# Patient Record
Sex: MALE | Race: WHITE | Employment: OTHER | URBAN - METROPOLITAN AREA
[De-identification: names, ages, dates, MRNs, and addresses within clinical notes are randomized per-mention and may not be internally consistent; named-entity substitution may affect disease eponyms.]

---

## 2017-05-06 ENCOUNTER — ED HISTORICAL/CONVERTED ENCOUNTER (OUTPATIENT)
Dept: OTHER | Age: 82
End: 2017-05-06

## 2018-03-13 ENCOUNTER — OP HISTORICAL/CONVERTED ENCOUNTER (OUTPATIENT)
Dept: OTHER | Age: 83
End: 2018-03-13

## 2018-03-22 ENCOUNTER — OP HISTORICAL/CONVERTED ENCOUNTER (OUTPATIENT)
Dept: OTHER | Age: 83
End: 2018-03-22

## 2018-11-22 ENCOUNTER — IP HISTORICAL/CONVERTED ENCOUNTER (OUTPATIENT)
Dept: OTHER | Age: 83
End: 2018-11-22

## 2019-01-15 ENCOUNTER — OP HISTORICAL/CONVERTED ENCOUNTER (OUTPATIENT)
Dept: OTHER | Age: 84
End: 2019-01-15

## 2019-06-06 ENCOUNTER — ED HISTORICAL/CONVERTED ENCOUNTER (OUTPATIENT)
Dept: OTHER | Age: 84
End: 2019-06-06

## 2019-10-20 ENCOUNTER — ED HISTORICAL/CONVERTED ENCOUNTER (OUTPATIENT)
Dept: OTHER | Age: 84
End: 2019-10-20

## 2020-03-28 ENCOUNTER — OP HISTORICAL/CONVERTED ENCOUNTER (OUTPATIENT)
Dept: OTHER | Age: 85
End: 2020-03-28

## 2020-05-07 ENCOUNTER — OP HISTORICAL/CONVERTED ENCOUNTER (OUTPATIENT)
Dept: OTHER | Age: 85
End: 2020-05-07

## 2020-07-07 ENCOUNTER — OP HISTORICAL/CONVERTED ENCOUNTER (OUTPATIENT)
Dept: OTHER | Age: 85
End: 2020-07-07

## 2021-01-01 ENCOUNTER — APPOINTMENT (OUTPATIENT)
Dept: CT IMAGING | Age: 86
End: 2021-01-01
Attending: EMERGENCY MEDICINE
Payer: MEDICARE

## 2021-01-01 ENCOUNTER — ANESTHESIA (OUTPATIENT)
Dept: SURGERY | Age: 86
DRG: 036 | End: 2021-01-01
Payer: MEDICARE

## 2021-01-01 ENCOUNTER — APPOINTMENT (OUTPATIENT)
Dept: GENERAL RADIOLOGY | Age: 86
End: 2021-01-01
Attending: FAMILY MEDICINE
Payer: MEDICARE

## 2021-01-01 ENCOUNTER — HOSPITAL ENCOUNTER (INPATIENT)
Age: 86
LOS: 1 days | Discharge: HOME | DRG: 036 | End: 2021-08-27
Payer: MEDICARE

## 2021-01-01 ENCOUNTER — ANESTHESIA EVENT (OUTPATIENT)
Dept: SURGERY | Age: 86
DRG: 036 | End: 2021-01-01
Payer: MEDICARE

## 2021-01-01 ENCOUNTER — APPOINTMENT (OUTPATIENT)
Dept: GENERAL RADIOLOGY | Age: 86
DRG: 036 | End: 2021-01-01
Payer: MEDICARE

## 2021-01-01 ENCOUNTER — HOSPITAL ENCOUNTER (EMERGENCY)
Age: 86
Discharge: HOME | End: 2021-08-11
Attending: FAMILY MEDICINE
Payer: MEDICARE

## 2021-01-01 ENCOUNTER — HOSPITAL ENCOUNTER (EMERGENCY)
Age: 86
Discharge: HOME | End: 2021-09-01
Attending: EMERGENCY MEDICINE
Payer: MEDICARE

## 2021-01-01 ENCOUNTER — HOSPITAL ENCOUNTER (OUTPATIENT)
Dept: PREADMISSION TESTING | Age: 86
Discharge: HOME | End: 2021-08-19
Payer: MEDICARE

## 2021-01-01 ENCOUNTER — HOSPITAL ENCOUNTER (EMERGENCY)
Age: 86
Discharge: HOME | End: 2021-08-28
Attending: EMERGENCY MEDICINE
Payer: MEDICARE

## 2021-01-01 ENCOUNTER — APPOINTMENT (OUTPATIENT)
Dept: CT IMAGING | Age: 86
End: 2021-01-01
Attending: FAMILY MEDICINE
Payer: MEDICARE

## 2021-01-01 VITALS
HEART RATE: 93 BPM | DIASTOLIC BLOOD PRESSURE: 103 MMHG | TEMPERATURE: 98.2 F | BODY MASS INDEX: 21.26 KG/M2 | WEIGHT: 120 LBS | HEIGHT: 63 IN | RESPIRATION RATE: 16 BRPM | OXYGEN SATURATION: 93 % | SYSTOLIC BLOOD PRESSURE: 176 MMHG

## 2021-01-01 VITALS
HEIGHT: 65 IN | BODY MASS INDEX: 19.78 KG/M2 | DIASTOLIC BLOOD PRESSURE: 74 MMHG | TEMPERATURE: 97.3 F | RESPIRATION RATE: 20 BRPM | HEART RATE: 77 BPM | WEIGHT: 118.7 LBS | SYSTOLIC BLOOD PRESSURE: 137 MMHG | OXYGEN SATURATION: 98 %

## 2021-01-01 VITALS
RESPIRATION RATE: 20 BRPM | WEIGHT: 118 LBS | TEMPERATURE: 98.5 F | HEIGHT: 65 IN | OXYGEN SATURATION: 90 % | SYSTOLIC BLOOD PRESSURE: 157 MMHG | BODY MASS INDEX: 19.66 KG/M2 | HEART RATE: 78 BPM | DIASTOLIC BLOOD PRESSURE: 82 MMHG

## 2021-01-01 VITALS
OXYGEN SATURATION: 96 % | RESPIRATION RATE: 18 BRPM | DIASTOLIC BLOOD PRESSURE: 79 MMHG | BODY MASS INDEX: 24.84 KG/M2 | WEIGHT: 135 LBS | HEART RATE: 70 BPM | SYSTOLIC BLOOD PRESSURE: 138 MMHG | TEMPERATURE: 98.2 F | HEIGHT: 62 IN

## 2021-01-01 VITALS
TEMPERATURE: 98 F | SYSTOLIC BLOOD PRESSURE: 119 MMHG | DIASTOLIC BLOOD PRESSURE: 60 MMHG | BODY MASS INDEX: 19.66 KG/M2 | HEIGHT: 65 IN | OXYGEN SATURATION: 90 % | HEART RATE: 75 BPM | RESPIRATION RATE: 19 BRPM | WEIGHT: 118 LBS

## 2021-01-01 DIAGNOSIS — G45.9 TIA (TRANSIENT ISCHEMIC ATTACK): Primary | ICD-10-CM

## 2021-01-01 DIAGNOSIS — I65.22 STENOSIS OF LEFT CAROTID ARTERY: Primary | ICD-10-CM

## 2021-01-01 DIAGNOSIS — Z09 POSTOP CHECK: Primary | ICD-10-CM

## 2021-01-01 DIAGNOSIS — Z48.812 POSTOP CAROTID ENDARTERECTOMY SURVEILLANCE, ENCOUNTER FOR: ICD-10-CM

## 2021-01-01 DIAGNOSIS — S01.01XA LACERATION OF SCALP, INITIAL ENCOUNTER: ICD-10-CM
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
## 2021-01-01 DIAGNOSIS — I65.22 MORE THAN 50 PERCENT STENOSIS OF LEFT INTERNAL CAROTID ARTERY: ICD-10-CM

## 2021-01-01 DIAGNOSIS — S00.03XA CONTUSION OF SCALP, INITIAL ENCOUNTER: ICD-10-CM

## 2021-01-01 LAB
ABO + RH BLD: NORMAL
ACT BLD: 323 SECS (ref 79–138)
ACT BLD: 390 SECS (ref 79–138)
ALBUMIN SERPL-MCNC: 3.4 G/DL (ref 3.5–5)
ALBUMIN SERPL-MCNC: 3.5 G/DL (ref 3.5–5)
ALBUMIN/GLOB SERPL: 0.9 {RATIO} (ref 1.1–2.2)
ALBUMIN/GLOB SERPL: 1.1 {RATIO} (ref 1.1–2.2)
ALP SERPL-CCNC: 125 U/L (ref 45–117)
ALP SERPL-CCNC: 92 U/L (ref 45–117)
ALT SERPL-CCNC: 17 U/L (ref 12–78)
ALT SERPL-CCNC: 23 U/L (ref 12–78)
ANION GAP SERPL CALC-SCNC: 7 MMOL/L (ref 5–15)
APTT PPP: 29.4 SEC (ref 22.1–31)
AST SERPL W P-5'-P-CCNC: 28 U/L (ref 15–37)
AST SERPL-CCNC: 25 U/L (ref 15–37)
ATRIAL RATE: 68 BPM
ATRIAL RATE: 76 BPM
BASOPHILS # BLD: 0 K/UL (ref 0–0.1)
BASOPHILS NFR BLD: 0 % (ref 0–1)
BASOPHILS NFR BLD: 0 % (ref 0–1)
BASOPHILS NFR BLD: 1 % (ref 0–1)
BILIRUB SERPL-MCNC: 0.4 MG/DL (ref 0.2–1)
BILIRUB SERPL-MCNC: 0.8 MG/DL (ref 0.2–1)
BLOOD GROUP ANTIBODIES SERPL: NORMAL
BUN SERPL-MCNC: 15 MG/DL (ref 6–20)
BUN SERPL-MCNC: 15 MG/DL (ref 6–20)
BUN SERPL-MCNC: 17 MG/DL (ref 6–20)
BUN/CREAT SERPL: 16 (ref 12–20)
BUN/CREAT SERPL: 17 (ref 12–20)
BUN/CREAT SERPL: 19 (ref 12–20)
CA-I BLD-MCNC: 9 MG/DL (ref 8.5–10.1)
CALCIUM SERPL-MCNC: 7.7 MG/DL (ref 8.5–10.1)
CALCIUM SERPL-MCNC: 8.9 MG/DL (ref 8.5–10.1)
CALCULATED P AXIS, ECG09: 69 DEGREES
CALCULATED P AXIS, ECG09: 71 DEGREES
CALCULATED R AXIS, ECG10: -23 DEGREES
CALCULATED R AXIS, ECG10: -28 DEGREES
CALCULATED T AXIS, ECG11: 101 DEGREES
CALCULATED T AXIS, ECG11: 99 DEGREES
CHLORIDE SERPL-SCNC: 100 MMOL/L (ref 97–108)
CHLORIDE SERPL-SCNC: 103 MMOL/L (ref 97–108)
CHLORIDE SERPL-SCNC: 99 MMOL/L (ref 97–108)
CHOLEST SERPL-MCNC: 132 MG/DL
CO2 SERPL-SCNC: 24 MMOL/L (ref 21–32)
CO2 SERPL-SCNC: 29 MMOL/L (ref 21–32)
CO2 SERPL-SCNC: 29 MMOL/L (ref 21–32)
CREAT SERPL-MCNC: 0.8 MG/DL (ref 0.7–1.3)
CREAT SERPL-MCNC: 0.93 MG/DL (ref 0.7–1.3)
CREAT SERPL-MCNC: 0.99 MG/DL (ref 0.7–1.3)
DIAGNOSIS, 93000: NORMAL
DIAGNOSIS, 93000: NORMAL
DIFFERENTIAL METHOD BLD: ABNORMAL
EOSINOPHIL # BLD: 0.1 K/UL (ref 0–0.4)
EOSINOPHIL # BLD: 0.2 K/UL (ref 0–0.4)
EOSINOPHIL # BLD: 0.2 K/UL (ref 0–0.4)
EOSINOPHIL NFR BLD: 1 % (ref 0–7)
EOSINOPHIL NFR BLD: 2 % (ref 0–7)
EOSINOPHIL NFR BLD: 3 % (ref 0–7)
ERYTHROCYTE [DISTWIDTH] IN BLOOD BY AUTOMATED COUNT: 13 % (ref 11.5–14.5)
ERYTHROCYTE [DISTWIDTH] IN BLOOD BY AUTOMATED COUNT: 13 % (ref 11.5–14.5)
ERYTHROCYTE [DISTWIDTH] IN BLOOD BY AUTOMATED COUNT: 13.1 % (ref 11.5–14.5)
ERYTHROCYTE [DISTWIDTH] IN BLOOD BY AUTOMATED COUNT: 13.3 % (ref 11.5–14.5)
GLOBULIN SER CALC-MCNC: 3.2 G/DL (ref 2–4)
GLOBULIN SER CALC-MCNC: 3.9 G/DL (ref 2–4)
GLUCOSE SERPL-MCNC: 118 MG/DL (ref 65–100)
GLUCOSE SERPL-MCNC: 122 MG/DL (ref 65–100)
GLUCOSE SERPL-MCNC: 90 MG/DL (ref 65–100)
HCT VFR BLD AUTO: 23.8 % (ref 36.6–50.3)
HCT VFR BLD AUTO: 26.3 % (ref 36.6–50.3)
HCT VFR BLD AUTO: 39.6 % (ref 36.6–50.3)
HCT VFR BLD AUTO: 41.9 % (ref 36.6–50.3)
HDLC SERPL-MCNC: 51 MG/DL
HDLC SERPL: 2.6 {RATIO} (ref 0–5)
HGB BLD-MCNC: 13 G/DL (ref 12.1–17)
HGB BLD-MCNC: 13.8 G/DL (ref 12.1–17)
HGB BLD-MCNC: 7.9 G/DL (ref 12.1–17)
HGB BLD-MCNC: 8.8 G/DL (ref 12.1–17)
IMM GRANULOCYTES # BLD AUTO: 0 K/UL (ref 0–0.04)
IMM GRANULOCYTES NFR BLD AUTO: 0 % (ref 0–0.5)
IMM GRANULOCYTES NFR BLD AUTO: 0 % (ref 0–0.5)
IMM GRANULOCYTES NFR BLD AUTO: 1 % (ref 0–0.5)
INR PPP: 1 (ref 0.9–1.1)
INR PPP: 1.1 (ref 0.9–1.1)
LDLC SERPL CALC-MCNC: 66.6 MG/DL (ref 0–100)
LYMPHOCYTES # BLD: 1.2 K/UL (ref 0.8–3.5)
LYMPHOCYTES # BLD: 1.7 K/UL (ref 0.8–3.5)
LYMPHOCYTES # BLD: 1.7 K/UL (ref 0.8–3.5)
LYMPHOCYTES NFR BLD: 17 % (ref 12–49)
LYMPHOCYTES NFR BLD: 24 % (ref 12–49)
LYMPHOCYTES NFR BLD: 34 % (ref 12–49)
MCH RBC QN AUTO: 32.8 PG (ref 26–34)
MCH RBC QN AUTO: 33.3 PG (ref 26–34)
MCH RBC QN AUTO: 33.5 PG (ref 26–34)
MCH RBC QN AUTO: 34.1 PG (ref 26–34)
MCHC RBC AUTO-ENTMCNC: 32.8 G/DL (ref 30–36.5)
MCHC RBC AUTO-ENTMCNC: 32.9 G/DL (ref 30–36.5)
MCHC RBC AUTO-ENTMCNC: 33.2 G/DL (ref 30–36.5)
MCHC RBC AUTO-ENTMCNC: 33.5 G/DL (ref 30–36.5)
MCV RBC AUTO: 100 FL (ref 80–99)
MCV RBC AUTO: 100.4 FL (ref 80–99)
MCV RBC AUTO: 101.7 FL (ref 80–99)
MCV RBC AUTO: 101.9 FL (ref 80–99)
MONOCYTES # BLD: 0.7 K/UL (ref 0–1)
MONOCYTES # BLD: 0.7 K/UL (ref 0–1)
MONOCYTES # BLD: 0.8 K/UL (ref 0–1)
MONOCYTES NFR BLD: 10 % (ref 5–13)
MONOCYTES NFR BLD: 12 % (ref 5–13)
MONOCYTES NFR BLD: 15 % (ref 5–13)
NEUTS SEG # BLD: 2.3 K/UL (ref 1.8–8)
NEUTS SEG # BLD: 4.2 K/UL (ref 1.8–8)
NEUTS SEG # BLD: 5 K/UL (ref 1.8–8)
NEUTS SEG NFR BLD: 46 % (ref 32–75)
NEUTS SEG NFR BLD: 62 % (ref 32–75)
NEUTS SEG NFR BLD: 72 % (ref 32–75)
NRBC # BLD: 0 K/UL (ref 0–0.01)
NRBC # BLD: 0 K/UL (ref 0–0.01)
NRBC BLD-RTO: 0 PER 100 WBC
NRBC BLD-RTO: 0 PER 100 WBC
P-R INTERVAL, ECG05: 208 MS
P-R INTERVAL, ECG05: 224 MS
PLATELET # BLD AUTO: 138 K/UL (ref 150–400)
PLATELET # BLD AUTO: 206 K/UL (ref 150–400)
PLATELET # BLD AUTO: 237 K/UL (ref 150–400)
PLATELET # BLD AUTO: 262 K/UL (ref 150–400)
PMV BLD AUTO: 10 FL (ref 8.9–12.9)
PMV BLD AUTO: 8.9 FL (ref 8.9–12.9)
PMV BLD AUTO: 9.3 FL (ref 8.9–12.9)
PMV BLD AUTO: 9.3 FL (ref 8.9–12.9)
POTASSIUM SERPL-SCNC: 3.9 MMOL/L (ref 3.5–5.1)
POTASSIUM SERPL-SCNC: 4.1 MMOL/L (ref 3.5–5.1)
POTASSIUM SERPL-SCNC: 4.2 MMOL/L (ref 3.5–5.1)
PROT SERPL-MCNC: 6.6 G/DL (ref 6.4–8.2)
PROT SERPL-MCNC: 7.4 G/DL (ref 6.4–8.2)
PROTHROMBIN TIME: 10.7 SEC (ref 9–11.1)
PROTHROMBIN TIME: 11.1 SEC (ref 9–11.1)
Q-T INTERVAL, ECG07: 394 MS
Q-T INTERVAL, ECG07: 412 MS
QRS DURATION, ECG06: 106 MS
QRS DURATION, ECG06: 98 MS
QTC CALCULATION (BEZET), ECG08: 438 MS
QTC CALCULATION (BEZET), ECG08: 443 MS
RBC # BLD AUTO: 2.37 M/UL (ref 4.1–5.7)
RBC # BLD AUTO: 2.58 M/UL (ref 4.1–5.7)
RBC # BLD AUTO: 3.96 M/UL (ref 4.1–5.7)
RBC # BLD AUTO: 4.12 M/UL (ref 4.1–5.7)
SODIUM SERPL-SCNC: 134 MMOL/L (ref 136–145)
SODIUM SERPL-SCNC: 135 MMOL/L (ref 136–145)
SODIUM SERPL-SCNC: 136 MMOL/L (ref 136–145)
SPECIMEN EXP DATE BLD: NORMAL
THERAPEUTIC RANGE,PTTT: NORMAL SECS (ref 58–77)
TRIGL SERPL-MCNC: 72 MG/DL (ref ?–150)
VENTRICULAR RATE, ECG03: 68 BPM
VENTRICULAR RATE, ECG03: 76 BPM
VLDLC SERPL CALC-MCNC: 14.4 MG/DL
WBC # BLD AUTO: 13.9 K/UL (ref 4.1–11.1)
WBC # BLD AUTO: 5 K/UL (ref 4.1–11.1)
WBC # BLD AUTO: 6.9 K/UL (ref 4.1–11.1)
WBC # BLD AUTO: 7.1 K/UL (ref 4.1–11.1)

## 2021-01-01 PROCEDURE — 65270000029 HC RM PRIVATE

## 2021-01-01 PROCEDURE — 74011250636 HC RX REV CODE- 250/636

## 2021-01-01 PROCEDURE — 70450 CT HEAD/BRAIN W/O DYE: CPT

## 2021-01-01 PROCEDURE — C1769 GUIDE WIRE: HCPCS

## 2021-01-01 PROCEDURE — 36415 COLL VENOUS BLD VENIPUNCTURE: CPT

## 2021-01-01 PROCEDURE — C1894 INTRO/SHEATH, NON-LASER: HCPCS

## 2021-01-01 PROCEDURE — 85025 COMPLETE CBC W/AUTO DIFF WBC: CPT

## 2021-01-01 PROCEDURE — 2709999900 HC NON-CHARGEABLE SUPPLY

## 2021-01-01 PROCEDURE — 77030002933 HC SUT MCRYL J&J -A

## 2021-01-01 PROCEDURE — 77030010507 HC ADH SKN DERMBND J&J -B

## 2021-01-01 PROCEDURE — 72125 CT NECK SPINE W/O DYE: CPT

## 2021-01-01 PROCEDURE — 77030028271 HC SRGFL HEMSTAT MTRX KT J&J -C

## 2021-01-01 PROCEDURE — 76010000153 HC OR TIME 1.5 TO 2 HR

## 2021-01-01 PROCEDURE — 77030019940 HC BLNKT HYPOTHRM STRY -B

## 2021-01-01 PROCEDURE — 74011000250 HC RX REV CODE- 250

## 2021-01-01 PROCEDURE — 74011250636 HC RX REV CODE- 250/636: Performed by: ANESTHESIOLOGY

## 2021-01-01 PROCEDURE — 85730 THROMBOPLASTIN TIME PARTIAL: CPT

## 2021-01-01 PROCEDURE — 99282 EMERGENCY DEPT VISIT SF MDM: CPT

## 2021-01-01 PROCEDURE — 74011000250 HC RX REV CODE- 250: Performed by: NURSE ANESTHETIST, CERTIFIED REGISTERED

## 2021-01-01 PROCEDURE — 99284 EMERGENCY DEPT VISIT MOD MDM: CPT

## 2021-01-01 PROCEDURE — 86901 BLOOD TYPING SEROLOGIC RH(D): CPT

## 2021-01-01 PROCEDURE — C1876 STENT, NON-COA/NON-COV W/DEL: HCPCS

## 2021-01-01 PROCEDURE — 80053 COMPREHEN METABOLIC PANEL: CPT

## 2021-01-01 PROCEDURE — 76210000006 HC OR PH I REC 0.5 TO 1 HR

## 2021-01-01 PROCEDURE — 03HY32Z INSERTION OF MONITORING DEVICE INTO UPPER ARTERY, PERCUTANEOUS APPROACH: ICD-10-PCS | Performed by: ANESTHESIOLOGY

## 2021-01-01 PROCEDURE — C1725 CATH, TRANSLUMIN NON-LASER: HCPCS

## 2021-01-01 PROCEDURE — 74011250637 HC RX REV CODE- 250/637

## 2021-01-01 PROCEDURE — 85610 PROTHROMBIN TIME: CPT

## 2021-01-01 PROCEDURE — 93005 ELECTROCARDIOGRAM TRACING: CPT

## 2021-01-01 PROCEDURE — 74011000636 HC RX REV CODE- 636

## 2021-01-01 PROCEDURE — 77030013058 HC DEV INFL ANGIO BSC -B

## 2021-01-01 PROCEDURE — 70498 CT ANGIOGRAPHY NECK: CPT

## 2021-01-01 PROCEDURE — 85347 COAGULATION TIME ACTIVATED: CPT

## 2021-01-01 PROCEDURE — 77030002996 HC SUT SLK J&J -A

## 2021-01-01 PROCEDURE — 77030040361 HC SLV COMPR DVT MDII -B

## 2021-01-01 PROCEDURE — 76060000034 HC ANESTHESIA 1.5 TO 2 HR

## 2021-01-01 PROCEDURE — 77030002986 HC SUT PROL J&J -A

## 2021-01-01 PROCEDURE — 77030005513 HC CATH URETH FOL11 MDII -B

## 2021-01-01 PROCEDURE — C1892 INTRO/SHEATH,FIXED,PEEL-AWAY: HCPCS

## 2021-01-01 PROCEDURE — 74011250636 HC RX REV CODE- 250/636: Performed by: NURSE ANESTHETIST, CERTIFIED REGISTERED

## 2021-01-01 PROCEDURE — 77030004561 HC CATH ANGI DX COBRA ANGI -B

## 2021-01-01 PROCEDURE — 80061 LIPID PANEL: CPT

## 2021-01-01 PROCEDURE — 037J3DZ DILATION OF LEFT COMMON CAROTID ARTERY WITH INTRALUMINAL DEVICE, PERCUTANEOUS APPROACH: ICD-10-PCS

## 2021-01-01 PROCEDURE — 85027 COMPLETE CBC AUTOMATED: CPT

## 2021-01-01 PROCEDURE — 77030040922 HC BLNKT HYPOTHRM STRY -A

## 2021-01-01 PROCEDURE — 80048 BASIC METABOLIC PNL TOTAL CA: CPT

## 2021-01-01 PROCEDURE — 74011000636 HC RX REV CODE- 636: Performed by: FAMILY MEDICINE

## 2021-01-01 PROCEDURE — 77030031139 HC SUT VCRL2 J&J -A

## 2021-01-01 PROCEDURE — 71045 X-RAY EXAM CHEST 1 VIEW: CPT

## 2021-01-01 PROCEDURE — 74011000258 HC RX REV CODE- 258: Performed by: NURSE ANESTHETIST, CERTIFIED REGISTERED

## 2021-01-01 DEVICE — 9 MM X 40 MM
Type: IMPLANTABLE DEVICE | Site: CAROTID | Status: FUNCTIONAL
Brand: ENROUTE TRANSCAROTID STENT

## 2021-01-01 RX ORDER — ASPIRIN 81 MG/1
81 TABLET ORAL DAILY
COMMUNITY

## 2021-01-01 RX ORDER — SODIUM CHLORIDE, SODIUM LACTATE, POTASSIUM CHLORIDE, CALCIUM CHLORIDE 600; 310; 30; 20 MG/100ML; MG/100ML; MG/100ML; MG/100ML
125 INJECTION, SOLUTION INTRAVENOUS CONTINUOUS
Status: DISCONTINUED | OUTPATIENT
Start: 2021-01-01 | End: 2021-01-01

## 2021-01-01 RX ORDER — MIDODRINE HYDROCHLORIDE 5 MG/1
10 TABLET ORAL
Status: DISCONTINUED | OUTPATIENT
Start: 2021-01-01 | End: 2021-01-01 | Stop reason: HOSPADM

## 2021-01-01 RX ORDER — HYDROMORPHONE HYDROCHLORIDE 1 MG/ML
.25-1 INJECTION, SOLUTION INTRAMUSCULAR; INTRAVENOUS; SUBCUTANEOUS
Status: DISCONTINUED | OUTPATIENT
Start: 2021-01-01 | End: 2021-01-01

## 2021-01-01 RX ORDER — SODIUM CHLORIDE, SODIUM LACTATE, POTASSIUM CHLORIDE, CALCIUM CHLORIDE 600; 310; 30; 20 MG/100ML; MG/100ML; MG/100ML; MG/100ML
75 INJECTION, SOLUTION INTRAVENOUS CONTINUOUS
Status: DISCONTINUED | OUTPATIENT
Start: 2021-01-01 | End: 2021-01-01 | Stop reason: HOSPADM

## 2021-01-01 RX ORDER — LIDOCAINE HYDROCHLORIDE 10 MG/ML
0.1 INJECTION, SOLUTION EPIDURAL; INFILTRATION; INTRACAUDAL; PERINEURAL AS NEEDED
Status: DISCONTINUED | OUTPATIENT
Start: 2021-01-01 | End: 2021-01-01 | Stop reason: HOSPADM

## 2021-01-01 RX ORDER — ASPIRIN 81 MG/1
81 TABLET ORAL DAILY
Status: DISCONTINUED | OUTPATIENT
Start: 2021-01-01 | End: 2021-01-01 | Stop reason: HOSPADM

## 2021-01-01 RX ORDER — PHENYLEPHRINE HCL IN 0.9% NACL 0.4MG/10ML
SYRINGE (ML) INTRAVENOUS AS NEEDED
Status: DISCONTINUED | OUTPATIENT
Start: 2021-01-01 | End: 2021-01-01 | Stop reason: HOSPADM

## 2021-01-01 RX ORDER — DULOXETIN HYDROCHLORIDE 30 MG/1
30 CAPSULE, DELAYED RELEASE ORAL DAILY
Status: DISCONTINUED | OUTPATIENT
Start: 2021-01-01 | End: 2021-01-01 | Stop reason: HOSPADM

## 2021-01-01 RX ORDER — MIDODRINE HYDROCHLORIDE 10 MG/1
10 TABLET ORAL
Qty: 30 TABLET | Refills: 0 | Status: SHIPPED | OUTPATIENT
Start: 2021-01-01 | End: 2021-01-01

## 2021-01-01 RX ORDER — HYDROCODONE BITARTRATE AND ACETAMINOPHEN 5; 325 MG/1; MG/1
1 TABLET ORAL
Status: DISCONTINUED | OUTPATIENT
Start: 2021-01-01 | End: 2021-01-01 | Stop reason: HOSPADM

## 2021-01-01 RX ORDER — SODIUM CHLORIDE 0.9 % (FLUSH) 0.9 %
5-40 SYRINGE (ML) INJECTION AS NEEDED
Status: DISCONTINUED | OUTPATIENT
Start: 2021-01-01 | End: 2021-01-01 | Stop reason: HOSPADM

## 2021-01-01 RX ORDER — SODIUM CHLORIDE 0.9 % (FLUSH) 0.9 %
5-40 SYRINGE (ML) INJECTION EVERY 8 HOURS
Status: DISCONTINUED | OUTPATIENT
Start: 2021-01-01 | End: 2021-01-01 | Stop reason: HOSPADM

## 2021-01-01 RX ORDER — ONDANSETRON 2 MG/ML
4 INJECTION INTRAMUSCULAR; INTRAVENOUS AS NEEDED
Status: DISCONTINUED | OUTPATIENT
Start: 2021-01-01 | End: 2021-01-01

## 2021-01-01 RX ORDER — GABAPENTIN 300 MG/1
300 CAPSULE ORAL
Status: DISCONTINUED | OUTPATIENT
Start: 2021-01-01 | End: 2021-01-01 | Stop reason: HOSPADM

## 2021-01-01 RX ORDER — GLYCOPYRROLATE 0.2 MG/ML
INJECTION INTRAMUSCULAR; INTRAVENOUS AS NEEDED
Status: DISCONTINUED | OUTPATIENT
Start: 2021-01-01 | End: 2021-01-01 | Stop reason: HOSPADM

## 2021-01-01 RX ORDER — DIPHENHYDRAMINE HYDROCHLORIDE 50 MG/ML
12.5 INJECTION, SOLUTION INTRAMUSCULAR; INTRAVENOUS AS NEEDED
Status: DISCONTINUED | OUTPATIENT
Start: 2021-01-01 | End: 2021-01-01

## 2021-01-01 RX ORDER — RANOLAZINE 500 MG/1
500 TABLET, EXTENDED RELEASE ORAL 2 TIMES DAILY
Status: DISCONTINUED | OUTPATIENT
Start: 2021-01-01 | End: 2021-01-01 | Stop reason: HOSPADM

## 2021-01-01 RX ORDER — CHOLECALCIFEROL (VITAMIN D3) 125 MCG
2000 CAPSULE ORAL
COMMUNITY

## 2021-01-01 RX ORDER — SODIUM CHLORIDE, SODIUM LACTATE, POTASSIUM CHLORIDE, CALCIUM CHLORIDE 600; 310; 30; 20 MG/100ML; MG/100ML; MG/100ML; MG/100ML
100 INJECTION, SOLUTION INTRAVENOUS CONTINUOUS
Status: DISCONTINUED | OUTPATIENT
Start: 2021-01-01 | End: 2021-01-01 | Stop reason: HOSPADM

## 2021-01-01 RX ORDER — SODIUM CHLORIDE, SODIUM LACTATE, POTASSIUM CHLORIDE, CALCIUM CHLORIDE 600; 310; 30; 20 MG/100ML; MG/100ML; MG/100ML; MG/100ML
100 INJECTION, SOLUTION INTRAVENOUS CONTINUOUS
Status: DISCONTINUED | OUTPATIENT
Start: 2021-01-01 | End: 2021-01-01

## 2021-01-01 RX ORDER — MIRTAZAPINE 15 MG/1
15 TABLET, FILM COATED ORAL
Status: DISCONTINUED | OUTPATIENT
Start: 2021-01-01 | End: 2021-01-01 | Stop reason: HOSPADM

## 2021-01-01 RX ORDER — MIRTAZAPINE 15 MG/1
15 TABLET, FILM COATED ORAL
COMMUNITY

## 2021-01-01 RX ORDER — DEXAMETHASONE SODIUM PHOSPHATE 4 MG/ML
INJECTION, SOLUTION INTRA-ARTICULAR; INTRALESIONAL; INTRAMUSCULAR; INTRAVENOUS; SOFT TISSUE AS NEEDED
Status: DISCONTINUED | OUTPATIENT
Start: 2021-01-01 | End: 2021-01-01 | Stop reason: HOSPADM

## 2021-01-01 RX ORDER — HYDROCODONE BITARTRATE AND ACETAMINOPHEN 5; 325 MG/1; MG/1
1 TABLET ORAL
Qty: 20 TABLET | Refills: 0 | Status: SHIPPED | OUTPATIENT
Start: 2021-01-01 | End: 2021-01-01

## 2021-01-01 RX ORDER — HEPARIN SODIUM 1000 [USP'U]/ML
INJECTION, SOLUTION INTRAVENOUS; SUBCUTANEOUS AS NEEDED
Status: DISCONTINUED | OUTPATIENT
Start: 2021-01-01 | End: 2021-01-01 | Stop reason: HOSPADM

## 2021-01-01 RX ORDER — ONDANSETRON 2 MG/ML
INJECTION INTRAMUSCULAR; INTRAVENOUS AS NEEDED
Status: DISCONTINUED | OUTPATIENT
Start: 2021-01-01 | End: 2021-01-01 | Stop reason: HOSPADM

## 2021-01-01 RX ORDER — CLOPIDOGREL BISULFATE 75 MG/1
75 TABLET ORAL DAILY
Qty: 20 TABLET | Refills: 0 | Status: SHIPPED | OUTPATIENT
Start: 2021-01-01 | End: 2021-01-01

## 2021-01-01 RX ORDER — PROPOFOL 10 MG/ML
INJECTION, EMULSION INTRAVENOUS AS NEEDED
Status: DISCONTINUED | OUTPATIENT
Start: 2021-01-01 | End: 2021-01-01 | Stop reason: HOSPADM

## 2021-01-01 RX ORDER — PROPOFOL 10 MG/ML
INJECTION, EMULSION INTRAVENOUS
Status: DISCONTINUED | OUTPATIENT
Start: 2021-01-01 | End: 2021-01-01 | Stop reason: HOSPADM

## 2021-01-01 RX ORDER — ESMOLOL HYDROCHLORIDE 10 MG/ML
INJECTION INTRAVENOUS AS NEEDED
Status: DISCONTINUED | OUTPATIENT
Start: 2021-01-01 | End: 2021-01-01 | Stop reason: HOSPADM

## 2021-01-01 RX ORDER — CLOPIDOGREL BISULFATE 75 MG/1
75 TABLET ORAL DAILY
Status: DISCONTINUED | OUTPATIENT
Start: 2021-01-01 | End: 2021-01-01 | Stop reason: HOSPADM

## 2021-01-01 RX ORDER — FENTANYL CITRATE 50 UG/ML
INJECTION, SOLUTION INTRAMUSCULAR; INTRAVENOUS AS NEEDED
Status: DISCONTINUED | OUTPATIENT
Start: 2021-01-01 | End: 2021-01-01 | Stop reason: HOSPADM

## 2021-01-01 RX ORDER — ATORVASTATIN CALCIUM 20 MG/1
20 TABLET, FILM COATED ORAL DAILY
Status: DISCONTINUED | OUTPATIENT
Start: 2021-01-01 | End: 2021-01-01 | Stop reason: HOSPADM

## 2021-01-01 RX ORDER — LIDOCAINE HYDROCHLORIDE 10 MG/ML
INJECTION INFILTRATION; PERINEURAL AS NEEDED
Status: DISCONTINUED | OUTPATIENT
Start: 2021-01-01 | End: 2021-01-01 | Stop reason: HOSPADM

## 2021-01-01 RX ORDER — ATORVASTATIN CALCIUM 20 MG/1
20 TABLET, FILM COATED ORAL DAILY
COMMUNITY

## 2021-01-01 RX ORDER — CLOPIDOGREL BISULFATE 75 MG/1
75 TABLET ORAL DAILY
Qty: 30 TABLET | Refills: 3 | Status: SHIPPED | OUTPATIENT
Start: 2021-01-01 | End: 2022-01-01

## 2021-01-01 RX ADMIN — Medication 40 MCG: at 14:03

## 2021-01-01 RX ADMIN — ESMOLOL HYDROCHLORIDE 2.5 MG: 100 INJECTION, SOLUTION INTRAVENOUS at 13:34

## 2021-01-01 RX ADMIN — SODIUM CHLORIDE, POTASSIUM CHLORIDE, SODIUM LACTATE AND CALCIUM CHLORIDE 100 ML/HR: 600; 310; 30; 20 INJECTION, SOLUTION INTRAVENOUS at 00:00

## 2021-01-01 RX ADMIN — Medication 40 MCG: at 14:06

## 2021-01-01 RX ADMIN — PHENYLEPHRINE HYDROCHLORIDE 30 MCG/MIN: 10 INJECTION INTRAVENOUS at 09:00

## 2021-01-01 RX ADMIN — ONDANSETRON HYDROCHLORIDE 4 MG: 2 SOLUTION INTRAMUSCULAR; INTRAVENOUS at 12:55

## 2021-01-01 RX ADMIN — Medication 40 MCG: at 14:01

## 2021-01-01 RX ADMIN — SODIUM CHLORIDE, POTASSIUM CHLORIDE, SODIUM LACTATE AND CALCIUM CHLORIDE 75 ML/HR: 600; 310; 30; 20 INJECTION, SOLUTION INTRAVENOUS at 11:24

## 2021-01-01 RX ADMIN — HYDROCODONE BITARTRATE AND ACETAMINOPHEN 1 TABLET: 5; 325 TABLET ORAL at 02:55

## 2021-01-01 RX ADMIN — PHENYLEPHRINE HYDROCHLORIDE 10 MCG/MIN: 10 INJECTION INTRAVENOUS at 13:59

## 2021-01-01 RX ADMIN — Medication 10 ML: at 18:00

## 2021-01-01 RX ADMIN — FENTANYL CITRATE 25 MCG: 50 INJECTION, SOLUTION INTRAMUSCULAR; INTRAVENOUS at 12:25

## 2021-01-01 RX ADMIN — Medication 80 MCG: at 14:08

## 2021-01-01 RX ADMIN — SODIUM CHLORIDE 19.3 MG/HR: 9 INJECTION, SOLUTION INTRAVENOUS at 12:49

## 2021-01-01 RX ADMIN — Medication 80 MCG: at 14:07

## 2021-01-01 RX ADMIN — Medication 80 MCG: at 14:09

## 2021-01-01 RX ADMIN — HYDROMORPHONE HYDROCHLORIDE 0.25 MG: 1 INJECTION, SOLUTION INTRAMUSCULAR; INTRAVENOUS; SUBCUTANEOUS at 16:46

## 2021-01-01 RX ADMIN — IOPAMIDOL 100 ML: 755 INJECTION, SOLUTION INTRAVENOUS at 11:46

## 2021-01-01 RX ADMIN — MIDODRINE HYDROCHLORIDE 10 MG: 5 TABLET ORAL at 11:31

## 2021-01-01 RX ADMIN — ATORVASTATIN CALCIUM 20 MG: 20 TABLET, FILM COATED ORAL at 09:24

## 2021-01-01 RX ADMIN — CEFAZOLIN SODIUM 2 G: 1 POWDER, FOR SOLUTION INTRAMUSCULAR; INTRAVENOUS at 12:53

## 2021-01-01 RX ADMIN — MIDODRINE HYDROCHLORIDE 10 MG: 5 TABLET ORAL at 09:24

## 2021-01-01 RX ADMIN — GABAPENTIN 300 MG: 300 CAPSULE ORAL at 20:02

## 2021-01-01 RX ADMIN — Medication 10 ML: at 06:52

## 2021-01-01 RX ADMIN — ASPIRIN 81 MG: 81 TABLET, COATED ORAL at 09:24

## 2021-01-01 RX ADMIN — PROPOFOL 20 MG: 10 INJECTION, EMULSION INTRAVENOUS at 13:42

## 2021-01-01 RX ADMIN — PROPOFOL 60 MCG/KG/MIN: 10 INJECTION, EMULSION INTRAVENOUS at 12:49

## 2021-01-01 RX ADMIN — PROPOFOL 10 MG: 10 INJECTION, EMULSION INTRAVENOUS at 13:27

## 2021-01-01 RX ADMIN — HYDROCODONE BITARTRATE AND ACETAMINOPHEN 1 TABLET: 5; 325 TABLET ORAL at 21:07

## 2021-01-01 RX ADMIN — RANOLAZINE 500 MG: 500 TABLET, FILM COATED, EXTENDED RELEASE ORAL at 19:49

## 2021-01-01 RX ADMIN — DULOXETINE HYDROCHLORIDE 30 MG: 30 CAPSULE, DELAYED RELEASE ORAL at 19:49

## 2021-01-01 RX ADMIN — CLOPIDOGREL BISULFATE 75 MG: 75 TABLET ORAL at 09:24

## 2021-01-01 RX ADMIN — Medication 40 MCG: at 14:05

## 2021-01-01 RX ADMIN — Medication 20 MCG: at 14:02

## 2021-01-01 RX ADMIN — GLYCOPYRROLATE 0.2 MG: 0.2 INJECTION INTRAMUSCULAR; INTRAVENOUS at 13:25

## 2021-01-01 RX ADMIN — RANOLAZINE 500 MG: 500 TABLET, FILM COATED, EXTENDED RELEASE ORAL at 09:24

## 2021-01-01 RX ADMIN — PHENYLEPHRINE HYDROCHLORIDE 30 MCG/MIN: 10 INJECTION INTRAVENOUS at 18:46

## 2021-01-01 RX ADMIN — Medication 40 MCG: at 13:58

## 2021-01-01 RX ADMIN — Medication 40 MCG: at 13:40

## 2021-01-01 RX ADMIN — HEPARIN SODIUM 6500 UNITS: 1000 INJECTION, SOLUTION INTRAVENOUS; SUBCUTANEOUS at 13:16

## 2021-01-01 RX ADMIN — PROPOFOL 10 MG: 10 INJECTION, EMULSION INTRAVENOUS at 13:31

## 2021-01-01 RX ADMIN — PROPOFOL 10 MG: 10 INJECTION, EMULSION INTRAVENOUS at 13:28

## 2021-01-01 RX ADMIN — Medication 80 MCG: at 14:18

## 2021-01-01 RX ADMIN — PHENYLEPHRINE HYDROCHLORIDE 10 MCG/MIN: 10 INJECTION INTRAVENOUS at 10:13

## 2021-01-01 RX ADMIN — FENTANYL CITRATE 25 MCG: 50 INJECTION, SOLUTION INTRAMUSCULAR; INTRAVENOUS at 12:17

## 2021-01-01 RX ADMIN — FENTANYL CITRATE 25 MCG: 50 INJECTION, SOLUTION INTRAMUSCULAR; INTRAVENOUS at 13:04

## 2021-01-01 RX ADMIN — DEXAMETHASONE SODIUM PHOSPHATE 4 MG: 4 INJECTION, SOLUTION INTRAMUSCULAR; INTRAVENOUS at 12:54

## 2021-01-01 RX ADMIN — SODIUM CHLORIDE, POTASSIUM CHLORIDE, SODIUM LACTATE AND CALCIUM CHLORIDE: 600; 310; 30; 20 INJECTION, SOLUTION INTRAVENOUS at 12:36

## 2021-01-01 RX ADMIN — PROPOFOL 10 MG: 10 INJECTION, EMULSION INTRAVENOUS at 13:35

## 2021-04-07 ENCOUNTER — TRANSCRIBE ORDER (OUTPATIENT)
Dept: SCHEDULING | Age: 86
End: 2021-04-07

## 2021-04-07 DIAGNOSIS — C61 MALIGNANT NEOPLASM OF PROSTATE (HCC): Primary | ICD-10-CM

## 2021-05-24 ENCOUNTER — APPOINTMENT (OUTPATIENT)
Dept: CT IMAGING | Age: 86
End: 2021-05-24
Attending: EMERGENCY MEDICINE
Payer: MEDICARE

## 2021-05-24 ENCOUNTER — HOSPITAL ENCOUNTER (EMERGENCY)
Age: 86
Discharge: HOME | End: 2021-05-24
Attending: EMERGENCY MEDICINE
Payer: MEDICARE

## 2021-05-24 VITALS
WEIGHT: 118 LBS | RESPIRATION RATE: 14 BRPM | TEMPERATURE: 98.6 F | HEART RATE: 84 BPM | DIASTOLIC BLOOD PRESSURE: 88 MMHG | BODY MASS INDEX: 20.91 KG/M2 | HEIGHT: 63 IN | SYSTOLIC BLOOD PRESSURE: 161 MMHG
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
## 2021-05-24 DIAGNOSIS — S01.01XA LACERATION OF OCCIPITAL SCALP, INITIAL ENCOUNTER: ICD-10-CM

## 2021-05-24 PROCEDURE — 75810000293 HC SIMP/SUPERF WND  RPR

## 2021-05-24 PROCEDURE — 99282 EMERGENCY DEPT VISIT SF MDM: CPT

## 2021-05-24 PROCEDURE — 70450 CT HEAD/BRAIN W/O DYE: CPT

## 2021-05-24 RX ORDER — RANOLAZINE 500 MG/1
500 TABLET, EXTENDED RELEASE ORAL 2 TIMES DAILY
COMMUNITY

## 2021-05-24 RX ORDER — DULOXETIN HYDROCHLORIDE 30 MG/1
30 CAPSULE, DELAYED RELEASE ORAL DAILY
COMMUNITY

## 2021-05-24 RX ORDER — GABAPENTIN 300 MG/1
CAPSULE ORAL
COMMUNITY
End: 2022-01-01

## 2021-05-24 RX ORDER — DOXAZOSIN 4 MG/1
4 TABLET ORAL DAILY
COMMUNITY

## 2021-05-24 NOTE — ED PROVIDER NOTES
EMERGENCY DEPARTMENT HISTORY AND PHYSICAL EXAM      Date: 5/24/2021  Patient Name: Bailee Iraheta    History of Presenting Illness     Chief Complaint   Patient presents with   [facility] Laceration       History Provided By: Patient    HPI: Bailee Iraheta, 80 y.o. male   presents to the ED with cc of level fall. Patient had a ground-level fall after losing balance and fell in his head just prior to arrival.  Patient denies LOC before or after the fall. No headache. No neck pain. No extremity pain. No other injury. Patient sustained a laceration on scalp. Patient anticoagulation. PCP: Faith Schulte MD    No current facility-administered medications on file prior to encounter. Current Outpatient Medications on File Prior to Encounter   Medication Sig Dispense Refill    ranolazine ER (RANEXA) 500 mg SR tablet Take 500 mg by mouth two (2) times a day.  gabapentin (NEURONTIN) 300 mg capsule gabapentin 300 mg capsule      DULoxetine (CYMBALTA) 30 mg capsule duloxetine 30 mg capsule,delayed release      doxazosin (CARDURA) 4 mg tablet doxazosin 4 mg tablet      hydrochlorothiazide (MICROZIDE PO) hydrochlorothiazide         Past History     Past Medical History:  No past medical history on file. Past Surgical History:  No past surgical history on file. Family History:  No family history on file. Social History:  Social History     Tobacco Use    Smoking status: Not on file   Substance Use Topics    Alcohol use: Not on file    Drug use: Not on file       Allergies:  No Known Allergies      Review of Systems   Review of Systems   Constitutional: Negative for chills and fever. HENT: Negative for sore throat. Eyes: Negative for discharge. Respiratory: Negative for cough. Cardiovascular: Negative for chest pain. Gastrointestinal: Negative for abdominal pain. Genitourinary: Negative for difficulty urinating. Musculoskeletal: Negative for arthralgias. Skin: Negative for rash. Neurological: Negative for headaches. Psychiatric/Behavioral: Negative for confusion. Physical Exam   Physical Exam  Vitals and nursing note reviewed. Constitutional:       Appearance: Normal appearance. HENT:      Head: Normocephalic and atraumatic. Comments: Except for 2 cm linear laceration occipital scalp     Nose: No congestion. Mouth/Throat:      Mouth: Mucous membranes are moist.   Eyes:      General: No scleral icterus. Conjunctiva/sclera: Conjunctivae normal.   Cardiovascular:      Rate and Rhythm: Normal rate and regular rhythm. Heart sounds: Normal heart sounds. Pulmonary:      Effort: Pulmonary effort is normal.      Breath sounds: Normal breath sounds. Abdominal:      General: Abdomen is flat. Bowel sounds are normal.      Palpations: Abdomen is soft. Musculoskeletal:         General: No swelling or deformity. Cervical back: Neck supple. Right lower leg: No edema. Left lower leg: No edema. Skin:     General: Skin is warm and dry. Neurological:      General: No focal deficit present. Mental Status: He is alert. Psychiatric:         Mood and Affect: Mood normal.         Diagnostic Study Results     Labs -   No results found for this or any previous visit (from the past 12 hour(s)). Radiologic Studies -   CT HEAD WO CONT   Final Result   Negative exam.               Dose reduction: All CT scans at this facility are performed using radiation dose   reduction optimization techniques as appropriate to a performed exam, including   the following: Automated exposure control (8 cc), adjustment of the MAA and/or   KUB according to the patient's size, or the patient's use of iterative   reconstruction techniques (ASIR).               CT Results  (Last 48 hours)               05/24/21 1906  CT HEAD WO CONT Final result    Impression:  Negative exam.                   Dose reduction: All CT scans at this facility are performed using radiation dose reduction optimization techniques as appropriate to a performed exam, including   the following: Automated exposure control (8 cc), adjustment of the MAA and/or   KUB according to the patient's size, or the patient's use of iterative   reconstruction techniques (ASIR). Narrative:  CT SCAN OF THE HEAD WITHOUT IV CONTRAST:       INDICATION: Head injury       COMPARISON:None       The exam is negative for hemorrhage, acute infarct, mass lesion, midline shift   or abnormal extra-axial fluid collection. There is central and cortical atrophy   with chronic small vessel ischemic change in the deep periventricular white   matter. The ventricles and cisterns are normal.  The orbits, paranasal sinuses   and temporal bones are normal. No fracture is identified. CXR Results  (Last 48 hours)    None            Medical Decision Making   I am the first provider for this patient. I reviewed the vital signs, available nursing notes, past medical history, past surgical history, family history and social history. Vital Signs-Reviewed the patient's vital signs. Patient Vitals for the past 12 hrs:   Temp Pulse Resp BP   05/24/21 1833 98.6 °F (37 °C) 84 14 (!) 161/88       Records Reviewed:     Provider Notes (Medical Decision Making):       ED Course:   Initial assessment performed. The patients presenting problems have been discussed, and they are in agreement with the care plan formulated and outlined with them. I have encouraged them to ask questions as they arise throughout their visit. PROCEDURES  Laceration repair  2 cm linear laceration of the occipital scalp  Wound was cleaned with a Betadine  Wound was explored that showed no foreign body or active bleeding  The wound was approximated with staples x2  Total procedure time 5 minutes  Patient tolerated procedure well without complication    Disposition: Condition stable   DC- Adult Discharges:  All of the diagnostic tests were reviewed and questions answered. Diagnosis, care plan and treatment options were discussed. understand instructions and will follow up as directed. The patients results have been reviewed with them. They have been counseled regarding their diagnosis. The patient verbally convey understanding and agreement of the signs, symptoms, diagnosis, treatment and prognosis and additionally agrees to follow up as recommended. They also agree with the care-plan and convey that all of their questions have been answered. I have also put together some discharge instructions for them that include: 1) educational information regarding their diagnosis, 2) how to care for their diagnosis at home, as well a 3) list of reasons why they would want to return to the ED prior to their follow-up appointment, should their condition change. PLAN:  1. Current Discharge Medication List        2. Follow-up Information     Follow up With Specialties Details Why Contact Info    Follow up with your primary care physician  Schedule an appointment as soon as possible for a visit in 3 days As needed         Return to ED if worse     Diagnosis     Clinical Impression:   1. Fall from ground level    2. Laceration of occipital scalp, initial encounter        Please note that this dictation was completed with [email or web address], the computer voice recognition software. Quite often unanticipated grammatical, syntax, homophones, and other interpretive errors are inadvertently transcribed by the computer software. Please disregard these errors. Please excuse any errors that have escaped final proofreading. Thank you.

## 2021-07-02 ENCOUNTER — TRANSCRIBE ORDER (OUTPATIENT)
Dept: SCHEDULING | Age: 86
End: 2021-07-02

## 2021-07-02 DIAGNOSIS — C61 PROSTATE CANCER (HCC): Primary | ICD-10-CM

## 2021-07-26 ENCOUNTER — HOSPITAL ENCOUNTER (OUTPATIENT)
Dept: NUCLEAR MEDICINE | Age: 86
Discharge: HOME | End: 2021-07-26
Payer: MEDICARE

## 2021-07-26 DIAGNOSIS — C61 MALIGNANT NEOPLASM OF PROSTATE (HCC): ICD-10-CM

## 2021-07-26 PROCEDURE — 78306 BONE IMAGING WHOLE BODY: CPT

## 2021-08-11 NOTE — ED PROVIDER NOTES
EMERGENCY DEPARTMENT HISTORY AND PHYSICAL EXAM      Date: 8/11/2021  Patient Name: Eleno Fields      History of Presenting Illness     Chief Complaint   Patient presents with    Dysarthria       History Provided By: Patient and Patient's Wife    HPI: Eleno Fields, 80 y.o. male presents to the ED with cc of trouble speaking. Around 930 this morning patient was having difficulty speaking getting words out. It lasted for about an hour then resolved on its own. During the same time it was noticed that the patient had trouble keeping his balance moving the right lower extremity. Since resolved and has not returned. He has a history of right-sided weakness from past TIAs. No headache, dizziness, chest pain, shortness of breath, nausea, vomiting, numbness tingling in the extremities, visual disturbances, and all other symptoms are negative. There are no other complaints, changes, or physical findings at this time. PCP: Rikki Posadas MD    Current Outpatient Medications   Medication Sig Dispense Refill    mirtazapine (REMERON) 15 mg tablet Take 15 mg by mouth nightly.  atorvastatin (LIPITOR) 20 mg tablet Take 20 mg by mouth daily.  apixaban (ELIQUIS) 2.5 mg tablet Take 2.5 mg by mouth two (2) times a day.  clopidogreL (Plavix) 75 mg tab Take 1 Tablet by mouth daily for 20 days. 20 Tablet 0    ranolazine ER (RANEXA) 500 mg SR tablet Take 500 mg by mouth two (2) times a day.  gabapentin (NEURONTIN) 300 mg capsule gabapentin 300 mg capsule      DULoxetine (CYMBALTA) 30 mg capsule duloxetine 30 mg capsule,delayed release      doxazosin (CARDURA) 4 mg tablet doxazosin 4 mg tablet      hydrochlorothiazide (MICROZIDE PO) hydrochlorothiazide         Past History     Past Medical History:  Past Medical History:   Diagnosis Date    Atrial fibrillation ([facility] [address]     Hypercholesteremia     Hypertension     Neuropathy        Past Surgical History:  No past surgical history on file.     Family History:  No family history on file. Social History:  Social History     Tobacco Use    Smoking status: Former Smoker    Smokeless tobacco: Never Used   Substance Use Topics    Alcohol use: Not on file    Drug use: Never       Allergies:  No Known Allergies      Review of Systems     Review of Systems   Constitutional: Negative for chills and fever. HENT: Negative for congestion and sore throat. Eyes: Negative for photophobia and visual disturbance. Respiratory: Negative for cough and shortness of breath. Cardiovascular: Negative for chest pain and palpitations. Gastrointestinal: Negative for nausea and vomiting. Genitourinary: Negative for dysuria and flank pain. Musculoskeletal: Negative for arthralgias, back pain and myalgias. Skin: Negative for rash and wound. Allergic/Immunologic: Negative for environmental allergies and food allergies. Neurological: Positive for speech difficulty and weakness. Negative for light-headedness and headaches. All other systems reviewed and are negative. Physical Exam     Physical Exam  Vitals and nursing note reviewed. Constitutional:       Appearance: Normal appearance. He is normal weight. HENT:      Head: Normocephalic and atraumatic. Eyes:      Extraocular Movements: Extraocular movements intact. Conjunctiva/sclera: Conjunctivae normal.   Cardiovascular:      Rate and Rhythm: Normal rate and regular rhythm. Pulses: Normal pulses. Heart sounds: Normal heart sounds. Pulmonary:      Effort: Pulmonary effort is normal.      Breath sounds: Normal breath sounds. Abdominal:      General: Abdomen is flat. Bowel sounds are normal. There is no distension. Palpations: Abdomen is soft. Tenderness: There is no abdominal tenderness. There is no right CVA tenderness, left CVA tenderness or guarding. Musculoskeletal:         General: No swelling. Normal range of motion. Skin:     General: Skin is warm.       Capillary Refill: Capillary refill takes less than 2 seconds. Neurological:      General: No focal deficit present. Mental Status: He is alert and oriented to person, place, and time. Cranial Nerves: Cranial nerves are intact. Sensory: Sensation is intact. Motor: Motor function is intact. Coordination: Coordination is intact. Gait: Gait is intact. Comments: 5 out of 5 muscular strength   Psychiatric:         Mood and Affect: Mood normal.         Behavior: Behavior normal.         Thought Content: Thought content normal.         Judgment: Judgment normal.         Lab and Diagnostic Study Results     Labs -     Recent Results (from the past 12 hour(s))   EKG, 12 LEAD, INITIAL    Collection Time: 08/11/21 11:19 AM   Result Value Ref Range    Ventricular Rate 68 BPM    Atrial Rate 68 BPM    P-R Interval 224 ms    QRS Duration 98 ms    Q-T Interval 412 ms    QTC Calculation (Bezet) 438 ms    Calculated P Axis 69 degrees    Calculated R Axis -28 degrees    Calculated T Axis 99 degrees    Diagnosis       Sinus rhythm with 1st degree A-V block  Anterior infarct , age undetermined  Abnormal ECG  No previous ECGs available  Confirmed by ANUPAM Samuel MD ([id number]) on 8/11/2021 3:81:39 PM     METABOLIC PANEL, COMPREHENSIVE    Collection Time: 08/11/21 11:20 AM   Result Value Ref Range    Sodium 135 (L) 136 - 145 mmol/L    Potassium 4.1 3.5 - 5.1 mmol/L    Chloride 99 97 - 108 mmol/L    CO2 29 21 - 32 mmol/L    Anion gap 7 5 - 15 mmol/L    Glucose 90 65 - 100 mg/dL    BUN 17 6 - 20 mg/dL    Creatinine 0.99 0.70 - 1.30 mg/dL    BUN/Creatinine ratio 17 12 - 20      GFR est AA >60 >60 ml/min/1.73m2    GFR est non-AA >60 >60 ml/min/1.73m2    Calcium 9.0 8.5 - 10.1 mg/dL    Bilirubin, total 0.8 0.2 - 1.0 mg/dL    AST (SGOT) 28 15 - 37 U/L    ALT (SGPT) 17 12 - 78 U/L    Alk.  phosphatase 92 45 - 117 U/L    Protein, total 7.4 6.4 - 8.2 g/dL    Albumin 3.5 3.5 - 5.0 g/dL    Globulin 3.9 2.0 - 4.0 g/dL    A-G Ratio 0.9 (L) 1.1 - 2.2     CBC WITH AUTOMATED DIFF    Collection Time: 08/11/21 11:20 AM   Result Value Ref Range    WBC 6.9 4.1 - 11.1 K/uL    RBC 4.12 4.10 - 5.70 M/uL    HGB 13.8 12.1 - 17.0 g/dL    HCT 41.9 36.6 - 50.3 %    .7 (H) 80.0 - 99.0 FL    MCH 33.5 26.0 - 34.0 PG    MCHC 32.9 30.0 - 36.5 g/dL    RDW 13.0 11.5 - 14.5 %    PLATELET 497 428 - 888 K/uL    MPV 9.3 8.9 - 12.9 FL    NEUTROPHILS 62 32 - 75 %    LYMPHOCYTES 24 12 - 49 %    MONOCYTES 12 5 - 13 %    EOSINOPHILS 2 0 - 7 %    BASOPHILS 0 0 - 1 %    IMMATURE GRANULOCYTES 0 0.0 - 0.5 %    ABS. NEUTROPHILS 4.2 1.8 - 8.0 K/UL    ABS. LYMPHOCYTES 1.7 0.8 - 3.5 K/UL    ABS. MONOCYTES 0.8 0.0 - 1.0 K/UL    ABS. EOSINOPHILS 0.2 0.0 - 0.4 K/UL    ABS. BASOPHILS 0.0 0.0 - 0.1 K/UL    ABS. IMM. GRANS. 0.0 0.00 - 0.04 K/UL    DF AUTOMATED     PROTHROMBIN TIME + INR    Collection Time: 08/11/21 11:20 AM   Result Value Ref Range    Prothrombin time 11.1 9.0 - 11.1 sec    INR 1.1 0.9 - 1.1         Radiologic Studies -   [unfilled]  CT Results  (Last 48 hours)               08/11/21 1159  CTA CODE NEURO HEAD AND NECK W CONT Final result    Impression:  Calcified plaque causes surgically significant degree of narrowing   short segment proximal LICA estimated at 56%. (NASCET criteria). Complete [place]. Branch vessels from the [place] are patent. Advanced cervical spondylosis as above. Report called to Dr. Tyesha Conde. Narrative:  CTA neck       CTA head       Axial images are reviewed along with reformatted sagittal/coronal/3-D MIP   images. 100 mL Isovue 370 administered. Dose reduction: All CT scans at this facility are performed using dose reduction   optimization techniques as appropriate to a performed exam including the   following-   automated exposure control, adjustments of mA and/or Kv according to patient   size, or use of iterative reconstructive technique.        Nonocclusive atherosclerotic change thoracic aorta. Great vessel origins are   patent. Common carotid arteries through the neck are patent noting hairpin turn   mid right common carotid artery. Vertebral arteries through the neck are patent. For the right carotid bifurcation/proximal right internal carotid artery, there   is no measurable degree of narrowing. (NASCET criteria) Otherwise, the more   distal right internal carotid artery to the skull base is patent. No dissection. For the left carotid bifurcation/proximal left internal carotid artery, there is   calcified plaque causing tight short segment narrowing/estimated 90% origin left   internal carotid artery. (NASCET criteria)       Continuing into the head, distal internal carotid arteries are patent. Nonocclusive arteriosclerosis noted cavernous and supraclinoid portions distal   internal carotid arteries and vertebral arteries. Complete [place]. Proximal branch vessels from the [place]   normally opacify. Symmetric opacification peripheral branch vessels. No CT   evidence for cerebral aneurysm. Dural venous sinuses are patent. Advanced cervical spondylosis. 4 mm anterolisthesis C4-C5 level appears on a   degenerative basis. 08/11/21 1153  CT CODE NEURO HEAD WO CONTRAST Final result    Impression:  No change compared to CT head 5/24/2021. Study findings called to Dr. Deedee Childress 12:05 PM 8/11/2021. Narrative:  CT head. Comparison CT head 5/24/2021. Axial images are reviewed along with reformatted sagittal/coronal images. Dose   reduction: All CT scans at this facility are performed using dose reduction   optimization techniques as appropriate to a performed exam including the   following-   automated exposure control, adjustments of mA and/or Kv according to patient   size, or use of iterative reconstructive technique.        Bone windows demonstrate normal aeration imaged sinuses and mastoid air cells       Review of intracranial content reveals periventricular/subcortical white matter   gliosis, evidence for nonacute end vessel occlusive change. Central cerebral   arteriosclerosis. No intracranial hemorrhage. CXR Results  (Last 48 hours)               08/11/21 1155  XR CHEST PORT Final result    Impression:  Electronic device at the left lower hemithorax. Hydrostatic edema. Blunting of the costophrenic angles as above. Narrative:  Chest, frontal view, 8/11/2021       History: AICD, pacemaker. Comparison: Including chest 3/20/2020. Findings: Small electronic device is again seen at the left lower hemithorax. Retrocardiac lucency suggests hiatal hernia. The cardiac silhouette is within   normal limits. Lung volumes are low. Apical pleural thickening is seen. There   is pulmonary vascular congestion and hydrostatic edema. Blunting of the   costophrenic angles may be due to scarring, atelectasis or small pleural   effusions. No pneumothorax is identified. Degenerative changes are present in   the thoracic spine. Chronic deformity of the right scapula and right-sided ribs   are again noted. Medical Decision Making and ED Course   - I am the first and primary provider for this patient AND AM THE PRIMARY PROVIDER OF RECORD. - I reviewed the vital signs, available nursing notes, past medical history, past surgical history, family history and social history. - Initial assessment performed. The patients presenting problems have been discussed, and the staff are in agreement with the care plan formulated and outlined with them. I have encouraged them to ask questions as they arise throughout their visit. Vital Signs-Reviewed the patient's vital signs.     Patient Vitals for the past 12 hrs:   Temp Pulse Resp BP SpO2   08/11/21 1443  70 18 138/79 96 %   08/11/21 1217  71 18 (!) 144/90 97 %   08/11/21 1108 98.2 °F (36.8 °C) 70 18 (!) 150/85 98 %     Records Reviewed: Nursing Notes and Old Medical Records    ED Course:     ED Course as of Aug 11 1937   Wed Aug 11, 2021   1120 EKG Results: Rhythm: normal sinus rhythm; Rate (approx.): 68; Axis: left axis deviation; PA interval: prolonged; QRS interval: normal ; ST/T wave: non-specific changes; Other findings: abnormal ekg      [MS]      ED Course User Index  [MS] Kvng Quezada DO       Provider Notes (Medical Decision Making):     MDM  Number of Diagnoses or Management Options  Risk of Complications, Morbidity, and/or Mortality  General comments: 2:22 PM  Patient is stable with no marked toxicity or distress. The weakness in the right lower extremity is likely chronic. However stroke alert was ordered. Patient not a TPA candidate because he took Eliquis at 8 AM this morning. CTA head and neck showed 90% stenosis in the left internal carotid artery. Discussed this with the vascular surgeon recommended for him to follow-up with the specialist outpatient in the morning and start on Plavix. I reviewed all radiographic and laboratory results with the patient. All questions were answered and there are no apparent barriers to comprehension or communication. The patient verbalized agreement with the diagnosis, treatment plan, and understanding of the follow-up instructions. The patient is appropriate for discharge; leaves the Emergency Department walking with a stable gait. Patient understands to return to the ED in 12-24 hours for any new or worsening symptoms or no  expected timely resolution of symptoms on mediations prescribed. Consultations:       Consultations: - Tele-Neurology Consultant: Dr. Bear Factor: We have asked for emergent assistance with regard to this patient. We have discussed the acute and any chronic neurologic presentations of this patient with our Neurologist partner as well as the findings on the imaging and labs studies available thus far.   They are recommending Follow-up with neurologist outpatient    Vascular surgeon was also consulted, Dr Priscilla Lemos, we discussed the acute and chronic neurological presentation of the patient with the vascular surgeon as well as the imaging and labs available thus far. He commended for him to follow-up outpatient at his office tomorrow morning. Disposition     Disposition: Condition stable  DC- Adult Discharges: All of the diagnostic tests were reviewed and questions answered. Diagnosis, care plan and treatment options were discussed. The patient understands the instructions and will follow up as directed. The patients results have been reviewed with them. They have been counseled regarding their diagnosis. The family member patient and wife verbally convey understanding and agreement of the signs, symptoms, diagnosis, treatment and prognosis and additionally agrees to follow up as recommended with their PCP in 24 - 48 hours. They also agree with the care-plan and convey that all of their questions have been answered. I have also put together some discharge instructions for them that include: 1) educational information regarding their diagnosis, 2) how to care for their diagnosis at home, as well a 3) list of reasons why they would want to return to the ED prior to their follow-up appointment, should their condition change. Discharged      DISCHARGE PLAN:  1. Current Discharge Medication List      CONTINUE these medications which have NOT CHANGED    Details   mirtazapine (REMERON) 15 mg tablet Take 15 mg by mouth nightly. atorvastatin (LIPITOR) 20 mg tablet Take 20 mg by mouth daily. apixaban (ELIQUIS) 2.5 mg tablet Take 2.5 mg by mouth two (2) times a day.       ranolazine ER (RANEXA) 500 mg SR tablet Take 500 mg by mouth two (2) times a day.      gabapentin (NEURONTIN) 300 mg capsule gabapentin 300 mg capsule      DULoxetine (CYMBALTA) 30 mg capsule duloxetine 30 mg capsule,delayed release      doxazosin (CARDURA) 4 mg tablet doxazosin 4 mg tablet      hydrochlorothiazide (MICROZIDE PO) hydrochlorothiazide           2. Follow-up Information     Follow up With Specialties Details Why Contact Info    Robby Meckel, MD General and Vascular Surgery Schedule an appointment as soon as possible for a visit in 1 day ER follow-up; number is [phone] [address] [place]  [facility] [address]  [phone]          3. Return to ED if worse   4. Discharge Medication List as of 8/11/2021  2:37 PM      START taking these medications    Details   clopidogreL (Plavix) 75 mg tab Take 1 Tablet by mouth daily for 20 days. , Normal, Disp-20 Tablet, R-0         CONTINUE these medications which have NOT CHANGED    Details   mirtazapine (REMERON) 15 mg tablet Take 15 mg by mouth nightly., Historical Med      atorvastatin (LIPITOR) 20 mg tablet Take 20 mg by mouth daily. , Historical Med      apixaban (ELIQUIS) 2.5 mg tablet Take 2.5 mg by mouth two (2) times a day., Historical Med      ranolazine ER (RANEXA) 500 mg SR tablet Take 500 mg by mouth two (2) times a day., Historical Med      gabapentin (NEURONTIN) 300 mg capsule gabapentin 300 mg capsule, Historical Med      DULoxetine (CYMBALTA) 30 mg capsule duloxetine 30 mg capsule,delayed release, Historical Med      doxazosin (CARDURA) 4 mg tablet doxazosin 4 mg tablet, Historical Med      hydrochlorothiazide (MICROZIDE PO) hydrochlorothiazide, Historical Med             Diagnosis     Clinical Impression:   1. TIA (transient ischemic attack)    2. 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 Quite often unanticipated grammatical, syntax, homophones, and other interpretive errors are inadvertently transcribed by the computer software. Please disregard these errors. Please excuse any errors that have escaped final proofreading.   Thank you.

## 2021-08-11 NOTE — ED TRIAGE NOTES
Had episode of slurred speech around 0930 this morning that lasted approx one hour that resolved, also increasing weakness over last several days to right leg \"neuropathy\", hx of TIA

## 2021-08-19 NOTE — PERIOP NOTES
[address] [address] [facility] ([place]   MAIN OR                                  [phone]   MAIN PRE OP                          [phone]                                                                                AMBULATORY PRE OP          [phone]  PRE-ADMISSION TESTING    [phone]   Surgery Date:  8/26/2021       Is surgery arrival time given by surgeon? NO  If NO, [address] [facility] staff will call you between 3 and 7pm the day before your surgery with your arrival time. (If your surgery is on a Monday, we will call you the Friday before.)    Call [phone] after 7pm Monday-Friday if you did not receive this call. INSTRUCTIONS BEFORE YOUR SURGERY   When You  Arrive Arrive at the [address] [facility] on the day of your surgery  Have your insurance card, photo ID, and any copayment (if needed)   Food   and   Drink NO food or drink after midnight the night before surgery    This means NO water, gum, mints, coffee, juice, etc.  No alcohol (beer, wine, liquor) 24 hours before and after surgery   Medications to   TAKE   Morning of Surgery MEDICATIONS TO TAKE THE MORNING OF SURGERY WITH A SIP OF WATER:    TAKE ATORVASTATIN ,PLAVIX,AND ASPIRIN   RANEXA   Medications  To  STOP      7 days before surgery  Non-Steroidal anti-inflammatory Drugs (NSAID's): for example, Ibuprofen (Advil, Motrin), Naproxen (Aleve)   Aspirin, if taking for pain    Herbal supplements, vitamins, and fish oil   Other:  (Pain medications not listed above, including Tylenol may be taken)   Blood  Thinners  If you take  Aspirin, Plavix, Coumadin, or any blood-thinning or anti-blood clot medicine, talk to the doctor who prescribed the medications for pre-operative instructions.    Bathing Clothing  Jewelry  Valuables      If you shower the morning of surgery, please do not apply anything to your skin (lotions, powders, deodorant, or makeup, especially lazaro)   Follow Chlorhexidine [employer] body wash instructions provided to you during PAT appointment. Begin 3 days prior to surgery.  Do not shave or trim anywhere 24 hours before surgery   Wear your hair loose or down; no pony-tails, buns, or metal hair clips   Wear loose, comfortable, clean clothes   Wear glasses instead of contacts  [employer] money, valuables, and jewelry, including body piercings, at home   If you were given an [employer], bring it on day of surgery. Going Home - or Spending the Night  SAME-DAY SURGERY: You must have a responsible adult drive you home and stay with you 24 hours after surgery   ADMITS: If your doctor is keeping you in the hospital after surgery, leave personal belongings/luggage in your car until you have a hospital room number. Hospital discharge time is 12 noon  Drivers must be here before 12 noon unless you are told differently   Special Instructions BRING WALKER       Follow all instructions so your surgery wont be cancelled. Please, be on time. If a situation occurs and you are delayed the day of surgery, call [phone]     If your physical condition changes (like a fever, cold, flu, etc.) call your surgeon. Home medication(s) reviewed and verified via    LIST     during PAT appointment. The patient was contacted by     IN-PERSON  The patient verbalizes understanding of all instructions and     DOES NOT   need reinforcement.

## 2021-08-20 NOTE — PERIOP NOTES
EKG abnormal.  Results from 8/11/2021 and 8/19/2021 reviewed with LIZZY Shaffer. Last cardiac note and EKG requested from Dr. Shree Duenas. Per Mariama Shaffer, no further work up required.

## 2021-08-26 PROBLEM — I65.29 CAROTID STENOSIS: Status: ACTIVE | Noted: 2021-01-01

## 2021-08-26 NOTE — ANESTHESIA PROCEDURE NOTES
Arterial Line Placement    Start time: 8/26/2021 12:25 PM  End time: 8/26/2021 12:30 PM  Performed by: Derek Carlson CRNA  Authorized by: Raphael Correa MD     Pre-Procedure  Indications:  Arterial pressure monitoring  Preanesthetic Checklist: patient identified, risks and benefits discussed, anesthesia consent, site marked, patient being monitored, timeout performed and patient being monitored    Timeout Time: 12:20 EDT        Procedure:   Prep:  Chlorhexidine  Seldinger Technique?: Yes    Orientation:  Left  Location:  Radial artery  Catheter size:  20 G  Number of attempts:  2    Assessment:   Post-procedure:  Line secured  Patient Tolerance:  Patient tolerated the procedure well with no immediate complications  Comment:   First attempt by [address] [place] [address] Tara- unable to thread; 2nd attempt by Charito Grossman MD successful. Pt tolerated v well.

## 2021-08-26 NOTE — ANESTHESIA POSTPROCEDURE EVALUATION
Procedure(s):  LEFT TRANSCAROTID ARTERY REVASCULARIZATION. MAC    Anesthesia Post Evaluation      Multimodal analgesia: multimodal analgesia not used between 6 hours prior to anesthesia start to PACU discharge  Patient location during evaluation: PACU  Patient participation: complete - patient participated  Level of consciousness: awake  Pain management: adequate  Airway patency: patent  Anesthetic complications: no  Cardiovascular status: acceptable, blood pressure returned to baseline and hemodynamically stable  Respiratory status: acceptable  Hydration status: acceptable  Post anesthesia nausea and vomiting:  controlled      INITIAL Post-op Vital signs:   Vitals Value Taken Time   /80 08/26/21 1520   Temp 36.8 °C (98.2 °F) 08/26/21 1447   Pulse 82 08/26/21 1522   Resp 19 08/26/21 1522   SpO2 95 % 08/26/21 1522   Vitals shown include unvalidated device data.

## 2021-08-26 NOTE — PROGRESS NOTES
15: 30,Pts rt neck appears more swollen and bruised than when he initially arrived. Dr Avila Serna. Will Hold Eliquis until  sees pt tomorrow. 16:30,Dr Madiha Bee updated of pts neck swelling,I was told to place pressure drsg,Pt c/o left neck and rt shoulder discomfort,medicated with Dilaudid . 25mg IVP,repositioned in bed,HOB elevated per pt request.Left radial Arterial A line continues with good waveform. 17:00,Bucky started via RT PIV @ 10 mcg to maintain MAP greater than 60.  19:00,Bucky titrated up to 30mcg via Lt PIV. Wfe visited with pt ,now has gone home. Repositioned for comfort. Pt tried to void in urinal,was unsuccessful. 20:00,pt trying to void,-140 sustained,Cuff BP is 107/65,A line is 132/72. Vascular MD on call has been paged. Dr Braulio Irving returned call,orders received. Tried to straight cath pt,unable to pass catheter. Coudet catheter placed,draind 550 clear yellow urine,left catheter in. HR is now in 90's. 21:10,pt voices c/o discomfort in neck and back,medicated with Norco.Bucky is infusing @ 25 mcg,LR @ 100cc/hr.

## 2021-08-26 NOTE — BRIEF OP NOTE
Brief Postoperative Note    Patient: Clay Salazar  YOB: 1929  MRN: [id number]    Date of Procedure: 8/26/2021     Pre-Op Diagnosis: CAROTID STENOSIS    Post-Op Diagnosis: Same as preoperative diagnosis. Procedure(s):  LEFT TRANSCAROTID ARTERY REVASCULARIZATION    Surgeon(s): Casper Tee MD    Surgical Assistant: Surg Asst-1: Rodriguez Reeves I    Anesthesia: MAC     Estimated Blood Loss (mL): Minimal    Complications: None    Specimens: * No specimens in log *     Implants:   Implant Name Type Inv.  Item Serial No.  Lot No. LRB No. Used Action   STENT SYS [id number] -- ENROUTE TRANSCAROTID - SN/A  STENT SYS [id number] -- ENROUTE TRANSCAROTID N/A [employer] [facility] [id number] Left 1 Implanted       Drains: * No LDAs found *    Findings: 0    Electronically Signed by Saurabh Chawla MD on 8/26/2021 at 2:28 PM

## 2021-08-26 NOTE — ANESTHESIA PREPROCEDURE EVALUATION
Relevant Problems   No relevant active problems       Anesthetic History   No history of anesthetic complications            Review of Systems / Medical History  Patient summary reviewed, nursing notes reviewed and pertinent labs reviewed    Pulmonary  Within defined limits                 Neuro/Psych         TIA     Cardiovascular    Hypertension        Dysrhythmias : atrial fibrillation  PAD and hyperlipidemia      Comments: Has LINQ recorder for a fib monitoring    EF 48%   GI/Hepatic/Renal  Within defined limits              Endo/Other  Within defined limits           Other Findings            Physical Exam    Airway  Mallampati: II  TM Distance: 4 - 6 cm  Neck ROM: normal range of motion   Mouth opening: Normal     Cardiovascular    Rhythm: regular  Rate: normal         Dental    Dentition: Lower dentition intact and Upper dentition intact     Pulmonary  Breath sounds clear to auscultation               Abdominal         Other Findings            Anesthetic Plan    ASA: 3  Anesthesia type: MAC    Monitoring Plan: Arterial line      Induction: Intravenous  Anesthetic plan and risks discussed with: Patient

## 2021-08-27 NOTE — OP NOTES
[facility]  OPERATIVE REPORT    Name:  Kike España  MR#:  [id number]  :  1929  ACCOUNT #:  [de-identified]  DATE OF SERVICE:  2021    PREOPERATIVE DIAGNOSIS:  Left carotid stenosis. POSTOPERATIVE DIAGNOSIS:  Left carotid stenosis. PROCEDURES PERFORMED:  1. Left transcarotid arterial revascularization. 2.  Ultrasound-guided cannulation of right common femoral vein. SURGEON:  Ulysses Stager, MD    ASSISTANT:  None. ANESTHESIA:  IV sedation and local anesthesia. COMPLICATIONS:  None. SPECIMENS REMOVED:  None. IMPLANTS:  Stent. ESTIMATED BLOOD LOSS:  Minimal.    INDICATION FOR PROCEDURE:  The patient is a 27-year-old male who has had a TIA and a high-grade stenosis of his left carotid artery. Decision was made to take him to the operating room for TCAR. PROCEDURE:  The patient was taken to the operating room. Once a suitable level of anesthesia was introduced, he was prepped and draped in typical sterile fashion. Ultrasound examination of the right common femoral vein confirmed the vein to be patent. The vein was then accessed under direct ultrasound guidance and a permanent image stored. A sheath was placed. A transverse incision was made at the base of the left neck and dissection carried out down to the common carotid artery. Because of its tortuosity, this was posterior to the vein. The vagus nerve was anterior on the vessel and required dissection and movement and was not divided or injured. The patient was systemically heparinized. ACT was confirmed to be adequate. The vessel was accessed and an angiogram performed. The lesion was identified. It was crossed after reversing the flow with the device. It was predilated and then stent placed in the usual fashion. Completion angiography showed a small step-off distally with good resolution of the stenosis without evidence of complication.   The sheath was removed and Prolene used for hemostasis. Wounds were irrigated thoroughly and closed in multiple layers. Handheld pressure was used for the groin. He tolerated the procedure well. Sponge and instruments counts were correct x2. He was awakened, confirmed to be neurologically intact, and transferred to recovery area in good condition.       Christy Faria MD      [id number]  D:  08/27/2021 10:16  T:  08/27/2021 13:17  JOB #:  [id number]

## 2021-08-27 NOTE — DISCHARGE INSTRUCTIONS
Patient Discharge Instructions    Chong Esqueda / [id number] : 1929    Admitted 2021 Discharged: 2021     Take Home Medications            · It is important that you take the medication exactly as they are prescribed. · Keep your medication in the bottles provided by the pharmacist and keep a list of the medication names, dosages, and times to be taken in your wallet. · Do not take other medications without consulting your doctor. What to do at Home    Recommended diet: Regular Diet,     Recommended activity: Activity as tolerated,      Follow-up with Dr Inga Opitz in 1 week        Information obtained by :  I understand that if any problems occur once I am at home I am to contact my physician. I understand and acknowledge receipt of the instructions indicated above. Physician's or R.N.'s Signature                                                                  Date/Time                                                                                                                                              Patient or Representative Signature                                                          Date/Time      DISCHARGE SUMMARY from your Nurse      PATIENT INSTRUCTIONS    After general anesthesia or intravenous sedation, for 24 hours or while taking prescription Narcotics:  · Limit your activities  · Do not drive and operate hazardous machinery  · Do not make important personal or business decisions  · Do  not drink alcoholic beverages  · If you have not urinated within 8 hours after discharge, please contact your surgeon on call.     Report the following to your surgeon:  · Excessive pain, swelling, redness or odor of or around the surgical area  · Temperature over 100.5  · Nausea and vomiting lasting longer than 4 hours or if unable to take medications  · Any signs of decreased circulation or nerve impairment to extremity: change in color, persistent  numbness, tingling, coldness or increase pain  · Any questions      GOOD HELP TO FIGHT AN INFECTION  Here are a few tip to help reduce the chance of getting an infection after surgery:   Wash Your Hands   Good handwashing is the most important thing you and your caregiver can do.  Wash before and after caring for any wounds. Dry your hand with a clean towel.  Wash with soap and water for at least 20 seconds. A TIP: sing the \"Happy Birthday\" song through one time while washing to help with the timing.  Use a hand  in between washings.  Shower   When your surgeon says it is OK to take a shower, use a new bar of antibacterial soap (if that is what you use, and keep that bar of soap ONLY for your use), or antibacterial body wash.  Use a clean wash cloth or sponge when you bathe.  Dry off with a clean towel  after every bath - be careful around any wounds, skin staples, sutures or surgical glue over/on wounds.  Do not enter swimming pools, hot tubs, lakes, rivers and/or ocean until wounds are healed and your doctor/surgeon says it is OK.  Use Clean Sheets   Sleep on freshly laundered sheets after your surgery.  Keep the surgery site covered with a clean, dry bandage (if instructed to do so). If the bandage becomes soiled, reapply a new, dry, clean bandage.  Do not allow pets to sleep with you while your wound is healing.  Lifestyle Modification and Controlling Your Blood Sugar   Smoking slows wound healing. Stop smoking and limit exposure to second-hand smoke.  High blood sugar slows wound healing. Eat a well-balanced diet to provide proper nutrition while healing   Monitor your blood sugar (if you are a diabetic) and take your medications as you are suppose to so you can control you blood sugar after surgery.       COUGH AND DEEP BREATHE    Breathing deeply and coughing are very important exercises to do after surgery. Deep breathing and coughing open the little air tubes and air sacks in your lungs. You take deep breaths every day. You may not even notice - it is just something you do when you sigh or yawn. It is a natural exercise you do to keep these air passages open. After surgery, take deep breaths and cough, on purpose. DIRECTIONS:  · Take 10 to 15 slow deep breaths every hour while awake. · Breathe in deeply, and hold it for 2 seconds. · Exhale slowly through puckered lips, like blowing up a balloon. · After every 4th or 5th deep breath, hug your pillow to your chest or belly and give a hard, deep cough. Yes, it will probably hurt. But doing this exercise is a very important part of healing after surgery. Take your pain medicine to help you do this exercise without too much pain. Coughing and deep breathing help prevent bronchitis and pneumonia after surgery. If you had chest or belly surgery, use a pillow as a \"hug rod\" and hold it tightly to your chest or belly when you cough. ANKLE PUMPS    Ankle pumps increase the circulation of oxygenated blood to your lower extremities and decrease your risk for circulation problems such as blood clots. They also stretch the muscles, tendons and ligaments in your foot and ankle, and prevent joint contracture in the ankle and foot, especially after surgeries on the legs. It is important to do ankle pump exercises regularly after surgery because immobility increases your risk for developing a blood clot. Your doctor may also have you take an Aspirin for the next few days as well. If your doctor did not ask you to take an Aspirin, consult with him before starting Aspirin therapy on your own. The exercise is quite simple. · Slowly point your foot forward, feeling the muscles on the top of your lower leg stretch, and hold this position for 5 seconds.                   · Next, pull your foot back toward you as far as possible, stretching the calf muscles, and hold that position for 5 seconds. · Repeat with the other foot. · Perform 10 repetitions every hour while awake for both ankles if possible (down and then up with the foot once is one repetition). You should feel gentle stretching of the muscles in your lower leg when doing this exercise. If you feel pain, or your range of motion is limited, don't push too hard. Only go the limit your joint and muscles will let you go. If you have increasing pain, progressively worsening leg warmth or swelling, STOP the exercise and call your doctor. MEDICATION AND   SIDE EFFECT GUIDE    The [address] [place] [address] MEDICATION AND SIDE EFFECT GUIDE was provided to the PATIENT AND CARE PROVIDER. Information provided includes instruction about drug purpose and common side effects for the following medications:   · Midodrine  · Norco        These are general instructions for a healthy lifestyle:    *   Please give a list of your current medications to your Primary Care Provider. *   Please update this list whenever your medications are discontinued, doses are changed, or new medications (including over-the-counter products) are added. *   Please carry medication information at all times in case of emergency situations. About Smoking  No smoking / No tobacco products  Avoid exposure to second hand smoke     Surgeon General's Warning:  Quitting smoking now greatly reduces serious risk to your health. Obesity, smoking, and sedentary lifestyle greatly increases your risk for illness and disease. A healthy diet, regular physical exercise & weight monitoring are important for maintaining a healthy lifestyle.       Congestive Heart Failure  You may be retaining fluid if you have a history of heart failure or if you experience any of the following symptoms:  Weight gain of 3 pounds or more overnight or 5 pounds in a week, increased swelling in your hands or feet or shortness of breath while lying flat in bed. Please call your doctor as soon as you notice any of these symptoms; do not wait until your next office visit. Recognize signs and symptoms of STROKE:  F -  Face looks uneven  A -  Arms unable to move or move evenly  S -  Speech slurred or non-existent  T -  Time-call 911 as soon as signs and symptoms begin-DO NOT go          back to bed or wait to see if you get better-TIME IS BRAIN. Warning Signs of HEART ATTACK   Call 911 if you have these symptoms:     Chest discomfort. Most heart attacks involve discomfort in the center of the chest that lasts more than a few minutes, or that goes away and comes back. It can feel like uncomfortable pressure, squeezing, fullness, or pain.  Discomfort in other areas of the upper body. Symptoms can include pain or discomfort in one or both arms, the back, neck, jaw, or stomach.  Shortness of breath with or without chest discomfort.  Other signs may include breaking out in a cold sweat, nausea, or lightheadedness. Don't wait more than five minutes to call 911 - MINUTES MATTER! Fast action can save your life. Calling 911 is almost always the fastest way to get lifesaving treatment. Emergency Medical Services staff can begin treatment when they arrive -- up to an hour sooner than if someone gets to the hospital by car. Learning About Coronavirus [phone])  Coronavirus [phone]): Overview  What is coronavirus (COVID-19)? The coronavirus disease (COVID-19) is caused by a virus. It is an illness that was first found in [place], [place], in December 2019. It has since spread worldwide. The virus can cause fever, cough, and trouble breathing. In severe cases, it can cause pneumonia and make it hard to breathe without help. It can cause death. Coronaviruses are a large group of viruses. They cause the common cold.  They also cause more serious illnesses like [place] respiratory syndrome (MERS) and severe acute respiratory syndrome (SARS). COVID-19 is caused by a novel coronavirus. That means it's a new type that has not been seen in people before. This virus spreads person-to-person through droplets from coughing and sneezing. It can also spread when you are close to someone who is infected. And it can spread when you touch something that has the virus on it, such as a doorknob or a tabletop. What can you do to protect yourself from coronavirus (COVID-19)? The best way to protect yourself from getting sick is to:  · Avoid areas where there is an outbreak. · Avoid contact with people who may be infected. · Wash your hands often with soap or alcohol-based hand sanitizers. · Avoid crowds and try to stay at least 6 feet away from other people. · Wash your hands often, especially after you cough or sneeze. Use soap and water, and scrub for at least 20 seconds. If soap and water aren't available, use an alcohol-based hand . · Avoid touching your mouth, nose, and eyes. What can you do to avoid spreading the virus to others? To help avoid spreading the virus to others:  · Cover your mouth with a tissue when you cough or sneeze. Then throw the tissue in the trash. · Use a disinfectant to clean things that you touch often. · Stay home if you are sick or have been exposed to the virus. Don't go to school, work, or public areas. And don't use public transportation. · If you are sick:  ? Leave your home only if you need to get medical care. But call the doctor's office first so they know you're coming. And wear a face mask, if you have one.  ? If you have a face mask, wear it whenever you're around other people. It can help stop the spread of the virus when you cough or sneeze. ? Clean and disinfect your home every day. Use household  and disinfectant wipes or sprays. Take special care to clean things that you grab with your hands.  These include doorknobs, remote controls, phones, and handles on your refrigerator and microwave. And don't forget countertops, tabletops, bathrooms, and computer keyboards. When to call for help  Call 911 anytime you think you may need emergency care. For example, call if:  · You have severe trouble breathing. (You can't talk at all.)  · You have constant chest pain or pressure. · You are severely dizzy or lightheaded. · You are confused or can't think clearly. · Your face and lips have a blue color. · You pass out (lose consciousness) or are very hard to wake up. Call your doctor now if you develop symptoms such as:  · Shortness of breath. · Fever. · Cough. If you need to get care, call ahead to the doctor's office for instructions before you go. Make sure you wear a face mask, if you have one, to prevent exposing other people to the virus. Where can you get the latest information? The following health organizations are tracking and studying this virus. Their websites contain the most up-to-date information. Paulina Mora also learn what to do if you think you may have been exposed to the virus. · U.S. Centers for Disease Control and Prevention (CDC): The CDC provides updated news about the disease and travel advice. The website also tells you how to prevent the spread of infection. www.cdc.gov  · World Health Organization [facility]): WHO offers information about the virus outbreaks. WHO also has travel advice. www.who.int  Current as of: April 1, 2020               Content Version: 12.4  © 2006-2020 [employer]. Care instructions adapted under license by your healthcare professional. If you have questions about a medical condition or this instruction, always ask your healthcare professional. [place] [address] any warranty or liability for your use of this information. The discharge information has been reviewed with the {PATIENT PARENT GUARDIAN:54800}.     Any questions and concerns from the {PATIENT PARENT GUARDIAN:09227} have been addressed. The {PATIENT PARENT GUARDIAN:82428} verbalized understanding. CONTENTS FOUND IN YOUR DISCHARGE ENVELOPE:  [x]     Surgeon and Hospital Discharge Instructions  [x]     [facility] Care Provider Card  []     Medication & Side Effect Guide            (your newly prescribed medications have been marked/highlighted showing the most common side effects from   the classes of drugs on your prescriptions)  []     Medication Prescription(s) x *** ( [] These have been sent electronically to your pharmacy by your surgeon,   - OR -       your surgeon has already provided these to you during a previous/pre-op office visit)  []     [address]  []     Physical Therapy Prescription  []     Follow-up Appointment Cards  []     Surgery-related Pictures/Media  []     Pain block and/or block with On-Q Catheter from Anesthesia Service (information included in your instructions above)  []     Medical device information sheets/pamphlets from their    []     School/work excuse note. []     /parent work excuse note. The following personal items collected during your admission are returned to you:   Dental Appliance: Dental Appliances: None  Vision: Visual Aid: None  Hearing Aid:    Jewelry: Jewelry: None  Clothing: Clothing: Other (comment) (clothes and shoes to [employer])  Other Valuables: Other Valuables:  Other (comment) (hearing aids to [employer])  Valuables sent to safe:

## 2021-08-27 NOTE — PROGRESS NOTES
Doing well  Neuro intact  Some expected hoarseness/neck swelling  On a bit of carlos  He would like to go home if possible  Will start some Midodrine

## 2021-08-27 NOTE — PROGRESS NOTES
0350 - Map dropped and sustaining at high 50's and low 60's Bucky titrated up to 30mcg. Pt sleeping, will continue to monitor.

## 2021-08-27 NOTE — PERIOP NOTES
2200 - Report received from [facility], pt care assumed at this time. Pt resting in bed, awake, alert, denies pain, A&O x 4, pleasant, remains on full monitor, assessment completed, pt denies needs, will monitor. 2250 - Pt called out, complains of not being able to sleep, repositioned for comfort, lights out, TV off, warm blankets given, was medicated as per [facility] prior to my arrival.  Door pulled to but not shut, monitor alarms quieted slightly, pt denies further needs, appreciative, will monitor. 2315 - Pt called out again, remains upset at lights \"all that noise\" such as the monitors, etc. Offered pt eye mask and ear plugs, pt refused. Lights in no dimmed, warm blankets provided, call light in reach. 0015 - Pt called out with same complaints, repositioned in bed, continues to refuse offers for eye mask and ear plugs, states \"I just need help falling asleep\", encouraged pt to close eyes and relax, VSS remain stable, swelling and bruising to left neck remains, dressing C/D/I.    0100 - Pt now resting, RR easy and unlabored, remains on complete monitor, no distress noted. 0230 - Report to Arielle Coley, pt care transferred at this time.

## 2021-08-27 NOTE — PROGRESS NOTES
Continues to do well  Expected bruising  Discussed with family  Theyd like to go home which I think is reasonable  Ill see him in the office next week  Hold eliquis until that time.

## 2021-08-28 NOTE — DISCHARGE INSTRUCTIONS
Follow-up with your vascular surgeon as instructed and monitor wound for any changes or worsening. Return to emergency room for any new or worsening symptoms. Thank you! Thank you for allowing me to care for you in the emergency department. I sincerely hope that you are satisfied with your visit today. It is my goal to provide you with excellent care. Below you will find a list of your labs and imaging from your visit today. Should you have any questions regarding these results please do not hesitate to call the emergency department. Labs -     Recent Results (from the past 12 hour(s))   CBC WITH AUTOMATED DIFF    Collection Time: 08/28/21  2:38 PM   Result Value Ref Range    WBC 7.1 4.1 - 11.1 K/uL    RBC 2.58 (L) 4.10 - 5.70 M/uL    HGB 8.8 (L) 12.1 - 17.0 g/dL    HCT 26.3 (L) 36.6 - 50.3 %    .9 (H) 80.0 - 99.0 FL    MCH 34.1 (H) 26.0 - 34.0 PG    MCHC 33.5 30.0 - 36.5 g/dL    RDW 13.1 11.5 - 14.5 %    PLATELET 308 (L) 792 - 400 K/uL    MPV 8.9 8.9 - 12.9 FL    NEUTROPHILS 72 32 - 75 %    LYMPHOCYTES 17 12 - 49 %    MONOCYTES 10 5 - 13 %    EOSINOPHILS 1 0 - 7 %    BASOPHILS 0 0 - 1 %    IMMATURE GRANULOCYTES 0 0.0 - 0.5 %    ABS. NEUTROPHILS 5.0 1.8 - 8.0 K/UL    ABS. LYMPHOCYTES 1.2 0.8 - 3.5 K/UL    ABS. MONOCYTES 0.7 0.0 - 1.0 K/UL    ABS. EOSINOPHILS 0.1 0.0 - 0.4 K/UL    ABS. BASOPHILS 0.0 0.0 - 0.1 K/UL    ABS. IMM. GRANS. 0.0 0.00 - 0.04 K/UL    DF AUTOMATED         Radiologic Studies -   No orders to display     CT Results  (Last 48 hours)      None          CXR Results  (Last 48 hours)      None               If you feel that you have not received excellent quality care or timely care, please ask to speak to the nurse manager. Please choose us in the future for your continued health care needs.    ------------------------------------------------------------------------------------------------------------  The exam and treatment you received in the Emergency Department were for an urgent problem and are not intended as complete care. It is important that you follow-up with a doctor, nurse practitioner, or physician assistant to:  (1) confirm your diagnosis,  (2) re-evaluation of changes in your illness and treatment, and  (3) for ongoing care. If your symptoms become worse or you do not improve as expected and you are unable to reach your usual health care provider, you should return to the Emergency Department. We are available 24 hours a day. Please take your discharge instructions with you when you go to your follow-up appointment. If you have any problem arranging a follow-up appointment, contact the Emergency Department immediately. If a prescription has been provided, please have it filled as soon as possible to prevent a delay in treatment. Read the entire medication instruction sheet provided to you by the pharmacy. If you have any questions or reservations about taking the medication due to side effects or interactions with other medications, please call your primary care physician or contact the ER to speak with the charge nurse. Make an appointment with your family doctor or the physician you were referred to for follow-up of this visit as instructed on your discharge paperwork, as this is a mandatory follow-up. Return to the ER if you are unable to be seen or if you are unable to be seen in a timely manner. If you have any problem arranging the follow-up visit, contact the Emergency Department immediately.

## 2021-08-28 NOTE — ED TRIAGE NOTES
PT. TO ED TO HAVE BLOOD WORK DRAWN, S/P CAROTID SURGERY 8/26/21. WIFE STATES, PHYSICIAN CALLED LAST NIGHT TO HAVE HGB CHECKED, TODAY 9 DR. GLADIS LAWTON , [facility].

## 2021-08-28 NOTE — ED PROVIDER NOTES
EMERGENCY DEPARTMENT HISTORY AND PHYSICAL EXAM      Date: 8/28/2021  Patient Name: Марина Rockwell    History of Presenting Illness     Chief Complaint   Patient presents with    Fatigue       History Provided By: Patient    HPI: Марина Rockwell, 80 y.o. male with a past medical history significant hypertension, hyperlipidemia, stroke and COVID-19 series completed presents to the ED with cc of fatigue of insidious onset. Patient is 2 days s/p left carotid endarterectomy. He is also on Plavix and has some subcutaneous ecchymotic areas in the left shoulder and chest, s/p surgery. His vascular surgeon Dr. Efrem Vargas saw him yesterday and wanted him to get a repeat H&H today. He has no other complaints at this time no bleeding or postop fever. No other constitutional symptoms. No syncopal episodes. There are no other complaints, changes, or physical findings at this time. PCP: Lillie Torres MD    No current facility-administered medications on file prior to encounter. Current Outpatient Medications on File Prior to Encounter   Medication Sig Dispense Refill    midodrine (PROAMATINE) 10 mg tablet Take 1 Tablet by mouth three (3) times daily (with meals) for 30 days. 30 Tablet 0    HYDROcodone-acetaminophen (NORCO) 5-325 mg per tablet Take 1 Tablet by mouth every four (4) hours as needed for Pain for up to 3 days. Max Daily Amount: 6 Tablets. 20 Tablet 0    clopidogreL (Plavix) 75 mg tab Take 1 Tablet by mouth daily. 30 Tablet 3    aspirin delayed-release 81 mg tablet Take  by mouth daily.  cholecalciferol, vitamin D3, (Vitamin D3) 50 mcg (2,000 unit) tab Take  by mouth nightly.  mirtazapine (REMERON) 15 mg tablet Take 15 mg by mouth nightly.  atorvastatin (LIPITOR) 20 mg tablet Take 20 mg by mouth daily.  clopidogreL (Plavix) 75 mg tab Take 1 Tablet by mouth daily for 20 days. 20 Tablet 0    ranolazine ER (RANEXA) 500 mg SR tablet Take 500 mg by mouth two (2) times a day.       gabapentin (NEURONTIN) 300 mg capsule nightly.  DULoxetine (CYMBALTA) 30 mg capsule duloxetine 30 mg capsule,delayed release      doxazosin (CARDURA) 4 mg tablet nightly.  hydrochlorothiazide (MICROZIDE PO) 12.5 mg daily. Past History     Past Medical History:  Past Medical History:   Diagnosis Date    Atrial fibrillation ([facility] [address]     COVID-19 vaccine series completed 03/2021    Hypercholesteremia     Hypertension     Neuropathy     Stroke [facility]) 08/2021    TIA       Past Surgical History:  Past Surgical History:   Procedure Laterality Date    HX CATARACT REMOVAL Left 2018    IOL    NV ABDOMEN SURGERY PROC UNLISTED Right     [facility]       Family History:  History reviewed. No pertinent family history. Social History:  Social History     Tobacco Use    Smoking status: Former Smoker    Smokeless tobacco: Never Used    Tobacco comment: quit 30 years ago , smoked  pipe and cigar   Substance Use Topics    Alcohol use: Not on file    Drug use: Never       Allergies:  No Known Allergies      Review of Systems     Review of Systems   Constitutional: Negative. Negative for chills, fatigue and fever. HENT: Negative. Negative for congestion, ear discharge, sinus pressure and sore throat. Eyes: Negative. Negative for photophobia. Respiratory: Negative. Negative for cough and shortness of breath. Cardiovascular: Negative. Negative for chest pain and palpitations. Gastrointestinal: Negative. Negative for diarrhea, nausea and vomiting. Endocrine: Negative. Genitourinary: Negative. Negative for dysuria and flank pain. Musculoskeletal: Negative. Skin: Positive for color change. Bruising left chest wall and neck s/p vascular surgery   Allergic/Immunologic: Negative. Neurological: Negative. Negative for seizures, syncope and headaches. Hematological: Negative. Psychiatric/Behavioral: Negative. All other systems reviewed and are negative.       Physical Exam Physical Exam  Vitals and nursing note reviewed. Constitutional:       General: He is not in acute distress. Appearance: He is well-developed. He is not toxic-appearing or diaphoretic. HENT:      Head: Normocephalic and atraumatic. Nose: Nose normal.      Mouth/Throat:      Mouth: Mucous membranes are moist.      Pharynx: Oropharynx is clear. Eyes:      Extraocular Movements: Extraocular movements intact. Pupils: Pupils are equal, round, and reactive to light. Cardiovascular:      Rate and Rhythm: Normal rate and regular rhythm. Heart sounds: Normal heart sounds. Pulmonary:      Effort: Pulmonary effort is normal. No respiratory distress. Breath sounds: Normal breath sounds. Chest:      Chest wall: No mass or tenderness. Abdominal:      General: Bowel sounds are normal. There is no abdominal bruit. Palpations: Abdomen is soft. There is no hepatomegaly. Tenderness: There is no abdominal tenderness. There is no rebound. Musculoskeletal:         General: Normal range of motion. Cervical back: Normal range of motion and neck supple. Right lower leg: No tenderness. No edema. Left lower leg: No tenderness. No edema. Skin:     General: Skin is warm and dry. Capillary Refill: Capillary refill takes less than 2 seconds. Comments: Area of ecchymosis over left chest wall and neck. Surgical wound intact. No drainage. Neurological:      General: No focal deficit present. Mental Status: He is alert and oriented to person, place, and time. Psychiatric:         Mood and Affect: Mood normal.         Behavior: Behavior normal.         Lab and Diagnostic Study Results     Labs -   Results for Freddie Womack (MRN [id number]) as of 8/30/2021 06:50   Ref.  Range 8/28/2021 14:38   WBC Latest Ref Range: 4.1 - 11.1 K/uL 7.1   RBC Latest Ref Range: 4.10 - 5.70 M/uL 2.58 (L)   HGB Latest Ref Range: 12.1 - 17.0 g/dL 8.8 (L)   HCT Latest Ref Range: 36.6 - 50.3 % 26.3 (L)   MCV Latest Ref Range: 80.0 - 99.0 .9 (H)   MCH Latest Ref Range: 26.0 - 34.0 PG 34.1 (H)   MCHC Latest Ref Range: 30.0 - 36.5 g/dL 33.5   RDW Latest Ref Range: 11.5 - 14.5 % 13.1   PLATELET Latest Ref Range: 150 - 400 K/uL 138 (L)   MPV Latest Ref Range: 8.9 - 12.9 FL 8.9   NEUTROPHILS Latest Ref Range: 32 - 75 % 72   LYMPHOCYTES Latest Ref Range: 12 - 49 % 17   MONOCYTES Latest Ref Range: 5 - 13 % 10   EOSINOPHILS Latest Ref Range: 0 - 7 % 1   BASOPHILS Latest Ref Range: 0 - 1 % 0   IMMATURE GRANULOCYTES Latest Ref Range: 0.0 - 0.5 % 0   DF Latest Units:   AUTOMATED   ABS. NEUTROPHILS Latest Ref Range: 1.8 - 8.0 K/UL 5.0   ABS. IMM. GRANS. Latest Ref Range: 0.00 - 0.04 K/UL 0.0   ABS. LYMPHOCYTES Latest Ref Range: 0.8 - 3.5 K/UL 1.2   ABS. MONOCYTES Latest Ref Range: 0.0 - 1.0 K/UL 0.7   ABS. EOSINOPHILS Latest Ref Range: 0.0 - 0.4 K/UL 0.1   ABS. BASOPHILS Latest Ref Range: 0.0 - 0.1 K/UL 0.0       Radiologic Studies -     CT Results  (Last 48 hours)    None        CXR Results  (Last 48 hours)    None            Medical Decision Making   - I am the first provider for this patient. - I reviewed the vital signs, available nursing notes, past medical history, past surgical history, family history and social history. - Initial assessment performed. The patients presenting problems have been discussed, and they are in agreement with the care plan formulated and outlined with them. I have encouraged them to ask questions as they arise throughout their visit. Vital Signs-Reviewed the patient's vital signs. No data found. Records Reviewed: Nursing Notes    ED Course/Provider Notes (Medical Decision Making): Uneventful ED course, clinical improvement with therapy, patient will be discharged to followup with PCP as directed.   Patient discussed with vascular surgeon on call Dr. Julio Morales, covering for Dr. Krzysztof Guerrero, patient's vascular surgeon, who confirmed no significant reduction/change in patient's H&H from the prior day and recommended ongoing outpatient follow-up and surveillance. Disposition     Disposition: Condition stable and improved  DC- Adult Discharges: All of the diagnostic tests were reviewed and questions answered. Diagnosis, care plan and treatment options were discussed. The patient understands the instructions and will follow up as directed. The patients results have been reviewed with them. They have been counseled regarding their diagnosis. The patient verbally convey understanding and agreement of the signs, symptoms, diagnosis, treatment and prognosis and additionally agrees to follow up as recommended with their PCP in 24 - 48 hours. They also agree with the care-plan and convey that all of their questions have been answered. I have also put together some discharge instructions for them that include: 1) educational information regarding their diagnosis, 2) how to care for their diagnosis at home, as well a 3) list of reasons why they would want to return to the ED prior to their follow-up appointment, should their condition change. Discharged    DISCHARGE PLAN:  1. Current Discharge Medication List      CONTINUE these medications which have NOT CHANGED    Details   midodrine (PROAMATINE) 10 mg tablet Take 1 Tablet by mouth three (3) times daily (with meals) for 30 days. Qty: 30 Tablet, Refills: 0      HYDROcodone-acetaminophen (NORCO) 5-325 mg per tablet Take 1 Tablet by mouth every four (4) hours as needed for Pain for up to 3 days. Max Daily Amount: 6 Tablets. 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 clopidogreL (Plavix) 75 mg tab Take 1 Tablet by mouth daily. Qty: 30 Tablet, Refills: 3      aspirin delayed-release 81 mg tablet Take  by mouth daily. cholecalciferol, vitamin D3, (Vitamin D3) 50 mcg (2,000 unit) tab Take  by mouth nightly.       mirtazapine (REMERON) 15 mg tablet Take 15 mg by mouth nightly. atorvastatin (LIPITOR) 20 mg tablet Take 20 mg by mouth daily. !! clopidogreL (Plavix) 75 mg tab Take 1 Tablet by mouth daily for 20 days. Qty: 20 Tablet, Refills: 0      ranolazine ER (RANEXA) 500 mg SR tablet Take 500 mg by mouth two (2) times a day.      gabapentin (NEURONTIN) 300 mg capsule nightly. DULoxetine (CYMBALTA) 30 mg capsule duloxetine 30 mg capsule,delayed release      doxazosin (CARDURA) 4 mg tablet nightly. hydrochlorothiazide (MICROZIDE PO) 12.5 mg daily. !! - Potential duplicate medications found. Please discuss with provider. 2.   Follow-up Information     Follow up With Specialties Details Why Contact Info    Timmy Leal MD Family Medicine In 2 days  [address]  [id number]  [phone]          3. Return to ED if worse   4. Discharge Medication List as of 8/28/2021  3:28 PM            Diagnosis     Clinical Impression:   1. Postop check    2. Postop carotid endarterectomy surveillance, encounter for        Attestations:    Joshua Pedraza MD    Please note that this dictation was completed with [employer], the computer voice recognition software. Quite often unanticipated grammatical, syntax, homophones, and other interpretive errors are inadvertently transcribed by the computer software. Please disregard these errors. Please excuse any errors that have escaped final proofreading. Thank you.

## 2021-09-01 NOTE — ED PROVIDER NOTES
EMERGENCY DEPARTMENT HISTORY AND PHYSICAL EXAM      Date: 2021  Patient Name: Eleno Fields    History of Presenting Illness     Chief Complaint   Patient presents with    Head Injury       History Provided By: Patient and Patient's Wife    HPI: Eleno Fields, 80 y.o. male presents to the ED with cc of   Chief Complaint   Patient presents with    Head Injury   \"lost balance\" and fell, hitting head on windowsill, scalp laceration, takes plavix, denies loc  And fell prior to arrival denies any loss of consciousness takes Plavix bleeding is controlled, hematoma laceration on the right parieto-occipital area see picture   Moderate severity, no known exacerbating or relieving factors, no other associated signs and symptoms. There are no other complaints, changes, or physical findings at this time. PCP: Rikki Posadas MD    No current facility-administered medications on file prior to encounter. Current Outpatient Medications on File Prior to Encounter   Medication Sig Dispense Refill    midodrine (PROAMATINE) 10 mg tablet Take 1 Tablet by mouth three (3) times daily (with meals) for 30 days. 30 Tablet 0    clopidogreL (Plavix) 75 mg tab Take 1 Tablet by mouth daily. 30 Tablet 3    aspirin delayed-release 81 mg tablet Take  by mouth daily.  cholecalciferol, vitamin D3, (Vitamin D3) 50 mcg (2,000 unit) tab Take  by mouth nightly.  mirtazapine (REMERON) 15 mg tablet Take 15 mg by mouth nightly.  atorvastatin (LIPITOR) 20 mg tablet Take 20 mg by mouth daily.  [] clopidogreL (Plavix) 75 mg tab Take 1 Tablet by mouth daily for 20 days. 20 Tablet 0    ranolazine ER (RANEXA) 500 mg SR tablet Take 500 mg by mouth two (2) times a day.  gabapentin (NEURONTIN) 300 mg capsule nightly.  DULoxetine (CYMBALTA) 30 mg capsule duloxetine 30 mg capsule,delayed release      doxazosin (CARDURA) 4 mg tablet nightly.  hydrochlorothiazide (MICROZIDE PO) 12.5 mg daily. Past History     Past Medical History:  Past Medical History:   Diagnosis Date    Atrial fibrillation ([address]     COVID-19 vaccine series completed 03/2021    Hypercholesteremia     Hypertension     Neuropathy     Stroke [facility]) 08/2021    TIA       Past Surgical History:  Past Surgical History:   Procedure Laterality Date    HX CATARACT REMOVAL Left 2018    IOL    RI ABDOMEN SURGERY PROC UNLISTED Right     [facility]       Family History:  No family history on file. Social History:  Social History     Tobacco Use    Smoking status: Former Smoker    Smokeless tobacco: Never Used    Tobacco comment: quit 30 years ago , smoked  pipe and cigar   Substance Use Topics    Alcohol use: Not on file    Drug use: Never       Allergies:  No Known Allergies      Review of Systems   Review of Systems   Constitutional: Negative. HENT: Negative for congestion, nosebleeds, sinus pressure and sinus pain. Eyes: Negative. Negative for photophobia. Respiratory: Negative for apnea, cough, choking, chest tightness, shortness of breath, wheezing and stridor. Cardiovascular: Negative for chest pain. Gastrointestinal: Negative. Genitourinary: Negative. Musculoskeletal: Negative. Negative for back pain, gait problem and neck pain. Skin: Positive for wound. Allergic/Immunologic: Negative. Neurological: Negative. Negative for syncope, weakness, light-headedness and numbness. Hematological: Negative. Psychiatric/Behavioral: The patient is nervous/anxious. Physical Exam   Physical Exam  Vitals and nursing note reviewed. Constitutional:       Appearance: Normal appearance. He is normal weight. HENT:      Head: Normocephalic and atraumatic. Nose: No congestion or rhinorrhea. Eyes:      Extraocular Movements: Extraocular movements intact. Pupils: Pupils are equal, round, and reactive to light. Cardiovascular:      Rate and Rhythm: Normal rate and regular rhythm.    Pulmonary: Effort: Pulmonary effort is normal.      Breath sounds: Normal breath sounds. Abdominal:      General: Abdomen is flat. Bowel sounds are normal. There is no distension. Tenderness: There is no abdominal tenderness. There is no guarding. Musculoskeletal:         General: Normal range of motion. Cervical back: Normal range of motion and neck supple. Skin:     General: Skin is warm and dry. Comments: laceration scalp about 3 to 4 cm gaping wound bleeding controlled   Neurological:      General: No focal deficit present. Mental Status: He is alert and oriented to person, place, and time. Psychiatric:         Mood and Affect: Mood normal.             Diagnostic Study Results     Labs -   No results found for this or any previous visit (from the past 12 hour(s)). Labs reviewed by me    Radiologic Studies -   CT SPINE CERV WO CONT   Final Result   1. No acute osseous abnormality definitively identified. Anterolisthesis of C4   on C5, stable. 2.  Hematoma in the left neck and supraclavicular region. Pneumomediastinum. This could be related to interval placement of left carotid stent. Traumatic   injury should be excluded. Other findings as above. Findings conveyed to Dr. Talisha Schulte on 9/1/2021 at 5:28 PM.      CT HEAD WO CONT   Final Result   No acute intracranial process. Other findings as above. CT Results  (Last 48 hours)               09/01/21 1647  CT SPINE CERV WO CONT Final result    Impression:  1. No acute osseous abnormality definitively identified. Anterolisthesis of C4   on C5, stable. 2.  Hematoma in the left neck and supraclavicular region. Pneumomediastinum. This could be related to interval placement of left carotid stent. Traumatic   injury should be excluded. Other findings as above. Findings conveyed to Dr. Talisha Schulte on 9/1/2021 at 5:28 PM.       Narrative:  CT cervical spine, 9/1/2021. History: Fall. Injury. Comparison: Including CT angiography head and neck 11/20/2021. Technique: No intravenous contrast was administered. Multiple contiguous thin   section axial images of the cervical spine were obtained from the skull base to   the thoracic inlet. Coronal and sagittal reconstruction of images was made. All CT scans at this facility are performed using dose reduction optimization   techniques as appropriate to perform the exam including the following: Automated   exposure control, adjustments of the mA and/or kV according to patient size, or   use iterative reconstruction technique. Findings: The cervical spine demonstrates 5 mm anterolisthesis of C4 and C5,   stable as compared to CT angiography of the head and neck 8/11/2021. C3 and C4   are partially fused. Severe disc space narrowing is seen at C4-C5, C5-C6 and   C6-C7. Endplate osteophytes are most pronounced at C5-C6 and C6-C7. No acute   fracture is definitively identified. The prevertebral soft tissues are within   normal limits. Left carotid stent is new since CT angiography 8/11/2021. There is hematoma in   the left neck and supraclavicular region measuring 5 cm x 5 cm with internal   air. Pneumomediastinum is incompletely imaged. The included lung show apical pleural thickening with calcification dependent   atelectatic changes. No pneumothorax is identified. Healed right-sided rib fractures are seen. 09/01/21 1646  CT HEAD WO CONT Final result    Impression:  No acute intracranial process. Other findings as above. Narrative:  CT head, 9/1/2021       History: Fall. Head injury. Technique: No intravenous contrast was administered. Multiple contiguous axial   images were acquired from the vertex to the skull base. Coronal and sagittal   reconstruction was made.        All CT scans at this facility are performed using dose reduction optimization   techniques as appropriate to perform the exam including the following: Automated   exposure control, adjustments of the mA and/or kV according to patient size, or   use iterative reconstruction technique. Comparison: Including CT head 8/11/2021. Findings:  Cortical and cerebellar volume loss and associated ventricular system   dilatation are stable. Periventricular and subcortical low attenuation is   suggestive of small vessel ischemic disease. Vascular calcifications are noted. Gray-white differentiation is preserved. No acute intracranial hemorrhage or   midline shift is identified. The calvarium is intact. Scalp hematoma is seen at   the right vertex. The included orbits and globes are intact. The remainder of the visualized   paranasal sinuses and mastoid air cells are clear. CXR Results  (Last 48 hours)    None            Medical Decision Making     I am the first provider for this patient. I reviewed the vital signs, available nursing notes, past medical history, past surgical history, family history and social history. RADIOLOGY report and LABS reviewed by me    Vital Signs-Reviewed the patient's vital signs. Patient Vitals for the past 12 hrs:   Temp Pulse Resp BP SpO2   09/01/21 1608 98.2 °F (36.8 °C) 93 16 (!) 176/103 93 %       EKG interpretation: (Preliminary)      Records Reviewed: Nurse's note. Provider Notes (Medical Decision Making):    Patient presents with DIFF DX : Contusion head laceration of scalp  Procedure Note - Wound Repair:    Performed by Renee Gonzalez MD .     Immediately prior to the procedure, the patient was reevaluated and found suitable for the planned procedure and any planned medications. Immediately prior to the procedure a time out was called to verify the correct patient, procedure, equipment, staff, and marking as appropriate. Tendon/Joint function was Intact. Neurovascular function was Intact. Site prepped with ChloraPrep.   Anesthesia was obtained with buffered with sodium bicarbonate. Wound irrigated with copious amounts of normal saline and explored. Wound was located on the right scalp, measured 3 cm and was stellate and avulsion. Level of complexity was: simple. Wound was closed using 3 staples. Foreign body was not suspected. Foreign body was not found. Procedure was tolerated well. ED Course:   Initial assessment performed. TREATMENT RESPONSE -Stable          Sean Finn MD      Disposition:  Discharged   Diagnostic tests were reviewed  Diagnosis, care plan and treatment options were reviewed and discussed. The patient understand instructions and will follow up as directed. Condition stable    Admitting Provider:  No admitting provider for patient encounter. Consulting Provider:  No ref. provider found       DISCHARGE PLAN:  1. Current Discharge Medication List        2. Follow-up Information     Follow up With Specialties Details Why Contact Info    Alec Lagunas MD Family Medicine In 2 days  [address]  [address] [facility]  [phone]          3. Return to ED if worse     Diagnosis     Clinical Impression:     ICD-10-CM ICD-9-CM    1. Fall, initial encounter  [address] XXXA E888.9    2. Contusion of scalp, initial encounter  S00. 03XA 920    3. Laceration of scalp, initial encounter  S01. 01XA 873.0         Attestations:    Sean Finn MD    Please note that this dictation was completed with [employer], the computer voice recognition software. Quite often unanticipated grammatical, syntax, homophones, and other interpretive errors are inadvertently transcribed by the computer software. Please disregard these errors. Please excuse any errors that have escaped final proofreading. Thank you.

## 2021-09-01 NOTE — DISCHARGE INSTRUCTIONS
Staple removal in 8 to 10 days wound recheck in 2 days   thank you! Thank you for allowing me to care for you in the emergency department. I sincerely hope that you are satisfied with your visit today. It is my goal to provide you with excellent care. Below you will find a list of your labs and imaging from your visit today. Should you have any questions regarding these results please do not hesitate to call the emergency department. Labs -   No results found for this or any previous visit (from the past 12 hour(s)). Radiologic Studies -   CT SPINE CERV WO CONT   Final Result   1. No acute osseous abnormality definitively identified. Anterolisthesis of C4   on C5, stable. 2.  Hematoma in the left neck and supraclavicular region. Pneumomediastinum. This could be related to interval placement of left carotid stent. Traumatic   injury should be excluded. Other findings as above. Findings conveyed to Dr. Nida Mcgarry on 9/1/2021 at 5:28 PM.      CT HEAD WO CONT   Final Result   No acute intracranial process. Other findings as above. CT Results  (Last 48 hours)                 09/01/21 1647  CT SPINE CERV WO CONT Final result    Impression:  1. No acute osseous abnormality definitively identified. Anterolisthesis of C4   on C5, stable. 2.  Hematoma in the left neck and supraclavicular region. Pneumomediastinum. This could be related to interval placement of left carotid stent. Traumatic   injury should be excluded. Other findings as above. Findings conveyed to Dr. Nida Mcgarry on 9/1/2021 at 5:28 PM.       Narrative:  CT cervical spine, 9/1/2021. History: Fall. Injury. Comparison: Including CT angiography head and neck 11/20/2021. Technique: No intravenous contrast was administered. Multiple contiguous thin   section axial images of the cervical spine were obtained from the skull base to   the thoracic inlet.   Coronal and sagittal reconstruction of images was made. All CT scans at this facility are performed using dose reduction optimization   techniques as appropriate to perform the exam including the following: Automated   exposure control, adjustments of the mA and/or kV according to patient size, or   use iterative reconstruction technique. Findings: The cervical spine demonstrates 5 mm anterolisthesis of C4 and C5,   stable as compared to CT angiography of the head and neck 8/11/2021. C3 and C4   are partially fused. Severe disc space narrowing is seen at C4-C5, C5-C6 and   C6-C7. Endplate osteophytes are most pronounced at C5-C6 and C6-C7. No acute   fracture is definitively identified. The prevertebral soft tissues are within   normal limits. Left carotid stent is new since CT angiography 8/11/2021. There is hematoma in   the left neck and supraclavicular region measuring 5 cm x 5 cm with internal   air. Pneumomediastinum is incompletely imaged. The included lung show apical pleural thickening with calcification dependent   atelectatic changes. No pneumothorax is identified. Healed right-sided rib fractures are seen. 09/01/21 1646  CT HEAD WO CONT Final result    Impression:  No acute intracranial process. Other findings as above. Narrative:  CT head, 9/1/2021       History: Fall. Head injury. Technique: No intravenous contrast was administered. Multiple contiguous axial   images were acquired from the vertex to the skull base. Coronal and sagittal   reconstruction was made. All CT scans at this facility are performed using dose reduction optimization   techniques as appropriate to perform the exam including the following: Automated   exposure control, adjustments of the mA and/or kV according to patient size, or   use iterative reconstruction technique. Comparison: Including CT head 8/11/2021.        Findings:  Cortical and cerebellar volume loss and associated ventricular system   dilatation are stable. Periventricular and subcortical low attenuation is   suggestive of small vessel ischemic disease. Vascular calcifications are noted. Gray-white differentiation is preserved. No acute intracranial hemorrhage or   midline shift is identified. The calvarium is intact. Scalp hematoma is seen at   the right vertex. The included orbits and globes are intact. The remainder of the visualized   paranasal sinuses and mastoid air cells are clear. CXR Results  (Last 48 hours)      None               If you feel that you have not received excellent quality care or timely care, please ask to speak to the nurse manager. Please choose us in the future for your continued health care needs. ------------------------------------------------------------------------------------------------------------  The exam and treatment you received in the Emergency Department were for an urgent problem and are not intended as complete care. It is important that you follow-up with a doctor, nurse practitioner, or physician assistant to:  (1) confirm your diagnosis,  (2) re-evaluation of changes in your illness and treatment, and  (3) for ongoing care. If your symptoms become worse or you do not improve as expected and you are unable to reach your usual health care provider, you should return to the Emergency Department. We are available 24 hours a day. Please take your discharge instructions with you when you go to your follow-up appointment. If you have any problem arranging a follow-up appointment, contact the Emergency Department immediately. If a prescription has been provided, please have it filled as soon as possible to prevent a delay in treatment. Read the entire medication instruction sheet provided to you by the pharmacy.  If you have any questions or reservations about taking the medication due to side effects or interactions with other medications, please call your primary care physician or contact the ER to speak with the charge nurse. Make an appointment with your family doctor or the physician you were referred to for follow-up of this visit as instructed on your discharge paperwork, as this is a mandatory follow-up. Return to the ER if you are unable to be seen or if you are unable to be seen in a timely manner. If you have any problem arranging the follow-up visit, contact the Emergency Department immediately.

## 2021-09-04 NOTE — DISCHARGE SUMMARY
[address] SUMMARY    Name:  Blaine Rhodes  MR#:  [id number]  :  [id number]  ACCOUNT #:  [de-identified]  ADMIT DATE:  2021  DISCHARGE DATE:  2021      ADMISSION DIAGNOSIS:  Carotid stenosis. DISCHARGE DIAGNOSIS:  Carotid stenosis. PROCEDURE:  Left transcarotid arterial revascularization done by me on the date of admission. DETAILS OF ADMISSION AND HOSPITAL COURSE:  The patient is a 25-year-old male with symptomatic high-grade carotid stenosis, who was admitted to the hospital for TCAR procedure. This was done without difficulty or complication. Postoperative course was unremarkable. Diet and activity were gradually advanced as tolerated. On the date of discharge, he was doing well and sent home in good condition. DISCHARGE DISPOSITION:  Home. DISCHARGE CONDITION:  Good.       Yanet Mccollum MD      [id number]  D:  2021 19:29  T:  2021 4:24  JOB #:  [id number]

## 2022-01-01 ENCOUNTER — APPOINTMENT (OUTPATIENT)
Dept: GENERAL RADIOLOGY | Age: 87
DRG: 871 | End: 2022-01-01
Attending: FAMILY MEDICINE
Payer: MEDICARE

## 2022-01-01 ENCOUNTER — APPOINTMENT (OUTPATIENT)
Dept: GENERAL RADIOLOGY | Age: 87
DRG: 871 | End: 2022-01-01
Attending: INTERNAL MEDICINE
Payer: MEDICARE

## 2022-01-01 ENCOUNTER — PATIENT OUTREACH (OUTPATIENT)
Dept: CASE MANAGEMENT | Age: 87
End: 2022-01-01

## 2022-01-01 ENCOUNTER — APPOINTMENT (OUTPATIENT)
Dept: GENERAL RADIOLOGY | Age: 87
DRG: 871 | End: 2022-01-01
Attending: EMERGENCY MEDICINE
Payer: MEDICARE

## 2022-01-01 ENCOUNTER — APPOINTMENT (OUTPATIENT)
Dept: GENERAL RADIOLOGY | Age: 87
DRG: 177 | End: 2022-01-01
Attending: EMERGENCY MEDICINE
Payer: MEDICARE

## 2022-01-01 ENCOUNTER — APPOINTMENT (OUTPATIENT)
Dept: GENERAL RADIOLOGY | Age: 87
DRG: 177 | End: 2022-01-01
Attending: FAMILY MEDICINE
Payer: MEDICARE

## 2022-01-01 ENCOUNTER — ANESTHESIA (OUTPATIENT)
Dept: ENDOSCOPY | Age: 87
DRG: 871 | End: 2022-01-01
Payer: MEDICARE

## 2022-01-01 ENCOUNTER — APPOINTMENT (OUTPATIENT)
Dept: NON INVASIVE DIAGNOSTICS | Age: 87
DRG: 871 | End: 2022-01-01
Attending: INTERNAL MEDICINE
Payer: MEDICARE

## 2022-01-01 ENCOUNTER — APPOINTMENT (OUTPATIENT)
Dept: ENDOSCOPY | Age: 87
DRG: 871 | End: 2022-01-01
Attending: INTERNAL MEDICINE
Payer: MEDICARE

## 2022-01-01 ENCOUNTER — ANESTHESIA EVENT (OUTPATIENT)
Dept: ENDOSCOPY | Age: 87
DRG: 871 | End: 2022-01-01
Payer: MEDICARE

## 2022-01-01 ENCOUNTER — HOSPITAL ENCOUNTER (INPATIENT)
Age: 87
LOS: 12 days | DRG: 871 | End: 2022-08-08
Attending: EMERGENCY MEDICINE | Admitting: FAMILY MEDICINE
Payer: MEDICARE

## 2022-01-01 ENCOUNTER — HOSPITAL ENCOUNTER (INPATIENT)
Age: 87
LOS: 8 days | Discharge: NURSING, REHAB OR LONG-TERM CARE | DRG: 177 | End: 2022-07-26
Attending: EMERGENCY MEDICINE | Admitting: FAMILY MEDICINE
Payer: MEDICARE

## 2022-01-01 ENCOUNTER — APPOINTMENT (OUTPATIENT)
Dept: CT IMAGING | Age: 87
End: 2022-01-01
Attending: EMERGENCY MEDICINE
Payer: MEDICARE

## 2022-01-01 ENCOUNTER — HOSPITAL ENCOUNTER (EMERGENCY)
Age: 87
Discharge: HOME | End: 2022-02-10
Attending: EMERGENCY MEDICINE
Payer: MEDICARE

## 2022-01-01 VITALS
HEIGHT: 66 IN | DIASTOLIC BLOOD PRESSURE: 26 MMHG | SYSTOLIC BLOOD PRESSURE: 37 MMHG | OXYGEN SATURATION: 100 % | TEMPERATURE: 97.8 F | BODY MASS INDEX: 18 KG/M2 | WEIGHT: 111.99 LBS

## 2022-01-01 VITALS
RESPIRATION RATE: 22 BRPM | BODY MASS INDEX: 18.83 KG/M2 | DIASTOLIC BLOOD PRESSURE: 69 MMHG | SYSTOLIC BLOOD PRESSURE: 106 MMHG | HEIGHT: 67 IN | OXYGEN SATURATION: 91 % | WEIGHT: 120 LBS | HEART RATE: 91 BPM | TEMPERATURE: 99.6 F

## 2022-01-01 VITALS
BODY MASS INDEX: 19.99 KG/M2 | HEART RATE: 92 BPM | TEMPERATURE: 98 F | WEIGHT: 120 LBS | DIASTOLIC BLOOD PRESSURE: 100 MMHG | HEIGHT: 65 IN | RESPIRATION RATE: 18 BRPM | SYSTOLIC BLOOD PRESSURE: 160 MMHG | OXYGEN SATURATION: 97 %

## 2022-01-01 DIAGNOSIS — J96.01 ACUTE RESPIRATORY FAILURE WITH HYPOXIA (HCC): Primary | ICD-10-CM
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
## 2022-01-01 DIAGNOSIS — U07.1 COVID: Primary | ICD-10-CM

## 2022-01-01 DIAGNOSIS — J18.9 COMMUNITY ACQUIRED PNEUMONIA OF LEFT LOWER LOBE OF LUNG: ICD-10-CM

## 2022-01-01 DIAGNOSIS — S01.01XA LACERATION OF SCALP WITHOUT FOREIGN BODY, INITIAL ENCOUNTER: Primary | ICD-10-CM

## 2022-01-01 DIAGNOSIS — U07.1 COVID: ICD-10-CM

## 2022-01-01 DIAGNOSIS — R09.02 HYPOXIA: ICD-10-CM

## 2022-01-01 DIAGNOSIS — S00.03XA HEMATOMA OF SCALP, INITIAL ENCOUNTER: ICD-10-CM

## 2022-01-01 LAB
ABO + RH BLD: NORMAL
ABO + RH BLD: NORMAL
ALBUMIN SERPL-MCNC: 1.8 G/DL (ref 3.5–5)
ALBUMIN SERPL-MCNC: 2 G/DL (ref 3.5–5)
ALBUMIN SERPL-MCNC: 2.1 G/DL (ref 3.5–5)
ALBUMIN SERPL-MCNC: 2.2 G/DL (ref 3.5–5)
ALBUMIN SERPL-MCNC: 2.5 G/DL (ref 3.5–5)
ALBUMIN SERPL-MCNC: 2.5 G/DL (ref 3.5–5)
ALBUMIN SERPL-MCNC: 2.6 G/DL (ref 3.5–5)
ALBUMIN SERPL-MCNC: 3 G/DL (ref 3.5–5)
ALBUMIN SERPL-MCNC: 3.2 G/DL (ref 3.5–5)
ALBUMIN SERPL-MCNC: 3.3 G/DL (ref 3.5–5)
ALBUMIN SERPL-MCNC: 3.7 G/DL (ref 3.5–5)
ALBUMIN/GLOB SERPL: 0.6 {RATIO} (ref 1.1–2.2)
ALBUMIN/GLOB SERPL: 0.7 {RATIO} (ref 1.1–2.2)
ALBUMIN/GLOB SERPL: 0.8 {RATIO} (ref 1.1–2.2)
ALBUMIN/GLOB SERPL: 1 {RATIO} (ref 1.1–2.2)
ALBUMIN/GLOB SERPL: 1.1 {RATIO} (ref 1.1–2.2)
ALBUMIN/GLOB SERPL: 1.1 {RATIO} (ref 1.1–2.2)
ALP SERPL-CCNC: 105 U/L (ref 45–117)
ALP SERPL-CCNC: 107 U/L (ref 45–117)
ALP SERPL-CCNC: 57 U/L (ref 45–117)
ALP SERPL-CCNC: 62 U/L (ref 45–117)
ALP SERPL-CCNC: 66 U/L (ref 45–117)
ALP SERPL-CCNC: 67 U/L (ref 45–117)
ALP SERPL-CCNC: 72 U/L (ref 45–117)
ALP SERPL-CCNC: 75 U/L (ref 45–117)
ALP SERPL-CCNC: 79 U/L (ref 45–117)
ALP SERPL-CCNC: 84 U/L (ref 45–117)
ALP SERPL-CCNC: 91 U/L (ref 45–117)
ALP SERPL-CCNC: 94 U/L (ref 45–117)
ALP SERPL-CCNC: 98 U/L (ref 45–117)
ALT SERPL-CCNC: 20 U/L (ref 12–78)
ALT SERPL-CCNC: 22 U/L (ref 12–78)
ALT SERPL-CCNC: 23 U/L (ref 12–78)
ALT SERPL-CCNC: 26 U/L (ref 12–78)
ALT SERPL-CCNC: 35 U/L (ref 12–78)
ALT SERPL-CCNC: 36 U/L (ref 12–78)
ALT SERPL-CCNC: 36 U/L (ref 12–78)
ALT SERPL-CCNC: 37 U/L (ref 12–78)
ALT SERPL-CCNC: 37 U/L (ref 12–78)
ALT SERPL-CCNC: 41 U/L (ref 12–78)
ALT SERPL-CCNC: 43 U/L (ref 12–78)
ALT SERPL-CCNC: 45 U/L (ref 12–78)
ALT SERPL-CCNC: 52 U/L (ref 12–78)
ANION GAP SERPL CALC-SCNC: 10 MMOL/L (ref 5–15)
ANION GAP SERPL CALC-SCNC: 11 MMOL/L (ref 5–15)
ANION GAP SERPL CALC-SCNC: 11 MMOL/L (ref 5–15)
ANION GAP SERPL CALC-SCNC: 12 MMOL/L (ref 5–15)
ANION GAP SERPL CALC-SCNC: 2 MMOL/L (ref 5–15)
ANION GAP SERPL CALC-SCNC: 3 MMOL/L (ref 5–15)
ANION GAP SERPL CALC-SCNC: 4 MMOL/L (ref 5–15)
ANION GAP SERPL CALC-SCNC: 5 MMOL/L (ref 5–15)
ANION GAP SERPL CALC-SCNC: 6 MMOL/L (ref 5–15)
ANION GAP SERPL CALC-SCNC: 7 MMOL/L (ref 5–15)
ANION GAP SERPL CALC-SCNC: 8 MMOL/L (ref 5–15)
ANION GAP SERPL CALC-SCNC: 9 MMOL/L (ref 5–15)
APPEARANCE UR: ABNORMAL
APPEARANCE UR: CLEAR
APPEARANCE UR: CLEAR
APTT PPP: 26.8 SEC (ref 21.2–34.1)
ARTERIAL PATENCY WRIST A: YES
AST SERPL W P-5'-P-CCNC: 30 U/L (ref 15–37)
AST SERPL W P-5'-P-CCNC: 39 U/L (ref 15–37)
AST SERPL W P-5'-P-CCNC: 39 U/L (ref 15–37)
AST SERPL W P-5'-P-CCNC: 40 U/L (ref 15–37)
AST SERPL W P-5'-P-CCNC: 43 U/L (ref 15–37)
AST SERPL W P-5'-P-CCNC: 46 U/L (ref 15–37)
AST SERPL W P-5'-P-CCNC: 48 U/L (ref 15–37)
AST SERPL W P-5'-P-CCNC: 49 U/L (ref 15–37)
AST SERPL W P-5'-P-CCNC: 52 U/L (ref 15–37)
AST SERPL W P-5'-P-CCNC: 54 U/L (ref 15–37)
AST SERPL W P-5'-P-CCNC: 55 U/L (ref 15–37)
AST SERPL W P-5'-P-CCNC: 70 U/L (ref 15–37)
AST SERPL W P-5'-P-CCNC: 73 U/L (ref 15–37)
ATRIAL RATE: 119 BPM
ATRIAL RATE: 120 BPM
ATRIAL RATE: 76 BPM
ATRIAL RATE: 88 BPM
BACTERIA SPEC CULT: NORMAL
BACTERIA URNS QL MICRO: ABNORMAL /HPF
BACTERIA URNS QL MICRO: NEGATIVE /HPF
BACTERIA URNS QL MICRO: NEGATIVE /HPF
BASE EXCESS BLDA CALC-SCNC: 0.9 MMOL/L (ref 0–3)
BASE EXCESS BLDA CALC-SCNC: 1.9 MMOL/L (ref 0–3)
BASE EXCESS BLDA CALC-SCNC: 2.1 MMOL/L (ref 0–3)
BASE EXCESS BLDA CALC-SCNC: 3.5 MMOL/L (ref 0–3)
BASE EXCESS BLDA CALC-SCNC: 4 MMOL/L (ref 0–3)
BASE EXCESS BLDA CALC-SCNC: 5.2 MMOL/L (ref 0–3)
BASE EXCESS BLDV CALC-SCNC: 0.6 MMOL/L (ref 0–3)
BASOPHILS # BLD: 0 K/UL (ref 0–0.1)
BASOPHILS NFR BLD: 0 % (ref 0–1)
BASOPHILS NFR BLD: 1 % (ref 0–1)
BDY SITE: ABNORMAL
BILIRUB SERPL-MCNC: 0.5 MG/DL (ref 0.2–1)
BILIRUB SERPL-MCNC: 0.5 MG/DL (ref 0.2–1)
BILIRUB SERPL-MCNC: 0.6 MG/DL (ref 0.2–1)
BILIRUB SERPL-MCNC: 0.6 MG/DL (ref 0.2–1)
BILIRUB SERPL-MCNC: 0.7 MG/DL (ref 0.2–1)
BILIRUB SERPL-MCNC: 0.8 MG/DL (ref 0.2–1)
BILIRUB SERPL-MCNC: 1 MG/DL (ref 0.2–1)
BILIRUB UR QL: NEGATIVE
BLD PROD TYP BPU: NORMAL
BLOOD GROUP ANTIBODIES SERPL: NEGATIVE
BLOOD GROUP ANTIBODIES SERPL: NEGATIVE
BNP SERPL-MCNC: 2592 PG/ML
BNP SERPL-MCNC: 2705 PG/ML
BNP SERPL-MCNC: 2758 PG/ML
BNP SERPL-MCNC: 3310 PG/ML
BNP SERPL-MCNC: 818 PG/ML
BODY TEMPERATURE: 96.9
BODY TEMPERATURE: 97.7
BODY TEMPERATURE: 98.1
BODY TEMPERATURE: 98.2
BODY TEMPERATURE: 98.6
BODY TEMPERATURE: 98.7
BPU ID: NORMAL
BUN SERPL-MCNC: 14 MG/DL (ref 6–20)
BUN SERPL-MCNC: 14 MG/DL (ref 6–20)
BUN SERPL-MCNC: 15 MG/DL (ref 6–20)
BUN SERPL-MCNC: 15 MG/DL (ref 6–20)
BUN SERPL-MCNC: 16 MG/DL (ref 6–20)
BUN SERPL-MCNC: 17 MG/DL (ref 6–20)
BUN SERPL-MCNC: 18 MG/DL (ref 6–20)
BUN SERPL-MCNC: 18 MG/DL (ref 6–20)
BUN SERPL-MCNC: 19 MG/DL (ref 6–20)
BUN SERPL-MCNC: 19 MG/DL (ref 6–20)
BUN SERPL-MCNC: 21 MG/DL (ref 6–20)
BUN SERPL-MCNC: 22 MG/DL (ref 6–20)
BUN SERPL-MCNC: 27 MG/DL (ref 6–20)
BUN SERPL-MCNC: 29 MG/DL (ref 6–20)
BUN SERPL-MCNC: 30 MG/DL (ref 6–20)
BUN SERPL-MCNC: 30 MG/DL (ref 6–20)
BUN SERPL-MCNC: 31 MG/DL (ref 6–20)
BUN SERPL-MCNC: 33 MG/DL (ref 6–20)
BUN SERPL-MCNC: 34 MG/DL (ref 6–20)
BUN SERPL-MCNC: 37 MG/DL (ref 6–20)
BUN SERPL-MCNC: 42 MG/DL (ref 6–20)
BUN/CREAT SERPL: 17 (ref 12–20)
BUN/CREAT SERPL: 20 (ref 12–20)
BUN/CREAT SERPL: 22 (ref 12–20)
BUN/CREAT SERPL: 23 (ref 12–20)
BUN/CREAT SERPL: 24 (ref 12–20)
BUN/CREAT SERPL: 25 (ref 12–20)
BUN/CREAT SERPL: 27 (ref 12–20)
BUN/CREAT SERPL: 28 (ref 12–20)
BUN/CREAT SERPL: 29 (ref 12–20)
BUN/CREAT SERPL: 31 (ref 12–20)
BUN/CREAT SERPL: 35 (ref 12–20)
BUN/CREAT SERPL: 36 (ref 12–20)
BUN/CREAT SERPL: 37 (ref 12–20)
BUN/CREAT SERPL: 42 (ref 12–20)
BUN/CREAT SERPL: 43 (ref 12–20)
BUN/CREAT SERPL: 43 (ref 12–20)
BUN/CREAT SERPL: 45 (ref 12–20)
BUN/CREAT SERPL: 45 (ref 12–20)
BUN/CREAT SERPL: 54 (ref 12–20)
BUN/CREAT SERPL: 55 (ref 12–20)
BUN/CREAT SERPL: 57 (ref 12–20)
BUN/CREAT SERPL: 59 (ref 12–20)
BUN/CREAT SERPL: 63 (ref 12–20)
BUN/CREAT SERPL: 63 (ref 12–20)
CA-I BLD-MCNC: 1.07 MMOL/L (ref 1.13–1.32)
CA-I BLD-MCNC: 6.5 MG/DL (ref 8.5–10.1)
CA-I BLD-MCNC: 6.6 MG/DL (ref 8.5–10.1)
CA-I BLD-MCNC: 6.6 MG/DL (ref 8.5–10.1)
CA-I BLD-MCNC: 7 MG/DL (ref 8.5–10.1)
CA-I BLD-MCNC: 7.1 MG/DL (ref 8.5–10.1)
CA-I BLD-MCNC: 7.1 MG/DL (ref 8.5–10.1)
CA-I BLD-MCNC: 7.2 MG/DL (ref 8.5–10.1)
CA-I BLD-MCNC: 7.3 MG/DL (ref 8.5–10.1)
CA-I BLD-MCNC: 7.4 MG/DL (ref 8.5–10.1)
CA-I BLD-MCNC: 7.5 MG/DL (ref 8.5–10.1)
CA-I BLD-MCNC: 7.6 MG/DL (ref 8.5–10.1)
CA-I BLD-MCNC: 7.7 MG/DL (ref 8.5–10.1)
CA-I BLD-MCNC: 8 MG/DL (ref 8.5–10.1)
CA-I BLD-MCNC: 8 MG/DL (ref 8.5–10.1)
CA-I BLD-MCNC: 8.2 MG/DL (ref 8.5–10.1)
CA-I BLD-MCNC: 9.2 MG/DL (ref 8.5–10.1)
CALCULATED P AXIS, ECG09: 85 DEGREES
CALCULATED P AXIS, ECG09: 86 DEGREES
CALCULATED R AXIS, ECG10: -19 DEGREES
CALCULATED R AXIS, ECG10: -22 DEGREES
CALCULATED R AXIS, ECG10: -28 DEGREES
CALCULATED R AXIS, ECG10: -7 DEGREES
CALCULATED T AXIS, ECG11: -168 DEGREES
CALCULATED T AXIS, ECG11: 131 DEGREES
CALCULATED T AXIS, ECG11: 146 DEGREES
CALCULATED T AXIS, ECG11: 84 DEGREES
CHLORIDE SERPL-SCNC: 100 MMOL/L (ref 97–108)
CHLORIDE SERPL-SCNC: 101 MMOL/L (ref 97–108)
CHLORIDE SERPL-SCNC: 101 MMOL/L (ref 97–108)
CHLORIDE SERPL-SCNC: 102 MMOL/L (ref 97–108)
CHLORIDE SERPL-SCNC: 102 MMOL/L (ref 97–108)
CHLORIDE SERPL-SCNC: 103 MMOL/L (ref 97–108)
CHLORIDE SERPL-SCNC: 104 MMOL/L (ref 97–108)
CHLORIDE SERPL-SCNC: 105 MMOL/L (ref 97–108)
CHLORIDE SERPL-SCNC: 106 MMOL/L (ref 97–108)
CHLORIDE SERPL-SCNC: 112 MMOL/L (ref 97–108)
CHLORIDE SERPL-SCNC: 94 MMOL/L (ref 97–108)
CHLORIDE SERPL-SCNC: 99 MMOL/L (ref 97–108)
CHLORIDE SERPL-SCNC: 99 MMOL/L (ref 97–108)
CO2 SERPL-SCNC: 20 MMOL/L (ref 21–32)
CO2 SERPL-SCNC: 21 MMOL/L (ref 21–32)
CO2 SERPL-SCNC: 21 MMOL/L (ref 21–32)
CO2 SERPL-SCNC: 22 MMOL/L (ref 21–32)
CO2 SERPL-SCNC: 22 MMOL/L (ref 21–32)
CO2 SERPL-SCNC: 23 MMOL/L (ref 21–32)
CO2 SERPL-SCNC: 24 MMOL/L (ref 21–32)
CO2 SERPL-SCNC: 25 MMOL/L (ref 21–32)
CO2 SERPL-SCNC: 26 MMOL/L (ref 21–32)
CO2 SERPL-SCNC: 27 MMOL/L (ref 21–32)
CO2 SERPL-SCNC: 27 MMOL/L (ref 21–32)
CO2 SERPL-SCNC: 29 MMOL/L (ref 21–32)
CO2 SERPL-SCNC: 29 MMOL/L (ref 21–32)
CO2 SERPL-SCNC: 30 MMOL/L (ref 21–32)
CO2 SERPL-SCNC: 31 MMOL/L (ref 21–32)
COHGB MFR BLD: 0 % (ref 1–2)
COHGB MFR BLD: 0.2 % (ref 1–2)
COHGB MFR BLD: 0.3 % (ref 1–2)
COLOR UR: ABNORMAL
COLOR UR: YELLOW
COLOR UR: YELLOW
COVID-19 RAPID TEST, COVR: DETECTED
COVID-19 RAPID TEST, COVR: DETECTED
CREAT SERPL-MCNC: 0.49 MG/DL (ref 0.7–1.3)
CREAT SERPL-MCNC: 0.5 MG/DL (ref 0.7–1.3)
CREAT SERPL-MCNC: 0.52 MG/DL (ref 0.7–1.3)
CREAT SERPL-MCNC: 0.55 MG/DL (ref 0.7–1.3)
CREAT SERPL-MCNC: 0.55 MG/DL (ref 0.7–1.3)
CREAT SERPL-MCNC: 0.56 MG/DL (ref 0.7–1.3)
CREAT SERPL-MCNC: 0.56 MG/DL (ref 0.7–1.3)
CREAT SERPL-MCNC: 0.58 MG/DL (ref 0.7–1.3)
CREAT SERPL-MCNC: 0.59 MG/DL (ref 0.7–1.3)
CREAT SERPL-MCNC: 0.59 MG/DL (ref 0.7–1.3)
CREAT SERPL-MCNC: 0.61 MG/DL (ref 0.7–1.3)
CREAT SERPL-MCNC: 0.62 MG/DL (ref 0.7–1.3)
CREAT SERPL-MCNC: 0.63 MG/DL (ref 0.7–1.3)
CREAT SERPL-MCNC: 0.64 MG/DL (ref 0.7–1.3)
CREAT SERPL-MCNC: 0.66 MG/DL (ref 0.7–1.3)
CREAT SERPL-MCNC: 0.67 MG/DL (ref 0.7–1.3)
CREAT SERPL-MCNC: 0.69 MG/DL (ref 0.7–1.3)
CREAT SERPL-MCNC: 0.72 MG/DL (ref 0.7–1.3)
CREAT SERPL-MCNC: 0.74 MG/DL (ref 0.7–1.3)
CREAT SERPL-MCNC: 0.74 MG/DL (ref 0.7–1.3)
CREAT SERPL-MCNC: 0.77 MG/DL (ref 0.7–1.3)
CREAT SERPL-MCNC: 0.78 MG/DL (ref 0.7–1.3)
CREAT SERPL-MCNC: 0.81 MG/DL (ref 0.7–1.3)
CREAT SERPL-MCNC: 0.9 MG/DL (ref 0.7–1.3)
CREAT SERPL-MCNC: 0.98 MG/DL (ref 0.7–1.3)
CREAT SERPL-MCNC: 1.05 MG/DL (ref 0.7–1.3)
CROSSMATCH RESULT,%XM: NORMAL
CRP SERPL-MCNC: 0.39 MG/DL (ref 0–0.6)
CRP SERPL-MCNC: 0.63 MG/DL (ref 0–0.6)
CRP SERPL-MCNC: 1.3 MG/DL (ref 0–0.6)
CRP SERPL-MCNC: 1.64 MG/DL (ref 0–0.6)
CRP SERPL-MCNC: 1.73 MG/DL (ref 0–0.6)
CRP SERPL-MCNC: 16.1 MG/DL (ref 0–0.6)
CRP SERPL-MCNC: 18.6 MG/DL (ref 0–0.6)
CRP SERPL-MCNC: 2.02 MG/DL (ref 0–0.6)
CRP SERPL-MCNC: 3.26 MG/DL (ref 0–0.6)
CRP SERPL-MCNC: 4.79 MG/DL (ref 0–0.6)
CRP SERPL-MCNC: 5.15 MG/DL (ref 0–0.6)
CRP SERPL-MCNC: 5.75 MG/DL (ref 0–0.6)
CRP SERPL-MCNC: 6.18 MG/DL (ref 0–0.6)
CRP SERPL-MCNC: 8.84 MG/DL (ref 0–0.6)
CRP SERPL-MCNC: 9.24 MG/DL (ref 0–0.6)
DATE LAST DOSE: NORMAL
DIAGNOSIS, 93000: NORMAL
DIFFERENTIAL METHOD BLD: ABNORMAL
ECHO AO ASC DIAM: 2.3 CM
ECHO AO ASCENDING AORTA INDEX: 1.4 CM/M2
ECHO AO ROOT DIAM: 3.4 CM
ECHO AO ROOT INDEX: 2.07 CM/M2
ECHO AR MAX VEL PISA: 3.1 M/S
ECHO AV AREA PEAK VELOCITY: 1.5 CM2
ECHO AV AREA VTI: 1.5 CM2
ECHO AV AREA/BSA PEAK VELOCITY: 0.9 CM2/M2
ECHO AV AREA/BSA VTI: 0.9 CM2/M2
ECHO AV MEAN GRADIENT: 5 MMHG
ECHO AV MEAN VELOCITY: 1.1 M/S
ECHO AV PEAK GRADIENT: 11 MMHG
ECHO AV PEAK VELOCITY: 1.7 M/S
ECHO AV REGURGITANT PHT: 397 MS
ECHO AV VELOCITY RATIO: 0.53
ECHO AV VTI: 28.6 CM
ECHO EST RA PRESSURE: 3 MMHG
ECHO LA AREA 2C: 16.6 CM2
ECHO LA AREA 4C: 15.3 CM2
ECHO LA DIAMETER INDEX: 1.59 CM/M2
ECHO LA DIAMETER: 2.6 CM
ECHO LA MAJOR AXIS: 4.3 CM
ECHO LA MINOR AXIS: 5.4 CM
ECHO LA TO AORTIC ROOT RATIO: 0.76
ECHO LA VOL BP: 44 ML (ref 18–58)
ECHO LA VOL/BSA BIPLANE: 27 ML/M2 (ref 16–34)
ECHO LV EDV A2C: 32 ML
ECHO LV EDV A4C: 56 ML
ECHO LV EDV INDEX A4C: 34 ML/M2
ECHO LV EDV NDEX A2C: 20 ML/M2
ECHO LV EJECTION FRACTION A2C: 53 %
ECHO LV EJECTION FRACTION A4C: 75 %
ECHO LV ESV A2C: 15 ML
ECHO LV ESV A4C: 14 ML
ECHO LV ESV INDEX A2C: 9 ML/M2
ECHO LV ESV INDEX A4C: 9 ML/M2
ECHO LV FRACTIONAL SHORTENING: 43 % (ref 28–44)
ECHO LV INTERNAL DIMENSION DIASTOLE INDEX: 2.68 CM/M2
ECHO LV INTERNAL DIMENSION DIASTOLIC: 4.4 CM (ref 4.2–5.9)
ECHO LV INTERNAL DIMENSION SYSTOLIC INDEX: 1.52 CM/M2
ECHO LV INTERNAL DIMENSION SYSTOLIC: 2.5 CM
ECHO LV IVSD: 0.8 CM (ref 0.6–1)
ECHO LV MASS 2D: 118.6 G (ref 88–224)
ECHO LV MASS INDEX 2D: 72.3 G/M2 (ref 49–115)
ECHO LV POSTERIOR WALL DIASTOLIC: 0.9 CM (ref 0.6–1)
ECHO LV RELATIVE WALL THICKNESS RATIO: 0.41
ECHO LVOT AREA: 2.8 CM2
ECHO LVOT AV VTI INDEX: 0.53
ECHO LVOT DIAM: 1.9 CM
ECHO LVOT MEAN GRADIENT: 1 MMHG
ECHO LVOT PEAK GRADIENT: 3 MMHG
ECHO LVOT PEAK VELOCITY: 0.9 M/S
ECHO LVOT STROKE VOLUME INDEX: 26.3 ML/M2
ECHO LVOT SV: 43.1 ML
ECHO LVOT VTI: 15.2 CM
ECHO MV A VELOCITY: 0.57 M/S
ECHO MV AREA VTI: 2.2 CM2
ECHO MV E VELOCITY: 0.72 M/S
ECHO MV E/A RATIO: 1.26
ECHO MV EROA CONT EQ: 0.4 CM2
ECHO MV LVOT VTI INDEX: 1.28
ECHO MV MAX VELOCITY: 0.9 M/S
ECHO MV MEAN GRADIENT: 2 MMHG
ECHO MV MEAN VELOCITY: 0.6 M/S
ECHO MV PEAK GRADIENT: 3 MMHG
ECHO MV REGURGITANT ALIASING (NYQUIST) VELOCITY: 92 CM/S
ECHO MV REGURGITANT PEAK GRADIENT: 149 MMHG
ECHO MV REGURGITANT PEAK VELOCITY: 6.1 M/S
ECHO MV REGURGITANT VTIA: 133 CM
ECHO MV VTI: 19.5 CM
ECHO PULMONARY ARTERY END DIASTOLIC PRESSURE: 6 MMHG
ECHO PV MAX VELOCITY: 0.7 M/S
ECHO PV MEAN GRADIENT: 1 MMHG
ECHO PV MEAN VELOCITY: 0.5 M/S
ECHO PV PEAK GRADIENT: 2 MMHG
ECHO PV REGURGITANT MAX VELOCITY: 1.2 M/S
ECHO PV VTI: 10.9 CM
ECHO RIGHT VENTRICULAR SYSTOLIC PRESSURE (RVSP): 25 MMHG
ECHO RV TAPSE: 1.9 CM (ref 1.7–?)
ECHO TV REGURGITANT MAX VELOCITY: 2.32 M/S
ECHO TV REGURGITANT PEAK GRADIENT: 22 MMHG
EOSINOPHIL # BLD: 0 K/UL (ref 0–0.4)
EOSINOPHIL # BLD: 0.1 K/UL (ref 0–0.4)
EOSINOPHIL NFR BLD: 0 % (ref 0–7)
EOSINOPHIL NFR BLD: 1 % (ref 0–7)
ERYTHROCYTE [DISTWIDTH] IN BLOOD BY AUTOMATED COUNT: 12.9 % (ref 11.5–14.5)
ERYTHROCYTE [DISTWIDTH] IN BLOOD BY AUTOMATED COUNT: 13.8 % (ref 11.5–14.5)
ERYTHROCYTE [DISTWIDTH] IN BLOOD BY AUTOMATED COUNT: 13.8 % (ref 11.5–14.5)
ERYTHROCYTE [DISTWIDTH] IN BLOOD BY AUTOMATED COUNT: 13.9 % (ref 11.5–14.5)
ERYTHROCYTE [DISTWIDTH] IN BLOOD BY AUTOMATED COUNT: 14.4 % (ref 11.5–14.5)
ERYTHROCYTE [DISTWIDTH] IN BLOOD BY AUTOMATED COUNT: 14.4 % (ref 11.5–14.5)
ERYTHROCYTE [DISTWIDTH] IN BLOOD BY AUTOMATED COUNT: 14.6 % (ref 11.5–14.5)
ERYTHROCYTE [DISTWIDTH] IN BLOOD BY AUTOMATED COUNT: 14.7 % (ref 11.5–14.5)
ERYTHROCYTE [DISTWIDTH] IN BLOOD BY AUTOMATED COUNT: 14.8 % (ref 11.5–14.5)
ERYTHROCYTE [DISTWIDTH] IN BLOOD BY AUTOMATED COUNT: 14.8 % (ref 11.5–14.5)
ERYTHROCYTE [DISTWIDTH] IN BLOOD BY AUTOMATED COUNT: 15 % (ref 11.5–14.5)
ERYTHROCYTE [DISTWIDTH] IN BLOOD BY AUTOMATED COUNT: 15 % (ref 11.5–14.5)
ERYTHROCYTE [DISTWIDTH] IN BLOOD BY AUTOMATED COUNT: 15.2 % (ref 11.5–14.5)
ERYTHROCYTE [DISTWIDTH] IN BLOOD BY AUTOMATED COUNT: 15.3 % (ref 11.5–14.5)
ERYTHROCYTE [DISTWIDTH] IN BLOOD BY AUTOMATED COUNT: 15.4 % (ref 11.5–14.5)
ERYTHROCYTE [DISTWIDTH] IN BLOOD BY AUTOMATED COUNT: 15.5 % (ref 11.5–14.5)
ERYTHROCYTE [DISTWIDTH] IN BLOOD BY AUTOMATED COUNT: 16.4 % (ref 11.5–14.5)
ERYTHROCYTE [DISTWIDTH] IN BLOOD BY AUTOMATED COUNT: 17.1 % (ref 11.5–14.5)
FERRITIN SERPL-MCNC: 155 NG/ML (ref 26–388)
FERRITIN SERPL-MCNC: 231 NG/ML (ref 26–388)
FERRITIN SERPL-MCNC: 309 NG/ML (ref 26–388)
FIO2 ON VENT: 0.21 %
FIO2 ON VENT: 100 %
FIO2 ON VENT: 40 %
FIO2 ON VENT: 60 %
FIO2 ON VENT: 80 %
GAS FLOW.O2 O2 DELIVERY SYS: 40 L/MIN
GAS FLOW.O2 O2 DELIVERY SYS: 5 L/MIN
GAS FLOW.O2 O2 DELIVERY SYS: 60 L/MIN
GAS FLOW.O2 O2 DELIVERY SYS: 60 L/MIN
GAS FLOW.O2 SETTING OXYMISER: 16 L/MIN
GLOBULIN SER CALC-MCNC: 2.5 G/DL (ref 2–4)
GLOBULIN SER CALC-MCNC: 2.6 G/DL (ref 2–4)
GLOBULIN SER CALC-MCNC: 2.6 G/DL (ref 2–4)
GLOBULIN SER CALC-MCNC: 2.7 G/DL (ref 2–4)
GLOBULIN SER CALC-MCNC: 2.9 G/DL (ref 2–4)
GLOBULIN SER CALC-MCNC: 3 G/DL (ref 2–4)
GLOBULIN SER CALC-MCNC: 3 G/DL (ref 2–4)
GLOBULIN SER CALC-MCNC: 3.1 G/DL (ref 2–4)
GLOBULIN SER CALC-MCNC: 3.3 G/DL (ref 2–4)
GLOBULIN SER CALC-MCNC: 3.4 G/DL (ref 2–4)
GLOBULIN SER CALC-MCNC: 3.5 G/DL (ref 2–4)
GLOBULIN SER CALC-MCNC: 3.5 G/DL (ref 2–4)
GLOBULIN SER CALC-MCNC: 3.8 G/DL (ref 2–4)
GLUCOSE BLD STRIP.AUTO-MCNC: 134 MG/DL (ref 65–117)
GLUCOSE BLD STRIP.AUTO-MCNC: 140 MG/DL (ref 65–117)
GLUCOSE BLD STRIP.AUTO-MCNC: 143 MG/DL (ref 65–117)
GLUCOSE BLD STRIP.AUTO-MCNC: 156 MG/DL (ref 65–117)
GLUCOSE BLD STRIP.AUTO-MCNC: 171 MG/DL (ref 65–117)
GLUCOSE SERPL-MCNC: 100 MG/DL (ref 65–100)
GLUCOSE SERPL-MCNC: 101 MG/DL (ref 65–100)
GLUCOSE SERPL-MCNC: 105 MG/DL (ref 65–100)
GLUCOSE SERPL-MCNC: 108 MG/DL (ref 65–100)
GLUCOSE SERPL-MCNC: 108 MG/DL (ref 65–100)
GLUCOSE SERPL-MCNC: 109 MG/DL (ref 65–100)
GLUCOSE SERPL-MCNC: 109 MG/DL (ref 65–100)
GLUCOSE SERPL-MCNC: 110 MG/DL (ref 65–100)
GLUCOSE SERPL-MCNC: 110 MG/DL (ref 65–100)
GLUCOSE SERPL-MCNC: 112 MG/DL (ref 65–100)
GLUCOSE SERPL-MCNC: 123 MG/DL (ref 65–100)
GLUCOSE SERPL-MCNC: 124 MG/DL (ref 65–100)
GLUCOSE SERPL-MCNC: 125 MG/DL (ref 65–100)
GLUCOSE SERPL-MCNC: 126 MG/DL (ref 65–100)
GLUCOSE SERPL-MCNC: 138 MG/DL (ref 65–100)
GLUCOSE SERPL-MCNC: 152 MG/DL (ref 65–100)
GLUCOSE SERPL-MCNC: 152 MG/DL (ref 65–100)
GLUCOSE SERPL-MCNC: 153 MG/DL (ref 65–100)
GLUCOSE SERPL-MCNC: 154 MG/DL (ref 65–100)
GLUCOSE SERPL-MCNC: 156 MG/DL (ref 65–100)
GLUCOSE SERPL-MCNC: 200 MG/DL (ref 65–100)
GLUCOSE SERPL-MCNC: 78 MG/DL (ref 65–100)
GLUCOSE SERPL-MCNC: 89 MG/DL (ref 65–100)
GLUCOSE SERPL-MCNC: 99 MG/DL (ref 65–100)
GLUCOSE UR STRIP.AUTO-MCNC: NEGATIVE MG/DL
GRAM STN SPEC: NORMAL
HCO3 BLDA-SCNC: 24 MMOL/L (ref 22–26)
HCO3 BLDA-SCNC: 24 MMOL/L (ref 22–26)
HCO3 BLDA-SCNC: 26 MMOL/L (ref 22–26)
HCO3 BLDA-SCNC: 26 MMOL/L (ref 22–26)
HCO3 BLDA-SCNC: 27 MMOL/L (ref 22–26)
HCO3 BLDA-SCNC: 28 MMOL/L (ref 22–26)
HCO3 BLDV-SCNC: 23 MMOL/L (ref 24–25)
HCT VFR BLD AUTO: 20.3 % (ref 36.6–50.3)
HCT VFR BLD AUTO: 22.7 % (ref 36.6–50.3)
HCT VFR BLD AUTO: 23 % (ref 36.6–50.3)
HCT VFR BLD AUTO: 23 % (ref 36.6–50.3)
HCT VFR BLD AUTO: 24.3 % (ref 36.6–50.3)
HCT VFR BLD AUTO: 24.4 % (ref 36.6–50.3)
HCT VFR BLD AUTO: 24.8 % (ref 36.6–50.3)
HCT VFR BLD AUTO: 25.7 % (ref 36.6–50.3)
HCT VFR BLD AUTO: 27.1 % (ref 36.6–50.3)
HCT VFR BLD AUTO: 29.5 % (ref 36.6–50.3)
HCT VFR BLD AUTO: 29.6 % (ref 36.6–50.3)
HCT VFR BLD AUTO: 29.9 % (ref 36.6–50.3)
HCT VFR BLD AUTO: 34.3 % (ref 36.6–50.3)
HCT VFR BLD AUTO: 34.8 % (ref 36.6–50.3)
HCT VFR BLD AUTO: 37 % (ref 36.6–50.3)
HCT VFR BLD AUTO: 38.9 % (ref 36.6–50.3)
HCT VFR BLD AUTO: 39.1 % (ref 36.6–50.3)
HCT VFR BLD AUTO: 40.4 % (ref 36.6–50.3)
HGB BLD-MCNC: 11.4 G/DL (ref 12.1–17)
HGB BLD-MCNC: 11.7 G/DL (ref 12.1–17)
HGB BLD-MCNC: 12.2 G/DL (ref 12.1–17)
HGB BLD-MCNC: 12.7 G/DL (ref 12.1–17)
HGB BLD-MCNC: 13.1 G/DL (ref 12.1–17)
HGB BLD-MCNC: 13.2 G/DL (ref 12.1–17)
HGB BLD-MCNC: 6.6 G/DL (ref 12.1–17)
HGB BLD-MCNC: 7.1 G/DL (ref 12.1–17)
HGB BLD-MCNC: 7.4 G/DL (ref 12.1–17)
HGB BLD-MCNC: 7.8 G/DL (ref 12.1–17)
HGB BLD-MCNC: 8.1 G/DL (ref 12.1–17)
HGB BLD-MCNC: 8.2 G/DL (ref 12.1–17)
HGB BLD-MCNC: 8.3 G/DL (ref 12.1–17)
HGB BLD-MCNC: 8.5 G/DL (ref 12.1–17)
HGB BLD-MCNC: 9 G/DL (ref 12.1–17)
HGB BLD-MCNC: 9.7 G/DL (ref 12.1–17)
HGB BLD-MCNC: 9.8 G/DL (ref 12.1–17)
HGB BLD-MCNC: 9.8 G/DL (ref 12.1–17)
HGB UR QL STRIP: ABNORMAL
HGB UR QL STRIP: NEGATIVE
HGB UR QL STRIP: NEGATIVE
HYALINE CASTS URNS QL MICRO: ABNORMAL /LPF (ref 0–5)
IMM GRANULOCYTES # BLD AUTO: 0 K/UL
IMM GRANULOCYTES # BLD AUTO: 0 K/UL (ref 0–0.04)
IMM GRANULOCYTES # BLD AUTO: 0.1 K/UL (ref 0–0.04)
IMM GRANULOCYTES # BLD AUTO: 0.2 K/UL (ref 0–0.04)
IMM GRANULOCYTES # BLD AUTO: 0.4 K/UL (ref 0–0.04)
IMM GRANULOCYTES NFR BLD AUTO: 0 %
IMM GRANULOCYTES NFR BLD AUTO: 0 % (ref 0–0.5)
IMM GRANULOCYTES NFR BLD AUTO: 1 % (ref 0–0.5)
IMM GRANULOCYTES NFR BLD AUTO: 2 % (ref 0–0.5)
IMM GRANULOCYTES NFR BLD AUTO: 2 % (ref 0–0.5)
INR PPP: 1.1 (ref 0.9–1.1)
INR PPP: 1.1 (ref 0.9–1.1)
IPAP/PIP, IPAPIP: 16
KETONES UR QL STRIP.AUTO: 5 MG/DL
KETONES UR QL STRIP.AUTO: NEGATIVE MG/DL
KETONES UR QL STRIP.AUTO: NEGATIVE MG/DL
LACTATE SERPL-SCNC: 2 MMOL/L (ref 0.4–2)
LACTATE SERPL-SCNC: 3.2 MMOL/L (ref 0.4–2)
LDH SERPL L TO P-CCNC: 1779 U/L (ref 85–241)
LDH SERPL L TO P-CCNC: 357 U/L (ref 85–241)
LDH SERPL L TO P-CCNC: 360 U/L (ref 85–241)
LDH SERPL L TO P-CCNC: 433 U/L (ref 85–241)
LDH SERPL L TO P-CCNC: 462 U/L (ref 85–241)
LDH SERPL L TO P-CCNC: 498 U/L (ref 85–241)
LDH SERPL L TO P-CCNC: 545 U/L (ref 85–241)
LDH SERPL L TO P-CCNC: 603 U/L (ref 85–241)
LDH SERPL L TO P-CCNC: 606 U/L (ref 85–241)
LDH SERPL L TO P-CCNC: 622 U/L (ref 85–241)
LDH SERPL L TO P-CCNC: 668 U/L (ref 85–241)
LDH SERPL L TO P-CCNC: 708 U/L (ref 85–241)
LDH SERPL L TO P-CCNC: 752 U/L (ref 85–241)
LEUKOCYTE ESTERASE UR QL STRIP.AUTO: NEGATIVE
LYMPHOCYTES # BLD: 0.1 K/UL (ref 0.8–3.5)
LYMPHOCYTES # BLD: 0.2 K/UL (ref 0.8–3.5)
LYMPHOCYTES # BLD: 0.3 K/UL (ref 0.8–3.5)
LYMPHOCYTES # BLD: 0.4 K/UL (ref 0.8–3.5)
LYMPHOCYTES # BLD: 0.6 K/UL (ref 0.8–3.5)
LYMPHOCYTES # BLD: 0.7 K/UL (ref 0.8–3.5)
LYMPHOCYTES # BLD: 0.8 K/UL (ref 0.8–3.5)
LYMPHOCYTES # BLD: 1 K/UL (ref 0.8–3.5)
LYMPHOCYTES # BLD: 1.2 K/UL (ref 0.8–3.5)
LYMPHOCYTES # BLD: 1.2 K/UL (ref 0.8–3.5)
LYMPHOCYTES NFR BLD: 1 % (ref 12–49)
LYMPHOCYTES NFR BLD: 13 % (ref 12–49)
LYMPHOCYTES NFR BLD: 16 % (ref 12–49)
LYMPHOCYTES NFR BLD: 2 % (ref 12–49)
LYMPHOCYTES NFR BLD: 23 % (ref 12–49)
LYMPHOCYTES NFR BLD: 23 % (ref 12–49)
LYMPHOCYTES NFR BLD: 3 % (ref 12–49)
LYMPHOCYTES NFR BLD: 5 % (ref 12–49)
LYMPHOCYTES NFR BLD: 5 % (ref 12–49)
MAGNESIUM SERPL-MCNC: 2 MG/DL (ref 1.6–2.4)
MAGNESIUM SERPL-MCNC: 2.1 MG/DL (ref 1.6–2.4)
MAGNESIUM SERPL-MCNC: 2.1 MG/DL (ref 1.6–2.4)
MAGNESIUM SERPL-MCNC: 2.2 MG/DL (ref 1.6–2.4)
MAGNESIUM SERPL-MCNC: 2.6 MG/DL (ref 1.6–2.4)
MCH RBC QN AUTO: 30.8 PG (ref 26–34)
MCH RBC QN AUTO: 31 PG (ref 26–34)
MCH RBC QN AUTO: 31 PG (ref 26–34)
MCH RBC QN AUTO: 31.1 PG (ref 26–34)
MCH RBC QN AUTO: 31.1 PG (ref 26–34)
MCH RBC QN AUTO: 31.3 PG (ref 26–34)
MCH RBC QN AUTO: 31.4 PG (ref 26–34)
MCH RBC QN AUTO: 31.4 PG (ref 26–34)
MCH RBC QN AUTO: 31.5 PG (ref 26–34)
MCH RBC QN AUTO: 31.5 PG (ref 26–34)
MCH RBC QN AUTO: 31.7 PG (ref 26–34)
MCH RBC QN AUTO: 31.8 PG (ref 26–34)
MCH RBC QN AUTO: 32.1 PG (ref 26–34)
MCH RBC QN AUTO: 32.5 PG (ref 26–34)
MCH RBC QN AUTO: 32.6 PG (ref 26–34)
MCH RBC QN AUTO: 33.3 PG (ref 26–34)
MCHC RBC AUTO-ENTMCNC: 30.9 G/DL (ref 30–36.5)
MCHC RBC AUTO-ENTMCNC: 32.5 G/DL (ref 30–36.5)
MCHC RBC AUTO-ENTMCNC: 32.5 G/DL (ref 30–36.5)
MCHC RBC AUTO-ENTMCNC: 32.6 G/DL (ref 30–36.5)
MCHC RBC AUTO-ENTMCNC: 32.7 G/DL (ref 30–36.5)
MCHC RBC AUTO-ENTMCNC: 32.8 G/DL (ref 30–36.5)
MCHC RBC AUTO-ENTMCNC: 32.9 G/DL (ref 30–36.5)
MCHC RBC AUTO-ENTMCNC: 33 G/DL (ref 30–36.5)
MCHC RBC AUTO-ENTMCNC: 33.1 G/DL (ref 30–36.5)
MCHC RBC AUTO-ENTMCNC: 33.2 G/DL (ref 30–36.5)
MCHC RBC AUTO-ENTMCNC: 33.6 G/DL (ref 30–36.5)
MCHC RBC AUTO-ENTMCNC: 33.7 G/DL (ref 30–36.5)
MCHC RBC AUTO-ENTMCNC: 33.9 G/DL (ref 30–36.5)
MCHC RBC AUTO-ENTMCNC: 34.2 G/DL (ref 30–36.5)
MCV RBC AUTO: 100.3 FL (ref 80–99)
MCV RBC AUTO: 102.7 FL (ref 80–99)
MCV RBC AUTO: 93.4 FL (ref 80–99)
MCV RBC AUTO: 93.8 FL (ref 80–99)
MCV RBC AUTO: 94.1 FL (ref 80–99)
MCV RBC AUTO: 94.2 FL (ref 80–99)
MCV RBC AUTO: 94.4 FL (ref 80–99)
MCV RBC AUTO: 94.5 FL (ref 80–99)
MCV RBC AUTO: 94.7 FL (ref 80–99)
MCV RBC AUTO: 94.9 FL (ref 80–99)
MCV RBC AUTO: 95.3 FL (ref 80–99)
MCV RBC AUTO: 95.3 FL (ref 80–99)
MCV RBC AUTO: 95.8 FL (ref 80–99)
MCV RBC AUTO: 96.2 FL (ref 80–99)
MCV RBC AUTO: 96.6 FL (ref 80–99)
MCV RBC AUTO: 97.6 FL (ref 80–99)
METHGB MFR BLD: 0.2 % (ref 0–1.4)
METHGB MFR BLD: 0.4 % (ref 0–1.4)
METHGB MFR BLD: 0.4 % (ref 0–1.4)
METHGB MFR BLD: 0.5 % (ref 0–1.4)
METHGB MFR BLD: 0.5 % (ref 0–1.4)
METHGB MFR BLD: 0.8 % (ref 0–1.4)
MONOCYTES # BLD: 0.1 K/UL (ref 0–1)
MONOCYTES # BLD: 0.1 K/UL (ref 0–1)
MONOCYTES # BLD: 0.2 K/UL (ref 0–1)
MONOCYTES # BLD: 0.3 K/UL (ref 0–1)
MONOCYTES # BLD: 0.4 K/UL (ref 0–1)
MONOCYTES # BLD: 0.4 K/UL (ref 0–1)
MONOCYTES # BLD: 0.6 K/UL (ref 0–1)
MONOCYTES # BLD: 0.8 K/UL (ref 0–1)
MONOCYTES # BLD: 0.9 K/UL (ref 0–1)
MONOCYTES # BLD: 0.9 K/UL (ref 0–1)
MONOCYTES NFR BLD: 1 % (ref 5–13)
MONOCYTES NFR BLD: 1 % (ref 5–13)
MONOCYTES NFR BLD: 2 % (ref 5–13)
MONOCYTES NFR BLD: 20 % (ref 5–13)
MONOCYTES NFR BLD: 4 % (ref 5–13)
MONOCYTES NFR BLD: 5 % (ref 5–13)
MONOCYTES NFR BLD: 5 % (ref 5–13)
MONOCYTES NFR BLD: 8 % (ref 5–13)
MONOCYTES NFR BLD: 8 % (ref 5–13)
MRSA DNA SPEC QL NAA+PROBE: DETECTED
NEUTS SEG # BLD: 1.8 K/UL (ref 1.8–8)
NEUTS SEG # BLD: 10.1 K/UL (ref 1.8–8)
NEUTS SEG # BLD: 11 K/UL (ref 1.8–8)
NEUTS SEG # BLD: 11.4 K/UL (ref 1.8–8)
NEUTS SEG # BLD: 11.6 K/UL (ref 1.8–8)
NEUTS SEG # BLD: 12.2 K/UL (ref 1.8–8)
NEUTS SEG # BLD: 12.5 K/UL (ref 1.8–8)
NEUTS SEG # BLD: 12.6 K/UL (ref 1.8–8)
NEUTS SEG # BLD: 13.9 K/UL (ref 1.8–8)
NEUTS SEG # BLD: 14.3 K/UL (ref 1.8–8)
NEUTS SEG # BLD: 14.4 K/UL (ref 1.8–8)
NEUTS SEG # BLD: 14.6 K/UL (ref 1.8–8)
NEUTS SEG # BLD: 15.6 K/UL (ref 1.8–8)
NEUTS SEG # BLD: 16.3 K/UL (ref 1.8–8)
NEUTS SEG # BLD: 2.5 K/UL (ref 1.8–8)
NEUTS SEG # BLD: 22.5 K/UL (ref 1.8–8)
NEUTS SEG # BLD: 4.3 K/UL (ref 1.8–8)
NEUTS SEG # BLD: 5.7 K/UL (ref 1.8–8)
NEUTS SEG NFR BLD: 57 % (ref 32–75)
NEUTS SEG NFR BLD: 72 % (ref 32–75)
NEUTS SEG NFR BLD: 74 % (ref 32–75)
NEUTS SEG NFR BLD: 79 % (ref 32–75)
NEUTS SEG NFR BLD: 89 % (ref 32–75)
NEUTS SEG NFR BLD: 89 % (ref 32–75)
NEUTS SEG NFR BLD: 94 % (ref 32–75)
NEUTS SEG NFR BLD: 95 % (ref 32–75)
NEUTS SEG NFR BLD: 96 % (ref 32–75)
NEUTS SEG NFR BLD: 97 % (ref 32–75)
NITRITE UR QL STRIP.AUTO: NEGATIVE
NRBC # BLD: 0 K/UL (ref 0–0.01)
NRBC # BLD: 0.02 K/UL (ref 0–0.01)
NRBC # BLD: 0.02 K/UL (ref 0–0.01)
NRBC # BLD: 0.03 K/UL (ref 0–0.01)
NRBC # BLD: 0.03 K/UL (ref 0–0.01)
NRBC # BLD: 0.04 K/UL (ref 0–0.01)
NRBC # BLD: 0.06 K/UL (ref 0–0.01)
NRBC # BLD: 0.1 K/UL (ref 0–0.01)
NRBC # BLD: 0.18 K/UL (ref 0–0.01)
NRBC # BLD: 0.57 K/UL (ref 0–0.01)
NRBC BLD-RTO: 0 PER 100 WBC
NRBC BLD-RTO: 0.2 PER 100 WBC
NRBC BLD-RTO: 0.4 PER 100 WBC
NRBC BLD-RTO: 0.8 PER 100 WBC
NRBC BLD-RTO: 1.2 PER 100 WBC
NRBC BLD-RTO: 4.6 PER 100 WBC
OXYHGB MFR BLD: 87.2 % (ref 95–99)
OXYHGB MFR BLD: 87.5 % (ref 95–99)
OXYHGB MFR BLD: 90.9 % (ref 95–99)
OXYHGB MFR BLD: 92 % (ref 95–99)
OXYHGB MFR BLD: 96.7 % (ref 95–99)
OXYHGB MFR BLD: 97 % (ref 95–99)
P-R INTERVAL, ECG05: 184 MS
P-R INTERVAL, ECG05: 204 MS
PCO2 BLDA: 30 MMHG (ref 35–45)
PCO2 BLDA: 32 MMHG (ref 35–45)
PCO2 BLDA: 33 MMHG (ref 35–45)
PCO2 BLDA: 33 MMHG (ref 35–45)
PCO2 BLDA: 36 MMHG (ref 35–45)
PCO2 BLDA: 40 MMHG (ref 35–45)
PCO2 BLDV: 30.3 MMHG (ref 41–51)
PEEP RESPIRATORY: 5 CM[H2O]
PERFORMED BY, TECHID: ABNORMAL
PH BLDA: 7.44 [PH] (ref 7.35–7.45)
PH BLDA: 7.49 [PH] (ref 7.35–7.45)
PH BLDA: 7.5 [PH] (ref 7.35–7.45)
PH BLDA: 7.52 [PH] (ref 7.35–7.45)
PH BLDA: 7.53 [PH] (ref 7.35–7.45)
PH BLDA: 7.55 [PH] (ref 7.35–7.45)
PH BLDV: 7.5 [PH] (ref 7.32–7.42)
PH UR STRIP: 6 [PH] (ref 5–8)
PH UR STRIP: 6.5 [PH] (ref 5–8)
PH UR STRIP: 9 [PH] (ref 5–8)
PHOSPHATE SERPL-MCNC: 2.2 MG/DL (ref 2.6–4.7)
PHOSPHATE SERPL-MCNC: 2.3 MG/DL (ref 2.6–4.7)
PHOSPHATE SERPL-MCNC: 2.6 MG/DL (ref 2.6–4.7)
PHOSPHATE SERPL-MCNC: 2.7 MG/DL (ref 2.6–4.7)
PHOSPHATE SERPL-MCNC: 2.8 MG/DL (ref 2.6–4.7)
PHOSPHATE SERPL-MCNC: 2.8 MG/DL (ref 2.6–4.7)
PHOSPHATE SERPL-MCNC: 2.9 MG/DL (ref 2.6–4.7)
PHOSPHATE SERPL-MCNC: 3.2 MG/DL (ref 2.6–4.7)
PHOSPHATE SERPL-MCNC: 4.2 MG/DL (ref 2.6–4.7)
PHOSPHATE SERPL-MCNC: 5.6 MG/DL (ref 2.6–4.7)
PHOSPHATE SERPL-MCNC: 5.8 MG/DL (ref 2.6–4.7)
PLATELET # BLD AUTO: 114 K/UL (ref 150–400)
PLATELET # BLD AUTO: 126 K/UL (ref 150–400)
PLATELET # BLD AUTO: 160 K/UL (ref 150–400)
PLATELET # BLD AUTO: 161 K/UL (ref 150–400)
PLATELET # BLD AUTO: 173 K/UL (ref 150–400)
PLATELET # BLD AUTO: 189 K/UL (ref 150–400)
PLATELET # BLD AUTO: 199 K/UL (ref 150–400)
PLATELET # BLD AUTO: 206 K/UL (ref 150–400)
PLATELET # BLD AUTO: 217 K/UL (ref 150–400)
PLATELET # BLD AUTO: 230 K/UL (ref 150–400)
PLATELET # BLD AUTO: 240 K/UL (ref 150–400)
PLATELET # BLD AUTO: 240 K/UL (ref 150–400)
PLATELET # BLD AUTO: 243 K/UL (ref 150–400)
PLATELET # BLD AUTO: 247 K/UL (ref 150–400)
PLATELET # BLD AUTO: 323 K/UL (ref 150–400)
PLATELET # BLD AUTO: 66 K/UL (ref 150–400)
PLATELET # BLD AUTO: 88 K/UL (ref 150–400)
PLATELET # BLD AUTO: 94 K/UL (ref 150–400)
PMV BLD AUTO: 10.1 FL (ref 8.9–12.9)
PMV BLD AUTO: 10.3 FL (ref 8.9–12.9)
PMV BLD AUTO: 10.4 FL (ref 8.9–12.9)
PMV BLD AUTO: 10.4 FL (ref 8.9–12.9)
PMV BLD AUTO: 10.5 FL (ref 8.9–12.9)
PMV BLD AUTO: 10.6 FL (ref 8.9–12.9)
PMV BLD AUTO: 10.8 FL (ref 8.9–12.9)
PMV BLD AUTO: 11.3 FL (ref 8.9–12.9)
PMV BLD AUTO: 11.5 FL (ref 8.9–12.9)
PMV BLD AUTO: 12.5 FL (ref 8.9–12.9)
PMV BLD AUTO: 9.4 FL (ref 8.9–12.9)
PMV BLD AUTO: 9.7 FL (ref 8.9–12.9)
PMV BLD AUTO: 9.8 FL (ref 8.9–12.9)
PMV BLD AUTO: 9.8 FL (ref 8.9–12.9)
PMV BLD AUTO: 9.9 FL (ref 8.9–12.9)
PO2 BLDA: 101 MMHG (ref 80–100)
PO2 BLDA: 53 MMHG (ref 80–100)
PO2 BLDA: 57 MMHG (ref 80–100)
PO2 BLDA: 64 MMHG (ref 80–100)
PO2 BLDA: 65 MMHG (ref 80–100)
PO2 BLDA: 98 MMHG (ref 80–100)
PO2 BLDV: 83 MMHG (ref 25–40)
POTASSIUM SERPL-SCNC: 2.8 MMOL/L (ref 3.5–5.1)
POTASSIUM SERPL-SCNC: 3.1 MMOL/L (ref 3.5–5.1)
POTASSIUM SERPL-SCNC: 3.1 MMOL/L (ref 3.5–5.1)
POTASSIUM SERPL-SCNC: 3.3 MMOL/L (ref 3.5–5.1)
POTASSIUM SERPL-SCNC: 3.4 MMOL/L (ref 3.5–5.1)
POTASSIUM SERPL-SCNC: 3.6 MMOL/L (ref 3.5–5.1)
POTASSIUM SERPL-SCNC: 3.6 MMOL/L (ref 3.5–5.1)
POTASSIUM SERPL-SCNC: 3.7 MMOL/L (ref 3.5–5.1)
POTASSIUM SERPL-SCNC: 3.8 MMOL/L (ref 3.5–5.1)
POTASSIUM SERPL-SCNC: 4.2 MMOL/L (ref 3.5–5.1)
POTASSIUM SERPL-SCNC: 4.7 MMOL/L (ref 3.5–5.1)
POTASSIUM SERPL-SCNC: 5.1 MMOL/L (ref 3.5–5.1)
POTASSIUM SERPL-SCNC: 5.6 MMOL/L (ref 3.5–5.1)
POTASSIUM SERPL-SCNC: 5.6 MMOL/L (ref 3.5–5.1)
POTASSIUM SERPL-SCNC: 6.1 MMOL/L (ref 3.5–5.1)
PROCALCITONIN SERPL-MCNC: 0.15 NG/ML
PROCALCITONIN SERPL-MCNC: <0.05 NG/ML
PROT SERPL-MCNC: 4.5 G/DL (ref 6.4–8.2)
PROT SERPL-MCNC: 4.8 G/DL (ref 6.4–8.2)
PROT SERPL-MCNC: 4.9 G/DL (ref 6.4–8.2)
PROT SERPL-MCNC: 5 G/DL (ref 6.4–8.2)
PROT SERPL-MCNC: 5.1 G/DL (ref 6.4–8.2)
PROT SERPL-MCNC: 5.4 G/DL (ref 6.4–8.2)
PROT SERPL-MCNC: 5.6 G/DL (ref 6.4–8.2)
PROT SERPL-MCNC: 5.7 G/DL (ref 6.4–8.2)
PROT SERPL-MCNC: 6 G/DL (ref 6.4–8.2)
PROT SERPL-MCNC: 6 G/DL (ref 6.4–8.2)
PROT SERPL-MCNC: 6.2 G/DL (ref 6.4–8.2)
PROT SERPL-MCNC: 6.4 G/DL (ref 6.4–8.2)
PROT SERPL-MCNC: 7.5 G/DL (ref 6.4–8.2)
PROT UR STRIP-MCNC: NEGATIVE MG/DL
PROTHROMBIN TIME: 10.9 SEC (ref 9–11.1)
PROTHROMBIN TIME: 14.4 SEC (ref 11.9–14.6)
Q-T INTERVAL, ECG07: 286 MS
Q-T INTERVAL, ECG07: 334 MS
Q-T INTERVAL, ECG07: 372 MS
Q-T INTERVAL, ECG07: 406 MS
QRS DURATION, ECG06: 110 MS
QRS DURATION, ECG06: 82 MS
QRS DURATION, ECG06: 90 MS
QRS DURATION, ECG06: 98 MS
QTC CALCULATION (BEZET), ECG08: 398 MS
QTC CALCULATION (BEZET), ECG08: 439 MS
QTC CALCULATION (BEZET), ECG08: 450 MS
QTC CALCULATION (BEZET), ECG08: 456 MS
RBC # BLD AUTO: 2.11 M/UL (ref 4.1–5.7)
RBC # BLD AUTO: 2.24 M/UL (ref 4.1–5.7)
RBC # BLD AUTO: 2.35 M/UL (ref 4.1–5.7)
RBC # BLD AUTO: 2.4 M/UL (ref 4.1–5.7)
RBC # BLD AUTO: 2.49 M/UL (ref 4.1–5.7)
RBC # BLD AUTO: 2.57 M/UL (ref 4.1–5.7)
RBC # BLD AUTO: 2.62 M/UL (ref 4.1–5.7)
RBC # BLD AUTO: 2.74 M/UL (ref 4.1–5.7)
RBC # BLD AUTO: 2.88 M/UL (ref 4.1–5.7)
RBC # BLD AUTO: 3.12 M/UL (ref 4.1–5.7)
RBC # BLD AUTO: 3.13 M/UL (ref 4.1–5.7)
RBC # BLD AUTO: 3.15 M/UL (ref 4.1–5.7)
RBC # BLD AUTO: 3.63 M/UL (ref 4.1–5.7)
RBC # BLD AUTO: 3.65 M/UL (ref 4.1–5.7)
RBC # BLD AUTO: 3.9 M/UL (ref 4.1–5.7)
RBC # BLD AUTO: 3.96 M/UL (ref 4.1–5.7)
RBC # BLD AUTO: 4.12 M/UL (ref 4.1–5.7)
RBC # BLD AUTO: 4.24 M/UL (ref 4.1–5.7)
RBC #/AREA URNS HPF: ABNORMAL /HPF (ref 0–5)
RBC MORPH BLD: ABNORMAL
REPORTED DOSE,DOSE: NORMAL UNITS
SAO2 % BLD: 88 % (ref 95–99)
SAO2 % BLD: 88 % (ref 95–99)
SAO2 % BLD: 92 % (ref 95–99)
SAO2 % BLD: 93 % (ref 95–99)
SAO2 % BLD: 97 % (ref 95–99)
SAO2 % BLD: 98 % (ref 95–99)
SAO2% DEVICE SAO2% SENSOR NAME: ABNORMAL
SERVICE CMNT-IMP: ABNORMAL
SERVICE CMNT-IMP: ABNORMAL
SODIUM SERPL-SCNC: 128 MMOL/L (ref 136–145)
SODIUM SERPL-SCNC: 132 MMOL/L (ref 136–145)
SODIUM SERPL-SCNC: 133 MMOL/L (ref 136–145)
SODIUM SERPL-SCNC: 135 MMOL/L (ref 136–145)
SODIUM SERPL-SCNC: 136 MMOL/L (ref 136–145)
SODIUM SERPL-SCNC: 137 MMOL/L (ref 136–145)
SODIUM SERPL-SCNC: 139 MMOL/L (ref 136–145)
SODIUM SERPL-SCNC: 139 MMOL/L (ref 136–145)
SODIUM SERPL-SCNC: 140 MMOL/L (ref 136–145)
SODIUM SERPL-SCNC: 141 MMOL/L (ref 136–145)
SODIUM SERPL-SCNC: 142 MMOL/L (ref 136–145)
SODIUM SERPL-SCNC: 144 MMOL/L (ref 136–145)
SP GR UR REFRACTOMETRY: 1.01 (ref 1–1.03)
SPECIAL REQUESTS,SREQ: NORMAL
SPECIAL REQUESTS,SREQ: NORMAL
SPECIMEN EXP DATE BLD: NORMAL
SPECIMEN EXP DATE BLD: NORMAL
SPECIMEN SITE: ABNORMAL
STATUS OF UNIT,%ST: NORMAL
THERAPEUTIC RANGE,PTTT: NORMAL SEC (ref 82–109)
TRANSFUSION STATUS PATIENT QL: NORMAL
TRIGL SERPL-MCNC: 118 MG/DL (ref ?–150)
TROPONIN-HIGH SENSITIVITY: 35 NG/L (ref 0–76)
TROPONIN-HIGH SENSITIVITY: 64 NG/L (ref 0–76)
TSH SERPL DL<=0.05 MIU/L-ACNC: 1.94 UIU/ML (ref 0.36–3.74)
UNIT DIVISION, %UDIV: 0
UROBILINOGEN UR QL STRIP.AUTO: 0.1 EU/DL (ref 0.1–1)
VANCOMYCIN SERPL-MCNC: 7.6 UG/ML
VENTILATION MODE VENT: ABNORMAL
VENTRICULAR RATE, ECG03: 104 BPM
VENTRICULAR RATE, ECG03: 117 BPM
VENTRICULAR RATE, ECG03: 76 BPM
VENTRICULAR RATE, ECG03: 88 BPM
WBC # BLD AUTO: 10.7 K/UL (ref 4.1–11.1)
WBC # BLD AUTO: 11.5 K/UL (ref 4.1–11.1)
WBC # BLD AUTO: 12.1 K/UL (ref 4.1–11.1)
WBC # BLD AUTO: 12.3 K/UL (ref 4.1–11.1)
WBC # BLD AUTO: 12.8 K/UL (ref 4.1–11.1)
WBC # BLD AUTO: 13 K/UL (ref 4.1–11.1)
WBC # BLD AUTO: 13.1 K/UL (ref 4.1–11.1)
WBC # BLD AUTO: 14.6 K/UL (ref 4.1–11.1)
WBC # BLD AUTO: 15.1 K/UL (ref 4.1–11.1)
WBC # BLD AUTO: 15.2 K/UL (ref 4.1–11.1)
WBC # BLD AUTO: 16 K/UL (ref 4.1–11.1)
WBC # BLD AUTO: 16.2 K/UL (ref 4.1–11.1)
WBC # BLD AUTO: 17 K/UL (ref 4.1–11.1)
WBC # BLD AUTO: 2.5 K/UL (ref 4.1–11.1)
WBC # BLD AUTO: 25.1 K/UL (ref 4.1–11.1)
WBC # BLD AUTO: 4.4 K/UL (ref 4.1–11.1)
WBC # BLD AUTO: 5.4 K/UL (ref 4.1–11.1)
WBC # BLD AUTO: 7.6 K/UL (ref 4.1–11.1)
WBC URNS QL MICRO: ABNORMAL /HPF (ref 0–4)

## 2022-01-01 PROCEDURE — 86140 C-REACTIVE PROTEIN: CPT

## 2022-01-01 PROCEDURE — 36600 WITHDRAWAL OF ARTERIAL BLOOD: CPT

## 2022-01-01 PROCEDURE — 83735 ASSAY OF MAGNESIUM: CPT

## 2022-01-01 PROCEDURE — 74011250636 HC RX REV CODE- 250/636: Performed by: FAMILY MEDICINE

## 2022-01-01 PROCEDURE — 92610 EVALUATE SWALLOWING FUNCTION: CPT

## 2022-01-01 PROCEDURE — 99233 SBSQ HOSP IP/OBS HIGH 50: CPT | Performed by: INTERNAL MEDICINE

## 2022-01-01 PROCEDURE — 71045 X-RAY EXAM CHEST 1 VIEW: CPT

## 2022-01-01 PROCEDURE — 80053 COMPREHEN METABOLIC PANEL: CPT

## 2022-01-01 PROCEDURE — 83615 LACTATE (LD) (LDH) ENZYME: CPT

## 2022-01-01 PROCEDURE — 86900 BLOOD TYPING SEROLOGIC ABO: CPT

## 2022-01-01 PROCEDURE — 82728 ASSAY OF FERRITIN: CPT

## 2022-01-01 PROCEDURE — 74011250637 HC RX REV CODE- 250/637: Performed by: INTERNAL MEDICINE

## 2022-01-01 PROCEDURE — 74011250636 HC RX REV CODE- 250/636: Performed by: INTERNAL MEDICINE

## 2022-01-01 PROCEDURE — 74011000258 HC RX REV CODE- 258: Performed by: FAMILY MEDICINE

## 2022-01-01 PROCEDURE — 87641 MR-STAPH DNA AMP PROBE: CPT

## 2022-01-01 PROCEDURE — 97530 THERAPEUTIC ACTIVITIES: CPT

## 2022-01-01 PROCEDURE — 84145 PROCALCITONIN (PCT): CPT

## 2022-01-01 PROCEDURE — 74011250637 HC RX REV CODE- 250/637: Performed by: FAMILY MEDICINE

## 2022-01-01 PROCEDURE — 77010033678 HC OXYGEN DAILY

## 2022-01-01 PROCEDURE — 82803 BLOOD GASES ANY COMBINATION: CPT

## 2022-01-01 PROCEDURE — 81001 URINALYSIS AUTO W/SCOPE: CPT

## 2022-01-01 PROCEDURE — 82962 GLUCOSE BLOOD TEST: CPT

## 2022-01-01 PROCEDURE — 36415 COLL VENOUS BLD VENIPUNCTURE: CPT

## 2022-01-01 PROCEDURE — 90471 IMMUNIZATION ADMIN: CPT

## 2022-01-01 PROCEDURE — 85025 COMPLETE CBC W/AUTO DIFF WBC: CPT

## 2022-01-01 PROCEDURE — 86850 RBC ANTIBODY SCREEN: CPT

## 2022-01-01 PROCEDURE — 74011250637 HC RX REV CODE- 250/637: Performed by: EMERGENCY MEDICINE

## 2022-01-01 PROCEDURE — 97165 OT EVAL LOW COMPLEX 30 MIN: CPT

## 2022-01-01 PROCEDURE — 74011000250 HC RX REV CODE- 250: Performed by: INTERNAL MEDICINE

## 2022-01-01 PROCEDURE — 83880 ASSAY OF NATRIURETIC PEPTIDE: CPT

## 2022-01-01 PROCEDURE — 94660 CPAP INITIATION&MGMT: CPT

## 2022-01-01 PROCEDURE — 80069 RENAL FUNCTION PANEL: CPT

## 2022-01-01 PROCEDURE — 84132 ASSAY OF SERUM POTASSIUM: CPT

## 2022-01-01 PROCEDURE — 97161 PT EVAL LOW COMPLEX 20 MIN: CPT

## 2022-01-01 PROCEDURE — 93005 ELECTROCARDIOGRAM TRACING: CPT

## 2022-01-01 PROCEDURE — 76060000032 HC ANESTHESIA 0.5 TO 1 HR: Performed by: INTERNAL MEDICINE

## 2022-01-01 PROCEDURE — 65270000029 HC RM PRIVATE

## 2022-01-01 PROCEDURE — 85610 PROTHROMBIN TIME: CPT

## 2022-01-01 PROCEDURE — 74011000258 HC RX REV CODE- 258: Performed by: EMERGENCY MEDICINE

## 2022-01-01 PROCEDURE — 99232 SBSQ HOSP IP/OBS MODERATE 35: CPT | Performed by: INTERNAL MEDICINE

## 2022-01-01 PROCEDURE — 2709999900 HC NON-CHARGEABLE SUPPLY: Performed by: INTERNAL MEDICINE

## 2022-01-01 PROCEDURE — 77010033711 HC HIGH FLOW OXYGEN

## 2022-01-01 PROCEDURE — 94002 VENT MGMT INPAT INIT DAY: CPT

## 2022-01-01 PROCEDURE — 94668 MNPJ CHEST WALL SBSQ: CPT

## 2022-01-01 PROCEDURE — 70450 CT HEAD/BRAIN W/O DYE: CPT

## 2022-01-01 PROCEDURE — 96374 THER/PROPH/DIAG INJ IV PUSH: CPT

## 2022-01-01 PROCEDURE — 87635 SARS-COV-2 COVID-19 AMP PRB: CPT

## 2022-01-01 PROCEDURE — 65610000006 HC RM INTENSIVE CARE

## 2022-01-01 PROCEDURE — 87077 CULTURE AEROBIC IDENTIFY: CPT

## 2022-01-01 PROCEDURE — 74011000250 HC RX REV CODE- 250: Performed by: FAMILY MEDICINE

## 2022-01-01 PROCEDURE — 80048 BASIC METABOLIC PNL TOTAL CA: CPT

## 2022-01-01 PROCEDURE — 87186 SC STD MICRODIL/AGAR DIL: CPT

## 2022-01-01 PROCEDURE — 74011250636 HC RX REV CODE- 250/636: Performed by: EMERGENCY MEDICINE

## 2022-01-01 PROCEDURE — 81003 URINALYSIS AUTO W/O SCOPE: CPT

## 2022-01-01 PROCEDURE — 85730 THROMBOPLASTIN TIME PARTIAL: CPT

## 2022-01-01 PROCEDURE — 84100 ASSAY OF PHOSPHORUS: CPT

## 2022-01-01 PROCEDURE — 99283 EMERGENCY DEPT VISIT LOW MDM: CPT

## 2022-01-01 PROCEDURE — 74011250636 HC RX REV CODE- 250/636: Performed by: NURSE ANESTHETIST, CERTIFIED REGISTERED

## 2022-01-01 PROCEDURE — XW0H7M6 INTRODUCTION OF BARICITINIB INTO LOWER GI, VIA NATURAL OR ARTIFICIAL OPENING, NEW TECHNOLOGY GROUP 6: ICD-10-PCS | Performed by: FAMILY MEDICINE

## 2022-01-01 PROCEDURE — 3E0G76Z INTRODUCTION OF NUTRITIONAL SUBSTANCE INTO UPPER GI, VIA NATURAL OR ARTIFICIAL OPENING: ICD-10-PCS | Performed by: INTERNAL MEDICINE

## 2022-01-01 PROCEDURE — 92950 HEART/LUNG RESUSCITATION CPR: CPT

## 2022-01-01 PROCEDURE — 3E0333Z INTRODUCTION OF ANTI-INFLAMMATORY INTO PERIPHERAL VEIN, PERCUTANEOUS APPROACH: ICD-10-PCS | Performed by: FAMILY MEDICINE

## 2022-01-01 PROCEDURE — 99285 EMERGENCY DEPT VISIT HI MDM: CPT

## 2022-01-01 PROCEDURE — XW0DXM6 INTRODUCTION OF BARICITINIB INTO MOUTH AND PHARYNX, EXTERNAL APPROACH, NEW TECHNOLOGY GROUP 6: ICD-10-PCS | Performed by: FAMILY MEDICINE

## 2022-01-01 PROCEDURE — 99223 1ST HOSP IP/OBS HIGH 75: CPT | Performed by: INTERNAL MEDICINE

## 2022-01-01 PROCEDURE — 0DH68UZ INSERTION OF FEEDING DEVICE INTO STOMACH, VIA NATURAL OR ARTIFICIAL OPENING ENDOSCOPIC: ICD-10-PCS | Performed by: INTERNAL MEDICINE

## 2022-01-01 PROCEDURE — 94667 MNPJ CHEST WALL 1ST: CPT | Performed by: FAMILY MEDICINE

## 2022-01-01 PROCEDURE — 84484 ASSAY OF TROPONIN QUANT: CPT

## 2022-01-01 PROCEDURE — 74011000250 HC RX REV CODE- 250: Performed by: HOSPITALIST

## 2022-01-01 PROCEDURE — 80202 ASSAY OF VANCOMYCIN: CPT

## 2022-01-01 PROCEDURE — 74011250636 HC RX REV CODE- 250/636: Performed by: HOSPITALIST

## 2022-01-01 PROCEDURE — 83605 ASSAY OF LACTIC ACID: CPT

## 2022-01-01 PROCEDURE — 84478 ASSAY OF TRIGLYCERIDES: CPT

## 2022-01-01 PROCEDURE — 94761 N-INVAS EAR/PLS OXIMETRY MLT: CPT

## 2022-01-01 PROCEDURE — 74011000250 HC RX REV CODE- 250: Performed by: EMERGENCY MEDICINE

## 2022-01-01 PROCEDURE — 93306 TTE W/DOPPLER COMPLETE: CPT

## 2022-01-01 PROCEDURE — 96375 TX/PRO/DX INJ NEW DRUG ADDON: CPT

## 2022-01-01 PROCEDURE — 96365 THER/PROPH/DIAG IV INF INIT: CPT

## 2022-01-01 PROCEDURE — 84443 ASSAY THYROID STIM HORMONE: CPT

## 2022-01-01 PROCEDURE — 31500 INSERT EMERGENCY AIRWAY: CPT

## 2022-01-01 PROCEDURE — 74011000272 HC RX REV CODE- 272: Performed by: EMERGENCY MEDICINE

## 2022-01-01 PROCEDURE — 76040000007: Performed by: INTERNAL MEDICINE

## 2022-01-01 PROCEDURE — 74011000250 HC RX REV CODE- 250: Performed by: NURSE ANESTHETIST, CERTIFIED REGISTERED

## 2022-01-01 PROCEDURE — 87040 BLOOD CULTURE FOR BACTERIA: CPT

## 2022-01-01 PROCEDURE — 36430 TRANSFUSION BLD/BLD COMPNT: CPT

## 2022-01-01 PROCEDURE — P9016 RBC LEUKOCYTES REDUCED: HCPCS

## 2022-01-01 PROCEDURE — 90715 TDAP VACCINE 7 YRS/> IM: CPT | Performed by: EMERGENCY MEDICINE

## 2022-01-01 PROCEDURE — 87070 CULTURE OTHR SPECIMN AEROBIC: CPT

## 2022-01-01 PROCEDURE — 74011000258 HC RX REV CODE- 258: Performed by: NURSE ANESTHETIST, CERTIFIED REGISTERED

## 2022-01-01 PROCEDURE — 97166 OT EVAL MOD COMPLEX 45 MIN: CPT

## 2022-01-01 PROCEDURE — 86923 COMPATIBILITY TEST ELECTRIC: CPT

## 2022-01-01 RX ORDER — ONDANSETRON 2 MG/ML
4 INJECTION INTRAMUSCULAR; INTRAVENOUS
Status: DISCONTINUED | OUTPATIENT
Start: 2022-01-01 | End: 2022-01-01 | Stop reason: HOSPADM

## 2022-01-01 RX ORDER — FUROSEMIDE 10 MG/ML
40 INJECTION INTRAMUSCULAR; INTRAVENOUS ONCE
Status: COMPLETED | OUTPATIENT
Start: 2022-01-01 | End: 2022-01-01

## 2022-01-01 RX ORDER — ACETAMINOPHEN 650 MG/1
650 SUPPOSITORY RECTAL
Status: DISCONTINUED | OUTPATIENT
Start: 2022-01-01 | End: 2022-01-01 | Stop reason: HOSPADM

## 2022-01-01 RX ORDER — PANTOPRAZOLE SODIUM 40 MG/1
40 GRANULE, DELAYED RELEASE ORAL EVERY 12 HOURS
Status: DISCONTINUED | OUTPATIENT
Start: 2022-01-01 | End: 2022-01-01

## 2022-01-01 RX ORDER — SODIUM CHLORIDE 9 MG/ML
250 INJECTION, SOLUTION INTRAVENOUS AS NEEDED
Status: DISCONTINUED | OUTPATIENT
Start: 2022-01-01 | End: 2022-01-01 | Stop reason: HOSPADM

## 2022-01-01 RX ORDER — SODIUM CHLORIDE 9 MG/ML
75 INJECTION, SOLUTION INTRAVENOUS CONTINUOUS
Status: DISCONTINUED | OUTPATIENT
Start: 2022-01-01 | End: 2022-01-01

## 2022-01-01 RX ORDER — ACETAMINOPHEN 325 MG/1
650 TABLET ORAL
Status: DISCONTINUED | OUTPATIENT
Start: 2022-01-01 | End: 2022-01-01 | Stop reason: HOSPADM

## 2022-01-01 RX ORDER — HYDROXYZINE 50 MG/ML
25 INJECTION, SOLUTION INTRAMUSCULAR
Status: DISCONTINUED | OUTPATIENT
Start: 2022-01-01 | End: 2022-01-01 | Stop reason: HOSPADM

## 2022-01-01 RX ORDER — DOXAZOSIN 4 MG/1
4 TABLET ORAL
Status: DISCONTINUED | OUTPATIENT
Start: 2022-01-01 | End: 2022-01-01 | Stop reason: HOSPADM

## 2022-01-01 RX ORDER — POTASSIUM CHLORIDE 750 MG/1
40 TABLET, FILM COATED, EXTENDED RELEASE ORAL
Status: DISCONTINUED | OUTPATIENT
Start: 2022-01-01 | End: 2022-01-01

## 2022-01-01 RX ORDER — ONDANSETRON 4 MG/1
4 TABLET, ORALLY DISINTEGRATING ORAL
Status: DISCONTINUED | OUTPATIENT
Start: 2022-01-01 | End: 2022-01-01 | Stop reason: SDUPTHER

## 2022-01-01 RX ORDER — ATORVASTATIN CALCIUM 20 MG/1
20 TABLET, FILM COATED ORAL DAILY
Status: DISCONTINUED | OUTPATIENT
Start: 2022-01-01 | End: 2022-01-01 | Stop reason: HOSPADM

## 2022-01-01 RX ORDER — DEXAMETHASONE SODIUM PHOSPHATE 4 MG/ML
4 INJECTION, SOLUTION INTRA-ARTICULAR; INTRALESIONAL; INTRAMUSCULAR; INTRAVENOUS; SOFT TISSUE EVERY 6 HOURS
Status: DISCONTINUED | OUTPATIENT
Start: 2022-01-01 | End: 2022-01-01

## 2022-01-01 RX ORDER — GUAIFENESIN 600 MG/1
600 TABLET, EXTENDED RELEASE ORAL 2 TIMES DAILY
Status: DISCONTINUED | OUTPATIENT
Start: 2022-01-01 | End: 2022-01-01

## 2022-01-01 RX ORDER — LIDOCAINE HYDROCHLORIDE AND EPINEPHRINE 10; 10 MG/ML; UG/ML
1.5 INJECTION, SOLUTION INFILTRATION; PERINEURAL ONCE
Status: COMPLETED | OUTPATIENT
Start: 2022-01-01 | End: 2022-01-01

## 2022-01-01 RX ORDER — LIDOCAINE HYDROCHLORIDE 20 MG/ML
JELLY TOPICAL ONCE
Status: CANCELLED | OUTPATIENT
Start: 2022-01-01 | End: 2022-01-01

## 2022-01-01 RX ORDER — LIDOCAINE HYDROCHLORIDE 20 MG/ML
JELLY TOPICAL AS NEEDED
Status: DISCONTINUED | OUTPATIENT
Start: 2022-01-01 | End: 2022-01-01

## 2022-01-01 RX ORDER — POTASSIUM CHLORIDE 1.5 G/1.77G
40 POWDER, FOR SOLUTION ORAL EVERY 4 HOURS
Status: COMPLETED | OUTPATIENT
Start: 2022-01-01 | End: 2022-01-01

## 2022-01-01 RX ORDER — ASPIRIN 81 MG/1
81 TABLET ORAL DAILY
Status: DISCONTINUED | OUTPATIENT
Start: 2022-01-01 | End: 2022-01-01 | Stop reason: HOSPADM

## 2022-01-01 RX ORDER — ONDANSETRON 2 MG/ML
4 INJECTION INTRAMUSCULAR; INTRAVENOUS
Status: DISCONTINUED | OUTPATIENT
Start: 2022-01-01 | End: 2022-01-01 | Stop reason: SDUPTHER

## 2022-01-01 RX ORDER — NYSTATIN 100000 [USP'U]/ML
500000 SUSPENSION ORAL 4 TIMES DAILY
Status: DISCONTINUED | OUTPATIENT
Start: 2022-01-01 | End: 2022-01-01 | Stop reason: HOSPADM

## 2022-01-01 RX ORDER — METOCLOPRAMIDE HYDROCHLORIDE 5 MG/ML
5 INJECTION INTRAMUSCULAR; INTRAVENOUS EVERY 8 HOURS
Status: DISCONTINUED | OUTPATIENT
Start: 2022-01-01 | End: 2022-01-01

## 2022-01-01 RX ORDER — LANOLIN ALCOHOL/MO/W.PET/CERES
3 CREAM (GRAM) TOPICAL
Status: DISCONTINUED | OUTPATIENT
Start: 2022-01-01 | End: 2022-01-01 | Stop reason: HOSPADM

## 2022-01-01 RX ORDER — ONDANSETRON 4 MG/1
4 TABLET, ORALLY DISINTEGRATING ORAL
Status: DISCONTINUED | OUTPATIENT
Start: 2022-01-01 | End: 2022-01-01 | Stop reason: HOSPADM

## 2022-01-01 RX ORDER — HYDROGEN PEROXIDE 3 %
SOLUTION, NON-ORAL MISCELLANEOUS
Status: COMPLETED | OUTPATIENT
Start: 2022-01-01 | End: 2022-01-01

## 2022-01-01 RX ORDER — PROPOFOL 10 MG/ML
0-50 VIAL (ML) INTRAVENOUS
Status: DISCONTINUED | OUTPATIENT
Start: 2022-01-01 | End: 2022-01-01 | Stop reason: HOSPADM

## 2022-01-01 RX ORDER — POLYETHYLENE GLYCOL 3350 17 G/17G
17 POWDER, FOR SOLUTION ORAL DAILY PRN
Status: DISCONTINUED | OUTPATIENT
Start: 2022-01-01 | End: 2022-01-01 | Stop reason: HOSPADM

## 2022-01-01 RX ORDER — SODIUM,POTASSIUM PHOSPHATES 280-250MG
2 POWDER IN PACKET (EA) ORAL EVERY 4 HOURS
Status: COMPLETED | OUTPATIENT
Start: 2022-01-01 | End: 2022-01-01

## 2022-01-01 RX ORDER — NOREPINEPHRINE BIT/0.9 % NACL 8 MG/250ML
.5-3 INFUSION BOTTLE (ML) INTRAVENOUS
Status: DISCONTINUED | OUTPATIENT
Start: 2022-01-01 | End: 2022-01-01 | Stop reason: HOSPADM

## 2022-01-01 RX ORDER — PANTOPRAZOLE SODIUM 40 MG/1
40 TABLET, DELAYED RELEASE ORAL
Status: DISCONTINUED | OUTPATIENT
Start: 2022-01-01 | End: 2022-01-01

## 2022-01-01 RX ORDER — MUPIROCIN 20 MG/G
OINTMENT TOPICAL 2 TIMES DAILY
Status: DISCONTINUED | OUTPATIENT
Start: 2022-01-01 | End: 2022-01-01

## 2022-01-01 RX ORDER — SODIUM,POTASSIUM PHOSPHATES 280-250MG
2 POWDER IN PACKET (EA) ORAL EVERY 4 HOURS
Status: DISCONTINUED | OUTPATIENT
Start: 2022-01-01 | End: 2022-01-01

## 2022-01-01 RX ORDER — LIDOCAINE HYDROCHLORIDE 20 MG/ML
JELLY TOPICAL AS NEEDED
Status: CANCELLED | OUTPATIENT
Start: 2022-01-01

## 2022-01-01 RX ORDER — AMLODIPINE BESYLATE 5 MG/1
5 TABLET ORAL DAILY
Status: DISCONTINUED | OUTPATIENT
Start: 2022-01-01 | End: 2022-01-01 | Stop reason: HOSPADM

## 2022-01-01 RX ORDER — FUROSEMIDE 10 MG/ML
20 INJECTION INTRAMUSCULAR; INTRAVENOUS ONCE
Status: COMPLETED | OUTPATIENT
Start: 2022-01-01 | End: 2022-01-01

## 2022-01-01 RX ORDER — PANTOPRAZOLE SODIUM 40 MG/1
40 GRANULE, DELAYED RELEASE ORAL
Status: DISCONTINUED | OUTPATIENT
Start: 2022-01-01 | End: 2022-01-01

## 2022-01-01 RX ORDER — NORETHINDRONE AND ETHINYL ESTRADIOL 0.5-0.035
KIT ORAL
Status: DISPENSED
Start: 2022-01-01 | End: 2022-01-01

## 2022-01-01 RX ORDER — HYDROXYZINE PAMOATE 25 MG/1
25 CAPSULE ORAL
Status: DISCONTINUED | OUTPATIENT
Start: 2022-01-01 | End: 2022-01-01

## 2022-01-01 RX ORDER — DEXAMETHASONE 4 MG/1
4 TABLET ORAL 2 TIMES DAILY
COMMUNITY

## 2022-01-01 RX ORDER — RANOLAZINE 500 MG/1
500 TABLET, EXTENDED RELEASE ORAL 2 TIMES DAILY
Status: DISCONTINUED | OUTPATIENT
Start: 2022-01-01 | End: 2022-01-01 | Stop reason: HOSPADM

## 2022-01-01 RX ORDER — POTASSIUM CHLORIDE 750 MG/1
40 TABLET, FILM COATED, EXTENDED RELEASE ORAL
Status: COMPLETED | OUTPATIENT
Start: 2022-01-01 | End: 2022-01-01

## 2022-01-01 RX ORDER — LIDOCAINE HYDROCHLORIDE 20 MG/ML
JELLY TOPICAL AS NEEDED
Status: DISCONTINUED | OUTPATIENT
Start: 2022-01-01 | End: 2022-01-01 | Stop reason: HOSPADM

## 2022-01-01 RX ORDER — FLUCONAZOLE 100 MG/1
200 TABLET ORAL
Status: COMPLETED | OUTPATIENT
Start: 2022-01-01 | End: 2022-01-01

## 2022-01-01 RX ORDER — SODIUM CHLORIDE 9 MG/ML
25 INJECTION, SOLUTION INTRAVENOUS CONTINUOUS
Status: DISCONTINUED | OUTPATIENT
Start: 2022-01-01 | End: 2022-01-01

## 2022-01-01 RX ORDER — DEXAMETHASONE 4 MG/1
4 TABLET ORAL EVERY 12 HOURS
Status: DISCONTINUED | OUTPATIENT
Start: 2022-01-01 | End: 2022-01-01 | Stop reason: HOSPADM

## 2022-01-01 RX ORDER — GUAIFENESIN 100 MG/5ML
100 SOLUTION ORAL 2 TIMES DAILY
Status: DISCONTINUED | OUTPATIENT
Start: 2022-01-01 | End: 2022-01-01 | Stop reason: HOSPADM

## 2022-01-01 RX ORDER — SODIUM POLYSTYRENE SULFONATE 15 G/60ML
30 SUSPENSION ORAL; RECTAL
Status: COMPLETED | OUTPATIENT
Start: 2022-01-01 | End: 2022-01-01

## 2022-01-01 RX ORDER — DEXAMETHASONE SODIUM PHOSPHATE 4 MG/ML
2 INJECTION, SOLUTION INTRA-ARTICULAR; INTRALESIONAL; INTRAMUSCULAR; INTRAVENOUS; SOFT TISSUE EVERY 12 HOURS
Status: DISCONTINUED | OUTPATIENT
Start: 2022-01-01 | End: 2022-01-01 | Stop reason: HOSPADM

## 2022-01-01 RX ORDER — METOPROLOL SUCCINATE 25 MG/1
25 TABLET, EXTENDED RELEASE ORAL DAILY
Status: DISCONTINUED | OUTPATIENT
Start: 2022-01-01 | End: 2022-01-01 | Stop reason: HOSPADM

## 2022-01-01 RX ORDER — SODIUM CHLORIDE 9 MG/ML
INJECTION, SOLUTION INTRAVENOUS
Status: DISCONTINUED | OUTPATIENT
Start: 2022-01-01 | End: 2022-01-01 | Stop reason: HOSPADM

## 2022-01-01 RX ORDER — SODIUM CHLORIDE 9 MG/ML
100 INJECTION, SOLUTION INTRAVENOUS CONTINUOUS
Status: DISCONTINUED | OUTPATIENT
Start: 2022-01-01 | End: 2022-01-01 | Stop reason: HOSPADM

## 2022-01-01 RX ORDER — CALCIUM GLUCONATE 20 MG/ML
2 INJECTION, SOLUTION INTRAVENOUS ONCE
Status: COMPLETED | OUTPATIENT
Start: 2022-01-01 | End: 2022-01-01

## 2022-01-01 RX ORDER — LIDOCAINE HYDROCHLORIDE 20 MG/ML
INJECTION, SOLUTION EPIDURAL; INFILTRATION; INTRACAUDAL; PERINEURAL AS NEEDED
Status: DISCONTINUED | OUTPATIENT
Start: 2022-01-01 | End: 2022-01-01 | Stop reason: HOSPADM

## 2022-01-01 RX ORDER — EPINEPHRINE 0.1 MG/ML
INJECTION INTRACARDIAC; INTRAVENOUS
Status: COMPLETED | OUTPATIENT
Start: 2022-01-01 | End: 2022-01-01

## 2022-01-01 RX ORDER — PROPOFOL 10 MG/ML
INJECTION, EMULSION INTRAVENOUS AS NEEDED
Status: DISCONTINUED | OUTPATIENT
Start: 2022-01-01 | End: 2022-01-01 | Stop reason: HOSPADM

## 2022-01-01 RX ORDER — TRANEXAMIC ACID 100 MG/ML
1 INJECTION, SOLUTION INTRAVENOUS ONCE
Status: COMPLETED | OUTPATIENT
Start: 2022-01-01 | End: 2022-01-01

## 2022-01-01 RX ORDER — AMLODIPINE BESYLATE 5 MG/1
5 TABLET ORAL DAILY
Qty: 30 TABLET | Refills: 0 | Status: SHIPPED | OUTPATIENT
Start: 2022-01-01

## 2022-01-01 RX ORDER — MIRTAZAPINE 15 MG/1
15 TABLET, FILM COATED ORAL
Status: DISCONTINUED | OUTPATIENT
Start: 2022-01-01 | End: 2022-01-01 | Stop reason: HOSPADM

## 2022-01-01 RX ORDER — POTASSIUM CHLORIDE 1.5 G/1.77G
40 POWDER, FOR SOLUTION ORAL EVERY 6 HOURS
Status: COMPLETED | OUTPATIENT
Start: 2022-01-01 | End: 2022-01-01

## 2022-01-01 RX ORDER — MORPHINE SULFATE 2 MG/ML
2 INJECTION, SOLUTION INTRAMUSCULAR; INTRAVENOUS ONCE
Status: COMPLETED | OUTPATIENT
Start: 2022-01-01 | End: 2022-01-01

## 2022-01-01 RX ORDER — ACETAMINOPHEN 500 MG
1000 TABLET ORAL
Status: COMPLETED | OUTPATIENT
Start: 2022-01-01 | End: 2022-01-01

## 2022-01-01 RX ORDER — DULOXETIN HYDROCHLORIDE 30 MG/1
30 CAPSULE, DELAYED RELEASE ORAL DAILY
Status: DISCONTINUED | OUTPATIENT
Start: 2022-01-01 | End: 2022-01-01 | Stop reason: HOSPADM

## 2022-01-01 RX ORDER — LORAZEPAM 2 MG/ML
1 CONCENTRATE ORAL
Status: DISCONTINUED | OUTPATIENT
Start: 2022-01-01 | End: 2022-01-01 | Stop reason: HOSPADM

## 2022-01-01 RX ORDER — SODIUM BICARBONATE 1 MEQ/ML
SYRINGE (ML) INTRAVENOUS
Status: COMPLETED | OUTPATIENT
Start: 2022-01-01 | End: 2022-01-01

## 2022-01-01 RX ORDER — DEXAMETHASONE 4 MG/1
TABLET ORAL
Qty: 10 TABLET | Refills: 0 | Status: SHIPPED | OUTPATIENT
Start: 2022-01-01 | End: 2022-01-01

## 2022-01-01 RX ORDER — POTASSIUM CHLORIDE 1.5 G/1.77G
40 POWDER, FOR SOLUTION ORAL ONCE
Status: DISCONTINUED | OUTPATIENT
Start: 2022-01-01 | End: 2022-01-01

## 2022-01-01 RX ORDER — POTASSIUM CHLORIDE 1.5 G/1.77G
40 POWDER, FOR SOLUTION ORAL
Status: COMPLETED | OUTPATIENT
Start: 2022-01-01 | End: 2022-01-01

## 2022-01-01 RX ORDER — MORPHINE SULFATE 2 MG/ML
2 INJECTION, SOLUTION INTRAMUSCULAR; INTRAVENOUS
Status: DISCONTINUED | OUTPATIENT
Start: 2022-01-01 | End: 2022-01-01 | Stop reason: HOSPADM

## 2022-01-01 RX ORDER — PANTOPRAZOLE SODIUM 40 MG/1
40 TABLET, DELAYED RELEASE ORAL 2 TIMES DAILY
Status: DISCONTINUED | OUTPATIENT
Start: 2022-01-01 | End: 2022-01-01

## 2022-01-01 RX ORDER — HYDROXYZINE PAMOATE 25 MG/1
25 CAPSULE ORAL EVERY 8 HOURS
Status: DISCONTINUED | OUTPATIENT
Start: 2022-01-01 | End: 2022-01-01

## 2022-01-01 RX ORDER — DEXAMETHASONE SODIUM PHOSPHATE 4 MG/ML
6 INJECTION, SOLUTION INTRA-ARTICULAR; INTRALESIONAL; INTRAMUSCULAR; INTRAVENOUS; SOFT TISSUE ONCE
Status: COMPLETED | OUTPATIENT
Start: 2022-01-01 | End: 2022-01-01

## 2022-01-01 RX ADMIN — DEXAMETHASONE SODIUM PHOSPHATE 4 MG: 4 INJECTION, SOLUTION INTRA-ARTICULAR; INTRALESIONAL; INTRAMUSCULAR; INTRAVENOUS; SOFT TISSUE at 19:38

## 2022-01-01 RX ADMIN — DEXAMETHASONE 4 MG: 4 TABLET ORAL at 10:48

## 2022-01-01 RX ADMIN — SODIUM CHLORIDE 100 ML/HR: 9 INJECTION, SOLUTION INTRAVENOUS at 02:00

## 2022-01-01 RX ADMIN — DOXAZOSIN 4 MG: 4 TABLET ORAL at 21:41

## 2022-01-01 RX ADMIN — DEXAMETHASONE 4 MG: 4 TABLET ORAL at 09:32

## 2022-01-01 RX ADMIN — DEXAMETHASONE SODIUM PHOSPHATE 4 MG: 4 INJECTION, SOLUTION INTRA-ARTICULAR; INTRALESIONAL; INTRAMUSCULAR; INTRAVENOUS; SOFT TISSUE at 12:42

## 2022-01-01 RX ADMIN — DEXAMETHASONE SODIUM PHOSPHATE 4 MG: 4 INJECTION, SOLUTION INTRA-ARTICULAR; INTRALESIONAL; INTRAMUSCULAR; INTRAVENOUS; SOFT TISSUE at 13:18

## 2022-01-01 RX ADMIN — AZITHROMYCIN MONOHYDRATE 500 MG: 500 INJECTION, POWDER, LYOPHILIZED, FOR SOLUTION INTRAVENOUS at 20:09

## 2022-01-01 RX ADMIN — TRACE ELEMENTS INJECTION 4: 7.4; .75; 98; 151 INJECTION, SOLUTION INTRAVENOUS at 21:52

## 2022-01-01 RX ADMIN — DOXAZOSIN 4 MG: 4 TABLET ORAL at 21:40

## 2022-01-01 RX ADMIN — DEXAMETHASONE 4 MG: 4 TABLET ORAL at 23:53

## 2022-01-01 RX ADMIN — RANOLAZINE 500 MG: 500 TABLET, FILM COATED, EXTENDED RELEASE ORAL at 20:10

## 2022-01-01 RX ADMIN — PIPERACILLIN AND TAZOBACTAM 3.38 G: 3; .375 INJECTION, POWDER, FOR SOLUTION INTRAVENOUS at 05:08

## 2022-01-01 RX ADMIN — DOXAZOSIN 4 MG: 4 TABLET ORAL at 21:51

## 2022-01-01 RX ADMIN — AZITHROMYCIN MONOHYDRATE 500 MG: 500 INJECTION, POWDER, LYOPHILIZED, FOR SOLUTION INTRAVENOUS at 19:46

## 2022-01-01 RX ADMIN — MIRTAZAPINE 15 MG: 15 TABLET, FILM COATED ORAL at 21:00

## 2022-01-01 RX ADMIN — RANOLAZINE 500 MG: 500 TABLET, FILM COATED, EXTENDED RELEASE ORAL at 22:15

## 2022-01-01 RX ADMIN — FUROSEMIDE 40 MG: 10 INJECTION, SOLUTION INTRAMUSCULAR; INTRAVENOUS at 11:11

## 2022-01-01 RX ADMIN — DULOXETINE 30 MG: 30 CAPSULE, DELAYED RELEASE ORAL at 12:24

## 2022-01-01 RX ADMIN — RANOLAZINE 500 MG: 500 TABLET, FILM COATED, EXTENDED RELEASE ORAL at 08:25

## 2022-01-01 RX ADMIN — PROPOFOL 20 MG: 10 INJECTION, EMULSION INTRAVENOUS at 16:27

## 2022-01-01 RX ADMIN — DEXAMETHASONE SODIUM PHOSPHATE 4 MG: 4 INJECTION, SOLUTION INTRA-ARTICULAR; INTRALESIONAL; INTRAMUSCULAR; INTRAVENOUS; SOFT TISSUE at 05:41

## 2022-01-01 RX ADMIN — APIXABAN 2.5 MG: 2.5 TABLET, FILM COATED ORAL at 09:32

## 2022-01-01 RX ADMIN — ASPIRIN 81 MG: 81 TABLET, COATED ORAL at 09:07

## 2022-01-01 RX ADMIN — HYDROXYZINE PAMOATE 25 MG: 25 CAPSULE ORAL at 22:14

## 2022-01-01 RX ADMIN — DEXAMETHASONE SODIUM PHOSPHATE 4 MG: 4 INJECTION, SOLUTION INTRA-ARTICULAR; INTRALESIONAL; INTRAMUSCULAR; INTRAVENOUS; SOFT TISSUE at 11:53

## 2022-01-01 RX ADMIN — BARICITINIB 2 MG: 2 TABLET, FILM COATED ORAL at 11:21

## 2022-01-01 RX ADMIN — MUPIROCIN 1 TUBE: 20 OINTMENT TOPICAL at 20:38

## 2022-01-01 RX ADMIN — APIXABAN 2.5 MG: 2.5 TABLET, FILM COATED ORAL at 08:13

## 2022-01-01 RX ADMIN — PIPERACILLIN AND TAZOBACTAM 3.38 G: 3; .375 INJECTION, POWDER, FOR SOLUTION INTRAVENOUS at 03:52

## 2022-01-01 RX ADMIN — DULOXETINE 30 MG: 30 CAPSULE, DELAYED RELEASE ORAL at 10:48

## 2022-01-01 RX ADMIN — ASPIRIN 81 MG: 81 TABLET, COATED ORAL at 08:00

## 2022-01-01 RX ADMIN — DEXAMETHASONE SODIUM PHOSPHATE 4 MG: 4 INJECTION, SOLUTION INTRA-ARTICULAR; INTRALESIONAL; INTRAMUSCULAR; INTRAVENOUS; SOFT TISSUE at 05:36

## 2022-01-01 RX ADMIN — HYDROXYZINE PAMOATE 25 MG: 25 CAPSULE ORAL at 13:57

## 2022-01-01 RX ADMIN — RANOLAZINE 500 MG: 500 TABLET, FILM COATED, EXTENDED RELEASE ORAL at 22:13

## 2022-01-01 RX ADMIN — PIPERACILLIN AND TAZOBACTAM 3.38 G: 3; .375 INJECTION, POWDER, FOR SOLUTION INTRAVENOUS at 19:55

## 2022-01-01 RX ADMIN — NYSTATIN 500000 UNITS: 100000 SUSPENSION ORAL at 13:24

## 2022-01-01 RX ADMIN — NYSTATIN 500000 UNITS: 100000 SUSPENSION ORAL at 13:08

## 2022-01-01 RX ADMIN — APIXABAN 2.5 MG: 2.5 TABLET, FILM COATED ORAL at 09:01

## 2022-01-01 RX ADMIN — MIRTAZAPINE 15 MG: 15 TABLET, FILM COATED ORAL at 21:21

## 2022-01-01 RX ADMIN — MUPIROCIN: 20 OINTMENT TOPICAL at 21:00

## 2022-01-01 RX ADMIN — RANOLAZINE 500 MG: 500 TABLET, FILM COATED, EXTENDED RELEASE ORAL at 11:21

## 2022-01-01 RX ADMIN — DOXAZOSIN 4 MG: 4 TABLET ORAL at 22:04

## 2022-01-01 RX ADMIN — AMLODIPINE BESYLATE 5 MG: 5 TABLET ORAL at 10:48

## 2022-01-01 RX ADMIN — PIPERACILLIN AND TAZOBACTAM 3.38 G: 3; .375 INJECTION, POWDER, FOR SOLUTION INTRAVENOUS at 04:13

## 2022-01-01 RX ADMIN — HYDROXYZINE HYDROCHLORIDE 25 MG: 50 INJECTION, SOLUTION INTRAMUSCULAR at 08:52

## 2022-01-01 RX ADMIN — ATORVASTATIN CALCIUM 20 MG: 20 TABLET, FILM COATED ORAL at 11:22

## 2022-01-01 RX ADMIN — RANOLAZINE 500 MG: 500 TABLET, FILM COATED, EXTENDED RELEASE ORAL at 08:12

## 2022-01-01 RX ADMIN — PANTOPRAZOLE SODIUM 40 MG: 40 GRANULE, DELAYED RELEASE ORAL at 11:35

## 2022-01-01 RX ADMIN — MUPIROCIN: 20 OINTMENT TOPICAL at 20:52

## 2022-01-01 RX ADMIN — MIRTAZAPINE 15 MG: 15 TABLET, FILM COATED ORAL at 21:11

## 2022-01-01 RX ADMIN — TRACE ELEMENTS INJECTION 4: 7.4; .75; 98; 151 INJECTION, SOLUTION INTRAVENOUS at 22:18

## 2022-01-01 RX ADMIN — RANOLAZINE 500 MG: 500 TABLET, FILM COATED, EXTENDED RELEASE ORAL at 08:58

## 2022-01-01 RX ADMIN — ACETAMINOPHEN 650 MG: 325 TABLET, FILM COATED ORAL at 12:52

## 2022-01-01 RX ADMIN — PIPERACILLIN AND TAZOBACTAM 3.38 G: 3; .375 INJECTION, POWDER, FOR SOLUTION INTRAVENOUS at 10:58

## 2022-01-01 RX ADMIN — AMLODIPINE BESYLATE 5 MG: 5 TABLET ORAL at 08:58

## 2022-01-01 RX ADMIN — APIXABAN 2.5 MG: 2.5 TABLET, FILM COATED ORAL at 20:10

## 2022-01-01 RX ADMIN — DULOXETINE HYDROCHLORIDE 30 MG: 30 CAPSULE, DELAYED RELEASE ORAL at 08:46

## 2022-01-01 RX ADMIN — MIRTAZAPINE 15 MG: 15 TABLET, FILM COATED ORAL at 21:52

## 2022-01-01 RX ADMIN — PIPERACILLIN AND TAZOBACTAM 3.38 G: 3; .375 INJECTION, POWDER, FOR SOLUTION INTRAVENOUS at 20:32

## 2022-01-01 RX ADMIN — PANTOPRAZOLE SODIUM 40 MG: 40 GRANULE, DELAYED RELEASE ORAL at 09:02

## 2022-01-01 RX ADMIN — DEXAMETHASONE SODIUM PHOSPHATE 4 MG: 4 INJECTION, SOLUTION INTRA-ARTICULAR; INTRALESIONAL; INTRAMUSCULAR; INTRAVENOUS; SOFT TISSUE at 00:02

## 2022-01-01 RX ADMIN — DOXAZOSIN 4 MG: 4 TABLET ORAL at 21:36

## 2022-01-01 RX ADMIN — ASPIRIN 81 MG: 81 TABLET, COATED ORAL at 10:48

## 2022-01-01 RX ADMIN — APIXABAN 2.5 MG: 2.5 TABLET, FILM COATED ORAL at 19:46

## 2022-01-01 RX ADMIN — MIRTAZAPINE 15 MG: 15 TABLET, FILM COATED ORAL at 20:47

## 2022-01-01 RX ADMIN — RANOLAZINE 500 MG: 500 TABLET, FILM COATED, EXTENDED RELEASE ORAL at 08:26

## 2022-01-01 RX ADMIN — RANOLAZINE 500 MG: 500 TABLET, FILM COATED, EXTENDED RELEASE ORAL at 20:36

## 2022-01-01 RX ADMIN — PIPERACILLIN AND TAZOBACTAM 3.38 G: 3; .375 INJECTION, POWDER, FOR SOLUTION INTRAVENOUS at 10:26

## 2022-01-01 RX ADMIN — RANOLAZINE 500 MG: 500 TABLET, FILM COATED, EXTENDED RELEASE ORAL at 21:21

## 2022-01-01 RX ADMIN — ATORVASTATIN CALCIUM 20 MG: 20 TABLET, FILM COATED ORAL at 08:25

## 2022-01-01 RX ADMIN — APIXABAN 2.5 MG: 2.5 TABLET, FILM COATED ORAL at 21:11

## 2022-01-01 RX ADMIN — APIXABAN 2.5 MG: 2.5 TABLET, FILM COATED ORAL at 08:16

## 2022-01-01 RX ADMIN — FUROSEMIDE 40 MG: 10 INJECTION, SOLUTION INTRAMUSCULAR; INTRAVENOUS at 11:33

## 2022-01-01 RX ADMIN — ACETAMINOPHEN 650 MG: 325 TABLET ORAL at 13:57

## 2022-01-01 RX ADMIN — DEXAMETHASONE SODIUM PHOSPHATE 4 MG: 4 INJECTION, SOLUTION INTRA-ARTICULAR; INTRALESIONAL; INTRAMUSCULAR; INTRAVENOUS; SOFT TISSUE at 18:53

## 2022-01-01 RX ADMIN — DEXAMETHASONE SODIUM PHOSPHATE 4 MG: 4 INJECTION, SOLUTION INTRA-ARTICULAR; INTRALESIONAL; INTRAMUSCULAR; INTRAVENOUS; SOFT TISSUE at 00:21

## 2022-01-01 RX ADMIN — PANTOPRAZOLE SODIUM 40 MG: 40 TABLET, DELAYED RELEASE ORAL at 22:14

## 2022-01-01 RX ADMIN — ASPIRIN 81 MG: 81 TABLET, COATED ORAL at 08:38

## 2022-01-01 RX ADMIN — NYSTATIN 500000 UNITS: 100000 SUSPENSION ORAL at 08:38

## 2022-01-01 RX ADMIN — DEXAMETHASONE SODIUM PHOSPHATE 4 MG: 4 INJECTION, SOLUTION INTRA-ARTICULAR; INTRALESIONAL; INTRAMUSCULAR; INTRAVENOUS; SOFT TISSUE at 12:47

## 2022-01-01 RX ADMIN — POTASSIUM CHLORIDE 40 MEQ: 1.5 POWDER, FOR SOLUTION ORAL at 17:07

## 2022-01-01 RX ADMIN — DULOXETINE HYDROCHLORIDE 30 MG: 30 CAPSULE, DELAYED RELEASE ORAL at 08:13

## 2022-01-01 RX ADMIN — SODIUM CHLORIDE 500 ML: 9 INJECTION, SOLUTION INTRAVENOUS at 18:46

## 2022-01-01 RX ADMIN — DEXAMETHASONE SODIUM PHOSPHATE 4 MG: 4 INJECTION, SOLUTION INTRA-ARTICULAR; INTRALESIONAL; INTRAMUSCULAR; INTRAVENOUS; SOFT TISSUE at 11:11

## 2022-01-01 RX ADMIN — POTASSIUM CHLORIDE 40 MEQ: 750 TABLET, FILM COATED, EXTENDED RELEASE ORAL at 11:00

## 2022-01-01 RX ADMIN — ATORVASTATIN CALCIUM 20 MG: 20 TABLET, FILM COATED ORAL at 08:58

## 2022-01-01 RX ADMIN — SODIUM CHLORIDE 25 ML/HR: 9 INJECTION, SOLUTION INTRAVENOUS at 21:49

## 2022-01-01 RX ADMIN — MIRTAZAPINE 15 MG: 15 TABLET, FILM COATED ORAL at 22:14

## 2022-01-01 RX ADMIN — ATORVASTATIN CALCIUM 20 MG: 20 TABLET, FILM COATED ORAL at 08:46

## 2022-01-01 RX ADMIN — RANOLAZINE 500 MG: 500 TABLET, FILM COATED, EXTENDED RELEASE ORAL at 10:48

## 2022-01-01 RX ADMIN — POTASSIUM CHLORIDE 40 MEQ: 1.5 POWDER, FOR SOLUTION ORAL at 13:59

## 2022-01-01 RX ADMIN — CEFTRIAXONE SODIUM 1 G: 1 INJECTION, POWDER, FOR SOLUTION INTRAMUSCULAR; INTRAVENOUS at 19:46

## 2022-01-01 RX ADMIN — MUPIROCIN: 20 OINTMENT TOPICAL at 08:47

## 2022-01-01 RX ADMIN — HYDROXYZINE PAMOATE 25 MG: 25 CAPSULE ORAL at 05:14

## 2022-01-01 RX ADMIN — HYDROXYZINE PAMOATE 25 MG: 25 CAPSULE ORAL at 13:27

## 2022-01-01 RX ADMIN — DULOXETINE 30 MG: 30 CAPSULE, DELAYED RELEASE ORAL at 08:58

## 2022-01-01 RX ADMIN — ACETAMINOPHEN 650 MG: 325 TABLET ORAL at 21:50

## 2022-01-01 RX ADMIN — RANOLAZINE 500 MG: 500 TABLET, FILM COATED, EXTENDED RELEASE ORAL at 21:41

## 2022-01-01 RX ADMIN — DOXAZOSIN 4 MG: 4 TABLET ORAL at 21:21

## 2022-01-01 RX ADMIN — PIPERACILLIN AND TAZOBACTAM 3.38 G: 3; .375 INJECTION, POWDER, FOR SOLUTION INTRAVENOUS at 19:33

## 2022-01-01 RX ADMIN — ATORVASTATIN CALCIUM 20 MG: 20 TABLET, FILM COATED ORAL at 08:16

## 2022-01-01 RX ADMIN — DEXAMETHASONE SODIUM PHOSPHATE 4 MG: 4 INJECTION, SOLUTION INTRA-ARTICULAR; INTRALESIONAL; INTRAMUSCULAR; INTRAVENOUS; SOFT TISSUE at 00:18

## 2022-01-01 RX ADMIN — ACETAMINOPHEN 1000 MG: 500 TABLET ORAL at 12:14

## 2022-01-01 RX ADMIN — PANTOPRAZOLE SODIUM 40 MG: 40 TABLET, DELAYED RELEASE ORAL at 20:10

## 2022-01-01 RX ADMIN — PIPERACILLIN AND TAZOBACTAM 3.38 G: 3; .375 INJECTION, POWDER, FOR SOLUTION INTRAVENOUS at 11:27

## 2022-01-01 RX ADMIN — MELATONIN TAB 3 MG 3 MG: 3 TAB at 23:53

## 2022-01-01 RX ADMIN — POTASSIUM CHLORIDE 40 MEQ: 1.5 POWDER, FOR SOLUTION ORAL at 13:27

## 2022-01-01 RX ADMIN — NYSTATIN 500000 UNITS: 100000 SUSPENSION ORAL at 21:40

## 2022-01-01 RX ADMIN — MUPIROCIN: 20 OINTMENT TOPICAL at 09:02

## 2022-01-01 RX ADMIN — PROPOFOL 50 MG: 10 INJECTION, EMULSION INTRAVENOUS at 16:22

## 2022-01-01 RX ADMIN — MUPIROCIN: 20 OINTMENT TOPICAL at 20:06

## 2022-01-01 RX ADMIN — VANCOMYCIN HYDROCHLORIDE 1250 MG: 1.25 INJECTION, POWDER, LYOPHILIZED, FOR SOLUTION INTRAVENOUS at 14:05

## 2022-01-01 RX ADMIN — POTASSIUM CHLORIDE 40 MEQ: 1.5 POWDER, FOR SOLUTION ORAL at 11:46

## 2022-01-01 RX ADMIN — DEXAMETHASONE SODIUM PHOSPHATE 4 MG: 4 INJECTION, SOLUTION INTRA-ARTICULAR; INTRALESIONAL; INTRAMUSCULAR; INTRAVENOUS; SOFT TISSUE at 06:14

## 2022-01-01 RX ADMIN — ATORVASTATIN CALCIUM 20 MG: 20 TABLET, FILM COATED ORAL at 08:56

## 2022-01-01 RX ADMIN — DOXAZOSIN 4 MG: 4 TABLET ORAL at 21:11

## 2022-01-01 RX ADMIN — DOXAZOSIN 4 MG: 4 TABLET ORAL at 21:46

## 2022-01-01 RX ADMIN — APIXABAN 2.5 MG: 2.5 TABLET, FILM COATED ORAL at 08:46

## 2022-01-01 RX ADMIN — MIRTAZAPINE 15 MG: 15 TABLET, FILM COATED ORAL at 23:53

## 2022-01-01 RX ADMIN — SODIUM CHLORIDE 75 ML/HR: 9 INJECTION, SOLUTION INTRAVENOUS at 13:53

## 2022-01-01 RX ADMIN — MIRTAZAPINE 15 MG: 15 TABLET, FILM COATED ORAL at 21:41

## 2022-01-01 RX ADMIN — FLUCONAZOLE 200 MG: 100 TABLET ORAL at 08:56

## 2022-01-01 RX ADMIN — SODIUM CHLORIDE 75 ML/HR: 9 INJECTION, SOLUTION INTRAVENOUS at 19:46

## 2022-01-01 RX ADMIN — GUAIFENESIN 100 MG: 200 SOLUTION ORAL at 23:52

## 2022-01-01 RX ADMIN — EPINEPHRINE 1 MG: 0.1 INJECTION, SOLUTION INTRAVENOUS at 00:45

## 2022-01-01 RX ADMIN — DEXAMETHASONE 4 MG: 4 TABLET ORAL at 21:36

## 2022-01-01 RX ADMIN — HYDROXYZINE PAMOATE 25 MG: 25 CAPSULE ORAL at 11:33

## 2022-01-01 RX ADMIN — HYDROXYZINE HYDROCHLORIDE 25 MG: 50 INJECTION, SOLUTION INTRAMUSCULAR at 10:51

## 2022-01-01 RX ADMIN — APIXABAN 2.5 MG: 2.5 TABLET, FILM COATED ORAL at 08:25

## 2022-01-01 RX ADMIN — MUPIROCIN: 20 OINTMENT TOPICAL at 21:12

## 2022-01-01 RX ADMIN — PIPERACILLIN AND TAZOBACTAM 3.38 G: 3; .375 INJECTION, POWDER, FOR SOLUTION INTRAVENOUS at 19:56

## 2022-01-01 RX ADMIN — APIXABAN 2.5 MG: 2.5 TABLET, FILM COATED ORAL at 20:46

## 2022-01-01 RX ADMIN — APIXABAN 2.5 MG: 2.5 TABLET, FILM COATED ORAL at 20:38

## 2022-01-01 RX ADMIN — APIXABAN 2.5 MG: 2.5 TABLET, FILM COATED ORAL at 11:22

## 2022-01-01 RX ADMIN — DOXAZOSIN 4 MG: 4 TABLET ORAL at 22:14

## 2022-01-01 RX ADMIN — PIPERACILLIN AND TAZOBACTAM 3.38 G: 3; .375 INJECTION, POWDER, FOR SOLUTION INTRAVENOUS at 18:01

## 2022-01-01 RX ADMIN — DEXAMETHASONE 4 MG: 4 TABLET ORAL at 21:41

## 2022-01-01 RX ADMIN — DULOXETINE 30 MG: 30 CAPSULE, DELAYED RELEASE ORAL at 08:00

## 2022-01-01 RX ADMIN — BARICITINIB 2 MG: 2 TABLET, FILM COATED ORAL at 08:25

## 2022-01-01 RX ADMIN — RANOLAZINE 500 MG: 500 TABLET, FILM COATED, EXTENDED RELEASE ORAL at 21:11

## 2022-01-01 RX ADMIN — HYDROXYZINE PAMOATE 25 MG: 25 CAPSULE ORAL at 05:51

## 2022-01-01 RX ADMIN — PIPERACILLIN AND TAZOBACTAM 3.38 G: 3; .375 INJECTION, POWDER, FOR SOLUTION INTRAVENOUS at 11:52

## 2022-01-01 RX ADMIN — FUROSEMIDE 40 MG: 10 INJECTION, SOLUTION INTRAMUSCULAR; INTRAVENOUS at 14:11

## 2022-01-01 RX ADMIN — BARICITINIB 4 MG: 2 TABLET, FILM COATED ORAL at 15:46

## 2022-01-01 RX ADMIN — APIXABAN 2.5 MG: 2.5 TABLET, FILM COATED ORAL at 09:07

## 2022-01-01 RX ADMIN — POTASSIUM CHLORIDE 40 MEQ: 1.5 POWDER, FOR SOLUTION ORAL at 11:12

## 2022-01-01 RX ADMIN — MIRTAZAPINE 15 MG: 15 TABLET, FILM COATED ORAL at 21:36

## 2022-01-01 RX ADMIN — ASPIRIN 81 MG: 81 TABLET, COATED ORAL at 09:32

## 2022-01-01 RX ADMIN — ASPIRIN 81 MG: 81 TABLET, COATED ORAL at 08:46

## 2022-01-01 RX ADMIN — HYDROXYZINE HYDROCHLORIDE 25 MG: 50 INJECTION, SOLUTION INTRAMUSCULAR at 18:02

## 2022-01-01 RX ADMIN — RANOLAZINE 500 MG: 500 TABLET, FILM COATED, EXTENDED RELEASE ORAL at 21:36

## 2022-01-01 RX ADMIN — DEXAMETHASONE SODIUM PHOSPHATE 4 MG: 4 INJECTION, SOLUTION INTRA-ARTICULAR; INTRALESIONAL; INTRAMUSCULAR; INTRAVENOUS; SOFT TISSUE at 23:11

## 2022-01-01 RX ADMIN — DEXAMETHASONE SODIUM PHOSPHATE 4 MG: 4 INJECTION, SOLUTION INTRA-ARTICULAR; INTRALESIONAL; INTRAMUSCULAR; INTRAVENOUS; SOFT TISSUE at 23:34

## 2022-01-01 RX ADMIN — MIRTAZAPINE 15 MG: 15 TABLET, FILM COATED ORAL at 19:45

## 2022-01-01 RX ADMIN — VANCOMYCIN HYDROCHLORIDE 1250 MG: 1.25 INJECTION, POWDER, LYOPHILIZED, FOR SOLUTION INTRAVENOUS at 17:33

## 2022-01-01 RX ADMIN — SODIUM BICARBONATE 50 MEQ: 84 INJECTION INTRAVENOUS at 00:47

## 2022-01-01 RX ADMIN — ASPIRIN 81 MG: 81 TABLET, COATED ORAL at 11:22

## 2022-01-01 RX ADMIN — SODIUM CHLORIDE 500 ML: 9 INJECTION, SOLUTION INTRAVENOUS at 14:12

## 2022-01-01 RX ADMIN — ASCORBIC ACID, VITAMIN A PALMITATE, CHOLECALCIFEROL, THIAMINE HYDROCHLORIDE, RIBOFLAVIN-5 PHOSPHATE SODIUM, PYRIDOXINE HYDROCHLORIDE, NIACINAMIDE, DEXPANTHENOL, ALPHA-TOCOPHEROL ACETATE, VITAMIN K1, FOLIC ACID, BIOTIN, CYANOCOBALAMIN: 200; 3300; 200; 6; 3.6; 6; 40; 15; 10; 150; 600; 60; 5 INJECTION, SOLUTION INTRAVENOUS at 22:17

## 2022-01-01 RX ADMIN — NYSTATIN 500000 UNITS: 100000 SUSPENSION ORAL at 21:03

## 2022-01-01 RX ADMIN — SOYBEAN OIL 250 ML: 20 INJECTION, SOLUTION INTRAVENOUS at 22:00

## 2022-01-01 RX ADMIN — HYDROXYZINE PAMOATE 25 MG: 25 CAPSULE ORAL at 22:03

## 2022-01-01 RX ADMIN — RANOLAZINE 500 MG: 500 TABLET, FILM COATED, EXTENDED RELEASE ORAL at 09:07

## 2022-01-01 RX ADMIN — DEXAMETHASONE SODIUM PHOSPHATE 4 MG: 4 INJECTION, SOLUTION INTRA-ARTICULAR; INTRALESIONAL; INTRAMUSCULAR; INTRAVENOUS; SOFT TISSUE at 23:00

## 2022-01-01 RX ADMIN — PANTOPRAZOLE SODIUM 40 MG: 40 TABLET, DELAYED RELEASE ORAL at 21:21

## 2022-01-01 RX ADMIN — RANOLAZINE 500 MG: 500 TABLET, FILM COATED, EXTENDED RELEASE ORAL at 20:11

## 2022-01-01 RX ADMIN — APIXABAN 2.5 MG: 2.5 TABLET, FILM COATED ORAL at 23:53

## 2022-01-01 RX ADMIN — DEXAMETHASONE SODIUM PHOSPHATE 4 MG: 4 INJECTION, SOLUTION INTRA-ARTICULAR; INTRALESIONAL; INTRAMUSCULAR; INTRAVENOUS; SOFT TISSUE at 14:05

## 2022-01-01 RX ADMIN — FUROSEMIDE 40 MG: 10 INJECTION, SOLUTION INTRAMUSCULAR; INTRAVENOUS at 08:56

## 2022-01-01 RX ADMIN — HYDROXYZINE PAMOATE 25 MG: 25 CAPSULE ORAL at 21:46

## 2022-01-01 RX ADMIN — POTASSIUM CHLORIDE 40 MEQ: 1.5 POWDER, FOR SOLUTION ORAL at 15:40

## 2022-01-01 RX ADMIN — DULOXETINE HYDROCHLORIDE 30 MG: 30 CAPSULE, DELAYED RELEASE ORAL at 09:30

## 2022-01-01 RX ADMIN — DEXAMETHASONE SODIUM PHOSPHATE 4 MG: 4 INJECTION, SOLUTION INTRA-ARTICULAR; INTRALESIONAL; INTRAMUSCULAR; INTRAVENOUS; SOFT TISSUE at 05:16

## 2022-01-01 RX ADMIN — DEXAMETHASONE SODIUM PHOSPHATE 4 MG: 4 INJECTION, SOLUTION INTRA-ARTICULAR; INTRALESIONAL; INTRAMUSCULAR; INTRAVENOUS; SOFT TISSUE at 15:22

## 2022-01-01 RX ADMIN — METOCLOPRAMIDE HYDROCHLORIDE 5 MG: 5 INJECTION INTRAMUSCULAR; INTRAVENOUS at 21:46

## 2022-01-01 RX ADMIN — POTASSIUM CHLORIDE 40 MEQ: 750 TABLET, FILM COATED, EXTENDED RELEASE ORAL at 13:51

## 2022-01-01 RX ADMIN — PANTOPRAZOLE SODIUM 40 MG: 40 TABLET, DELAYED RELEASE ORAL at 08:56

## 2022-01-01 RX ADMIN — APIXABAN 2.5 MG: 2.5 TABLET, FILM COATED ORAL at 22:13

## 2022-01-01 RX ADMIN — HYDROXYZINE PAMOATE 25 MG: 25 CAPSULE ORAL at 13:24

## 2022-01-01 RX ADMIN — MUPIROCIN: 20 OINTMENT TOPICAL at 08:38

## 2022-01-01 RX ADMIN — PIPERACILLIN AND TAZOBACTAM 3.38 G: 3; .375 INJECTION, POWDER, FOR SOLUTION INTRAVENOUS at 12:14

## 2022-01-01 RX ADMIN — DEXAMETHASONE SODIUM PHOSPHATE 4 MG: 4 INJECTION, SOLUTION INTRA-ARTICULAR; INTRALESIONAL; INTRAMUSCULAR; INTRAVENOUS; SOFT TISSUE at 05:33

## 2022-01-01 RX ADMIN — PIPERACILLIN AND TAZOBACTAM 3.38 G: 3; .375 INJECTION, POWDER, FOR SOLUTION INTRAVENOUS at 20:05

## 2022-01-01 RX ADMIN — HYDROXYZINE HYDROCHLORIDE 25 MG: 50 INJECTION, SOLUTION INTRAMUSCULAR at 13:07

## 2022-01-01 RX ADMIN — BARICITINIB 2 MG: 2 TABLET, FILM COATED ORAL at 12:25

## 2022-01-01 RX ADMIN — POTASSIUM CHLORIDE 40 MEQ: 1.5 FOR SOLUTION ORAL at 18:20

## 2022-01-01 RX ADMIN — ASPIRIN 81 MG: 81 TABLET, COATED ORAL at 12:24

## 2022-01-01 RX ADMIN — APIXABAN 2.5 MG: 2.5 TABLET, FILM COATED ORAL at 08:00

## 2022-01-01 RX ADMIN — Medication: at 19:38

## 2022-01-01 RX ADMIN — PIPERACILLIN AND TAZOBACTAM 3.38 G: 3; .375 INJECTION, POWDER, FOR SOLUTION INTRAVENOUS at 05:51

## 2022-01-01 RX ADMIN — PIPERACILLIN AND TAZOBACTAM 3.38 G: 3; .375 INJECTION, POWDER, FOR SOLUTION INTRAVENOUS at 04:02

## 2022-01-01 RX ADMIN — PIPERACILLIN AND TAZOBACTAM 3.38 G: 3; .375 INJECTION, POWDER, FOR SOLUTION INTRAVENOUS at 20:33

## 2022-01-01 RX ADMIN — FUROSEMIDE 40 MG: 10 INJECTION, SOLUTION INTRAMUSCULAR; INTRAVENOUS at 11:27

## 2022-01-01 RX ADMIN — MIRTAZAPINE 15 MG: 15 TABLET, FILM COATED ORAL at 20:36

## 2022-01-01 RX ADMIN — HYDROXYZINE HYDROCHLORIDE 25 MG: 50 INJECTION, SOLUTION INTRAMUSCULAR at 01:43

## 2022-01-01 RX ADMIN — APIXABAN 2.5 MG: 2.5 TABLET, FILM COATED ORAL at 21:41

## 2022-01-01 RX ADMIN — PIPERACILLIN AND TAZOBACTAM 3.38 G: 3; .375 INJECTION, POWDER, FOR SOLUTION INTRAVENOUS at 03:14

## 2022-01-01 RX ADMIN — ATORVASTATIN CALCIUM 20 MG: 20 TABLET, FILM COATED ORAL at 09:30

## 2022-01-01 RX ADMIN — BARICITINIB 2 MG: 2 TABLET, FILM COATED ORAL at 10:36

## 2022-01-01 RX ADMIN — MELATONIN TAB 3 MG 3 MG: 3 TAB at 20:46

## 2022-01-01 RX ADMIN — HYDROXYZINE PAMOATE 25 MG: 25 CAPSULE ORAL at 21:00

## 2022-01-01 RX ADMIN — HYDROXYZINE PAMOATE 25 MG: 25 CAPSULE ORAL at 23:00

## 2022-01-01 RX ADMIN — TRANEXAMIC ACID 1000 MG: 1 INJECTION, SOLUTION INTRAVENOUS at 19:38

## 2022-01-01 RX ADMIN — SOYBEAN OIL 250 ML: 20 INJECTION, SOLUTION INTRAVENOUS at 21:49

## 2022-01-01 RX ADMIN — DEXAMETHASONE SODIUM PHOSPHATE 4 MG: 4 INJECTION, SOLUTION INTRA-ARTICULAR; INTRALESIONAL; INTRAMUSCULAR; INTRAVENOUS; SOFT TISSUE at 05:51

## 2022-01-01 RX ADMIN — ASPIRIN 81 MG: 81 TABLET, COATED ORAL at 08:25

## 2022-01-01 RX ADMIN — AMLODIPINE BESYLATE 5 MG: 5 TABLET ORAL at 11:22

## 2022-01-01 RX ADMIN — DOXAZOSIN 4 MG: 4 TABLET ORAL at 21:08

## 2022-01-01 RX ADMIN — NYSTATIN 500000 UNITS: 100000 SUSPENSION ORAL at 17:44

## 2022-01-01 RX ADMIN — ATORVASTATIN CALCIUM 20 MG: 20 TABLET, FILM COATED ORAL at 10:48

## 2022-01-01 RX ADMIN — DULOXETINE 30 MG: 30 CAPSULE, DELAYED RELEASE ORAL at 09:32

## 2022-01-01 RX ADMIN — DEXAMETHASONE SODIUM PHOSPHATE 4 MG: 4 INJECTION, SOLUTION INTRA-ARTICULAR; INTRALESIONAL; INTRAMUSCULAR; INTRAVENOUS; SOFT TISSUE at 17:17

## 2022-01-01 RX ADMIN — BARICITINIB 2 MG: 2 TABLET, FILM COATED ORAL at 10:48

## 2022-01-01 RX ADMIN — MUPIROCIN: 20 OINTMENT TOPICAL at 08:13

## 2022-01-01 RX ADMIN — RANOLAZINE 500 MG: 500 TABLET, FILM COATED, EXTENDED RELEASE ORAL at 09:30

## 2022-01-01 RX ADMIN — FUROSEMIDE 40 MG: 10 INJECTION, SOLUTION INTRAMUSCULAR; INTRAVENOUS at 10:26

## 2022-01-01 RX ADMIN — AZITHROMYCIN MONOHYDRATE 500 MG: 500 INJECTION, POWDER, LYOPHILIZED, FOR SOLUTION INTRAVENOUS at 18:53

## 2022-01-01 RX ADMIN — METOPROLOL SUCCINATE 25 MG: 25 TABLET, EXTENDED RELEASE ORAL at 06:38

## 2022-01-01 RX ADMIN — APIXABAN 2.5 MG: 2.5 TABLET, FILM COATED ORAL at 21:00

## 2022-01-01 RX ADMIN — POTASSIUM CHLORIDE 40 MEQ: 1.5 POWDER, FOR SOLUTION ORAL at 10:05

## 2022-01-01 RX ADMIN — MIRTAZAPINE 15 MG: 15 TABLET, FILM COATED ORAL at 20:11

## 2022-01-01 RX ADMIN — ATORVASTATIN CALCIUM 20 MG: 20 TABLET, FILM COATED ORAL at 09:02

## 2022-01-01 RX ADMIN — DEXAMETHASONE SODIUM PHOSPHATE 4 MG: 4 INJECTION, SOLUTION INTRA-ARTICULAR; INTRALESIONAL; INTRAMUSCULAR; INTRAVENOUS; SOFT TISSUE at 23:20

## 2022-01-01 RX ADMIN — DULOXETINE HYDROCHLORIDE 30 MG: 30 CAPSULE, DELAYED RELEASE ORAL at 08:16

## 2022-01-01 RX ADMIN — POTASSIUM & SODIUM PHOSPHATES POWDER PACK 280-160-250 MG 2 PACKET: 280-160-250 PACK at 11:33

## 2022-01-01 RX ADMIN — DEXAMETHASONE SODIUM PHOSPHATE 4 MG: 4 INJECTION, SOLUTION INTRA-ARTICULAR; INTRALESIONAL; INTRAMUSCULAR; INTRAVENOUS; SOFT TISSUE at 05:39

## 2022-01-01 RX ADMIN — PIPERACILLIN AND TAZOBACTAM 3.38 G: 3; .375 INJECTION, POWDER, FOR SOLUTION INTRAVENOUS at 03:04

## 2022-01-01 RX ADMIN — PANTOPRAZOLE SODIUM 40 MG: 40 TABLET, DELAYED RELEASE ORAL at 09:30

## 2022-01-01 RX ADMIN — APIXABAN 2.5 MG: 2.5 TABLET, FILM COATED ORAL at 20:51

## 2022-01-01 RX ADMIN — ATORVASTATIN CALCIUM 20 MG: 20 TABLET, FILM COATED ORAL at 08:13

## 2022-01-01 RX ADMIN — CEFTRIAXONE SODIUM 1 G: 1 INJECTION, POWDER, FOR SOLUTION INTRAMUSCULAR; INTRAVENOUS at 20:05

## 2022-01-01 RX ADMIN — HYDROXYZINE PAMOATE 25 MG: 25 CAPSULE ORAL at 21:07

## 2022-01-01 RX ADMIN — ASPIRIN 81 MG: 81 TABLET, COATED ORAL at 08:58

## 2022-01-01 RX ADMIN — APIXABAN 2.5 MG: 2.5 TABLET, FILM COATED ORAL at 08:58

## 2022-01-01 RX ADMIN — NYSTATIN 500000 UNITS: 100000 SUSPENSION ORAL at 17:17

## 2022-01-01 RX ADMIN — NYSTATIN 500000 UNITS: 100000 SUSPENSION ORAL at 09:04

## 2022-01-01 RX ADMIN — DEXAMETHASONE SODIUM PHOSPHATE 4 MG: 4 INJECTION, SOLUTION INTRA-ARTICULAR; INTRALESIONAL; INTRAMUSCULAR; INTRAVENOUS; SOFT TISSUE at 11:33

## 2022-01-01 RX ADMIN — RANOLAZINE 500 MG: 500 TABLET, FILM COATED, EXTENDED RELEASE ORAL at 20:05

## 2022-01-01 RX ADMIN — PIPERACILLIN AND TAZOBACTAM 3.38 G: 3; .375 INJECTION, POWDER, FOR SOLUTION INTRAVENOUS at 11:46

## 2022-01-01 RX ADMIN — DULOXETINE HYDROCHLORIDE 30 MG: 30 CAPSULE, DELAYED RELEASE ORAL at 09:02

## 2022-01-01 RX ADMIN — PIPERACILLIN AND TAZOBACTAM 3.38 G: 3; .375 INJECTION, POWDER, FOR SOLUTION INTRAVENOUS at 11:20

## 2022-01-01 RX ADMIN — DULOXETINE 30 MG: 30 CAPSULE, DELAYED RELEASE ORAL at 09:07

## 2022-01-01 RX ADMIN — SODIUM CHLORIDE 25 ML/HR: 9 INJECTION, SOLUTION INTRAVENOUS at 20:27

## 2022-01-01 RX ADMIN — HYDROXYZINE HYDROCHLORIDE 25 MG: 50 INJECTION, SOLUTION INTRAMUSCULAR at 19:07

## 2022-01-01 RX ADMIN — DULOXETINE HYDROCHLORIDE 30 MG: 30 CAPSULE, DELAYED RELEASE ORAL at 09:01

## 2022-01-01 RX ADMIN — DOXAZOSIN 4 MG: 4 TABLET ORAL at 21:03

## 2022-01-01 RX ADMIN — POTASSIUM & SODIUM PHOSPHATES POWDER PACK 280-160-250 MG 2 PACKET: 280-160-250 PACK at 19:43

## 2022-01-01 RX ADMIN — NYSTATIN 500000 UNITS: 100000 SUSPENSION ORAL at 13:27

## 2022-01-01 RX ADMIN — DEXAMETHASONE SODIUM PHOSPHATE 4 MG: 4 INJECTION, SOLUTION INTRA-ARTICULAR; INTRALESIONAL; INTRAMUSCULAR; INTRAVENOUS; SOFT TISSUE at 05:11

## 2022-01-01 RX ADMIN — DEXAMETHASONE 4 MG: 4 TABLET ORAL at 19:44

## 2022-01-01 RX ADMIN — PIPERACILLIN AND TAZOBACTAM 3.38 G: 3; .375 INJECTION, POWDER, FOR SOLUTION INTRAVENOUS at 03:38

## 2022-01-01 RX ADMIN — POTASSIUM CHLORIDE 40 MEQ: 1.5 POWDER, FOR SOLUTION ORAL at 17:54

## 2022-01-01 RX ADMIN — APIXABAN 2.5 MG: 2.5 TABLET, FILM COATED ORAL at 21:36

## 2022-01-01 RX ADMIN — HYDROXYZINE PAMOATE 25 MG: 25 CAPSULE ORAL at 21:51

## 2022-01-01 RX ADMIN — DEXAMETHASONE SODIUM PHOSPHATE 4 MG: 4 INJECTION, SOLUTION INTRA-ARTICULAR; INTRALESIONAL; INTRAMUSCULAR; INTRAVENOUS; SOFT TISSUE at 11:28

## 2022-01-01 RX ADMIN — HYDROXYZINE PAMOATE 25 MG: 25 CAPSULE ORAL at 05:16

## 2022-01-01 RX ADMIN — SOYBEAN OIL 250 ML: 20 INJECTION, SOLUTION INTRAVENOUS at 21:53

## 2022-01-01 RX ADMIN — HYDROXYZINE PAMOATE 25 MG: 25 CAPSULE ORAL at 14:11

## 2022-01-01 RX ADMIN — AZITHROMYCIN MONOHYDRATE 500 MG: 500 INJECTION, POWDER, LYOPHILIZED, FOR SOLUTION INTRAVENOUS at 19:18

## 2022-01-01 RX ADMIN — PIPERACILLIN AND TAZOBACTAM 3.38 G: 3; .375 INJECTION, POWDER, FOR SOLUTION INTRAVENOUS at 11:43

## 2022-01-01 RX ADMIN — DULOXETINE 30 MG: 30 CAPSULE, DELAYED RELEASE ORAL at 08:25

## 2022-01-01 RX ADMIN — PIPERACILLIN AND TAZOBACTAM 3.38 G: 3; .375 INJECTION, POWDER, FOR SOLUTION INTRAVENOUS at 11:35

## 2022-01-01 RX ADMIN — ASPIRIN 81 MG: 81 TABLET, COATED ORAL at 08:12

## 2022-01-01 RX ADMIN — HYDROXYZINE PAMOATE 25 MG: 25 CAPSULE ORAL at 21:41

## 2022-01-01 RX ADMIN — DEXAMETHASONE SODIUM PHOSPHATE 6 MG: 4 INJECTION, SOLUTION INTRAMUSCULAR; INTRAVENOUS at 19:18

## 2022-01-01 RX ADMIN — HYDROXYZINE HYDROCHLORIDE 25 MG: 50 INJECTION, SOLUTION INTRAMUSCULAR at 04:48

## 2022-01-01 RX ADMIN — AMLODIPINE BESYLATE 5 MG: 5 TABLET ORAL at 09:07

## 2022-01-01 RX ADMIN — ASPIRIN 81 MG: 81 TABLET, COATED ORAL at 09:01

## 2022-01-01 RX ADMIN — DEXAMETHASONE SODIUM PHOSPHATE 4 MG: 4 INJECTION, SOLUTION INTRA-ARTICULAR; INTRALESIONAL; INTRAMUSCULAR; INTRAVENOUS; SOFT TISSUE at 06:39

## 2022-01-01 RX ADMIN — AZITHROMYCIN MONOHYDRATE 500 MG: 500 INJECTION, POWDER, LYOPHILIZED, FOR SOLUTION INTRAVENOUS at 17:09

## 2022-01-01 RX ADMIN — DEXAMETHASONE SODIUM PHOSPHATE 4 MG: 4 INJECTION, SOLUTION INTRA-ARTICULAR; INTRALESIONAL; INTRAMUSCULAR; INTRAVENOUS; SOFT TISSUE at 17:54

## 2022-01-01 RX ADMIN — AZITHROMYCIN MONOHYDRATE 500 MG: 500 INJECTION, POWDER, LYOPHILIZED, FOR SOLUTION INTRAVENOUS at 19:47

## 2022-01-01 RX ADMIN — RANOLAZINE 500 MG: 500 TABLET, FILM COATED, EXTENDED RELEASE ORAL at 21:00

## 2022-01-01 RX ADMIN — RANOLAZINE 500 MG: 500 TABLET, FILM COATED, EXTENDED RELEASE ORAL at 12:24

## 2022-01-01 RX ADMIN — DEXAMETHASONE SODIUM PHOSPHATE 4 MG: 4 INJECTION, SOLUTION INTRA-ARTICULAR; INTRALESIONAL; INTRAMUSCULAR; INTRAVENOUS; SOFT TISSUE at 06:10

## 2022-01-01 RX ADMIN — SODIUM POLYSTYRENE SULFONATE 30 G: 15 SUSPENSION ORAL; RECTAL at 14:21

## 2022-01-01 RX ADMIN — Medication 2 MCG/MIN: at 01:20

## 2022-01-01 RX ADMIN — HYDROXYZINE PAMOATE 25 MG: 25 CAPSULE ORAL at 05:36

## 2022-01-01 RX ADMIN — PIPERACILLIN AND TAZOBACTAM 3.38 G: 3; .375 INJECTION, POWDER, FOR SOLUTION INTRAVENOUS at 20:09

## 2022-01-01 RX ADMIN — DEXAMETHASONE SODIUM PHOSPHATE 4 MG: 4 INJECTION, SOLUTION INTRA-ARTICULAR; INTRALESIONAL; INTRAMUSCULAR; INTRAVENOUS; SOFT TISSUE at 00:00

## 2022-01-01 RX ADMIN — DEXAMETHASONE SODIUM PHOSPHATE 4 MG: 4 INJECTION, SOLUTION INTRA-ARTICULAR; INTRALESIONAL; INTRAMUSCULAR; INTRAVENOUS; SOFT TISSUE at 23:27

## 2022-01-01 RX ADMIN — NYSTATIN 500000 UNITS: 100000 SUSPENSION ORAL at 22:04

## 2022-01-01 RX ADMIN — BARICITINIB 4 MG: 2 TABLET, FILM COATED ORAL at 17:09

## 2022-01-01 RX ADMIN — PIPERACILLIN AND TAZOBACTAM 3.38 G: 3; .375 INJECTION, POWDER, FOR SOLUTION INTRAVENOUS at 11:33

## 2022-01-01 RX ADMIN — NYSTATIN 500000 UNITS: 100000 SUSPENSION ORAL at 14:12

## 2022-01-01 RX ADMIN — PIPERACILLIN AND TAZOBACTAM 3.38 G: 3; .375 INJECTION, POWDER, FOR SOLUTION INTRAVENOUS at 04:41

## 2022-01-01 RX ADMIN — APIXABAN 2.5 MG: 2.5 TABLET, FILM COATED ORAL at 08:38

## 2022-01-01 RX ADMIN — DEXAMETHASONE SODIUM PHOSPHATE 4 MG: 4 INJECTION, SOLUTION INTRA-ARTICULAR; INTRALESIONAL; INTRAMUSCULAR; INTRAVENOUS; SOFT TISSUE at 11:43

## 2022-01-01 RX ADMIN — ATORVASTATIN CALCIUM 20 MG: 20 TABLET, FILM COATED ORAL at 08:39

## 2022-01-01 RX ADMIN — APIXABAN 2.5 MG: 2.5 TABLET, FILM COATED ORAL at 09:02

## 2022-01-01 RX ADMIN — NYSTATIN 500000 UNITS: 100000 SUSPENSION ORAL at 21:46

## 2022-01-01 RX ADMIN — RANOLAZINE 500 MG: 500 TABLET, FILM COATED, EXTENDED RELEASE ORAL at 09:32

## 2022-01-01 RX ADMIN — NYSTATIN 500000 UNITS: 100000 SUSPENSION ORAL at 21:21

## 2022-01-01 RX ADMIN — ASCORBIC ACID, VITAMIN A PALMITATE, CHOLECALCIFEROL, THIAMINE HYDROCHLORIDE, RIBOFLAVIN-5 PHOSPHATE SODIUM, PYRIDOXINE HYDROCHLORIDE, NIACINAMIDE, DEXPANTHENOL, ALPHA-TOCOPHEROL ACETATE, VITAMIN K1, FOLIC ACID, BIOTIN, CYANOCOBALAMIN: 200; 3300; 200; 6; 3.6; 6; 40; 15; 10; 150; 600; 60; 5 INJECTION, SOLUTION INTRAVENOUS at 22:00

## 2022-01-01 RX ADMIN — FUROSEMIDE 40 MG: 10 INJECTION, SOLUTION INTRAMUSCULAR; INTRAVENOUS at 13:27

## 2022-01-01 RX ADMIN — DEXAMETHASONE SODIUM PHOSPHATE 2 MG: 4 INJECTION, SOLUTION INTRA-ARTICULAR; INTRALESIONAL; INTRAMUSCULAR; INTRAVENOUS; SOFT TISSUE at 20:00

## 2022-01-01 RX ADMIN — DEXAMETHASONE SODIUM PHOSPHATE 4 MG: 4 INJECTION, SOLUTION INTRA-ARTICULAR; INTRALESIONAL; INTRAMUSCULAR; INTRAVENOUS; SOFT TISSUE at 17:33

## 2022-01-01 RX ADMIN — EPINEPHRINE 1 MG: 0.1 INJECTION, SOLUTION INTRAVENOUS at 00:51

## 2022-01-01 RX ADMIN — POTASSIUM PHOSPHATE, MONOBASIC 1000 MG: 500 TABLET, SOLUBLE ORAL at 14:00

## 2022-01-01 RX ADMIN — POTASSIUM CHLORIDE 40 MEQ: 1.5 POWDER, FOR SOLUTION ORAL at 16:20

## 2022-01-01 RX ADMIN — AZITHROMYCIN MONOHYDRATE 500 MG: 500 INJECTION, POWDER, LYOPHILIZED, FOR SOLUTION INTRAVENOUS at 19:37

## 2022-01-01 RX ADMIN — LIDOCAINE HYDROCHLORIDE 100 MG: 20 INJECTION, SOLUTION EPIDURAL; INFILTRATION; INTRACAUDAL; PERINEURAL at 16:21

## 2022-01-01 RX ADMIN — DEXAMETHASONE SODIUM PHOSPHATE 4 MG: 4 INJECTION, SOLUTION INTRA-ARTICULAR; INTRALESIONAL; INTRAMUSCULAR; INTRAVENOUS; SOFT TISSUE at 05:14

## 2022-01-01 RX ADMIN — DEXAMETHASONE SODIUM PHOSPHATE 4 MG: 4 INJECTION, SOLUTION INTRA-ARTICULAR; INTRALESIONAL; INTRAMUSCULAR; INTRAVENOUS; SOFT TISSUE at 00:07

## 2022-01-01 RX ADMIN — EPINEPHRINE 1 MG: 0.1 INJECTION, SOLUTION INTRAVENOUS at 00:48

## 2022-01-01 RX ADMIN — BARICITINIB 2 MG: 2 TABLET, FILM COATED ORAL at 08:58

## 2022-01-01 RX ADMIN — PIPERACILLIN AND TAZOBACTAM 3.38 G: 3; .375 INJECTION, POWDER, FOR SOLUTION INTRAVENOUS at 03:25

## 2022-01-01 RX ADMIN — SODIUM CHLORIDE 25 ML/HR: 9 INJECTION, SOLUTION INTRAVENOUS at 20:14

## 2022-01-01 RX ADMIN — HYDROXYZINE PAMOATE 25 MG: 25 CAPSULE ORAL at 21:03

## 2022-01-01 RX ADMIN — METOPROLOL SUCCINATE 25 MG: 25 TABLET, EXTENDED RELEASE ORAL at 09:31

## 2022-01-01 RX ADMIN — MORPHINE SULFATE 2 MG: 2 INJECTION, SOLUTION INTRAMUSCULAR; INTRAVENOUS at 03:30

## 2022-01-01 RX ADMIN — HYDROXYZINE PAMOATE 25 MG: 25 CAPSULE ORAL at 12:43

## 2022-01-01 RX ADMIN — APIXABAN 2.5 MG: 2.5 TABLET, FILM COATED ORAL at 10:48

## 2022-01-01 RX ADMIN — RANOLAZINE 500 MG: 500 TABLET, FILM COATED, EXTENDED RELEASE ORAL at 20:52

## 2022-01-01 RX ADMIN — AZITHROMYCIN MONOHYDRATE 500 MG: 500 INJECTION, POWDER, LYOPHILIZED, FOR SOLUTION INTRAVENOUS at 15:38

## 2022-01-01 RX ADMIN — GUAIFENESIN 100 MG: 200 SOLUTION ORAL at 09:31

## 2022-01-01 RX ADMIN — SODIUM CHLORIDE 75 ML/HR: 9 INJECTION, SOLUTION INTRAVENOUS at 00:18

## 2022-01-01 RX ADMIN — DEXAMETHASONE SODIUM PHOSPHATE 4 MG: 4 INJECTION, SOLUTION INTRA-ARTICULAR; INTRALESIONAL; INTRAMUSCULAR; INTRAVENOUS; SOFT TISSUE at 18:25

## 2022-01-01 RX ADMIN — DOXAZOSIN 4 MG: 4 TABLET ORAL at 20:12

## 2022-01-01 RX ADMIN — NYSTATIN 500000 UNITS: 100000 SUSPENSION ORAL at 09:02

## 2022-01-01 RX ADMIN — DEXAMETHASONE SODIUM PHOSPHATE 4 MG: 4 INJECTION, SOLUTION INTRA-ARTICULAR; INTRALESIONAL; INTRAMUSCULAR; INTRAVENOUS; SOFT TISSUE at 17:26

## 2022-01-01 RX ADMIN — DEXAMETHASONE SODIUM PHOSPHATE 4 MG: 4 INJECTION, SOLUTION INTRA-ARTICULAR; INTRALESIONAL; INTRAMUSCULAR; INTRAVENOUS; SOFT TISSUE at 11:46

## 2022-01-01 RX ADMIN — PIPERACILLIN AND TAZOBACTAM 3.38 G: 3; .375 INJECTION, POWDER, FOR SOLUTION INTRAVENOUS at 19:42

## 2022-01-01 RX ADMIN — DOXAZOSIN 4 MG: 4 TABLET ORAL at 23:53

## 2022-01-01 RX ADMIN — FUROSEMIDE 20 MG: 10 INJECTION, SOLUTION INTRAMUSCULAR; INTRAVENOUS at 10:04

## 2022-01-01 RX ADMIN — DEXAMETHASONE 4 MG: 4 TABLET ORAL at 09:07

## 2022-01-01 RX ADMIN — DEXAMETHASONE SODIUM PHOSPHATE 4 MG: 4 INJECTION, SOLUTION INTRA-ARTICULAR; INTRALESIONAL; INTRAMUSCULAR; INTRAVENOUS; SOFT TISSUE at 17:09

## 2022-01-01 RX ADMIN — DEXAMETHASONE 4 MG: 4 TABLET ORAL at 20:10

## 2022-01-01 RX ADMIN — AZITHROMYCIN MONOHYDRATE 500 MG: 500 INJECTION, POWDER, LYOPHILIZED, FOR SOLUTION INTRAVENOUS at 15:40

## 2022-01-01 RX ADMIN — HYDROXYZINE PAMOATE 25 MG: 25 CAPSULE ORAL at 05:11

## 2022-01-01 RX ADMIN — HYDROXYZINE HYDROCHLORIDE 25 MG: 50 INJECTION, SOLUTION INTRAMUSCULAR at 03:14

## 2022-01-01 RX ADMIN — HYDROXYZINE PAMOATE 25 MG: 25 CAPSULE ORAL at 21:21

## 2022-01-01 RX ADMIN — MELATONIN TAB 3 MG 3 MG: 3 TAB at 21:00

## 2022-01-01 RX ADMIN — CEFTRIAXONE SODIUM 1 G: 1 INJECTION, POWDER, FOR SOLUTION INTRAMUSCULAR; INTRAVENOUS at 19:18

## 2022-01-01 RX ADMIN — RANOLAZINE 500 MG: 500 TABLET, FILM COATED, EXTENDED RELEASE ORAL at 23:53

## 2022-01-01 RX ADMIN — HYDROXYZINE PAMOATE 25 MG: 25 CAPSULE ORAL at 05:39

## 2022-01-01 RX ADMIN — NYSTATIN 500000 UNITS: 100000 SUSPENSION ORAL at 17:58

## 2022-01-01 RX ADMIN — MIRTAZAPINE 15 MG: 15 TABLET, FILM COATED ORAL at 22:04

## 2022-01-01 RX ADMIN — DOXAZOSIN 4 MG: 4 TABLET ORAL at 19:49

## 2022-01-01 RX ADMIN — ATORVASTATIN CALCIUM 20 MG: 20 TABLET, FILM COATED ORAL at 09:01

## 2022-01-01 RX ADMIN — DEXAMETHASONE SODIUM PHOSPHATE 4 MG: 4 INJECTION, SOLUTION INTRA-ARTICULAR; INTRALESIONAL; INTRAMUSCULAR; INTRAVENOUS; SOFT TISSUE at 17:07

## 2022-01-01 RX ADMIN — NYSTATIN 500000 UNITS: 100000 SUSPENSION ORAL at 13:59

## 2022-01-01 RX ADMIN — CALCIUM GLUCONATE 2 G: 20 INJECTION, SOLUTION INTRAVENOUS at 11:11

## 2022-01-01 RX ADMIN — MUPIROCIN: 20 OINTMENT TOPICAL at 09:01

## 2022-01-01 RX ADMIN — DULOXETINE HYDROCHLORIDE 30 MG: 30 CAPSULE, DELAYED RELEASE ORAL at 08:39

## 2022-01-01 RX ADMIN — DEXAMETHASONE SODIUM PHOSPHATE 4 MG: 4 INJECTION, SOLUTION INTRA-ARTICULAR; INTRALESIONAL; INTRAMUSCULAR; INTRAVENOUS; SOFT TISSUE at 17:08

## 2022-01-01 RX ADMIN — DEXAMETHASONE SODIUM PHOSPHATE 4 MG: 4 INJECTION, SOLUTION INTRA-ARTICULAR; INTRALESIONAL; INTRAMUSCULAR; INTRAVENOUS; SOFT TISSUE at 08:25

## 2022-01-01 RX ADMIN — RANOLAZINE 500 MG: 500 TABLET, FILM COATED, EXTENDED RELEASE ORAL at 08:56

## 2022-01-01 RX ADMIN — MIRTAZAPINE 15 MG: 15 TABLET, FILM COATED ORAL at 21:07

## 2022-01-01 RX ADMIN — APIXABAN 2.5 MG: 2.5 TABLET, FILM COATED ORAL at 09:00

## 2022-01-01 RX ADMIN — TRACE ELEMENTS INJECTION 4: 7.4; .75; 98; 151 INJECTION, SOLUTION INTRAVENOUS at 21:51

## 2022-01-01 RX ADMIN — FUROSEMIDE 40 MG: 10 INJECTION, SOLUTION INTRAMUSCULAR; INTRAVENOUS at 11:35

## 2022-01-01 RX ADMIN — TETANUS TOXOID, REDUCED DIPHTHERIA TOXOID AND ACELLULAR PERTUSSIS VACCINE, ADSORBED 0.5 ML: 5; 2.5; 8; 8; 2.5 SUSPENSION INTRAMUSCULAR at 17:51

## 2022-01-01 RX ADMIN — ATORVASTATIN CALCIUM 20 MG: 20 TABLET, FILM COATED ORAL at 12:24

## 2022-01-01 RX ADMIN — DEXAMETHASONE 4 MG: 4 TABLET ORAL at 12:25

## 2022-01-01 RX ADMIN — RANOLAZINE 500 MG: 500 TABLET, FILM COATED, EXTENDED RELEASE ORAL at 08:39

## 2022-01-01 RX ADMIN — APIXABAN 2.5 MG: 2.5 TABLET, FILM COATED ORAL at 20:35

## 2022-01-01 RX ADMIN — POTASSIUM CHLORIDE 40 MEQ: 1.5 POWDER, FOR SOLUTION ORAL at 12:54

## 2022-01-01 RX ADMIN — POTASSIUM CHLORIDE 40 MEQ: 1.5 FOR SOLUTION ORAL at 23:53

## 2022-01-01 RX ADMIN — DULOXETINE 30 MG: 30 CAPSULE, DELAYED RELEASE ORAL at 11:22

## 2022-01-01 RX ADMIN — POTASSIUM CHLORIDE 40 MEQ: 1.5 POWDER, FOR SOLUTION ORAL at 12:43

## 2022-01-01 RX ADMIN — NYSTATIN 500000 UNITS: 100000 SUSPENSION ORAL at 09:31

## 2022-01-01 RX ADMIN — DEXAMETHASONE 4 MG: 4 TABLET ORAL at 08:58

## 2022-01-01 RX ADMIN — PIPERACILLIN AND TAZOBACTAM 3.38 G: 3; .375 INJECTION, POWDER, FOR SOLUTION INTRAVENOUS at 11:11

## 2022-01-01 RX ADMIN — NYSTATIN 500000 UNITS: 100000 SUSPENSION ORAL at 18:25

## 2022-01-01 RX ADMIN — RANOLAZINE 500 MG: 500 TABLET, FILM COATED, EXTENDED RELEASE ORAL at 08:46

## 2022-01-01 RX ADMIN — DOXAZOSIN 4 MG: 4 TABLET ORAL at 20:45

## 2022-01-01 RX ADMIN — DOXAZOSIN 4 MG: 4 TABLET ORAL at 21:09

## 2022-01-01 RX ADMIN — CEFTRIAXONE SODIUM 1 G: 1 INJECTION, POWDER, FOR SOLUTION INTRAMUSCULAR; INTRAVENOUS at 19:38

## 2022-01-01 RX ADMIN — BARICITINIB 4 MG: 2 TABLET, FILM COATED ORAL at 18:53

## 2022-01-01 RX ADMIN — PANTOPRAZOLE SODIUM 40 MG: 40 GRANULE, DELAYED RELEASE ORAL at 08:39

## 2022-01-01 RX ADMIN — NYSTATIN 500000 UNITS: 100000 SUSPENSION ORAL at 17:08

## 2022-01-01 RX ADMIN — ATORVASTATIN CALCIUM 20 MG: 20 TABLET, FILM COATED ORAL at 09:32

## 2022-01-01 RX ADMIN — RANOLAZINE 500 MG: 500 TABLET, FILM COATED, EXTENDED RELEASE ORAL at 20:37

## 2022-01-01 RX ADMIN — ASPIRIN 81 MG: 81 TABLET, COATED ORAL at 09:30

## 2022-01-01 RX ADMIN — RANOLAZINE 500 MG: 500 TABLET, FILM COATED, EXTENDED RELEASE ORAL at 09:02

## 2022-01-01 RX ADMIN — RANOLAZINE 500 MG: 500 TABLET, FILM COATED, EXTENDED RELEASE ORAL at 09:01

## 2022-01-01 RX ADMIN — NYSTATIN 500000 UNITS: 100000 SUSPENSION ORAL at 12:43

## 2022-01-01 RX ADMIN — METOCLOPRAMIDE HYDROCHLORIDE 5 MG: 5 INJECTION INTRAMUSCULAR; INTRAVENOUS at 06:16

## 2022-01-01 RX ADMIN — APIXABAN 2.5 MG: 2.5 TABLET, FILM COATED ORAL at 12:25

## 2022-01-01 RX ADMIN — ASPIRIN 81 MG: 81 TABLET, COATED ORAL at 08:16

## 2022-01-01 RX ADMIN — LIDOCAINE HYDROCHLORIDE AND EPINEPHRINE 15 MG: 10; 10 INJECTION, SOLUTION INFILTRATION; PERINEURAL at 17:54

## 2022-01-01 RX ADMIN — POTASSIUM & SODIUM PHOSPHATES POWDER PACK 280-160-250 MG 2 PACKET: 280-160-250 PACK at 17:09

## 2022-01-01 RX ADMIN — RANOLAZINE 500 MG: 500 TABLET, FILM COATED, EXTENDED RELEASE ORAL at 07:55

## 2022-01-01 RX ADMIN — PANTOPRAZOLE SODIUM 40 MG: 40 GRANULE, DELAYED RELEASE ORAL at 20:51

## 2022-01-01 RX ADMIN — DEXAMETHASONE SODIUM PHOSPHATE 4 MG: 4 INJECTION, SOLUTION INTRA-ARTICULAR; INTRALESIONAL; INTRAMUSCULAR; INTRAVENOUS; SOFT TISSUE at 17:44

## 2022-01-01 RX ADMIN — METOCLOPRAMIDE HYDROCHLORIDE 5 MG: 5 INJECTION INTRAMUSCULAR; INTRAVENOUS at 10:05

## 2022-01-01 RX ADMIN — SODIUM CHLORIDE 75 ML/HR: 9 INJECTION, SOLUTION INTRAVENOUS at 14:15

## 2022-01-01 RX ADMIN — AZITHROMYCIN MONOHYDRATE 500 MG: 500 INJECTION, POWDER, LYOPHILIZED, FOR SOLUTION INTRAVENOUS at 15:41

## 2022-01-01 RX ADMIN — DEXAMETHASONE SODIUM PHOSPHATE 4 MG: 4 INJECTION, SOLUTION INTRA-ARTICULAR; INTRALESIONAL; INTRAMUSCULAR; INTRAVENOUS; SOFT TISSUE at 05:46

## 2022-01-01 RX ADMIN — DEXAMETHASONE SODIUM PHOSPHATE 4 MG: 4 INJECTION, SOLUTION INTRA-ARTICULAR; INTRALESIONAL; INTRAMUSCULAR; INTRAVENOUS; SOFT TISSUE at 17:58

## 2022-01-01 RX ADMIN — PIPERACILLIN AND TAZOBACTAM 3.38 G: 3; .375 INJECTION, POWDER, FOR SOLUTION INTRAVENOUS at 19:41

## 2022-01-01 RX ADMIN — MUPIROCIN 1 TUBE: 20 OINTMENT TOPICAL at 20:36

## 2022-01-01 RX ADMIN — NYSTATIN 500000 UNITS: 100000 SUSPENSION ORAL at 08:13

## 2022-01-01 RX ADMIN — ATORVASTATIN CALCIUM 20 MG: 20 TABLET, FILM COATED ORAL at 08:00

## 2022-01-01 RX ADMIN — PIPERACILLIN AND TAZOBACTAM 3.38 G: 3; .375 INJECTION, POWDER, FOR SOLUTION INTRAVENOUS at 20:36

## 2022-01-01 RX ADMIN — RANOLAZINE 500 MG: 500 TABLET, FILM COATED, EXTENDED RELEASE ORAL at 19:44

## 2022-01-01 RX ADMIN — BARICITINIB 2 MG: 2 TABLET, FILM COATED ORAL at 09:07

## 2022-01-01 RX ADMIN — PHENYLEPHRINE HYDROCHLORIDE 200 MCG: 10 INJECTION INTRAVENOUS at 16:24

## 2022-01-01 RX ADMIN — BARICITINIB 4 MG: 2 TABLET, FILM COATED ORAL at 15:40

## 2022-01-01 RX ADMIN — DEXAMETHASONE SODIUM PHOSPHATE 4 MG: 4 INJECTION, SOLUTION INTRA-ARTICULAR; INTRALESIONAL; INTRAMUSCULAR; INTRAVENOUS; SOFT TISSUE at 01:24

## 2022-01-01 RX ADMIN — MIRTAZAPINE 15 MG: 15 TABLET, FILM COATED ORAL at 21:03

## 2022-01-01 RX ADMIN — SODIUM CHLORIDE 75 ML/HR: 9 INJECTION, SOLUTION INTRAVENOUS at 15:23

## 2022-01-01 RX ADMIN — ATORVASTATIN CALCIUM 20 MG: 20 TABLET, FILM COATED ORAL at 09:07

## 2022-01-01 RX ADMIN — MELATONIN TAB 3 MG 3 MG: 3 TAB at 21:36

## 2022-01-01 RX ADMIN — METOCLOPRAMIDE HYDROCHLORIDE 5 MG: 5 INJECTION INTRAMUSCULAR; INTRAVENOUS at 13:59

## 2022-01-01 RX ADMIN — ACETAMINOPHEN 650 MG: 325 TABLET ORAL at 11:33

## 2022-01-01 RX ADMIN — DULOXETINE HYDROCHLORIDE 30 MG: 30 CAPSULE, DELAYED RELEASE ORAL at 08:56

## 2022-01-01 RX ADMIN — BARICITINIB 2 MG: 2 TABLET, FILM COATED ORAL at 09:32

## 2022-01-01 RX ADMIN — NYSTATIN 500000 UNITS: 100000 SUSPENSION ORAL at 22:14

## 2022-01-01 RX ADMIN — DEXAMETHASONE SODIUM PHOSPHATE 4 MG: 4 INJECTION, SOLUTION INTRA-ARTICULAR; INTRALESIONAL; INTRAMUSCULAR; INTRAVENOUS; SOFT TISSUE at 23:22

## 2022-01-01 RX ADMIN — DOXAZOSIN 4 MG: 4 TABLET ORAL at 22:13

## 2022-01-01 RX ADMIN — POTASSIUM PHOSPHATE, MONOBASIC 1000 MG: 500 TABLET, SOLUBLE ORAL at 12:00

## 2022-01-01 RX ADMIN — DEXAMETHASONE SODIUM PHOSPHATE 4 MG: 4 INJECTION, SOLUTION INTRA-ARTICULAR; INTRALESIONAL; INTRAMUSCULAR; INTRAVENOUS; SOFT TISSUE at 11:34

## 2022-01-01 RX ADMIN — PIPERACILLIN AND TAZOBACTAM 3.38 G: 3; .375 INJECTION, POWDER, FOR SOLUTION INTRAVENOUS at 20:12

## 2022-01-01 RX ADMIN — BARICITINIB 4 MG: 2 TABLET, FILM COATED ORAL at 17:08

## 2022-01-01 RX ADMIN — DEXAMETHASONE SODIUM PHOSPHATE 4 MG: 4 INJECTION, SOLUTION INTRA-ARTICULAR; INTRALESIONAL; INTRAMUSCULAR; INTRAVENOUS; SOFT TISSUE at 11:36

## 2022-01-01 RX ADMIN — SODIUM CHLORIDE: 9 INJECTION, SOLUTION INTRAVENOUS at 16:10

## 2022-01-01 RX ADMIN — APIXABAN 2.5 MG: 2.5 TABLET, FILM COATED ORAL at 20:06

## 2022-01-01 RX ADMIN — PIPERACILLIN AND TAZOBACTAM 3.38 G: 3; .375 INJECTION, POWDER, FOR SOLUTION INTRAVENOUS at 03:47

## 2022-01-01 RX ADMIN — BARICITINIB 4 MG: 2 TABLET, FILM COATED ORAL at 17:07

## 2022-01-01 RX ADMIN — DEXAMETHASONE SODIUM PHOSPHATE 4 MG: 4 INJECTION, SOLUTION INTRA-ARTICULAR; INTRALESIONAL; INTRAMUSCULAR; INTRAVENOUS; SOFT TISSUE at 11:20

## 2022-01-01 RX ADMIN — DEXAMETHASONE SODIUM PHOSPHATE 4 MG: 4 INJECTION, SOLUTION INTRA-ARTICULAR; INTRALESIONAL; INTRAMUSCULAR; INTRAVENOUS; SOFT TISSUE at 07:55

## 2022-01-01 RX ADMIN — NYSTATIN 500000 UNITS: 100000 SUSPENSION ORAL at 21:07

## 2022-01-01 RX ADMIN — RANOLAZINE 500 MG: 500 TABLET, FILM COATED, EXTENDED RELEASE ORAL at 20:46

## 2022-01-01 RX ADMIN — NYSTATIN 500000 UNITS: 100000 SUSPENSION ORAL at 17:26

## 2022-01-01 RX ADMIN — HYDROXYZINE PAMOATE 25 MG: 25 CAPSULE ORAL at 13:58

## 2022-01-01 RX ADMIN — MUPIROCIN: 20 OINTMENT TOPICAL at 08:16

## 2022-01-01 RX ADMIN — NYSTATIN 500000 UNITS: 100000 SUSPENSION ORAL at 08:56

## 2022-01-01 RX ADMIN — DEXAMETHASONE SODIUM PHOSPHATE 4 MG: 4 INJECTION, SOLUTION INTRA-ARTICULAR; INTRALESIONAL; INTRAMUSCULAR; INTRAVENOUS; SOFT TISSUE at 00:31

## 2022-01-01 RX ADMIN — NYSTATIN 500000 UNITS: 100000 SUSPENSION ORAL at 17:54

## 2022-01-01 RX ADMIN — POTASSIUM CHLORIDE 40 MEQ: 1.5 FOR SOLUTION ORAL at 04:28

## 2022-01-01 RX ADMIN — ASPIRIN 81 MG: 81 TABLET, COATED ORAL at 09:02

## 2022-01-01 RX ADMIN — DEXAMETHASONE SODIUM PHOSPHATE 4 MG: 4 INJECTION, SOLUTION INTRA-ARTICULAR; INTRALESIONAL; INTRAMUSCULAR; INTRAVENOUS; SOFT TISSUE at 19:46

## 2022-01-01 RX ADMIN — GUAIFENESIN 100 MG: 200 SOLUTION ORAL at 17:07

## 2022-02-10 NOTE — ED TRIAGE NOTES
Pt fell around 3:45 today and hit posterior head on concrete floor and lawnmower. Pt has 1 in laceration on posterior head. Area cleaned with wound  and quickclot in place. Denies loss of consciousness. Is on eliquis.

## 2022-02-10 NOTE — ED PROVIDER NOTES
EMERGENCY DEPARTMENT HISTORY AND PHYSICAL EXAM      Date: 2/10/2022  Patient Name: Pierce Councilman      History of Presenting Illness     Chief Complaint   Patient presents with    Fall    Laceration       History Provided By: Patient    HPI: Pierce Councilman, 80 y.o. male with a past medical history significant Atrial fibrillation on anticoagulants presents to the ED with cc of posterior head wound after a mechanical fall prior to arrival.  Patient states he fell and hit the concrete floor and lawnmower. Patient denies LOC. Patient denies neck pain or other somatic complaints at this time. There are no other complaints, changes, or physical findings at this time. PCP: Genna Fernández MD    Current Outpatient Medications   Medication Sig Dispense Refill    apixaban (Eliquis) 2.5 mg tablet Take 2.5 mg by mouth two (2) times a day.  clopidogreL (Plavix) 75 mg tab Take 1 Tablet by mouth daily. (Patient not taking: Reported on 2/10/2022) 30 Tablet 3    aspirin delayed-release 81 mg tablet Take  by mouth daily.  cholecalciferol, vitamin D3, (Vitamin D3) 50 mcg (2,000 unit) tab Take  by mouth nightly.  mirtazapine (REMERON) 15 mg tablet Take 15 mg by mouth nightly.  atorvastatin (LIPITOR) 20 mg tablet Take 20 mg by mouth daily.  ranolazine ER (RANEXA) 500 mg SR tablet Take 500 mg by mouth two (2) times a day.  gabapentin (NEURONTIN) 300 mg capsule nightly.  DULoxetine (CYMBALTA) 30 mg capsule duloxetine 30 mg capsule,delayed release      doxazosin (CARDURA) 4 mg tablet nightly.  hydrochlorothiazide (MICROZIDE PO) 12.5 mg daily.          Past History     Past Medical History:  Past Medical History:   Diagnosis Date    Atrial fibrillation ([address]     COVID-19 vaccine series completed 03/2021    Hypercholesteremia     Hypertension     Neuropathy     Stroke [facility]) 08/2021    TIA       Past Surgical History:  Past Surgical History:   Procedure Laterality Date    HX CATARACT REMOVAL Left 2018    IOL    [place] ABDOMEN SURGERY PROC UNLISTED Right     [facility]       Family History:  No family history on file. Social History:  Social History     Tobacco Use    Smoking status: Former Smoker    Smokeless tobacco: Never Used    Tobacco comment: quit 30 years ago , smoked  pipe and cigar   Substance Use Topics    Alcohol use: Not on file    Drug use: Never       Allergies:  No Known Allergies      Review of Systems   Review of Systems   Constitutional: Negative for chills and fever. HENT: Negative for sinus pressure and sinus pain. Eyes: Negative for photophobia and redness. Respiratory: Negative for shortness of breath and wheezing. Cardiovascular: Negative for chest pain and palpitations. Gastrointestinal: Negative for abdominal pain and nausea. Genitourinary: Negative for flank pain and hematuria. Musculoskeletal: Negative for arthralgias and gait problem. Skin: Negative for color change and pallor. Neurological: Negative for dizziness and weakness. Review of Systems    Physical Exam   Physical Exam  Constitutional:       General: No acute distress. Appearance: Normal appearance. Not toxic-appearing. HENT:      Head: Normocephalic.  2cm avulsion laceration noted to the occiput, puncture wound as well. Quick clot and dressing replaced as there was still minimal bleeding. Wound not amenable to repair. Nose: Nose normal.      Mouth/Throat:      Mouth: Mucous membranes are moist.   Eyes:      Extraocular Movements: Extraocular movements intact. Pupils: Pupils are equal, round, and reactive to light. Cardiovascular:      Rate and Rhythm: Normal rate. Pulses: Normal pulses. Pulmonary:      Effort: Pulmonary effort is normal.      Breath sounds: No stridor. Abdominal:      General: Abdomen is flat. There is no distension. Musculoskeletal:         General: Normal range of motion.       Cervical back: Normal range of motion and neck supple. Skin:     General: Skin is warm and dry. Capillary Refill: Capillary refill takes less than 2 seconds. Neurological:      General: No focal deficit present. Cranial nerves II through XII intact. Mental Status: Aert and oriented to person, place, and time. Psychiatric:         Mood and Affect: Mood normal.         Behavior: Behavior normal.     Physical Exam    Lab and Diagnostic Study Results     Labs -     Recent Results (from the past 12 hour(s))   CBC WITH AUTOMATED DIFF    Collection Time: 02/10/22  7:40 PM   Result Value Ref Range    WBC 7.6 4.1 - 11.1 K/uL    RBC 3.90 (L) 4.10 - 5.70 M/uL    HGB 12.7 12.1 - 17.0 g/dL    HCT 39.1 36.6 - 50.3 %    .3 (H) 80.0 - 99.0 FL    MCH 32.6 26.0 - 34.0 PG    MCHC 32.5 30.0 - 36.5 g/dL    RDW 12.9 11.5 - 14.5 %    PLATELET 985 933 - 858 K/uL    MPV 9.4 8.9 - 12.9 FL    NEUTROPHILS 74 32 - 75 %    LYMPHOCYTES 16 12 - 49 %    MONOCYTES 8 5 - 13 %    EOSINOPHILS 1 0 - 7 %    BASOPHILS 1 0 - 1 %    IMMATURE GRANULOCYTES 0 0.0 - 0.5 %    ABS. NEUTROPHILS 5.7 1.8 - 8.0 K/UL    ABS. LYMPHOCYTES 1.2 0.8 - 3.5 K/UL    ABS. MONOCYTES 0.6 0.0 - 1.0 K/UL    ABS. EOSINOPHILS 0.1 0.0 - 0.4 K/UL    ABS. BASOPHILS 0.0 0.0 - 0.1 K/UL    ABS. IMM. GRANS. 0.0 0.00 - 0.04 K/UL    DF AUTOMATED         Radiologic Studies -   [unfilled]  CT Results  (Last 48 hours)               02/10/22 1800  CT HEAD WO CONT Final result    Impression:  No acute intracranial process. Extracalvarial soft tissue hematoma posterior   vertex .        Narrative:  Technique: Multiple continuous axial images were obtained from the skull base to   the vertex without administration of intravenous contrast.       Comment on dose reduction: All CT scans at this facility are performed using   dose reduction optimization technique as appropriate to perform the exam   including the following; automated exposure control, adjustments of the mA   and/or kV according to patient size, or use of iterative reconstructed   technique. Comparison examination: None       Findings:    The ventricles and sulci are prominent consistent with age-related changes of   atrophy. Periventricular hypodensity is identified consistent with changes of   chronic small vessel disease. No evidence of midline shift, mass lesion, or   extra-axial fluid collection. No evidence of parenchymal hemorrhage or   infarction in a major vascular territory. The osseous structures are intact, the paranasal sinuses are clear. Extracalvarial soft tissue hematoma posterior vertex . CXR Results  (Last 48 hours)    None          Medical Decision Making and ED Course   - I am the first and primary provider for this patient AND AM THE PRIMARY PROVIDER OF RECORD. - I reviewed the vital signs, available nursing notes, past medical history, past surgical history, family history and social history. - Initial assessment performed. The patients presenting problems have been discussed, and the staff are in agreement with the care plan formulated and outlined with them. I have encouraged them to ask questions as they arise throughout their visit. Vital Signs-Reviewed the patient's vital signs. Patient Vitals for the past 12 hrs:   Temp Pulse Resp BP SpO2   02/10/22 1859  92  (!) 160/100    02/10/22 1654 98 °F (36.7 °C) 88 18 (!) 157/97 97 %         Disposition     Disposition: DC- Adult Discharges: All of the diagnostic tests were reviewed and questions answered. Diagnosis, care plan and treatment options were discussed. The patient understands the instructions and will follow up as directed. The patients results have been reviewed with them. They have been counseled regarding their diagnosis. The patient verbally convey understanding and agreement of the signs, symptoms, diagnosis, treatment and prognosis and additionally agrees to follow up as recommended with their PCP in 24 - 48 hours.   They also agree with the care-plan and convey that all of their questions have been answered. I have also put together some discharge instructions for them that include: 1) educational information regarding their diagnosis, 2) how to care for their diagnosis at home, as well a 3) list of reasons why they would want to return to the ED prior to their follow-up appointment, should their condition change. Remove if not discharged  DISCHARGE PLAN:  1. Current Discharge Medication List      CONTINUE these medications which have NOT CHANGED    Details   apixaban (Eliquis) 2.5 mg tablet Take 2.5 mg by mouth two (2) times a day. clopidogreL (Plavix) 75 mg tab Take 1 Tablet by mouth daily. Qty: 30 Tablet, Refills: 3      aspirin delayed-release 81 mg tablet Take  by mouth daily. cholecalciferol, vitamin D3, (Vitamin D3) 50 mcg (2,000 unit) tab Take  by mouth nightly. mirtazapine (REMERON) 15 mg tablet Take 15 mg by mouth nightly. atorvastatin (LIPITOR) 20 mg tablet Take 20 mg by mouth daily. ranolazine ER (RANEXA) 500 mg SR tablet Take 500 mg by mouth two (2) times a day.      gabapentin (NEURONTIN) 300 mg capsule nightly. DULoxetine (CYMBALTA) 30 mg capsule duloxetine 30 mg capsule,delayed release      doxazosin (CARDURA) 4 mg tablet nightly. hydrochlorothiazide (MICROZIDE PO) 12.5 mg daily. 2.   Follow-up Information    None       3. Return to ED if worse   4. Current Discharge Medication List          Diagnosis     Clinical Impression:   1. Laceration of scalp without foreign body, initial encounter    2. Fall from ground level    3. Hematoma of scalp, initial encounter        Attestations:    Maureen Anaya MD    Please note that this dictation was completed with [employer], the [employer] voice recognition software. Quite often unanticipated grammatical, syntax, homophones, and other interpretive errors are inadvertently transcribed by the computer software.   Please disregard these errors. Please excuse any errors that have escaped final proofreading. Thank you.

## 2022-02-11 NOTE — DISCHARGE INSTRUCTIONS
Thank you! Thank you for allowing me to care for you in the emergency department. I sincerely hope that you are satisfied with your visit today. It is my goal to provide you with excellent care. Below you will find a list of your labs and imaging from your visit today. Should you have any questions regarding these results please do not hesitate to call the emergency department. Labs -     Recent Results (from the past 12 hour(s))   CBC WITH AUTOMATED DIFF    Collection Time: 02/10/22  7:40 PM   Result Value Ref Range    WBC 7.6 4.1 - 11.1 K/uL    RBC 3.90 (L) 4.10 - 5.70 M/uL    HGB 12.7 12.1 - 17.0 g/dL    HCT 39.1 36.6 - 50.3 %    .3 (H) 80.0 - 99.0 FL    MCH 32.6 26.0 - 34.0 PG    MCHC 32.5 30.0 - 36.5 g/dL    RDW 12.9 11.5 - 14.5 %    PLATELET 923 088 - 583 K/uL    MPV 9.4 8.9 - 12.9 FL    NEUTROPHILS 74 32 - 75 %    LYMPHOCYTES 16 12 - 49 %    MONOCYTES 8 5 - 13 %    EOSINOPHILS 1 0 - 7 %    BASOPHILS 1 0 - 1 %    IMMATURE GRANULOCYTES 0 0.0 - 0.5 %    ABS. NEUTROPHILS 5.7 1.8 - 8.0 K/UL    ABS. LYMPHOCYTES 1.2 0.8 - 3.5 K/UL    ABS. MONOCYTES 0.6 0.0 - 1.0 K/UL    ABS. EOSINOPHILS 0.1 0.0 - 0.4 K/UL    ABS. BASOPHILS 0.0 0.0 - 0.1 K/UL    ABS. IMM. GRANS. 0.0 0.00 - 0.04 K/UL    DF AUTOMATED         Radiologic Studies -   CT HEAD WO CONT   Final Result   No acute intracranial process. Extracalvarial soft tissue hematoma posterior   vertex . CT Results  (Last 48 hours)                 02/10/22 1800  CT HEAD WO CONT Final result    Impression:  No acute intracranial process. Extracalvarial soft tissue hematoma posterior   vertex .        Narrative:  Technique: Multiple continuous axial images were obtained from the skull base to   the vertex without administration of intravenous contrast.       Comment on dose reduction: All CT scans at this facility are performed using   dose reduction optimization technique as appropriate to perform the exam   including the following; automated exposure control, adjustments of the mA   and/or kV according to patient size, or use of iterative reconstructed   technique. Comparison examination: None       Findings:    The ventricles and sulci are prominent consistent with age-related changes of   atrophy. Periventricular hypodensity is identified consistent with changes of   chronic small vessel disease. No evidence of midline shift, mass lesion, or   extra-axial fluid collection. No evidence of parenchymal hemorrhage or   infarction in a major vascular territory. The osseous structures are intact, the paranasal sinuses are clear. Extracalvarial soft tissue hematoma posterior vertex . CXR Results  (Last 48 hours)      None               If you feel that you have not received excellent quality care or timely care, please ask to speak to the nurse manager. Please choose us in the future for your continued health care needs. ------------------------------------------------------------------------------------------------------------  The exam and treatment you received in the Emergency Department were for an urgent problem and are not intended as complete care. It is important that you follow-up with a doctor, nurse practitioner, or physician assistant to:  (1) confirm your diagnosis,  (2) re-evaluation of changes in your illness and treatment, and  (3) for ongoing care. If your symptoms become worse or you do not improve as expected and you are unable to reach your usual health care provider, you should return to the Emergency Department. We are available 24 hours a day. Please take your discharge instructions with you when you go to your follow-up appointment. If you have any problem arranging a follow-up appointment, contact the Emergency Department immediately. If a prescription has been provided, please have it filled as soon as possible to prevent a delay in treatment.  Read the entire medication instruction sheet provided to you by the pharmacy. If you have any questions or reservations about taking the medication due to side effects or interactions with other medications, please call your primary care physician or contact the ER to speak with the charge nurse. Make an appointment with your family doctor or the physician you were referred to for follow-up of this visit as instructed on your discharge paperwork, as this is a mandatory follow-up. Return to the ER if you are unable to be seen or if you are unable to be seen in a timely manner. If you have any problem arranging the follow-up visit, contact the Emergency Department immediately.

## 2022-07-18 PROBLEM — R09.02 HYPOXIA: Status: ACTIVE | Noted: 2022-01-01

## 2022-07-18 PROBLEM — U07.1 COVID: Status: ACTIVE | Noted: 2022-01-01

## 2022-07-18 NOTE — ED TRIAGE NOTES
Pt family has tested positive for covid and is complaining of weakness, fatigue, not eating    Normally ambulates with walker.

## 2022-07-18 NOTE — ED PROVIDER NOTES
EMERGENCY DEPARTMENT HISTORY AND PHYSICAL EXAM      Date: 7/18/2022  Patient Name: Teddy Donovan      History of Presenting Illness     Chief Complaint   Patient presents with    Fatigue       History Provided By: Patient    HPI: Teddy Donovan, 80 y.o. male with a past medical history significant hypertension, stroke and AFib presents to the ED with cc of generalized weakness and fatigue. Patient has multiple family members at home that have tested positive for COVID. He typically ambulates with a walker and they noticed that he is having trouble ambulating even using his walker. He has weaker than normal and has had decreased appetite. There are no other complaints, changes, or physical findings at this time. PCP: Mary Segundo MD    Current Facility-Administered Medications   Medication Dose Route Frequency Provider Last Rate Last Admin    sodium chloride 0.9 % bolus infusion 500 mL  500 mL IntraVENous ONCE Kevin Salcido  mL/hr at 07/18/22 1846 500 mL at 07/18/22 1846    cefTRIAXone (ROCEPHIN) 1 g in sterile water (preservative free) 10 mL IV syringe  1 g IntraVENous NOW Kevin Salcido MD        azithromycin (ZITHROMAX) 500 mg in 0.9% sodium chloride 250 mL ([id number])  500 mg IntraVENous NOW Kevin Salcido MD        dexamethasone (DECADRON) 4 mg/mL injection 6 mg  6 mg IntraVENous Zakiya Ferrer MD         Current Outpatient Medications   Medication Sig Dispense Refill    apixaban (Eliquis) 2.5 mg tablet Take 2.5 mg by mouth two (2) times a day.  clopidogreL (Plavix) 75 mg tab Take 1 Tablet by mouth daily. (Patient not taking: Reported on 2/10/2022) 30 Tablet 3    aspirin delayed-release 81 mg tablet Take  by mouth daily.  cholecalciferol, vitamin D3, (Vitamin D3) 50 mcg (2,000 unit) tab Take  by mouth nightly.  mirtazapine (REMERON) 15 mg tablet Take 15 mg by mouth nightly.  atorvastatin (LIPITOR) 20 mg tablet Take 20 mg by mouth daily.       ranolazine ER (RANEXA) 500 mg SR tablet Take 500 mg by mouth two (2) times a day.  gabapentin (NEURONTIN) 300 mg capsule nightly.  DULoxetine (CYMBALTA) 30 mg capsule duloxetine 30 mg capsule,delayed release      doxazosin (CARDURA) 4 mg tablet nightly.  hydrochlorothiazide (MICROZIDE PO) 12.5 mg daily. Past History     Past Medical History:  Past Medical History:   Diagnosis Date    Atrial fibrillation ([address]     COVID-19 vaccine series completed 03/2021    Hypercholesteremia     Hypertension     Neuropathy     Stroke [facility]) 08/2021    TIA       Past Surgical History:  Past Surgical History:   Procedure Laterality Date    HX CATARACT REMOVAL Left 2018    IOL    [place] ABDOMEN SURGERY PROC UNLISTED Right     I[facility]       Family History:  History reviewed. No pertinent family history. Social History:  Social History     Tobacco Use    Smoking status: Former Smoker    Smokeless tobacco: Never Used    Tobacco comment: quit 30 years ago , smoked  pipe and cigar   Substance Use Topics    Alcohol use: Not on file    Drug use: Never       Allergies:  No Known Allergies      Review of Systems     Review of Systems   Constitutional: Negative for activity change, appetite change and fever. HENT: Negative for rhinorrhea and sore throat. Eyes: Negative for visual disturbance. Respiratory: Negative for cough and shortness of breath. Cardiovascular: Negative for chest pain. Gastrointestinal: Negative for abdominal pain, diarrhea, nausea and vomiting. Genitourinary: Negative for dysuria. Musculoskeletal: Negative for arthralgias and myalgias. Skin: Negative for rash. Neurological: Positive for weakness. Negative for headaches. Psychiatric/Behavioral: Negative for confusion. All other systems reviewed and are negative. Physical Exam     Physical Exam  Vitals and nursing note reviewed. Constitutional:       General: He is not in acute distress. Appearance: Normal appearance. HENT:      Head: Normocephalic and atraumatic. Eyes:      Pupils: Pupils are equal, round, and reactive to light. Cardiovascular:      Rate and Rhythm: Normal rate and regular rhythm. Pulses: Normal pulses. Pulmonary:      Effort: Pulmonary effort is normal. No respiratory distress. Breath sounds: No wheezing. Comments: On 2L NC  Musculoskeletal:         General: No swelling or deformity. Skin:     Coloration: Skin is not pale. Findings: No rash. Neurological:      General: No focal deficit present. Mental Status: He is alert and oriented to person, place, and time. Psychiatric:         Mood and Affect: Mood normal.         Behavior: Behavior normal.         Lab and Diagnostic Study Results     Labs -     Recent Results (from the past 12 hour(s))   CBC WITH AUTOMATED DIFF    Collection Time: 07/18/22  4:48 PM   Result Value Ref Range    WBC 4.4 4.1 - 11.1 K/uL    RBC 4.12 4.10 - 5.70 M/uL    HGB 13.1 12.1 - 17.0 g/dL    HCT 38.9 36.6 - 50.3 %    MCV 94.4 80.0 - 99.0 FL    MCH 31.8 26.0 - 34.0 PG    MCHC 33.7 30.0 - 36.5 g/dL    RDW 13.8 11.5 - 14.5 %    PLATELET 834 439 - 028 K/uL    MPV 10.1 8.9 - 12.9 FL    NRBC 0.0 0.0  WBC    ABSOLUTE NRBC 0.00 0.00 - 0.01 K/uL    NEUTROPHILS PENDING %    LYMPHOCYTES PENDING %    MONOCYTES PENDING %    EOSINOPHILS PENDING %    BASOPHILS PENDING %    IMMATURE GRANULOCYTES PENDING %    ABS. NEUTROPHILS PENDING K/UL    ABS. LYMPHOCYTES PENDING K/UL    ABS. MONOCYTES PENDING K/UL    ABS. EOSINOPHILS PENDING K/UL    ABS. BASOPHILS PENDING K/UL    ABS. IMM. GRANS.  PENDING K/UL    DF PENDING    METABOLIC PANEL, COMPREHENSIVE    Collection Time: 07/18/22  4:48 PM   Result Value Ref Range    Sodium 128 (L) 136 - 145 mmol/L    Potassium 3.7 3.5 - 5.1 mmol/L    Chloride 94 (L) 97 - 108 mmol/L    CO2 23 21 - 32 mmol/L    Anion gap 11 5 - 15 mmol/L    Glucose 78 65 - 100 mg/dL    BUN 18 6 - 20 mg/dL    Creatinine 0.90 0.70 - 1.30 mg/dL BUN/Creatinine ratio 20 12 - 20      GFR est AA >60 >60 ml/min/1.73m2    GFR est non-AA >60 >60 ml/min/1.73m2    Calcium 8.2 (L) 8.5 - 10.1 mg/dL    Bilirubin, total 0.7 0.2 - 1.0 mg/dL    AST (SGOT) 54 (H) 15 - 37 U/L    ALT (SGPT) 22 12 - 78 U/L    Alk. phosphatase 105 45 - 117 U/L    Protein, total 6.2 (L) 6.4 - 8.2 g/dL    Albumin 3.2 (L) 3.5 - 5.0 g/dL    Globulin 3.0 2.0 - 4.0 g/dL    A-G Ratio 1.1 1.1 - 2.2     TROPONIN-HIGH SENSITIVITY    Collection Time: 07/18/22  4:48 PM   Result Value Ref Range    Troponin-High Sensitivity 35 0 - 76 ng/L   COVID-19 RAPID TEST    Collection Time: 07/18/22  5:00 PM   Result Value Ref Range    COVID-19 rapid test DETECTED (A) Not Detected     URINALYSIS W/ RFLX MICROSCOPIC    Collection Time: 07/18/22  5:15 PM   Result Value Ref Range    Color Yellow/Straw      Appearance Clear Clear      Specific gravity 1.010 1.003 - 1.030      pH (UA) 6.0 5.0 - 8.0      Protein Negative Negative mg/dL    Glucose Negative Negative mg/dL    Ketone 5 (A) Negative mg/dL    Bilirubin Negative Negative      Blood Negative Negative      Urobilinogen 0.1 0.1 - 1.0 EU/dL    Nitrites Negative Negative      Leukocyte Esterase Negative Negative      WBC 0-4 0 - 4 /hpf    RBC 0-5 0 - 5 /hpf    Bacteria Negative Negative /hpf    Hyaline cast 2-5 0 - 5 /lpf       Radiologic Studies -   [unfilled]  CT Results  (Last 48 hours)    None        CXR Results  (Last 48 hours)               07/18/22 1737  XR CHEST PORT Final result    Impression:  Hypoinspiratory examination. Chronic small left pleural effusion   with left basilar atelectasis. Superimposed left lower lobe pneumonia cannot be   excluded. Narrative:  INDICATION:  Fatigue        COMPARISON: August 2021       FINDINGS: Single AP portable view of the chest obtained at 1732 demonstrates a   stable cardiomediastinal silhouette. The lungs are hypoinspiratory and the   patient is rotated leftward.  There is a chronic small left pleural effusion with   left basilar opacification. There is a chronic right lateral chest wall   deformity with pleural thickening. Medical Decision Making and ED Course   - I am the first and primary provider for this patient AND AM THE PRIMARY PROVIDER OF RECORD. - I reviewed the vital signs, available nursing notes, past medical history, past surgical history, family history and social history. - Initial assessment performed. The patients presenting problems have been discussed, and the staff are in agreement with the care plan formulated and outlined with them. I have encouraged them to ask questions as they arise throughout their visit. Vital Signs-Reviewed the patient's vital signs. Patient Vitals for the past 12 hrs:   Temp Pulse Resp BP SpO2   07/18/22 1830 -- -- -- 127/85 97 %   07/18/22 1753 -- -- -- (!) 147/81 99 %   07/18/22 1638 98.7 °F (37.1 °C) 73 16 135/79 92 %         Records Reviewed: Nursing Notes      ED Course:       ED Course as of 07/18/22 1849   Mon Jul 18, 2022   164 80year-old male presents for evaluation of generalized weakness and fatigue. Multiple family members at home are COVID-positive. Differential diagnosis includes ACS versus electrolyte disarray versus UTI versus pneumonia versus viral syndrome including COVID. Getting a CBC, CMP, UA, troponin COVID test and chest x-ray. Disposition as per clinical course. [LW]   2489 EGD performed at [phone], read at 1756. Sinus rhythm with PACs. Rate of 76. Normal MO, normal QRS, normal QTC. Normal axis. [LW]   [facility] decreased PO intake x 1 week. Patient remains hypoxic. Giving dex, abx as has a consolidation on CXR. Will admit. [LW]      ED Course User Index  [LW] Fidelina Sherman MD         Consultations:       Consultations: -  Hospitalist Consultant: Dr. Neal Yip: We have asked for emergent assistance with regard to this patient.  We have discussed the patients HPI, ROS, PE and results this far.  They will come and evaluate the patient for admission. Disposition     Disposition: Admitted to Floor Medical Floor the case was discussed with the admitting physician Pramod. Admitted    Diagnosis     Clinical Impression:   1. COVID    2. Hypoxia    3. Community acquired pneumonia of left lower lobe of lung        Attestations:    Radha Kay MD    Please note that this dictation was completed with [employer], the computer voice recognition software. Quite often unanticipated grammatical, syntax, homophones, and other interpretive errors are inadvertently transcribed by the computer software. Please disregard these errors. Please excuse any errors that have escaped final proofreading. Thank you.

## 2022-07-18 NOTE — Clinical Note
Status[de-identified] INPATIENT [101]   Type of Bed: Medical [8]   Cardiac Monitoring Required?: Yes   Inpatient Hospitalization Certified Necessary for the Following Reasons: 3.  Patient receiving treatment that can only be provided in an inpatient setting (further clarification in H&P documentation)   Admitting Diagnosis: Hypoxia [882049]   Admitting Physician: Frantz Crespo [[id number]]   Attending Physician: Frantz Crespo [[id number]]   Estimated Length of Stay: 2 Midnights   Discharge Plan[de-identified] Home with Office Follow-up

## 2022-07-19 NOTE — PROGRESS NOTES
PULMONARY CONSULT  [facility]    Name: Sylwia Wilder MRN: [id number]   : 1929 Hospital: [facility]   Date: 2022  Admission date: 2022 Hospital Day: 2       HPI:     Hospital Problems  Never Reviewed          Codes Class Noted POA    Hypoxia ICD-10-CM: R09.02  ICD-9-CM: 799.02  2022 Unknown        COVID ICD-10-CM: U07.1  ICD-9-CM: 079.89  2022 Unknown                   [x] High complexity decision making was performed  [x] See my orders for details      Subjective/Initial History:     I was asked by Aleshia Stewart MD to see Sylwia Wilder  a 80 y.o.  male in consultation     Excerpts from admission 2022 or consult notes as follows:     Patient is a 80y.o. year old male with significant past medical history of hypertension CVA A.  Fib AICD  came to emergency room because of generalized weakness fatigue for almost 1 week patient had multiple family members at home which are COVID-positive he typically walks with a walker but recently because of generalized weakness not able to ambulate came to emergency room seen by ER physician chest x-ray shows pneumonia COVID screening came back positive      No Known Allergies     MAR reviewed and pertinent medications noted or modified as needed     Current Facility-Administered Medications   Medication    apixaban (ELIQUIS) tablet 2.5 mg    aspirin delayed-release tablet 81 mg    atorvastatin (LIPITOR) tablet 20 mg    doxazosin (CARDURA) tablet 4 mg    DULoxetine (CYMBALTA) capsule 30 mg    ranolazine ER (RANEXA) tablet 500 mg    0.9% sodium chloride infusion    acetaminophen (TYLENOL) tablet 650 mg    Or    acetaminophen (TYLENOL) suppository 650 mg    polyethylene glycol (MIRALAX) packet 17 g    ondansetron (ZOFRAN ODT) tablet 4 mg    Or    ondansetron (ZOFRAN) injection 4 mg    dexamethasone (DECADRON) 4 mg/mL injection 4 mg    baricitinib (OLUMIANT) tablet 2 mg    azithromycin (ZITHROMAX) 500 mg in 0.9% sodium chloride 250 mL ([id number])    cefTRIAXone (ROCEPHIN) 1 g in sterile water (preservative free) 10 mL IV syringe      Patient PCP: Pete Fam MD  PMH:  has a past medical history of Atrial fibrillation ([facility] [address] COVID-19 vaccine series completed (2021), Hypercholesteremia, Hypertension, Neuropathy, and Stroke ([facility] [address] (2021). PSH:   has a past surgical history that includes pr abdomen surgery proc unlisted (Right) and hx cataract removal (Left, 2018). FHX: family history is not on file. SHX:  reports that he has quit smoking. He has never used smokeless tobacco. He reports that he does not use drugs. ROS:    ROS     Constitutional: Generalized weakness. HENT: Negative. Eyes: Negative. Respiratory: Negative. Cardiovascular: Negative. Gastrointestinal: Negative for abdominal pain and nausea. Skin: Negative. Neurological: Negative. Objective:     Vital Signs: Telemetry:    normal sinus rhythm Intake/Output:   Visit Vitals  BP (!) 162/95 (BP 1 Location: Left lower arm)   Pulse 95   Temp 97.8 °F (36.6 °C)   Resp 18   Ht 5' 7\" (1.702 m)   Wt 54.4 kg (120 lb)   SpO2 96%   BMI 18.79 kg/m²       Temp (24hrs), Av.2 °F (36.8 °C), Min:97.8 °F (36.6 °C), Max:98.7 °F (37.1 °C)        O2 Device: Nasal cannula O2 Flow Rate (L/min): 2 l/min       Wt Readings from Last 4 Encounters:   22 54.4 kg (120 lb)   02/10/22 54.4 kg (120 lb)   21 54.4 kg (120 lb)   21 53.5 kg (118 lb)          Intake/Output Summary (Last 24 hours) at 2022 0848  Last data filed at 2022  Gross per 24 hour   Intake 750 ml   Output --   Net 750 ml       Last shift:      No intake/output data recorded. Last 3 shifts:  1901 -  0700  In: 750 [I.V.:750]  Out: -        Physical Exam:     Physical Exam     Constitutional: pt is oriented to person, place, and time. Generalized weakness  HENT:   Head: Normocephalic and atraumatic.    Eyes: Pupils are equal, round, and reactive to light. EOM are normal.   Cardiovascular: Normal rate, regular rhythm and normal heart sounds. Pulmonary/Chest: Breath sounds normal. No wheezes. No rales. Exhibits no tenderness. Abdominal: Soft. Bowel sounds are normal. There is no abdominal tenderness. There is no rebound and no guarding. Musculoskeletal: Normal range of motion. Neurological: pt is alert and oriented to person, place, and time. Alert. Normal strength. No cranial nerve deficit or sensory deficit. Labs:    Recent Labs     07/19/22  0616 07/18/22  1648   WBC 2.5* 4.4   HGB 13.2 13.1    199     Recent Labs     07/19/22  0616 07/18/22  1648   * 128*   K 3.3* 3.7   CL 99 94*   CO2 27 23    78   BUN 17 18   CREA 0.78 0.90   CA 8.0* 8.2*   ALB 3.3* 3.2*   ALT 23 22     No results for input(s): PH, PCO2, PO2, HCO3, FIO2 in the last 72 hours. No results for input(s): CPK, CKNDX, TROIQ in the last 72 hours. No lab exists for component: CPKMB  No results found for: BNPP, BNP   No results found for: CULTNo results found for: TSH, TSHEXT    Imaging:    CXR Results  (Last 48 hours)               07/18/22 1737  XR CHEST PORT Final result    Impression:  Hypoinspiratory examination. Chronic small left pleural effusion   with left basilar atelectasis. Superimposed left lower lobe pneumonia cannot be   excluded. Narrative:  INDICATION:  Fatigue        COMPARISON: August 2021       FINDINGS: Single AP portable view of the chest obtained at 1732 demonstrates a   stable cardiomediastinal silhouette. The lungs are hypoinspiratory and the   patient is rotated leftward. There is a chronic small left pleural effusion with   left basilar opacification. There is a chronic right lateral chest wall   deformity with pleural thickening.                Results from [place] encounter on 07/18/22    XR CHEST PORT    Narrative  INDICATION:  Fatigue    COMPARISON: August 2021    FINDINGS: Single AP portable view of the chest obtained at 1732 demonstrates a  stable cardiomediastinal silhouette. The lungs are hypoinspiratory and the  patient is rotated leftward. There is a chronic small left pleural effusion with  left basilar opacification. There is a chronic right lateral chest wall  deformity with pleural thickening. Impression  Hypoinspiratory examination. Chronic small left pleural effusion  with left basilar atelectasis. Superimposed left lower lobe pneumonia cannot be  excluded. Results from [place] encounter on 08/26/21    NC XR TECHNOLOGIST SERVICE    Narrative  COMPLIANCE ONLY    INDICATION: intra op    FINDINGS:    No intraoperative fluoroscopic views were submitted during fluoroscopic  assistance. Impression  Intraoperative views not provided. Results from [place] encounter on 08/11/21    XR CHEST PORT    Narrative  Chest, frontal view, 8/11/2021    History: AICD, pacemaker. Comparison: Including chest 3/20/2020. Findings: Small electronic device is again seen at the left lower hemithorax. Retrocardiac lucency suggests hiatal hernia. The cardiac silhouette is within  normal limits. Lung volumes are low. Apical pleural thickening is seen. There  is pulmonary vascular congestion and hydrostatic edema. Blunting of the  costophrenic angles may be due to scarring, atelectasis or small pleural  effusions. No pneumothorax is identified. Degenerative changes are present in  the thoracic spine. Chronic deformity of the right scapula and right-sided ribs  are again noted. Impression  Electronic device at the left lower hemithorax. Hydrostatic edema. Blunting of the costophrenic angles as above.     Results from [place] encounter on 02/10/22    CT HEAD WO CONT    Narrative  Technique: Multiple continuous axial images were obtained from the skull base to  the vertex without administration of intravenous contrast.    Comment on dose reduction: All CT scans at this facility are performed using  dose reduction optimization technique as appropriate to perform the exam  including the following; automated exposure control, adjustments of the mA  and/or kV according to patient size, or use of iterative reconstructed  technique. Comparison examination: None    Findings:  The ventricles and sulci are prominent consistent with age-related changes of  atrophy. Periventricular hypodensity is identified consistent with changes of  chronic small vessel disease. No evidence of midline shift, mass lesion, or  extra-axial fluid collection. No evidence of parenchymal hemorrhage or  infarction in a major vascular territory. The osseous structures are intact, the paranasal sinuses are clear. Extracalvarial soft tissue hematoma posterior vertex . Impression  No acute intracranial process. Extracalvarial soft tissue hematoma posterior  vertex . IMPRESSION:   1. COVID-19  2. Hypoxia  3. Pleural effusion  4. Atelectasis  5. Hypokalemia  6. Pt is requiring Drug therapy requiring intensive monitoring for toxicity  7. Pt is unstable, unpredictable needing inpatient monitoring; is acutely ill and at high risk of sudden decline and decompensation with severe consequenses and continued end organ dysfunction and failure  8. Prognosis guarded       RECOMMENDATIONS/PLAN:     1. 2L nasal Cannula oxygen as salvage oxygen delivery device to provide high concentration of oxygen to overcome refractory hypoxia;  2. Agree with rocephin and zithromax IV antibiotics pending culture results   3. Follow culture results  4. Hypokalemia, replace potassium  5. Patient on 2L nasal cannula, will try to wean. 6. Aspiration precautions  7. Labs to follow electrolytes, renal function and and blood counts  8. Pt needs IV fluids with additives and Drug therapy requiring intensive monitoring for toxicity  9.  Prescription drug management with home med reconciliation reviewed       This care involved high complexity medical decision making: I personally:  · Reviewed the flowsheet and previous days notes  · Reviewed and summarized records or history from previous days note or discussions with staff, family  · High Risk Drug therapy requiring intensive monitoring for toxicity: eg steroids, pressors, antibiotics  · Reviewed and/or ordered Clinical lab tests  · Reviewed images and/or ordered Radiology tests  · Reviewed the patients ECG / Telemetry  · Reviewed and/or adjusted NiPPV settings  · Called and arranged for Radiologic procedures or interventions  · performed or ordered Diagnostic endoscopies with identified risk factors. · discussed my assessment/management with : Nursing, Hospitalist and Family for coordination of care    7/19: Patient complaining of cough with sputum production. Lungs are clear.       Yamilka Dill

## 2022-07-19 NOTE — H&P
History and Physical    NAME: Vicky Sanchez   :  1929   MRN:  [id number]     Date/Time:  2022 9:56 PM    Patient PCP: Sandy Ceja MD  ______________________________________________________________________             Subjective:     CHIEF COMPLAINT:     Generalized weakness and fatigue      HISTORY OF PRESENT ILLNESS:       Patient is a 80y.o. year old male with significant past medical history of hypertension CVA A. Fib AICD  came to emergency room because of generalized weakness fatigue for almost 1 week patient had multiple family members at home which are COVID-positive he typically walks with a walker but recently because of generalized weakness not able to ambulate came to emergency room seen by ER physician chest x-ray shows pneumonia COVID screening came back positive    Discussed with the patient wife patient is full code    Past Medical History:   Diagnosis Date    Atrial fibrillation ([address]     COVID-19 vaccine series completed 2021    Hypercholesteremia     Hypertension     Neuropathy     Stroke ([facility] [address] 2021    TIA        Past Surgical History:   Procedure Laterality Date    HX CATARACT REMOVAL Left     IOL    PA ABDOMEN SURGERY PROC UNLISTED Right     IHR       Social History     Tobacco Use    Smoking status: Former Smoker    Smokeless tobacco: Never Used    Tobacco comment: quit 30 years ago , smoked  pipe and cigar   Substance Use Topics    Alcohol use: Not on file        History reviewed. No pertinent family history. No Known Allergies     Prior to Admission medications    Medication Sig Start Date End Date Taking? Authorizing Provider   apixaban (Eliquis) 2.5 mg tablet Take 2.5 mg by mouth two (2) times a day. Other, MD Nova   clopidogreL (Plavix) 75 mg tab Take 1 Tablet by mouth daily. Patient not taking: Reported on 2/10/2022 8/27/21   Jamshid Alvarez MD   aspirin delayed-release 81 mg tablet Take  by mouth daily.     Provider, Historical cholecalciferol, vitamin D3, (Vitamin D3) 50 mcg (2,000 unit) tab Take  by mouth nightly. Bruce Sanon   mirtazapine (REMERON) 15 mg tablet Take 15 mg by mouth nightly. Nova Painter MD   atorvastatin (LIPITOR) 20 mg tablet Take 20 mg by mouth daily. Nova Painter MD   ranolazine ER (RANEXA) 500 mg SR tablet Take 500 mg by mouth two (2) times a day. Nova Painter MD   gabapentin (NEURONTIN) 300 mg capsule nightly. Nova Painter MD   DULoxetine (CYMBALTA) 30 mg capsule duloxetine 30 mg capsule,delayed release    Nova Painter MD   doxazosin (CARDURA) 4 mg tablet nightly. Nova Painter MD   hydrochlorothiazide (MICROZIDE PO) 12.5 mg daily.     Nova Painter MD         Current Facility-Administered Medications:     apixaban (ELIQUIS) tablet 2.5 mg, 2.5 mg, Oral, BID, Roxann Chapa MD    [START ON 7/19/2022] aspirin delayed-release tablet 81 mg, 81 mg, Oral, DAILY, Roxann Chapa MD    [START ON 7/19/2022] atorvastatin (LIPITOR) tablet 20 mg, 20 mg, Oral, DAILY, Roxann Chapa MD    doxazosin (CARDURA) tablet 4 mg, 4 mg, Oral, QHS, Mahogany Chapa MD Marice Her  [START ON 7/19/2022] DULoxetine (CYMBALTA) capsule 30 mg, 30 mg, Oral, DAILY, Roxann Chapa MD    ranolazine ER (RANEXA) tablet 500 mg, 500 mg, Oral, BID, Roxann Chapa MD    0.9% sodium chloride infusion, 75 mL/hr, IntraVENous, CONTINUOUS, Roxann Chapa MD    acetaminophen (TYLENOL) tablet 650 mg, 650 mg, Oral, Q6H PRN **OR** acetaminophen (TYLENOL) suppository 650 mg, 650 mg, Rectal, Q6H PRN, Roxann Chapa MD    polyethylene glycol (MIRALAX) packet 17 g, 17 g, Oral, DAILY PRN, Roxann Chapa MD    ondansetron (ZOFRAN ODT) tablet 4 mg, 4 mg, Oral, Q8H PRN **OR** ondansetron (ZOFRAN) injection 4 mg, 4 mg, IntraVENous, Q6H PRN, Mahogany Chapa MD    [START ON 7/19/2022] dexamethasone (DECADRON) 4 mg/mL injection 4 mg, 4 mg, IntraVENous, Q6H, Mahogany Chapa MD    azithromycin (ZITHROMAX) 500 mg in 0.9% sodium chloride 250 mL ([id number]), 500 mg, IntraVENous, Q24H, Peter Chapa MD    cefTRIAXone (ROCEPHIN) 1 g in sterile water (preservative free) 10 mL IV syringe, 1 g, IntraVENous, Q24H, Peter Chapa MD    [START ON 7/19/2022] baricitinib (OLUMIANT) tablet 2 mg, 2 mg, Oral, DAILY, Mahogany Chapa MD    Current Outpatient Medications:     apixaban (Eliquis) 2.5 mg tablet, Take 2.5 mg by mouth two (2) times a day., Disp: , Rfl:     clopidogreL (Plavix) 75 mg tab, Take 1 Tablet by mouth daily. (Patient not taking: Reported on 2/10/2022), Disp: 30 Tablet, Rfl: 3    aspirin delayed-release 81 mg tablet, Take  by mouth daily. , Disp: , Rfl:     cholecalciferol, vitamin D3, (Vitamin D3) 50 mcg (2,000 unit) tab, Take  by mouth nightly., Disp: , Rfl:     mirtazapine (REMERON) 15 mg tablet, Take 15 mg by mouth nightly., Disp: , Rfl:     atorvastatin (LIPITOR) 20 mg tablet, Take 20 mg by mouth daily. , Disp: , Rfl:     ranolazine ER (RANEXA) 500 mg SR tablet, Take 500 mg by mouth two (2) times a day., Disp: , Rfl:     gabapentin (NEURONTIN) 300 mg capsule, nightly., Disp: , Rfl:     DULoxetine (CYMBALTA) 30 mg capsule, duloxetine 30 mg capsule,delayed release, Disp: , Rfl:     doxazosin (CARDURA) 4 mg tablet, nightly., Disp: , Rfl:     hydrochlorothiazide (MICROZIDE PO), 12.5 mg daily. , Disp: , Rfl:     LAB DATA REVIEWED:    Recent Results (from the past 24 hour(s))   CBC WITH AUTOMATED DIFF    Collection Time: 07/18/22  4:48 PM   Result Value Ref Range    WBC 4.4 4.1 - 11.1 K/uL    RBC 4.12 4.10 - 5.70 M/uL    HGB 13.1 12.1 - 17.0 g/dL    HCT 38.9 36.6 - 50.3 %    MCV 94.4 80.0 - 99.0 FL    MCH 31.8 26.0 - 34.0 PG    MCHC 33.7 30.0 - 36.5 g/dL    RDW 13.8 11.5 - 14.5 %    PLATELET 208 397 - 969 K/uL    MPV 10.1 8.9 - 12.9 FL    NRBC 0.0 0.0  WBC    ABSOLUTE NRBC 0.00 0.00 - 0.01 K/uL    NEUTROPHILS 57 32 - 75 %    LYMPHOCYTES 23 12 - 49 %    MONOCYTES 20 (H) 5 - 13 %    EOSINOPHILS 0 0 - 7 % BASOPHILS 0 0 - 1 %    IMMATURE GRANULOCYTES 0 %    ABS. NEUTROPHILS 2.5 1.8 - 8.0 K/UL    ABS. LYMPHOCYTES 1.0 0.8 - 3.5 K/UL    ABS. MONOCYTES 0.9 0.0 - 1.0 K/UL    ABS. EOSINOPHILS 0.0 0.0 - 0.4 K/UL    ABS. BASOPHILS 0.0 0.0 - 0.1 K/UL    ABS. IMM. GRANS. 0.0 K/UL    DF Manual      RBC COMMENTS Ovalocytes  2+       METABOLIC PANEL, COMPREHENSIVE    Collection Time: 07/18/22  4:48 PM   Result Value Ref Range    Sodium 128 (L) 136 - 145 mmol/L    Potassium 3.7 3.5 - 5.1 mmol/L    Chloride 94 (L) 97 - 108 mmol/L    CO2 23 21 - 32 mmol/L    Anion gap 11 5 - 15 mmol/L    Glucose 78 65 - 100 mg/dL    BUN 18 6 - 20 mg/dL    Creatinine 0.90 0.70 - 1.30 mg/dL    BUN/Creatinine ratio 20 12 - 20      GFR est AA >60 >60 ml/min/1.73m2    GFR est non-AA >60 >60 ml/min/1.73m2    Calcium 8.2 (L) 8.5 - 10.1 mg/dL    Bilirubin, total 0.7 0.2 - 1.0 mg/dL    AST (SGOT) 54 (H) 15 - 37 U/L    ALT (SGPT) 22 12 - 78 U/L    Alk.  phosphatase 105 45 - 117 U/L    Protein, total 6.2 (L) 6.4 - 8.2 g/dL    Albumin 3.2 (L) 3.5 - 5.0 g/dL    Globulin 3.0 2.0 - 4.0 g/dL    A-G Ratio 1.1 1.1 - 2.2     TROPONIN-HIGH SENSITIVITY    Collection Time: 07/18/22  4:48 PM   Result Value Ref Range    Troponin-High Sensitivity 35 0 - 76 ng/L   COVID-19 RAPID TEST    Collection Time: 07/18/22  5:00 PM   Result Value Ref Range    COVID-19 rapid test DETECTED (A) Not Detected     URINALYSIS W/ RFLX MICROSCOPIC    Collection Time: 07/18/22  5:15 PM   Result Value Ref Range    Color Yellow/Straw      Appearance Clear Clear      Specific gravity 1.010 1.003 - 1.030      pH (UA) 6.0 5.0 - 8.0      Protein Negative Negative mg/dL    Glucose Negative Negative mg/dL    Ketone 5 (A) Negative mg/dL    Bilirubin Negative Negative      Blood Negative Negative      Urobilinogen 0.1 0.1 - 1.0 EU/dL    Nitrites Negative Negative      Leukocyte Esterase Negative Negative      WBC 0-4 0 - 4 /hpf    RBC 0-5 0 - 5 /hpf    Bacteria Negative Negative /hpf    Hyaline cast 2-5 0 - 5 /lpf       XR Results (most recent):  Results from Hospital Encounter encounter on 07/18/22    XR CHEST PORT    Narrative  INDICATION:  Fatigue    COMPARISON: August 2021    FINDINGS: Single AP portable view of the chest obtained at 1732 demonstrates a  stable cardiomediastinal silhouette. The lungs are hypoinspiratory and the  patient is rotated leftward. There is a chronic small left pleural effusion with  left basilar opacification. There is a chronic right lateral chest wall  deformity with pleural thickening. Impression  Hypoinspiratory examination. Chronic small left pleural effusion  with left basilar atelectasis. Superimposed left lower lobe pneumonia cannot be  excluded. XR CHEST PORT   Final Result   Hypoinspiratory examination. Chronic small left pleural effusion   with left basilar atelectasis. Superimposed left lower lobe pneumonia cannot be   excluded. Review of Systems:  Constitutional: Generalized weakness. HENT: Negative. Eyes: Negative. Respiratory: Negative. Cardiovascular: Negative. Gastrointestinal: Negative for abdominal pain and nausea. Skin: Negative. Neurological: Negative. Objective:   VITALS:    Visit Vitals  BP (!) 157/84 (BP 1 Location: Right upper arm, BP Patient Position: At rest)   Pulse 78   Temp 98.7 °F (37.1 °C)   Resp 18   Ht 5' 7\" (1.702 m)   Wt 54.4 kg (120 lb)   SpO2 96%   BMI 18.79 kg/m²       Physical Exam:   Constitutional: pt is oriented to person, place, and time. Generalized weakness  HENT:   Head: Normocephalic and atraumatic. Eyes: Pupils are equal, round, and reactive to light. EOM are normal.   Cardiovascular: Normal rate, regular rhythm and normal heart sounds. Pulmonary/Chest: Breath sounds normal. No wheezes. No rales. Exhibits no tenderness. Abdominal: Soft. Bowel sounds are normal. There is no abdominal tenderness. There is no rebound and no guarding. Musculoskeletal: Normal range of motion. Neurological: pt is alert and oriented to person, place, and time. Alert. Normal strength. No cranial nerve deficit or sensory deficit.     ASSESSMENT & PLAN:    COVID-19 pneumonitis  hypertension,   stroke and   Afib    Admit to medical telemetry floor  Pulmonary and ID consult  Started on Decadron  IV Zosyn IV Rocephin  Olumiant  Continue other home medication      Current Facility-Administered Medications:     apixaban (ELIQUIS) tablet 2.5 mg, 2.5 mg, Oral, BID, Nubia Chapa MD [place]  [START ON 7/19/2022] aspirin delayed-release tablet 81 mg, 81 mg, Oral, DAILY, Nubia Chapa MD [place]  [START ON 7/19/2022] atorvastatin (LIPITOR) tablet 20 mg, 20 mg, Oral, DAILY, Nubia Chapa MD    doxazosin (CARDURA) tablet 4 mg, 4 mg, Oral, QHS, Mahogany Chapa MD [place]  [START ON 7/19/2022] DULoxetine (CYMBALTA) capsule 30 mg, 30 mg, Oral, DAILY, Nubia Chapa MD    ranolazine ER (RANEXA) tablet 500 mg, 500 mg, Oral, BID, Nubia Chapa MD    0.9% sodium chloride infusion, 75 mL/hr, IntraVENous, CONTINUOUS, Nubia Chapa MD    acetaminophen (TYLENOL) tablet 650 mg, 650 mg, Oral, Q6H PRN **OR** acetaminophen (TYLENOL) suppository 650 mg, 650 mg, Rectal, Q6H PRN, Nubia Chapa MD    polyethylene glycol (MIRALAX) packet 17 g, 17 g, Oral, DAILY PRN, Nubia Chapa MD    ondansetron (ZOFRAN ODT) tablet 4 mg, 4 mg, Oral, Q8H PRN **OR** ondansetron (ZOFRAN) injection 4 mg, 4 mg, IntraVENous, Q6H PRN, Mahogany Chapa MD [place]  [START ON 7/19/2022] dexamethasone (DECADRON) 4 mg/mL injection 4 mg, 4 mg, IntraVENous, Q6H, Mahogany Chapa MD    azithromycin (ZITHROMAX) 500 mg in 0.9% sodium chloride 250 mL ([id number]), 500 mg, IntraVENous, Q24H, Mahogany Chapa MD, Held at 07/18/22 2153    cefTRIAXone (ROCEPHIN) 1 g in sterile water (preservative free) 10 mL IV syringe, 1 g, IntraVENous, Q24H, Mahogany Chapa MD    [START ON 7/19/2022] baricitinib (OLUMIANT) tablet 2 mg, 2 mg, Oral, DAILY, Eduard Cristina Bray MD    Current Outpatient Medications:     apixaban (Eliquis) 2.5 mg tablet, Take 2.5 mg by mouth two (2) times a day., Disp: , Rfl:     clopidogreL (Plavix) 75 mg tab, Take 1 Tablet by mouth daily. (Patient not taking: Reported on 2/10/2022), Disp: 30 Tablet, Rfl: 3    aspirin delayed-release 81 mg tablet, Take  by mouth daily. , Disp: , Rfl:     cholecalciferol, vitamin D3, (Vitamin D3) 50 mcg (2,000 unit) tab, Take  by mouth nightly., Disp: , Rfl:     mirtazapine (REMERON) 15 mg tablet, Take 15 mg by mouth nightly., Disp: , Rfl:     atorvastatin (LIPITOR) 20 mg tablet, Take 20 mg by mouth daily. , Disp: , Rfl:     ranolazine ER (RANEXA) 500 mg SR tablet, Take 500 mg by mouth two (2) times a day., Disp: , Rfl:     gabapentin (NEURONTIN) 300 mg capsule, nightly., Disp: , Rfl:     DULoxetine (CYMBALTA) 30 mg capsule, duloxetine 30 mg capsule,delayed release, Disp: , Rfl:     doxazosin (CARDURA) 4 mg tablet, nightly., Disp: , Rfl:     hydrochlorothiazide (MICROZIDE PO), 12.5 mg daily. , Disp: , Rfl:       Pt is full code  ________________________________________________________________________    Signed: Estela Davidson MD

## 2022-07-19 NOTE — ED NOTES
TRANSFER - OUT REPORT:    Verbal report given to [address] [facility] RN on Yue Novoa  being transferred to [address] [place] [address] for routine progression of care       Report consisted of patients Situation, Background, Assessment and   Recommendations(SBAR). Information from the following report(s) SBAR, ED Summary, Intake/Output, MAR and Recent Results was reviewed with the receiving nurse. Lines:   Peripheral IV 07/18/22 Posterior;Right Forearm (Active)        Opportunity for questions and clarification was provided.       Patient transported with:   Monitor  O2 @ 2 liters  Registered Nurse  [facility]

## 2022-07-19 NOTE — PROGRESS NOTES
Reason for Admission:  Hypoxia                     RUR Score:  11%                   Plan for utilizing home health:  Declines at this time        PCP: First and Last name:  Rory Uriostegui MD     Name of Practice:    Are you a current patient: Yes/No:    Approximate date of last visit: April 2022   Can you participate in a virtual visit with your PCP:                     Current Advanced Directive/Advance Care Plan: Full Code  CM confirmed with patient's wife that he is a Full Code    Healthcare Decision Maker:   Click here to complete [address] including selection of the Healthcare Decision Maker Relationship (ie \"Primary\")           Wife, Mark Marie, [phone]                  Transition of Care Plan:                    CM spoke with patient who is hard of hearing and asked CM to call his wife. CM spoke with patient's wife. Patient currently lives at home with his wife. There is a ramp and stairs to enter the home. Patient has had [facility] in the past, but no IRF or SNF services. At this time patient's wife reports that patient is independent and likely would not need [facility] services. Patient's wife will be his ride at discharge and to follow up appointments. [facility] [place] [facility] [place]. Current Dispo: Home/self.

## 2022-07-19 NOTE — PROGRESS NOTES
Problem: Airway Clearance - Ineffective  Goal: Achieve or maintain patent airway  Outcome: Progressing Towards Goal     Problem: Gas Exchange - Impaired  Goal: Absence of hypoxia  Outcome: Progressing Towards Goal  Goal: Promote optimal lung function  Outcome: Progressing Towards Goal     Problem: Breathing Pattern - Ineffective  Goal: Ability to achieve and maintain a regular respiratory rate  Outcome: Progressing Towards Goal     Problem:  Body Temperature -  Risk of, Imbalanced  Goal: Ability to maintain a body temperature within defined limits  Outcome: Progressing Towards Goal  Goal: Will regain or maintain usual level of consciousness  Outcome: Progressing Towards Goal  Goal: Complications related to the disease process, condition or treatment will be avoided or minimized  Outcome: Progressing Towards Goal     Problem: Isolation Precautions - Risk of Spread of Infection  Goal: Prevent transmission of infectious organism to others  Outcome: Progressing Towards Goal     Problem: Nutrition Deficits  Goal: Optimize nutrtional status  Outcome: Progressing Towards Goal     Problem: Risk for Fluid Volume Deficit  Goal: Maintain normal heart rhythm  Outcome: Progressing Towards Goal  Goal: Maintain absence of muscle cramping  Outcome: Progressing Towards Goal  Goal: Maintain normal serum potassium, sodium, calcium, phosphorus, and pH  Outcome: Progressing Towards Goal     Problem: Loneliness or Risk for Loneliness  Goal: Demonstrate positive use of time alone when socialization is not possible  Outcome: Progressing Towards Goal     Problem: Fatigue  Goal: Verbalize increase energy and improved vitality  Outcome: Progressing Towards Goal     Problem: Patient Education: Go to Patient Education Activity  Goal: Patient/Family Education  Outcome: Progressing Towards Goal     Problem: Risk for Spread of Infection  Goal: Prevent transmission of infectious organism to others  Description: Prevent the transmission of infectious organisms to other patients, staff members, and visitors.   Outcome: Progressing Towards Goal     Problem: Patient Education:  Go to Education Activity  Goal: Patient/Family Education  Outcome: Progressing Towards Goal

## 2022-07-19 NOTE — PROGRESS NOTES
PROGRESS NOTE    Patient: Eduardo Gomez MRN: [id number]  SSN: [id number]    YOB: 1929  Age: 80 y.o. Sex: male      Admit Date: 7/18/2022    LOS: 1 day       Subjective     Chief Complaint   Patient presents with    Fatigue       HPI: Patient is a 80y.o. year old male with significant past medical history of hypertension CVA A. Fib AICD  came to emergency room because of generalized weakness fatigue for almost 1 week patient had multiple family members at home which are COVID-positive he typically walks with a walker but recently because of generalized weakness not able to ambulate came to emergency room seen by ER physician chest x-ray shows pneumonia COVID screening came back positive     Discussed with the patient wife patient is full code      7/19  Patient laying in bed comfortably. No complaints at this time. He states he is not having any difficulty breathing. O2: 96% on 2L NC  WBC: 2.5  Potassium: 3.3    CXR:  Hypoinspiratory examination. Chronic small left pleural effusion  with left basilar atelectasis. Superimposed left lower lobe pneumonia cannot be  Excluded. Review of Systems:  Constitutional: Generalized weakness. HENT: Negative. Eyes: Negative. Respiratory: Negative. Cardiovascular: Negative. Gastrointestinal: Negative for abdominal pain and nausea. Skin: Negative. Neurological: Negative. Objective     Visit Vitals  BP (!) 162/95 (BP 1 Location: Left lower arm)   Pulse 95   Temp 97.8 °F (36.6 °C)   Resp 18   Ht 5' 7\" (1.702 m)   Wt 54.4 kg (120 lb)   SpO2 96%   BMI 18.79 kg/m²    O2 Flow Rate (L/min): 2 l/min O2 Device: Nasal cannula    Physical Exam:   Constitutional: pt is oriented to person, place, and time. Generalized weakness  HENT:   Head: Normocephalic and atraumatic. Eyes: Pupils are equal, round, and reactive to light. EOM are normal.   Cardiovascular: Normal rate, regular rhythm and normal heart sounds.    Pulmonary/Chest: Breath sounds normal. No wheezes. No rales. Exhibits no tenderness. Abdominal: Soft. Bowel sounds are normal. There is no abdominal tenderness. There is no rebound and no guarding. Musculoskeletal: Normal range of motion. Neurological: pt is alert and oriented to person, place, and time. Alert. Normal strength. No cranial nerve deficit or sensory deficit. Intake & Output:  Current Shift: No intake/output data recorded. Last three shifts: 07/17 1901 - 07/19 0700  In: 750 [I.V.:750]  Out: -     Lab/Data Review:  Lab results reviewed. For significant abnormal values and values requiring intervention, see assessment and plan. 24 Hour Results:    Recent Results (from the past 24 hour(s))   CBC WITH AUTOMATED DIFF    Collection Time: 07/18/22  4:48 PM   Result Value Ref Range    WBC 4.4 4.1 - 11.1 K/uL    RBC 4.12 4.10 - 5.70 M/uL    HGB 13.1 12.1 - 17.0 g/dL    HCT 38.9 36.6 - 50.3 %    MCV 94.4 80.0 - 99.0 FL    MCH 31.8 26.0 - 34.0 PG    MCHC 33.7 30.0 - 36.5 g/dL    RDW 13.8 11.5 - 14.5 %    PLATELET 856 358 - 865 K/uL    MPV 10.1 8.9 - 12.9 FL    NRBC 0.0 0.0  WBC    ABSOLUTE NRBC 0.00 0.00 - 0.01 K/uL    NEUTROPHILS 57 32 - 75 %    LYMPHOCYTES 23 12 - 49 %    MONOCYTES 20 (H) 5 - 13 %    EOSINOPHILS 0 0 - 7 %    BASOPHILS 0 0 - 1 %    IMMATURE GRANULOCYTES 0 %    ABS. NEUTROPHILS 2.5 1.8 - 8.0 K/UL    ABS. LYMPHOCYTES 1.0 0.8 - 3.5 K/UL    ABS. MONOCYTES 0.9 0.0 - 1.0 K/UL    ABS. EOSINOPHILS 0.0 0.0 - 0.4 K/UL    ABS. BASOPHILS 0.0 0.0 - 0.1 K/UL    ABS. IMM.  GRANS. 0.0 K/UL    DF Manual      RBC COMMENTS Ovalocytes  2+       METABOLIC PANEL, COMPREHENSIVE    Collection Time: 07/18/22  4:48 PM   Result Value Ref Range    Sodium 128 (L) 136 - 145 mmol/L    Potassium 3.7 3.5 - 5.1 mmol/L    Chloride 94 (L) 97 - 108 mmol/L    CO2 23 21 - 32 mmol/L    Anion gap 11 5 - 15 mmol/L    Glucose 78 65 - 100 mg/dL    BUN 18 6 - 20 mg/dL    Creatinine 0.90 0.70 - 1.30 mg/dL    BUN/Creatinine ratio 20 12 - 20      GFR est AA >60 >60 ml/min/1.73m2    GFR est non-AA >60 >60 ml/min/1.73m2    Calcium 8.2 (L) 8.5 - 10.1 mg/dL    Bilirubin, total 0.7 0.2 - 1.0 mg/dL    AST (SGOT) 54 (H) 15 - 37 U/L    ALT (SGPT) 22 12 - 78 U/L    Alk.  phosphatase 105 45 - 117 U/L    Protein, total 6.2 (L) 6.4 - 8.2 g/dL    Albumin 3.2 (L) 3.5 - 5.0 g/dL    Globulin 3.0 2.0 - 4.0 g/dL    A-G Ratio 1.1 1.1 - 2.2     TROPONIN-HIGH SENSITIVITY    Collection Time: 07/18/22  4:48 PM   Result Value Ref Range    Troponin-High Sensitivity 35 0 - 76 ng/L   COVID-19 RAPID TEST    Collection Time: 07/18/22  5:00 PM   Result Value Ref Range    COVID-19 rapid test DETECTED (A) Not Detected     URINALYSIS W/ RFLX MICROSCOPIC    Collection Time: 07/18/22  5:15 PM   Result Value Ref Range    Color Yellow/Straw      Appearance Clear Clear      Specific gravity 1.010 1.003 - 1.030      pH (UA) 6.0 5.0 - 8.0      Protein Negative Negative mg/dL    Glucose Negative Negative mg/dL    Ketone 5 (A) Negative mg/dL    Bilirubin Negative Negative      Blood Negative Negative      Urobilinogen 0.1 0.1 - 1.0 EU/dL    Nitrites Negative Negative      Leukocyte Esterase Negative Negative      WBC 0-4 0 - 4 /hpf    RBC 0-5 0 - 5 /hpf    Bacteria Negative Negative /hpf    Hyaline cast 2-5 0 - 5 /lpf   EKG, 12 LEAD, INITIAL    Collection Time: 07/18/22  5:49 PM   Result Value Ref Range    Ventricular Rate 76 BPM    Atrial Rate 76 BPM    P-R Interval 204 ms    QRS Duration 110 ms    Q-T Interval 406 ms    QTC Calculation (Bezet) 456 ms    Calculated P Axis 86 degrees    Calculated R Axis -28 degrees    Calculated T Axis 84 degrees    Diagnosis       Sinus rhythm with Premature atrial complexes  Cannot rule out Anterior infarct (cited on or before 11-AUG-2021)  Abnormal ECG  When compared with ECG of 11-AUG-2021 11:19,  Premature atrial complexes are now Present  Questionable change in initial forces of Anterior leads  Confirmed by Jasmyn Schmidt MD, [facility] ([id number]) on 7/18/2022 9:59:05 PM METABOLIC PANEL, COMPREHENSIVE    Collection Time: 07/19/22  6:16 AM   Result Value Ref Range    Sodium 135 (L) 136 - 145 mmol/L    Potassium 3.3 (L) 3.5 - 5.1 mmol/L    Chloride 99 97 - 108 mmol/L    CO2 27 21 - 32 mmol/L    Anion gap 9 5 - 15 mmol/L    Glucose 100 65 - 100 mg/dL    BUN 17 6 - 20 mg/dL    Creatinine 0.78 0.70 - 1.30 mg/dL    BUN/Creatinine ratio 22 (H) 12 - 20      GFR est AA >60 >60 ml/min/1.73m2    GFR est non-AA >60 >60 ml/min/1.73m2    Calcium 8.0 (L) 8.5 - 10.1 mg/dL    Bilirubin, total 0.5 0.2 - 1.0 mg/dL    AST (SGOT) 52 (H) 15 - 37 U/L    ALT (SGPT) 23 12 - 78 U/L    Alk. phosphatase 107 45 - 117 U/L    Protein, total 6.4 6.4 - 8.2 g/dL    Albumin 3.3 (L) 3.5 - 5.0 g/dL    Globulin 3.1 2.0 - 4.0 g/dL    A-G Ratio 1.1 1.1 - 2.2     CBC WITH AUTOMATED DIFF    Collection Time: 07/19/22  6:16 AM   Result Value Ref Range    WBC 2.5 (L) 4.1 - 11.1 K/uL    RBC 4.24 4.10 - 5.70 M/uL    HGB 13.2 12.1 - 17.0 g/dL    HCT 40.4 36.6 - 50.3 %    MCV 95.3 80.0 - 99.0 FL    MCH 31.1 26.0 - 34.0 PG    MCHC 32.7 30.0 - 36.5 g/dL    RDW 13.8 11.5 - 14.5 %    PLATELET 951 378 - 413 K/uL    MPV 10.3 8.9 - 12.9 FL    NRBC 0.0 0.0  WBC    ABSOLUTE NRBC 0.00 0.00 - 0.01 K/uL    NEUTROPHILS 72 32 - 75 %    LYMPHOCYTES 23 12 - 49 %    MONOCYTES 5 5 - 13 %    EOSINOPHILS 0 0 - 7 %    BASOPHILS 0 0 - 1 %    IMMATURE GRANULOCYTES 0 0 - 0.5 %    ABS. NEUTROPHILS 1.8 1.8 - 8.0 K/UL    ABS. LYMPHOCYTES 0.6 (L) 0.8 - 3.5 K/UL    ABS. MONOCYTES 0.1 0.0 - 1.0 K/UL    ABS. EOSINOPHILS 0.0 0.0 - 0.4 K/UL    ABS. BASOPHILS 0.0 0.0 - 0.1 K/UL    ABS. IMM. GRANS. 0.0 0.00 - 0.04 K/UL    DF AUTOMATED           Imaging:    XR CHEST PORT   Final Result   Hypoinspiratory examination. Chronic small left pleural effusion   with left basilar atelectasis. Superimposed left lower lobe pneumonia cannot be   excluded.                 Assessment     COVID-19 pneumonitis  hypertension,   stroke and   Afib    Plan     Begin decadron 6mg IV every day  Give Potassium 40mEq IV once  Begin amlodipine 5mg PO QD    Eliquis 2.5mg PO BID  Aspirin 81mg PO every day  Lipitor 20mg PO every day  Cardura 4mg PO every day  Cymbalta 30mg  PO every day  Ranolazine 500mg PO BID  Miralax 17g PO every day  Zofran 4mg tab PO Q8h  Olumiant 2mg PO every day  Zithromax 500mg IV every day  Recphin 1g IV every day      Current Facility-Administered Medications:     apixaban (ELIQUIS) tablet 2.5 mg, 2.5 mg, Oral, BID, Mahogany Chapa MD, 2.5 mg at 07/19/22 0800    aspirin delayed-release tablet 81 mg, 81 mg, Oral, DAILY, Mahogany Chapa MD, 81 mg at 07/19/22 0800    atorvastatin (LIPITOR) tablet 20 mg, 20 mg, Oral, DAILY, Mahogany Chapa MD, 20 mg at 07/19/22 0800    doxazosin (CARDURA) tablet 4 mg, 4 mg, Oral, QHS, Gianna Chapa MD    DULoxetine (CYMBALTA) capsule 30 mg, 30 mg, Oral, DAILY, Mahogany Chapa MD, 30 mg at 07/19/22 0800    ranolazine ER (RANEXA) tablet 500 mg, 500 mg, Oral, BID, Mahogany Chapa MD, 500 mg at 07/19/22 0755    0.9% sodium chloride infusion, 75 mL/hr, IntraVENous, CONTINUOUS, Mahogany Chapa MD, Last Rate: 75 mL/hr at 07/19/22 0018, 75 mL/hr at 07/19/22 0018    acetaminophen (TYLENOL) tablet 650 mg, 650 mg, Oral, Q6H PRN **OR** acetaminophen (TYLENOL) suppository 650 mg, 650 mg, Rectal, Q6H PRN, Gianna Chapa MD    polyethylene glycol (MIRALAX) packet 17 g, 17 g, Oral, DAILY PRN, Gianna Chapa MD    ondansetron (ZOFRAN ODT) tablet 4 mg, 4 mg, Oral, Q8H PRN **OR** ondansetron (ZOFRAN) injection 4 mg, 4 mg, IntraVENous, Q6H PRN, Mahogany Chapa MD    dexamethasone (DECADRON) 4 mg/mL injection 4 mg, 4 mg, IntraVENous, Q6H, Mahogany Chapa MD, 4 mg at 07/19/22 0755    baricitinib (OLUMIANT) tablet 2 mg, 2 mg, Oral, DAILY, Gianna Chapa MD    azithromycin (ZITHROMAX) 500 mg in 0.9% sodium chloride 250 mL ([id number]), 500 mg, IntraVENous, Q24H, Mahogany Chapa MD    cefTRIAXone (ROCEPHIN) 1 g in sterile water (preservative free) 10 mL IV syringe, 1 g, IntraVENous, Q24H, Mahogany Chapa MD    Current Outpatient Medications   Medication Instructions    apixaban (ELIQUIS) 2.5 mg, Oral, 2 TIMES DAILY    aspirin delayed-release 81 mg tablet Oral, DAILY    atorvastatin (LIPITOR) 20 mg, Oral, DAILY    cholecalciferol, vitamin D3, (Vitamin D3) 50 mcg (2,000 unit) tab Oral, EVERY BEDTIME    clopidogreL (PLAVIX) 75 mg, Oral, DAILY    doxazosin (CARDURA) 4 mg tablet EVERY BEDTIME    DULoxetine (CYMBALTA) 30 mg capsule duloxetine 30 mg capsule,delayed release    gabapentin (NEURONTIN) 300 mg capsule EVERY BEDTIME    hydrochlorothiazide (MICROZIDE PO) 12.5 mg, DAILY    mirtazapine (REMERON) 15 mg, Oral, EVERY BEDTIME    ranolazine ER (RANEXA) 500 mg, Oral, 2 TIMES DAILY         Signed By: Lemuel Mendes     July 19, 2022

## 2022-07-19 NOTE — CONSULTS
Consult Date: 7/19/2022    Consults  Covid-19    Subjective   This is a 80year old male with known exposure to Covid-19 among family members, presented with malaise, fatigue, generalized weakness and poor appetite. He was afebrile and not hypoxic. He was leukopenic with monocyctosis. Urinalysis was unremarkable. Covid-19 was detected. CXR showed chronic small left pleural effusion with LLL atelectasis. Images reviewed by me and it appears that the changes are primarily in the RLL. He was placed on nasal cannula. Patient was started on Ceftriaxone, azithromycin, baricitinib and Dexamethasone. ID has been consulted for this reason. Patient also seen by Pulmonary. Patient resting comfortably. He is on nasal O2 but denies SOB but with mild nonproductive cough. Past Medical History:   Diagnosis Date    Atrial fibrillation ([address]     COVID-19 vaccine series completed 03/2021    Hypercholesteremia     Hypertension     Neuropathy     Stroke [facility]) 08/2021    TIA      Past Surgical History:   Procedure Laterality Date    HX CATARACT REMOVAL Left 2018    IOL    SD ABDOMEN SURGERY PROC UNLISTED Right     I[facility]     History reviewed. No pertinent family history.    Social History     Tobacco Use    Smoking status: Former Smoker    Smokeless tobacco: Never Used    Tobacco comment: quit 30 years ago , smoked  pipe and cigar   Substance Use Topics    Alcohol use: Not on file       Current Facility-Administered Medications   Medication Dose Route Frequency Provider Last Rate Last Admin    apixaban (ELIQUIS) tablet 2.5 mg  2.5 mg Oral BID Mahogany Chapa MD   2.5 mg at 07/19/22 0800    aspirin delayed-release tablet 81 mg  81 mg Oral DAILY Mahogany Chapa MD   81 mg at 07/19/22 0800    atorvastatin (LIPITOR) tablet 20 mg  20 mg Oral DAILY Mahogany Chapa MD   20 mg at 07/19/22 0800    doxazosin (CARDURA) tablet 4 mg  4 mg Oral QHS Mahogany Chapa MD        DULoxetine (CYMBALTA) capsule 30 mg  30 mg Oral DAILY Nubia Chapa MD   30 mg at 07/19/22 0800    ranolazine ER (RANEXA) tablet 500 mg  500 mg Oral BID Nubia Chapa MD   500 mg at 07/19/22 0755    0.9% sodium chloride infusion  75 mL/hr IntraVENous CONTINUOUS Nubia Chapa MD 75 mL/hr at 07/19/22 0018 75 mL/hr at 07/19/22 0018    acetaminophen (TYLENOL) tablet 650 mg  650 mg Oral Q6H PRN Nubia Chapa MD        Or   Shereen Whitten acetaminophen (TYLENOL) suppository 650 mg  650 mg Rectal Q6H PRN Nubia Chapa MD        polyethylene glycol (MIRALAX) packet 17 g  17 g Oral DAILY PRN Nubia Chapa MD        ondansetron (ZOFRAN ODT) tablet 4 mg  4 mg Oral Q8H PRN Nubia Chapa MD        Or    ondansetron [facility]) injection 4 mg  4 mg IntraVENous Q6H PRN Nubia Chapa MD        dexamethasone (DECADRON) 4 mg/mL injection 4 mg  4 mg IntraVENous Q6H Mahogany Chapa MD   4 mg at 07/19/22 0755    baricitinib (OLUMIANT) tablet 2 mg  2 mg Oral DAILY Nubia Chapa MD        azithromycin (ZITHROMAX) 500 mg in 0.9% sodium chloride 250 mL ([id number])  500 mg IntraVENous Q24H uNbia Chapa MD        cefTRIAXone (ROCEPHIN) 1 g in sterile water (preservative free) 10 mL IV syringe  1 g IntraVENous Q24H Nelly Seay MD            Review of Systems   Constitutional: Positive for appetite change and fatigue. Negative for chills and fever. HENT: Negative. Eyes: Negative. Respiratory: Negative. Cardiovascular: Negative. Gastrointestinal: Negative. Endocrine: Negative. Genitourinary: Negative. Musculoskeletal: Negative. Skin: Negative. Allergic/Immunologic: Negative. Neurological: Negative. Hematological: Negative. Psychiatric/Behavioral: Negative.         Objective     Vital signs for last 24 hours:  Visit Vitals  BP (!) 162/95 (BP 1 Location: Left lower arm)   Pulse 95   Temp 97.8 °F (36.6 °C)   Resp 18   Ht 5' 7\" (1.702 m)   Wt 120 lb (54.4 kg)   SpO2 96%   BMI 18.79 kg/m²       Intake/Output this shift:  Current Shift: No intake/output data recorded. Last 3 Shifts: 07/17 1901 - 07/19 0700  In: 750 [I.V.:750]  Out: -     Data Review:   Recent Results (from the past 24 hour(s))   CBC WITH AUTOMATED DIFF    Collection Time: 07/18/22  4:48 PM   Result Value Ref Range    WBC 4.4 4.1 - 11.1 K/uL    RBC 4.12 4.10 - 5.70 M/uL    HGB 13.1 12.1 - 17.0 g/dL    HCT 38.9 36.6 - 50.3 %    MCV 94.4 80.0 - 99.0 FL    MCH 31.8 26.0 - 34.0 PG    MCHC 33.7 30.0 - 36.5 g/dL    RDW 13.8 11.5 - 14.5 %    PLATELET 108 051 - 471 K/uL    MPV 10.1 8.9 - 12.9 FL    NRBC 0.0 0.0  WBC    ABSOLUTE NRBC 0.00 0.00 - 0.01 K/uL    NEUTROPHILS 57 32 - 75 %    LYMPHOCYTES 23 12 - 49 %    MONOCYTES 20 (H) 5 - 13 %    EOSINOPHILS 0 0 - 7 %    BASOPHILS 0 0 - 1 %    IMMATURE GRANULOCYTES 0 %    ABS. NEUTROPHILS 2.5 1.8 - 8.0 K/UL    ABS. LYMPHOCYTES 1.0 0.8 - 3.5 K/UL    ABS. MONOCYTES 0.9 0.0 - 1.0 K/UL    ABS. EOSINOPHILS 0.0 0.0 - 0.4 K/UL    ABS. BASOPHILS 0.0 0.0 - 0.1 K/UL    ABS. IMM. GRANS. 0.0 K/UL    DF Manual      RBC COMMENTS Ovalocytes  2+       METABOLIC PANEL, COMPREHENSIVE    Collection Time: 07/18/22  4:48 PM   Result Value Ref Range    Sodium 128 (L) 136 - 145 mmol/L    Potassium 3.7 3.5 - 5.1 mmol/L    Chloride 94 (L) 97 - 108 mmol/L    CO2 23 21 - 32 mmol/L    Anion gap 11 5 - 15 mmol/L    Glucose 78 65 - 100 mg/dL    BUN 18 6 - 20 mg/dL    Creatinine 0.90 0.70 - 1.30 mg/dL    BUN/Creatinine ratio 20 12 - 20      GFR est AA >60 >60 ml/min/1.73m2    GFR est non-AA >60 >60 ml/min/1.73m2    Calcium 8.2 (L) 8.5 - 10.1 mg/dL    Bilirubin, total 0.7 0.2 - 1.0 mg/dL    AST (SGOT) 54 (H) 15 - 37 U/L    ALT (SGPT) 22 12 - 78 U/L    Alk.  phosphatase 105 45 - 117 U/L    Protein, total 6.2 (L) 6.4 - 8.2 g/dL    Albumin 3.2 (L) 3.5 - 5.0 g/dL    Globulin 3.0 2.0 - 4.0 g/dL    A-G Ratio 1.1 1.1 - 2.2     TROPONIN-HIGH SENSITIVITY    Collection Time: 07/18/22  4:48 PM   Result Value Ref Range    Troponin-High Sensitivity 35 0 - 76 ng/L   COVID-19 RAPID TEST    Collection Time: 07/18/22  5:00 PM   Result Value Ref Range    COVID-19 rapid test DETECTED (A) Not Detected     URINALYSIS W/ RFLX MICROSCOPIC    Collection Time: 07/18/22  5:15 PM   Result Value Ref Range    Color Yellow/Straw      Appearance Clear Clear      Specific gravity 1.010 1.003 - 1.030      pH (UA) 6.0 5.0 - 8.0      Protein Negative Negative mg/dL    Glucose Negative Negative mg/dL    Ketone 5 (A) Negative mg/dL    Bilirubin Negative Negative      Blood Negative Negative      Urobilinogen 0.1 0.1 - 1.0 EU/dL    Nitrites Negative Negative      Leukocyte Esterase Negative Negative      WBC 0-4 0 - 4 /hpf    RBC 0-5 0 - 5 /hpf    Bacteria Negative Negative /hpf    Hyaline cast 2-5 0 - 5 /lpf   EKG, 12 LEAD, INITIAL    Collection Time: 07/18/22  5:49 PM   Result Value Ref Range    Ventricular Rate 76 BPM    Atrial Rate 76 BPM    P-R Interval 204 ms    QRS Duration 110 ms    Q-T Interval 406 ms    QTC Calculation (Bezet) 456 ms    Calculated P Axis 86 degrees    Calculated R Axis -28 degrees    Calculated T Axis 84 degrees    Diagnosis       Sinus rhythm with Premature atrial complexes  Cannot rule out Anterior infarct (cited on or before 11-AUG-2021)  Abnormal ECG  When compared with ECG of 11-AUG-2021 11:19,  Premature atrial complexes are now Present  Questionable change in initial forces of Anterior leads  Confirmed by Lata Carrillo MD, [facility] ([id number]) on 7/18/2022 3:00:32 PM     METABOLIC PANEL, COMPREHENSIVE    Collection Time: 07/19/22  6:16 AM   Result Value Ref Range    Sodium 135 (L) 136 - 145 mmol/L    Potassium 3.3 (L) 3.5 - 5.1 mmol/L    Chloride 99 97 - 108 mmol/L    CO2 27 21 - 32 mmol/L    Anion gap 9 5 - 15 mmol/L    Glucose 100 65 - 100 mg/dL    BUN 17 6 - 20 mg/dL    Creatinine 0.78 0.70 - 1.30 mg/dL    BUN/Creatinine ratio 22 (H) 12 - 20      GFR est AA >60 >60 ml/min/1.73m2    GFR est non-AA >60 >60 ml/min/1.73m2    Calcium 8.0 (L) 8.5 - 10.1 mg/dL    Bilirubin, total 0.5 0.2 - 1.0 mg/dL    AST (SGOT) 52 (H) 15 - 37 U/L    ALT (SGPT) 23 12 - 78 U/L    Alk. phosphatase 107 45 - 117 U/L    Protein, total 6.4 6.4 - 8.2 g/dL    Albumin 3.3 (L) 3.5 - 5.0 g/dL    Globulin 3.1 2.0 - 4.0 g/dL    A-G Ratio 1.1 1.1 - 2.2     CBC WITH AUTOMATED DIFF    Collection Time: 07/19/22  6:16 AM   Result Value Ref Range    WBC 2.5 (L) 4.1 - 11.1 K/uL    RBC 4.24 4.10 - 5.70 M/uL    HGB 13.2 12.1 - 17.0 g/dL    HCT 40.4 36.6 - 50.3 %    MCV 95.3 80.0 - 99.0 FL    MCH 31.1 26.0 - 34.0 PG    MCHC 32.7 30.0 - 36.5 g/dL    RDW 13.8 11.5 - 14.5 %    PLATELET 972 581 - 970 K/uL    MPV 10.3 8.9 - 12.9 FL    NRBC 0.0 0.0  WBC    ABSOLUTE NRBC 0.00 0.00 - 0.01 K/uL    NEUTROPHILS 72 32 - 75 %    LYMPHOCYTES 23 12 - 49 %    MONOCYTES 5 5 - 13 %    EOSINOPHILS 0 0 - 7 %    BASOPHILS 0 0 - 1 %    IMMATURE GRANULOCYTES 0 0 - 0.5 %    ABS. NEUTROPHILS 1.8 1.8 - 8.0 K/UL    ABS. LYMPHOCYTES 0.6 (L) 0.8 - 3.5 K/UL    ABS. MONOCYTES 0.1 0.0 - 1.0 K/UL    ABS. EOSINOPHILS 0.0 0.0 - 0.4 K/UL    ABS. BASOPHILS 0.0 0.0 - 0.1 K/UL    ABS. IMM. GRANS. 0.0 0.00 - 0.04 K/UL    DF AUTOMATED       CXR (7/18)         Physical Exam  Vitals and nursing note reviewed. Constitutional:       General: He is not in acute distress. Appearance: He is ill-appearing. He is not diaphoretic. Comments: Nasal O2 2 L/min (no desaturation)   HENT:      Head: Normocephalic and atraumatic. Right Ear: External ear normal.      Left Ear: External ear normal.      Nose: Nose normal.      Mouth/Throat:      Pharynx: Oropharynx is clear. Eyes:      Pupils: Pupils are equal, round, and reactive to light. Cardiovascular:      Rate and Rhythm: Normal rate and regular rhythm. Pulmonary:      Effort: Pulmonary effort is normal.      Breath sounds: Normal breath sounds. Abdominal:      General: Bowel sounds are normal.      Palpations: Abdomen is soft. Tenderness: There is no abdominal tenderness.    Genitourinary: Comments: No Roll[place]  Musculoskeletal:      Cervical back: Neck supple. Right lower leg: No edema. Left lower leg: No edema. Skin:     Findings: No rash. Neurological:      General: No focal deficit present. Mental Status: He is alert and oriented to person, place, and time. Psychiatric:         Mood and Affect: Mood normal.         Behavior: Behavior normal.         Thought Content: Thought content normal.         Judgment: Judgment normal.       ASSESSMENT/PLAN    1. Covid-19 infection on supplemental oxygen (nasal cannula)  2. Leukopenia, possibly secondary to #1  3. Abnormal CXR, clinical significance unclear    1. No indication for Remdesivir  2. Continue Dexamethasone/baricitinib per Pulmonary  3. Reasonable to continue Azithromycin and Ceftriaxone given CXR findings  4. In am, repeat CBC, check CRP, ferritin and LDH    Luis Manuel Ashley MD

## 2022-07-19 NOTE — PROGRESS NOTES
Pt assisted as needed with incontinent care. Able to make needs known. No acute distress, tolerating oxygen well. Tele monitor on with reports of elevated heart rates  Greater than 100 , noted with pt activities upon further eval. Pt reported no discomfort overnight. Will continue to monitor.

## 2022-07-19 NOTE — PROGRESS NOTES
PROGRESS NOTE    Patient: Lyndsay Navarro MRN: [id number]  SSN: [id number]    YOB: 1929  Age: 80 y.o. Sex: male      Admit Date: 7/18/2022    LOS: 1 day       Subjective     Chief Complaint   Patient presents with    Fatigue       HPI: Patient is a 80y.o. year old male with significant past medical history of hypertension CVA A. Fib AICD  came to emergency room because of generalized weakness fatigue for almost 1 week patient had multiple family members at home which are COVID-positive he typically walks with a walker but recently because of generalized weakness not able to ambulate came to emergency room seen by ER physician chest x-ray shows pneumonia COVID screening came back positive     Discussed with the patient wife patient is full code      7/19  Patient laying in bed comfortably. No complaints at this time. He states he is not having any difficulty breathing. O2: 96% on 2L NC  WBC: 2.5  Potassium: 3.3    CXR:  Hypoinspiratory examination. Chronic small left pleural effusion  with left basilar atelectasis. Superimposed left lower lobe pneumonia cannot be  Excluded. Review of Systems:  Constitutional: Generalized weakness. HENT: Negative. Eyes: Negative. Respiratory: Negative. Cardiovascular: Negative. Gastrointestinal: Negative for abdominal pain and nausea. Skin: Negative. Neurological: Negative. Objective     Visit Vitals  BP (!) 162/95 (BP 1 Location: Left lower arm)   Pulse 95   Temp 97.8 °F (36.6 °C)   Resp 18   Ht 5' 7\" (1.702 m)   Wt 54.4 kg (120 lb)   SpO2 96%   BMI 18.79 kg/m²    O2 Flow Rate (L/min): 2 l/min O2 Device: Nasal cannula    Physical Exam:   Constitutional: pt is oriented to person, place, and time. Generalized weakness  HENT:   Head: Normocephalic and atraumatic. Eyes: Pupils are equal, round, and reactive to light. EOM are normal.   Cardiovascular: Normal rate, regular rhythm and normal heart sounds.    Pulmonary/Chest: Breath sounds normal. No wheezes. No rales. Exhibits no tenderness. Abdominal: Soft. Bowel sounds are normal. There is no abdominal tenderness. There is no rebound and no guarding. Musculoskeletal: Normal range of motion. Neurological: pt is alert and oriented to person, place, and time. Alert. Normal strength. No cranial nerve deficit or sensory deficit. Intake & Output:  Current Shift: No intake/output data recorded. Last three shifts: 07/17 1901 - 07/19 0700  In: 750 [I.V.:750]  Out: -     Lab/Data Review:  Lab results reviewed. For significant abnormal values and values requiring intervention, see assessment and plan. 24 Hour Results:    Recent Results (from the past 24 hour(s))   CBC WITH AUTOMATED DIFF    Collection Time: 07/18/22  4:48 PM   Result Value Ref Range    WBC 4.4 4.1 - 11.1 K/uL    RBC 4.12 4.10 - 5.70 M/uL    HGB 13.1 12.1 - 17.0 g/dL    HCT 38.9 36.6 - 50.3 %    MCV 94.4 80.0 - 99.0 FL    MCH 31.8 26.0 - 34.0 PG    MCHC 33.7 30.0 - 36.5 g/dL    RDW 13.8 11.5 - 14.5 %    PLATELET 639 597 - 841 K/uL    MPV 10.1 8.9 - 12.9 FL    NRBC 0.0 0.0  WBC    ABSOLUTE NRBC 0.00 0.00 - 0.01 K/uL    NEUTROPHILS 57 32 - 75 %    LYMPHOCYTES 23 12 - 49 %    MONOCYTES 20 (H) 5 - 13 %    EOSINOPHILS 0 0 - 7 %    BASOPHILS 0 0 - 1 %    IMMATURE GRANULOCYTES 0 %    ABS. NEUTROPHILS 2.5 1.8 - 8.0 K/UL    ABS. LYMPHOCYTES 1.0 0.8 - 3.5 K/UL    ABS. MONOCYTES 0.9 0.0 - 1.0 K/UL    ABS. EOSINOPHILS 0.0 0.0 - 0.4 K/UL    ABS. BASOPHILS 0.0 0.0 - 0.1 K/UL    ABS. IMM.  GRANS. 0.0 K/UL    DF Manual      RBC COMMENTS Ovalocytes  2+       METABOLIC PANEL, COMPREHENSIVE    Collection Time: 07/18/22  4:48 PM   Result Value Ref Range    Sodium 128 (L) 136 - 145 mmol/L    Potassium 3.7 3.5 - 5.1 mmol/L    Chloride 94 (L) 97 - 108 mmol/L    CO2 23 21 - 32 mmol/L    Anion gap 11 5 - 15 mmol/L    Glucose 78 65 - 100 mg/dL    BUN 18 6 - 20 mg/dL    Creatinine 0.90 0.70 - 1.30 mg/dL    BUN/Creatinine ratio 20 12 - 20      GFR est AA >60 >60 ml/min/1.73m2    GFR est non-AA >60 >60 ml/min/1.73m2    Calcium 8.2 (L) 8.5 - 10.1 mg/dL    Bilirubin, total 0.7 0.2 - 1.0 mg/dL    AST (SGOT) 54 (H) 15 - 37 U/L    ALT (SGPT) 22 12 - 78 U/L    Alk.  phosphatase 105 45 - 117 U/L    Protein, total 6.2 (L) 6.4 - 8.2 g/dL    Albumin 3.2 (L) 3.5 - 5.0 g/dL    Globulin 3.0 2.0 - 4.0 g/dL    A-G Ratio 1.1 1.1 - 2.2     TROPONIN-HIGH SENSITIVITY    Collection Time: 07/18/22  4:48 PM   Result Value Ref Range    Troponin-High Sensitivity 35 0 - 76 ng/L   COVID-19 RAPID TEST    Collection Time: 07/18/22  5:00 PM   Result Value Ref Range    COVID-19 rapid test DETECTED (A) Not Detected     URINALYSIS W/ RFLX MICROSCOPIC    Collection Time: 07/18/22  5:15 PM   Result Value Ref Range    Color Yellow/Straw      Appearance Clear Clear      Specific gravity 1.010 1.003 - 1.030      pH (UA) 6.0 5.0 - 8.0      Protein Negative Negative mg/dL    Glucose Negative Negative mg/dL    Ketone 5 (A) Negative mg/dL    Bilirubin Negative Negative      Blood Negative Negative      Urobilinogen 0.1 0.1 - 1.0 EU/dL    Nitrites Negative Negative      Leukocyte Esterase Negative Negative      WBC 0-4 0 - 4 /hpf    RBC 0-5 0 - 5 /hpf    Bacteria Negative Negative /hpf    Hyaline cast 2-5 0 - 5 /lpf   EKG, 12 LEAD, INITIAL    Collection Time: 07/18/22  5:49 PM   Result Value Ref Range    Ventricular Rate 76 BPM    Atrial Rate 76 BPM    P-R Interval 204 ms    QRS Duration 110 ms    Q-T Interval 406 ms    QTC Calculation (Bezet) 456 ms    Calculated P Axis 86 degrees    Calculated R Axis -28 degrees    Calculated T Axis 84 degrees    Diagnosis       Sinus rhythm with Premature atrial complexes  Cannot rule out Anterior infarct (cited on or before 11-AUG-2021)  Abnormal ECG  When compared with ECG of 11-AUG-2021 11:19,  Premature atrial complexes are now Present  Questionable change in initial forces of Anterior leads  Confirmed by Leatha Weems MD, MATT ([id number]) on 7/18/2022 9:59:05 PM METABOLIC PANEL, COMPREHENSIVE    Collection Time: 07/19/22  6:16 AM   Result Value Ref Range    Sodium 135 (L) 136 - 145 mmol/L    Potassium 3.3 (L) 3.5 - 5.1 mmol/L    Chloride 99 97 - 108 mmol/L    CO2 27 21 - 32 mmol/L    Anion gap 9 5 - 15 mmol/L    Glucose 100 65 - 100 mg/dL    BUN 17 6 - 20 mg/dL    Creatinine 0.78 0.70 - 1.30 mg/dL    BUN/Creatinine ratio 22 (H) 12 - 20      GFR est AA >60 >60 ml/min/1.73m2    GFR est non-AA >60 >60 ml/min/1.73m2    Calcium 8.0 (L) 8.5 - 10.1 mg/dL    Bilirubin, total 0.5 0.2 - 1.0 mg/dL    AST (SGOT) 52 (H) 15 - 37 U/L    ALT (SGPT) 23 12 - 78 U/L    Alk. phosphatase 107 45 - 117 U/L    Protein, total 6.4 6.4 - 8.2 g/dL    Albumin 3.3 (L) 3.5 - 5.0 g/dL    Globulin 3.1 2.0 - 4.0 g/dL    A-G Ratio 1.1 1.1 - 2.2     CBC WITH AUTOMATED DIFF    Collection Time: 07/19/22  6:16 AM   Result Value Ref Range    WBC 2.5 (L) 4.1 - 11.1 K/uL    RBC 4.24 4.10 - 5.70 M/uL    HGB 13.2 12.1 - 17.0 g/dL    HCT 40.4 36.6 - 50.3 %    MCV 95.3 80.0 - 99.0 FL    MCH 31.1 26.0 - 34.0 PG    MCHC 32.7 30.0 - 36.5 g/dL    RDW 13.8 11.5 - 14.5 %    PLATELET 667 656 - 800 K/uL    MPV 10.3 8.9 - 12.9 FL    NRBC 0.0 0.0  WBC    ABSOLUTE NRBC 0.00 0.00 - 0.01 K/uL    NEUTROPHILS 72 32 - 75 %    LYMPHOCYTES 23 12 - 49 %    MONOCYTES 5 5 - 13 %    EOSINOPHILS 0 0 - 7 %    BASOPHILS 0 0 - 1 %    IMMATURE GRANULOCYTES 0 0 - 0.5 %    ABS. NEUTROPHILS 1.8 1.8 - 8.0 K/UL    ABS. LYMPHOCYTES 0.6 (L) 0.8 - 3.5 K/UL    ABS. MONOCYTES 0.1 0.0 - 1.0 K/UL    ABS. EOSINOPHILS 0.0 0.0 - 0.4 K/UL    ABS. BASOPHILS 0.0 0.0 - 0.1 K/UL    ABS. IMM. GRANS. 0.0 0.00 - 0.04 K/UL    DF AUTOMATED           Imaging:    XR CHEST PORT   Final Result   Hypoinspiratory examination. Chronic small left pleural effusion   with left basilar atelectasis. Superimposed left lower lobe pneumonia cannot be   excluded.                 Assessment     Acute hypoxic respiratory failure on 2 L  COVID-19 pneumonitis  hypertension, stroke and   Afib  Neutropenia    Plan     Begin decadron 4mg IV every 6 hours   give Potassium 40mEq IV once  Begin amlodipine 5mg PO QD    Eliquis 2.5mg PO BID  Aspirin 81mg PO every day  Lipitor 20mg PO every day  Cardura 4mg PO every day  Cymbalta 30mg  PO every day  Ranolazine 500mg PO BID  Miralax 17g PO every day  Zofran 4mg tab PO Q8h  Olumiant 2mg PO every day  Zithromax 500mg IV every day  Recphin 1g IV every day      PT OT consult    Current Facility-Administered Medications:     apixaban (ELIQUIS) tablet 2.5 mg, 2.5 mg, Oral, BID, Mahogany Chapa MD, 2.5 mg at 07/19/22 0800    aspirin delayed-release tablet 81 mg, 81 mg, Oral, DAILY, Mahogany Chapa MD, 81 mg at 07/19/22 0800    atorvastatin (LIPITOR) tablet 20 mg, 20 mg, Oral, DAILY, Mahogany Chapa MD, 20 mg at 07/19/22 0800    doxazosin (CARDURA) tablet 4 mg, 4 mg, Oral, QHS, Rosalinda Chapa MD    DULoxetine (CYMBALTA) capsule 30 mg, 30 mg, Oral, DAILY, Mahogany Chapa MD, 30 mg at 07/19/22 0800    ranolazine ER (RANEXA) tablet 500 mg, 500 mg, Oral, BID, Mahogany Chapa MD, 500 mg at 07/19/22 0755    0.9% sodium chloride infusion, 75 mL/hr, IntraVENous, CONTINUOUS, Mahogany Chapa MD, Last Rate: 75 mL/hr at 07/19/22 0018, 75 mL/hr at 07/19/22 0018    acetaminophen (TYLENOL) tablet 650 mg, 650 mg, Oral, Q6H PRN **OR** acetaminophen (TYLENOL) suppository 650 mg, 650 mg, Rectal, Q6H PRN, Rosalinda Chapa MD    polyethylene glycol (MIRALAX) packet 17 g, 17 g, Oral, DAILY PRN, Rosalinda Chapa MD    ondansetron (ZOFRAN ODT) tablet 4 mg, 4 mg, Oral, Q8H PRN **OR** ondansetron (ZOFRAN) injection 4 mg, 4 mg, IntraVENous, Q6H PRN, Mahogany Chapa MD    dexamethasone (DECADRON) 4 mg/mL injection 4 mg, 4 mg, IntraVENous, Q6H, Mahogany Chapa MD, 4 mg at 07/19/22 1247    baricitinib (OLUMIANT) tablet 2 mg, 2 mg, Oral, DAILY, Mahogany Chapa MD, 2 mg at 07/19/22 1036    azithromycin (ZITHROMAX) 500 mg in 0.9% sodium chloride 250 mL ([id number]), 500 mg, IntraVENous, Q24H, Hiro Chapa MD    cefTRIAXone (ROCEPHIN) 1 g in sterile water (preservative free) 10 mL IV syringe, 1 g, IntraVENous, Q24H, Mahogany Chapa MD    Current Outpatient Medications   Medication Instructions    apixaban (ELIQUIS) 2.5 mg, Oral, 2 TIMES DAILY    aspirin delayed-release 81 mg tablet Oral, DAILY    atorvastatin (LIPITOR) 20 mg, Oral, DAILY    cholecalciferol, vitamin D3, (Vitamin D3) 50 mcg (2,000 unit) tab Oral, EVERY BEDTIME    clopidogreL (PLAVIX) 75 mg, Oral, DAILY    doxazosin (CARDURA) 4 mg tablet EVERY BEDTIME    DULoxetine (CYMBALTA) 30 mg capsule duloxetine 30 mg capsule,delayed release    gabapentin (NEURONTIN) 300 mg capsule EVERY BEDTIME    hydrochlorothiazide (MICROZIDE PO) 12.5 mg, DAILY    mirtazapine (REMERON) 15 mg, Oral, EVERY BEDTIME    ranolazine ER (RANEXA) 500 mg, Oral, 2 TIMES DAILY         Signed By: Maria Fernanda Roman MD     July 19, 2022

## 2022-07-19 NOTE — PROGRESS NOTES
Problem: Airway Clearance - Ineffective  Goal: Achieve or maintain patent airway  Outcome: Progressing Towards Goal     Problem: Gas Exchange - Impaired  Goal: Absence of hypoxia  Outcome: Progressing Towards Goal  Goal: Promote optimal lung function  Outcome: Progressing Towards Goal     Problem: Breathing Pattern - Ineffective  Goal: Ability to achieve and maintain a regular respiratory rate  Outcome: Progressing Towards Goal     Problem:  Body Temperature -  Risk of, Imbalanced  Goal: Ability to maintain a body temperature within defined limits  Outcome: Progressing Towards Goal  Goal: Will regain or maintain usual level of consciousness  Outcome: Progressing Towards Goal  Goal: Complications related to the disease process, condition or treatment will be avoided or minimized  Outcome: Progressing Towards Goal     Problem: Isolation Precautions - Risk of Spread of Infection  Goal: Prevent transmission of infectious organism to others  Outcome: Progressing Towards Goal     Problem: Nutrition Deficits  Goal: Optimize nutrtional status  Outcome: Progressing Towards Goal     Problem: Risk for Fluid Volume Deficit  Goal: Maintain normal heart rhythm  Outcome: Progressing Towards Goal  Goal: Maintain absence of muscle cramping  Outcome: Progressing Towards Goal  Goal: Maintain normal serum potassium, sodium, calcium, phosphorus, and pH  Outcome: Progressing Towards Goal     Problem: Loneliness or Risk for Loneliness  Goal: Demonstrate positive use of time alone when socialization is not possible  Outcome: Progressing Towards Goal     Problem: Fatigue  Goal: Verbalize increase energy and improved vitality  Outcome: Progressing Towards Goal     Problem: Patient Education: Go to Patient Education Activity  Goal: Patient/Family Education  Outcome: Progressing Towards Goal     Problem: Risk for Spread of Infection  Goal: Prevent transmission of infectious organism to others  Description: Prevent the transmission of infectious organisms to other patients, staff members, and visitors. Outcome: Progressing Towards Goal     Problem: Patient Education:  Go to Education Activity  Goal: Patient/Family Education  Outcome: Progressing Towards Goal     Problem: Pressure Injury - Risk of  Goal: *Prevention of pressure injury  Description: Document Ed Scale and appropriate interventions in the flowsheet. Outcome: Progressing Towards Goal  Note: Pressure Injury Interventions:  Sensory Interventions: Assess changes in LOC         Activity Interventions: Assess need for specialty bed    Mobility Interventions: Assess need for specialty bed    Nutrition Interventions: Document food/fluid/supplement intake                     Problem: Patient Education: Go to Patient Education Activity  Goal: Patient/Family Education  Outcome: Progressing Towards Goal     Problem: Falls - Risk of  Goal: *Absence of Falls  Description: Document Angelesmeño Cervantes Fall Risk and appropriate interventions in the flowsheet.   Outcome: Progressing Towards Goal  Note: Fall Risk Interventions:  Mobility Interventions: Patient to call before getting OOB              Elimination Interventions: Call light in reach              Problem: Patient Education: Go to Patient Education Activity  Goal: Patient/Family Education  Outcome: Progressing Towards Goal

## 2022-07-19 NOTE — PROGRESS NOTES
PULMONARY CONSULT  [facility]    Name: Yue Novoa MRN: [id number]   : 1929 Hospital: [facility]   Date: 2022  Admission date: 2022 Hospital Day: 2       HPI:     Hospital Problems  Never Reviewed          Codes Class Noted POA    Hypoxia ICD-10-CM: R09.02  ICD-9-CM: 799.02  2022 Unknown        COVID ICD-10-CM: U07.1  ICD-9-CM: 079.89  2022 Unknown                   [x] High complexity decision making was performed  [x] See my orders for details      Subjective/Initial History:     I was asked by Markos Breen MD to see Yue Novoa  a 80 y.o.  male in consultation     Excerpts from admission 2022 or consult notes as follows:     Patient is a 80y.o. year old male with significant past medical history of hypertension CVA A.  Fib AICD  came to emergency room because of generalized weakness fatigue for almost 1 week patient had multiple family members at home which are COVID-positive he typically walks with a walker but recently because of generalized weakness not able to ambulate came to emergency room seen by ER physician chest x-ray shows pneumonia COVID screening came back positive his wife has COVID-19 and she is at home along the oxygen or inhalers no history of smoking in the past      No Known Allergies     MAR reviewed and pertinent medications noted or modified as needed     Current Facility-Administered Medications   Medication    apixaban (ELIQUIS) tablet 2.5 mg    aspirin delayed-release tablet 81 mg    atorvastatin (LIPITOR) tablet 20 mg    doxazosin (CARDURA) tablet 4 mg    DULoxetine (CYMBALTA) capsule 30 mg    ranolazine ER (RANEXA) tablet 500 mg    0.9% sodium chloride infusion    acetaminophen (TYLENOL) tablet 650 mg    Or    acetaminophen (TYLENOL) suppository 650 mg    polyethylene glycol (MIRALAX) packet 17 g    ondansetron (ZOFRAN ODT) tablet 4 mg    Or    ondansetron (ZOFRAN) injection 4 mg    dexamethasone (DECADRON) 4 mg/mL injection 4 mg    baricitinib (OLUMIANT) tablet 2 mg    azithromycin (ZITHROMAX) 500 mg in 0.9% sodium chloride 250 mL ([id number])    cefTRIAXone (ROCEPHIN) 1 g in sterile water (preservative free) 10 mL IV syringe      Patient PCP: Hoa Whitten MD  PMH:  has a past medical history of Atrial fibrillation ([facility] [address] COVID-19 vaccine series completed (2021), Hypercholesteremia, Hypertension, Neuropathy, and Stroke ([facility] [address] (2021). PSH:   has a past surgical history that includes pr abdomen surgery proc unlisted (Right) and hx cataract removal (Left, 2018). FHX: family history is not on file. SHX:  reports that he has quit smoking. He has never used smokeless tobacco. He reports that he does not use drugs. ROS:      Constitutional: Generalized weakness. HENT: Negative. Eyes: Negative. Respiratory: Negative. Cardiovascular: Negative. Gastrointestinal: Negative for abdominal pain and nausea. Skin: Negative. Neurological: Negative. Objective:     Vital Signs: Telemetry:    normal sinus rhythm Intake/Output:   Visit Vitals  BP (!) 162/95 (BP 1 Location: Left lower arm)   Pulse 95   Temp 97.8 °F (36.6 °C)   Resp 18   Ht 5' 7\" (1.702 m)   Wt 54.4 kg (120 lb)   SpO2 96%   BMI 18.79 kg/m²       Temp (24hrs), Av.2 °F (36.8 °C), Min:97.8 °F (36.6 °C), Max:98.7 °F (37.1 °C)        O2 Device: Nasal cannula O2 Flow Rate (L/min): 2 l/min       Wt Readings from Last 4 Encounters:   22 54.4 kg (120 lb)   02/10/22 54.4 kg (120 lb)   21 54.4 kg (120 lb)   21 53.5 kg (118 lb)          Intake/Output Summary (Last 24 hours) at 2022 09  Last data filed at 2022 2018  Gross per 24 hour   Intake 750 ml   Output --   Net 750 ml       Last shift:      No intake/output data recorded. Last 3 shifts: 1901 -  0700  In: 750 [I.V.:750]  Out: -        Physical Exam:       Constitutional: pt is oriented to person, place, and time.   Generalized weakness  HENT:   Head: Normocephalic and atraumatic. Eyes: Pupils are equal, round, and reactive to light. EOM are normal.   Cardiovascular: Normal rate, regular rhythm and normal heart sounds. Pulmonary/Chest: Breath sounds normal. No wheezes. No rales. Exhibits no tenderness. Abdominal: Soft. Bowel sounds are normal. There is no abdominal tenderness. There is no rebound and no guarding. Musculoskeletal: Normal range of motion. Neurological: pt is alert and oriented to person, place, and time. Alert. Normal strength. No cranial nerve deficit or sensory deficit. Labs:    Recent Labs     07/19/22  0616 07/18/22  1648   WBC 2.5* 4.4   HGB 13.2 13.1    199     Recent Labs     07/19/22  0616 07/18/22  1648   * 128*   K 3.3* 3.7   CL 99 94*   CO2 27 23    78   BUN 17 18   CREA 0.78 0.90   CA 8.0* 8.2*   ALB 3.3* 3.2*   ALT 23 22     No results for input(s): PH, PCO2, PO2, HCO3, FIO2 in the last 72 hours. No results for input(s): CPK, CKNDX, TROIQ in the last 72 hours. No lab exists for component: CPKMB  No results found for: BNPP, BNP   No results found for: CULTNo results found for: TSH, TSHEXT, TSHEXT    Imaging:    CXR Results  (Last 48 hours)               07/18/22 1737  XR CHEST PORT Final result    Impression:  Hypoinspiratory examination. Chronic small left pleural effusion   with left basilar atelectasis. Superimposed left lower lobe pneumonia cannot be   excluded. Narrative:  INDICATION:  Fatigue        COMPARISON: August 2021       FINDINGS: Single AP portable view of the chest obtained at 1732 demonstrates a   stable cardiomediastinal silhouette. The lungs are hypoinspiratory and the   patient is rotated leftward. There is a chronic small left pleural effusion with   left basilar opacification. There is a chronic right lateral chest wall   deformity with pleural thickening.                Results from [place] encounter on 07/18/22    XR CHEST PORT    Narrative  INDICATION:  Fatigue    COMPARISON: August 2021    FINDINGS: Single AP portable view of the chest obtained at 1732 demonstrates a  stable cardiomediastinal silhouette. The lungs are hypoinspiratory and the  patient is rotated leftward. There is a chronic small left pleural effusion with  left basilar opacification. There is a chronic right lateral chest wall  deformity with pleural thickening. Impression  Hypoinspiratory examination. Chronic small left pleural effusion  with left basilar atelectasis. Superimposed left lower lobe pneumonia cannot be  excluded. Results from [place] encounter on 08/26/21    NC XR TECHNOLOGIST SERVICE    Narrative  COMPLIANCE ONLY    INDICATION: intra op    FINDINGS:    No intraoperative fluoroscopic views were submitted during fluoroscopic  assistance. Impression  Intraoperative views not provided. Results from [place] encounter on 08/11/21    XR CHEST PORT    Narrative  Chest, frontal view, 8/11/2021    History: AICD, pacemaker. Comparison: Including chest 3/20/2020. Findings: Small electronic device is again seen at the left lower hemithorax. Retrocardiac lucency suggests hiatal hernia. The cardiac silhouette is within  normal limits. Lung volumes are low. Apical pleural thickening is seen. There  is pulmonary vascular congestion and hydrostatic edema. Blunting of the  costophrenic angles may be due to scarring, atelectasis or small pleural  effusions. No pneumothorax is identified. Degenerative changes are present in  the thoracic spine. Chronic deformity of the right scapula and right-sided ribs  are again noted. Impression  Electronic device at the left lower hemithorax. Hydrostatic edema. Blunting of the costophrenic angles as above.     Results from [place] encounter on 02/10/22    CT HEAD WO CONT    Narrative  Technique: Multiple continuous axial images were obtained from the skull base to  the vertex without administration of intravenous contrast.    Comment on dose reduction: All CT scans at this facility are performed using  dose reduction optimization technique as appropriate to perform the exam  including the following; automated exposure control, adjustments of the mA  and/or kV according to patient size, or use of iterative reconstructed  technique. Comparison examination: None    Findings:  The ventricles and sulci are prominent consistent with age-related changes of  atrophy. Periventricular hypodensity is identified consistent with changes of  chronic small vessel disease. No evidence of midline shift, mass lesion, or  extra-axial fluid collection. No evidence of parenchymal hemorrhage or  infarction in a major vascular territory. The osseous structures are intact, the paranasal sinuses are clear. Extracalvarial soft tissue hematoma posterior vertex . Impression  No acute intracranial process. Extracalvarial soft tissue hematoma posterior  vertex . IMPRESSION:   1. COVID-19  2. Hypoxia  3. Pleural effusion  4. Atelectasis  5. Hypokalemia  6. Chronic atrial fibrillation      RECOMMENDATIONS/PLAN:     1. 2L nasal Cannula oxygen as salvage oxygen delivery device to provide high concentration of oxygen to overcome refractory hypoxia;  2. Agree with rocephin and zithromax IV antibiotics pending culture results   3. Will start him on baricitinib  4. Hypokalemia, replace potassium  5. Patient on 2L nasal cannula, will try to wean. 6. Chest x-ray shows small left pleural effusion which was present before atelectasis possible infiltrate  7. Aspiration precautions  8. Labs to follow electrolytes, renal function and and blood counts  9. Pt needs IV fluids with additives and Drug therapy requiring intensive monitoring for toxicity  10.  Prescription drug management with home med reconciliation reviewed       This care involved high complexity medical decision making: I personally:  · Reviewed the flowsheet and previous days notes  · Reviewed and summarized records or history from previous days note or discussions with staff, family  · High Risk Drug therapy requiring intensive monitoring for toxicity: eg steroids, pressors, antibiotics  · Reviewed and/or ordered Clinical lab tests  · Reviewed images and/or ordered Radiology tests  · Reviewed the patients ECG / Telemetry  · Reviewed and/or adjusted NiPPV settings  · Called and arranged for Radiologic procedures or interventions  · performed or ordered Diagnostic endoscopies with identified risk factors. · discussed my assessment/management with : Nursing, Hospitalist and Family for coordination of care    7/19: Patient complaining of cough with sputum production. Lungs are clear.       Donte Rg MD

## 2022-07-19 NOTE — PROGRESS NOTES
Problem: Airway Clearance - Ineffective  Goal: Achieve or maintain patent airway  Outcome: Progressing Towards Goal     Problem: Gas Exchange - Impaired  Goal: Promote optimal lung function  Outcome: Progressing Towards Goal     Problem: Breathing Pattern - Ineffective  Goal: Ability to achieve and maintain a regular respiratory rate  Outcome: Progressing Towards Goal     Problem: Isolation Precautions - Risk of Spread of Infection  Goal: Prevent transmission of infectious organism to others  Outcome: Progressing Towards Goal     Problem: Nutrition Deficits  Goal: Optimize nutrtional status  Outcome: Progressing Towards Goal     Problem: Risk for Fluid Volume Deficit  Goal: Maintain normal heart rhythm  Outcome: Progressing Towards Goal

## 2022-07-20 NOTE — PROGRESS NOTES
PROGRESS NOTE    Patient: Gilbert Jimenez MRN: [id number]  SSN: [id number]    YOB: 1929  Age: 80 y.o. Sex: male      Admit Date: 7/18/2022    LOS: 2 days       Subjective     Chief Complaint   Patient presents with    Fatigue       HPI: Patient is a 80y.o. year old male with significant past medical history of hypertension CVA A. Fib AICD  came to emergency room because of generalized weakness fatigue for almost 1 week patient had multiple family members at home which are COVID-positive he typically walks with a walker but recently because of generalized weakness not able to ambulate came to emergency room seen by ER physician chest x-ray shows pneumonia COVID screening came back positive     Discussed with the patient wife patient is full code      7/19  Patient laying in bed comfortably. No complaints at this time. He states he is not having any difficulty breathing. O2: 96% on 2L NC  WBC: 2.5  Potassium: 3.3    CXR:  Hypoinspiratory examination. Chronic small left pleural effusion  with left basilar atelectasis. Superimposed left lower lobe pneumonia cannot be  Excluded. 7/20  Patient laying in bed comfortably without complaints at this time. NAD. Patient not wearing nasal canula when I entered the room. O2 sat 96%  WBC: 2.5>5.4      Review of Systems:  Constitutional: Generalized weakness. HENT: Negative. Eyes: Negative. Respiratory: Negative. Cardiovascular: Negative. Gastrointestinal: Negative for abdominal pain and nausea. Skin: Negative. Neurological: Negative. Objective     Visit Vitals  BP (!) 156/84 (BP 1 Location: Left upper arm, BP Patient Position: At rest;Supine;Sitting)   Pulse 88   Temp 97.5 °F (36.4 °C)   Resp 18   Ht 5' 7\" (1.702 m)   Wt 54.4 kg (120 lb)   SpO2 99%   BMI 18.79 kg/m²    O2 Flow Rate (L/min): 2 l/min O2 Device: Nasal cannula    Physical Exam:   Constitutional: pt is oriented to person, place, and time.   Generalized weakness  HENT: Head: Normocephalic and atraumatic. Eyes: Pupils are equal, round, and reactive to light. EOM are normal.   Cardiovascular: Normal rate, regular rhythm and normal heart sounds. Pulmonary/Chest: Breath sounds normal. No wheezes. No rales. Exhibits no tenderness. Abdominal: Soft. Bowel sounds are normal. There is no abdominal tenderness. There is no rebound and no guarding. Musculoskeletal: Normal range of motion. Neurological: pt is alert and oriented to person, place, and time. Alert. Normal strength. No cranial nerve deficit or sensory deficit. Intake & Output:  Current Shift: No intake/output data recorded. Last three shifts: 07/18 1901 - 07/20 0700  In: 750 [I.V.:750]  Out: 450 [Urine:450]    Lab/Data Review:  Lab results reviewed. For significant abnormal values and values requiring intervention, see assessment and plan. 24 Hour Results:    Recent Results (from the past 24 hour(s))   C REACTIVE PROTEIN, QT    Collection Time: 07/20/22  6:28 AM   Result Value Ref Range    C-Reactive protein 1.30 (H) 0.00 - 0.60 mg/dL   LD    Collection Time: 07/20/22  6:28 AM   Result Value Ref Range     (H) 85 - 997 U/L   METABOLIC PANEL, COMPREHENSIVE    Collection Time: 07/20/22  6:28 AM   Result Value Ref Range    Sodium 136 136 - 145 mmol/L    Potassium 3.8 3.5 - 5.1 mmol/L    Chloride 104 97 - 108 mmol/L    CO2 23 21 - 32 mmol/L    Anion gap 9 5 - 15 mmol/L    Glucose 112 (H) 65 - 100 mg/dL    BUN 17 6 - 20 mg/dL    Creatinine 0.63 (L) 0.70 - 1.30 mg/dL    BUN/Creatinine ratio 27 (H) 12 - 20      GFR est AA >60 >60 ml/min/1.73m2    GFR est non-AA >60 >60 ml/min/1.73m2    Calcium 7.2 (L) 8.5 - 10.1 mg/dL    Bilirubin, total 0.5 0.2 - 1.0 mg/dL    AST (SGOT) 73 (H) 15 - 37 U/L    ALT (SGPT) 26 12 - 78 U/L    Alk.  phosphatase 91 45 - 117 U/L    Protein, total 6.0 (L) 6.4 - 8.2 g/dL    Albumin 3.0 (L) 3.5 - 5.0 g/dL    Globulin 3.0 2.0 - 4.0 g/dL    A-G Ratio 1.0 (L) 1.1 - 2.2     CBC WITH AUTOMATED DIFF    Collection Time: 07/20/22  6:28 AM   Result Value Ref Range    WBC 5.4 4.1 - 11.1 K/uL    RBC 3.96 (L) 4.10 - 5.70 M/uL    HGB 12.2 12.1 - 17.0 g/dL    HCT 37.0 36.6 - 50.3 %    MCV 93.4 80.0 - 99.0 FL    MCH 30.8 26.0 - 34.0 PG    MCHC 33.0 30.0 - 36.5 g/dL    RDW 13.9 11.5 - 14.5 %    PLATELET 250 232 - 391 K/uL    MPV 10.1 8.9 - 12.9 FL    NRBC 0.0 0.0  WBC    ABSOLUTE NRBC 0.00 0.00 - 0.01 K/uL    NEUTROPHILS 79 (H) 32 - 75 %    LYMPHOCYTES 13 12 - 49 %    MONOCYTES 8 5 - 13 %    EOSINOPHILS 0 0 - 7 %    BASOPHILS 0 0 - 1 %    IMMATURE GRANULOCYTES 0 0 - 0.5 %    ABS. NEUTROPHILS 4.3 1.8 - 8.0 K/UL    ABS. LYMPHOCYTES 0.7 (L) 0.8 - 3.5 K/UL    ABS. MONOCYTES 0.4 0.0 - 1.0 K/UL    ABS. EOSINOPHILS 0.0 0.0 - 0.4 K/UL    ABS. BASOPHILS 0.0 0.0 - 0.1 K/UL    ABS. IMM. GRANS. 0.0 0.00 - 0.04 K/UL    DF AUTOMATED           Imaging:    XR CHEST PORT   Final Result   Hypoinspiratory examination. Chronic small left pleural effusion   with left basilar atelectasis. Superimposed left lower lobe pneumonia cannot be   excluded.                 Assessment     Acute hypoxic respiratory failure on 2 L  COVID-19 pneumonitis  hypertension,   stroke and   Afib  Neutropenia    Plan   Increase Cardura to 8mg PO QD    Begin decadron 4mg IV every 6 hours   give Potassium 40mEq IV once    Eliquis 2.5mg PO BID  Aspirin 81mg PO every day  Lipitor 20mg PO every day  Cardura 4mg PO every day  Cymbalta 30mg  PO every day  Ranolazine 500mg PO BID  Miralax 17g PO every day  Zofran 4mg tab PO Q8h  Olumiant 2mg PO every day  Zithromax 500mg IV every day  Recphin 1g IV every day      PT OT consult    Current Facility-Administered Medications:     apixaban (ELIQUIS) tablet 2.5 mg, 2.5 mg, Oral, BID, Mahogany Chapa MD, 2.5 mg at 07/19/22 2213    aspirin delayed-release tablet 81 mg, 81 mg, Oral, DAILY, Mahogany Chapa MD, 81 mg at 07/19/22 0800    atorvastatin (LIPITOR) tablet 20 mg, 20 mg, Oral, DAILY, Eduard Teresa Rock MD, 20 mg at 07/19/22 0800    doxazosin (CARDURA) tablet 4 mg, 4 mg, Oral, QHS, Mahogany Chapa MD, 4 mg at 07/19/22 2213    DULoxetine (CYMBALTA) capsule 30 mg, 30 mg, Oral, DAILY, Courtney Davis MD, 30 mg at 07/19/22 0800    ranolazine ER (RANEXA) tablet 500 mg, 500 mg, Oral, BID, Mahogany Chapa MD, 500 mg at 07/19/22 2213    0.9% sodium chloride infusion, 75 mL/hr, IntraVENous, CONTINUOUS, Mahogany Chapa MD, Last Rate: 75 mL/hr at 07/19/22 1353, 75 mL/hr at 07/19/22 1353    acetaminophen (TYLENOL) tablet 650 mg, 650 mg, Oral, Q6H PRN **OR** acetaminophen (TYLENOL) suppository 650 mg, 650 mg, Rectal, Q6H PRN, Mahogany Chapa MD    polyethylene glycol (MIRALAX) packet 17 g, 17 g, Oral, DAILY PRN, Mahogany Chapa MD    ondansetron (ZOFRAN ODT) tablet 4 mg, 4 mg, Oral, Q8H PRN **OR** ondansetron (ZOFRAN) injection 4 mg, 4 mg, IntraVENous, Q6H PRN, Mahogany Chapa MD    dexamethasone (DECADRON) 4 mg/mL injection 4 mg, 4 mg, IntraVENous, Q6H, Mahogany Chapa MD, 4 mg at 07/20/22 0007    baricitinib (OLUMIANT) tablet 2 mg, 2 mg, Oral, DAILY, Mahogany Chapa MD, 2 mg at 07/19/22 1036    azithromycin (ZITHROMAX) 500 mg in 0.9% sodium chloride 250 mL ([id number]), 500 mg, IntraVENous, Q24H, Mahogany Chapa MD, Last Rate: 250 mL/hr at 07/19/22 1937, 500 mg at 07/19/22 1937    cefTRIAXone (ROCEPHIN) 1 g in sterile water (preservative free) 10 mL IV syringe, 1 g, IntraVENous, Q24H, Mahogany Chapa MD, 1 g at 07/19/22 1129    Current Outpatient Medications   Medication Instructions    apixaban (ELIQUIS) 2.5 mg, Oral, 2 TIMES DAILY    aspirin delayed-release 81 mg tablet Oral, DAILY    atorvastatin (LIPITOR) 20 mg, Oral, DAILY    cholecalciferol, vitamin D3, (Vitamin D3) 50 mcg (2,000 unit) tab Oral, EVERY BEDTIME    clopidogreL (PLAVIX) 75 mg, Oral, DAILY    doxazosin (CARDURA) 4 mg tablet EVERY BEDTIME    DULoxetine (CYMBALTA) 30 mg capsule duloxetine 30 mg capsule,delayed release    gabapentin (NEURONTIN) 300 mg capsule EVERY BEDTIME    hydrochlorothiazide (MICROZIDE PO) 12.5 mg, DAILY    mirtazapine (REMERON) 15 mg, Oral, EVERY BEDTIME    ranolazine ER (RANEXA) 500 mg, Oral, 2 TIMES DAILY         Signed By: Stacey Bryant     July 20, 2022

## 2022-07-20 NOTE — PROGRESS NOTES
PROGRESS NOTE    Patient: Cyndie Jean-Baptiste MRN: [id number]  SSN: [id number]    YOB: 1929  Age: 80 y.o. Sex: male      Admit Date: 7/18/2022    LOS: 2 days       Subjective     Chief Complaint   Patient presents with    Fatigue       HPI: Patient is a 80y.o. year old male with significant past medical history of hypertension CVA A. Fib AICD  came to emergency room because of generalized weakness fatigue for almost 1 week patient had multiple family members at home which are COVID-positive he typically walks with a walker but recently because of generalized weakness not able to ambulate came to emergency room seen by ER physician chest x-ray shows pneumonia COVID screening came back positive     Discussed with the patient wife patient is full code      7/19  Patient laying in bed comfortably. No complaints at this time. He states he is not having any difficulty breathing. O2: 96% on 2L NC  WBC: 2.5  Potassium: 3.3    CXR:  Hypoinspiratory examination. Chronic small left pleural effusion  with left basilar atelectasis. Superimposed left lower lobe pneumonia cannot be  Excluded. 7/20  Patient laying in bed comfortably without complaints at this time. NAD. Patient not wearing nasal canula when I entered the room. O2 sat 96%  WBC: 2.5>5.4      Review of Systems:  Constitutional: Generalized weakness. HENT: Negative. Eyes: Negative. Respiratory: Negative. Cardiovascular: Negative. Gastrointestinal: Negative for abdominal pain and nausea. Skin: Negative. Neurological: Negative. Objective     Visit Vitals  BP (!) 157/90 (BP 1 Location: Left upper arm, BP Patient Position: At rest)   Pulse 88   Temp 98.9 °F (37.2 °C)   Resp 20   Ht 5' 7\" (1.702 m)   Wt 54.4 kg (120 lb)   SpO2 96%   BMI 18.79 kg/m²    O2 Flow Rate (L/min): 2 l/min O2 Device: None (Room air)    Physical Exam:   Constitutional: pt is oriented to person, place, and time.   Generalized weakness  HENT:   Head: Normocephalic and atraumatic. Eyes: Pupils are equal, round, and reactive to light. EOM are normal.   Cardiovascular: Normal rate, regular rhythm and normal heart sounds. Pulmonary/Chest: Breath sounds normal. No wheezes. No rales. Exhibits no tenderness. Abdominal: Soft. Bowel sounds are normal. There is no abdominal tenderness. There is no rebound and no guarding. Musculoskeletal: Normal range of motion. Neurological: pt is alert and oriented to person, place, and time. Alert. Normal strength. No cranial nerve deficit or sensory deficit. Intake & Output:  Current Shift: No intake/output data recorded. Last three shifts: 07/18 1901 - 07/20 0700  In: 750 [I.V.:750]  Out: 450 [Urine:450]    Lab/Data Review:  Lab results reviewed. For significant abnormal values and values requiring intervention, see assessment and plan. 24 Hour Results:    Recent Results (from the past 24 hour(s))   C REACTIVE PROTEIN, QT    Collection Time: 07/20/22  6:28 AM   Result Value Ref Range    C-Reactive protein 1.30 (H) 0.00 - 0.60 mg/dL   LD    Collection Time: 07/20/22  6:28 AM   Result Value Ref Range     (H) 85 - 241 U/L   FERRITIN    Collection Time: 07/20/22  6:28 AM   Result Value Ref Range    Ferritin 155 26 - 340 ng/mL   METABOLIC PANEL, COMPREHENSIVE    Collection Time: 07/20/22  6:28 AM   Result Value Ref Range    Sodium 136 136 - 145 mmol/L    Potassium 3.8 3.5 - 5.1 mmol/L    Chloride 104 97 - 108 mmol/L    CO2 23 21 - 32 mmol/L    Anion gap 9 5 - 15 mmol/L    Glucose 112 (H) 65 - 100 mg/dL    BUN 17 6 - 20 mg/dL    Creatinine 0.63 (L) 0.70 - 1.30 mg/dL    BUN/Creatinine ratio 27 (H) 12 - 20      GFR est AA >60 >60 ml/min/1.73m2    GFR est non-AA >60 >60 ml/min/1.73m2    Calcium 7.2 (L) 8.5 - 10.1 mg/dL    Bilirubin, total 0.5 0.2 - 1.0 mg/dL    AST (SGOT) 73 (H) 15 - 37 U/L    ALT (SGPT) 26 12 - 78 U/L    Alk.  phosphatase 91 45 - 117 U/L    Protein, total 6.0 (L) 6.4 - 8.2 g/dL    Albumin 3.0 (L) 3.5 - 5.0 g/dL    Globulin 3.0 2.0 - 4.0 g/dL    A-G Ratio 1.0 (L) 1.1 - 2.2     CBC WITH AUTOMATED DIFF    Collection Time: 07/20/22  6:28 AM   Result Value Ref Range    WBC 5.4 4.1 - 11.1 K/uL    RBC 3.96 (L) 4.10 - 5.70 M/uL    HGB 12.2 12.1 - 17.0 g/dL    HCT 37.0 36.6 - 50.3 %    MCV 93.4 80.0 - 99.0 FL    MCH 30.8 26.0 - 34.0 PG    MCHC 33.0 30.0 - 36.5 g/dL    RDW 13.9 11.5 - 14.5 %    PLATELET 634 475 - 559 K/uL    MPV 10.1 8.9 - 12.9 FL    NRBC 0.0 0.0  WBC    ABSOLUTE NRBC 0.00 0.00 - 0.01 K/uL    NEUTROPHILS 79 (H) 32 - 75 %    LYMPHOCYTES 13 12 - 49 %    MONOCYTES 8 5 - 13 %    EOSINOPHILS 0 0 - 7 %    BASOPHILS 0 0 - 1 %    IMMATURE GRANULOCYTES 0 0 - 0.5 %    ABS. NEUTROPHILS 4.3 1.8 - 8.0 K/UL    ABS. LYMPHOCYTES 0.7 (L) 0.8 - 3.5 K/UL    ABS. MONOCYTES 0.4 0.0 - 1.0 K/UL    ABS. EOSINOPHILS 0.0 0.0 - 0.4 K/UL    ABS. BASOPHILS 0.0 0.0 - 0.1 K/UL    ABS. IMM. GRANS. 0.0 0.00 - 0.04 K/UL    DF AUTOMATED           Imaging:    XR CHEST PORT   Final Result   Hypoinspiratory examination. Chronic small left pleural effusion   with left basilar atelectasis. Superimposed left lower lobe pneumonia cannot be   excluded.                 Assessment     Acute hypoxic respiratory failure on 2 L  COVID-19 pneumonitis  hypertension,   stroke and   Afib  Neutropenia    Plan   Increase Cardura to 8mg PO QD    Begin decadron 4mg IV every 6 hours   give Potassium 40mEq IV once    Eliquis 2.5mg PO BID  Aspirin 81mg PO every day  Lipitor 20mg PO every day  Cardura 4mg PO every day  Cymbalta 30mg  PO every day  Ranolazine 500mg PO BID  Miralax 17g PO every day  Zofran 4mg tab PO Q8h  Olumiant 2mg PO every day  Zithromax 500mg IV every day  Recphin 1g IV every day  Norvasc 5mg po qd      PT OT consult    Current Facility-Administered Medications:     apixaban (ELIQUIS) tablet 2.5 mg, 2.5 mg, Oral, BID, Mahogany Chapa MD, 2.5 mg at 07/20/22 0825    aspirin delayed-release tablet 81 mg, 81 mg, Oral, DAILY, Eduard Romana Dallas, MD, 81 mg at 07/20/22 0825    atorvastatin (LIPITOR) tablet 20 mg, 20 mg, Oral, DAILY, Mahogany Chapa MD, 20 mg at 07/20/22 0825    doxazosin (CARDURA) tablet 4 mg, 4 mg, Oral, QHS, Mahogany Chapa MD, 4 mg at 07/19/22 2213    DULoxetine (CYMBALTA) capsule 30 mg, 30 mg, Oral, DAILY, Mahogany Chapa MD, 30 mg at 07/20/22 0825    ranolazine ER (RANEXA) tablet 500 mg, 500 mg, Oral, BID, Mohiuddin, Romana Dallas, MD, 500 mg at 07/20/22 0825    0.9% sodium chloride infusion, 75 mL/hr, IntraVENous, CONTINUOUS, Mahogany Chapa MD, Last Rate: 75 mL/hr at 07/19/22 1353, 75 mL/hr at 07/19/22 1353    acetaminophen (TYLENOL) tablet 650 mg, 650 mg, Oral, Q6H PRN **OR** acetaminophen (TYLENOL) suppository 650 mg, 650 mg, Rectal, Q6H PRN, Mahogany Chapa MD    polyethylene glycol (MIRALAX) packet 17 g, 17 g, Oral, DAILY PRN, Mahogany Chapa MD    ondansetron (ZOFRAN ODT) tablet 4 mg, 4 mg, Oral, Q8H PRN **OR** ondansetron (ZOFRAN) injection 4 mg, 4 mg, IntraVENous, Q6H PRN, Mahogany Chapa MD    dexamethasone (DECADRON) 4 mg/mL injection 4 mg, 4 mg, IntraVENous, Q6H, Mahogany Chapa MD, 4 mg at 07/20/22 0825    baricitinib (OLUMIANT) tablet 2 mg, 2 mg, Oral, DAILY, Mahogany Chapa MD, 2 mg at 07/20/22 0825    azithromycin (ZITHROMAX) 500 mg in 0.9% sodium chloride 250 mL ([id number]), 500 mg, IntraVENous, Q24H, Mahogany Chapa MD, Last Rate: 250 mL/hr at 07/19/22 1937, 500 mg at 07/19/22 1937    cefTRIAXone (ROCEPHIN) 1 g in sterile water (preservative free) 10 mL IV syringe, 1 g, IntraVENous, Q24H, Mahogany Chapa MD, 1 g at 07/19/22 1938    Current Outpatient Medications   Medication Instructions    apixaban (ELIQUIS) 2.5 mg, Oral, 2 TIMES DAILY    aspirin delayed-release 81 mg tablet Oral, DAILY    atorvastatin (LIPITOR) 20 mg, Oral, DAILY    cholecalciferol, vitamin D3, (Vitamin D3) 50 mcg (2,000 unit) tab Oral, EVERY BEDTIME    clopidogreL (PLAVIX) 75 mg, Oral, DAILY    doxazosin (CARDURA) 4 mg tablet EVERY BEDTIME    DULoxetine (CYMBALTA) 30 mg capsule duloxetine 30 mg capsule,delayed release    gabapentin (NEURONTIN) 300 mg capsule EVERY BEDTIME    hydrochlorothiazide (MICROZIDE PO) 12.5 mg, DAILY    mirtazapine (REMERON) 15 mg, Oral, EVERY BEDTIME    ranolazine ER (RANEXA) 500 mg, Oral, 2 TIMES DAILY         Signed By: Pat Gil MD     July 20, 2022

## 2022-07-20 NOTE — PROGRESS NOTES
PULMONARY NOTE  [facility]    Name: Gilbert Jimenez MRN: [id number]   : 1929 Hospital: [facility]   Date: 2022  Admission date: 2022 Hospital Day: 3       HPI:     Hospital Problems  Never Reviewed            Codes Class Noted POA    Hypoxia ICD-10-CM: R09.02  ICD-9-CM: 799.02  2022 Unknown        COVID ICD-10-CM: U07.1  ICD-9-CM: 079.89  2022 Unknown            [x] High complexity decision making was performed  [x] See my orders for details      Subjective/Initial History:     I was asked by Oliver Perdue MD to see Gilbert Jimenez  a 80 y.o.  male in consultation     Excerpts from admission 2022 or consult notes as follows:     Patient is a 80y.o. year old male with significant past medical history of hypertension CVA A.  Fib AICD  came to emergency room because of generalized weakness fatigue for almost 1 week patient had multiple family members at home which are COVID-positive he typically walks with a walker but recently because of generalized weakness not able to ambulate came to emergency room seen by ER physician chest x-ray shows pneumonia COVID screening came back positive his wife has COVID-19 and she is at home along the oxygen or inhalers no history of smoking in the past      No Known Allergies     MAR reviewed and pertinent medications noted or modified as needed     Current Facility-Administered Medications   Medication    apixaban (ELIQUIS) tablet 2.5 mg    aspirin delayed-release tablet 81 mg    atorvastatin (LIPITOR) tablet 20 mg    doxazosin (CARDURA) tablet 4 mg    DULoxetine (CYMBALTA) capsule 30 mg    ranolazine ER (RANEXA) tablet 500 mg    0.9% sodium chloride infusion    acetaminophen (TYLENOL) tablet 650 mg    Or    acetaminophen (TYLENOL) suppository 650 mg    polyethylene glycol (MIRALAX) packet 17 g    ondansetron (ZOFRAN ODT) tablet 4 mg    Or    ondansetron (ZOFRAN) injection 4 mg    dexamethasone (DECADRON) 4 mg/mL injection 4 mg    baricitinib (OLUMIANT) tablet 2 mg    azithromycin (ZITHROMAX) 500 mg in 0.9% sodium chloride 250 mL ([id number])    cefTRIAXone (ROCEPHIN) 1 g in sterile water (preservative free) 10 mL IV syringe      Patient PCP: Akhil Sterling MD  PMH:  has a past medical history of Atrial fibrillation ([facility] [address] COVID-19 vaccine series completed (2021), Hypercholesteremia, Hypertension, Neuropathy, and Stroke ([facility] [address] (2021). PSH:   has a past surgical history that includes pr abdomen surgery proc unlisted (Right) and hx cataract removal (Left, 2018). FHX: family history is not on file. SHX:  reports that he has quit smoking. He has never used smokeless tobacco. He reports that he does not use drugs. ROS:      Constitutional: Generalized weakness. HENT: Negative. Eyes: Negative. Respiratory: Negative. Cardiovascular: Negative. Gastrointestinal: Negative for abdominal pain and nausea. Skin: Negative. Neurological: Negative. Objective:     Vital Signs: Telemetry:    normal sinus rhythm Intake/Output:   Visit Vitals  BP (!) 157/90 (BP 1 Location: Left upper arm, BP Patient Position: At rest)   Pulse 88   Temp 98.9 °F (37.2 °C)   Resp 20   Ht 5' 7\" (1.702 m)   Wt 54.4 kg (120 lb)   SpO2 97%   BMI 18.79 kg/m²       Temp (24hrs), Av.1 °F (36.7 °C), Min:97.5 °F (36.4 °C), Max:98.9 °F (37.2 °C)        O2 Device: Nasal cannula O2 Flow Rate (L/min): 2 l/min       Wt Readings from Last 4 Encounters:   22 54.4 kg (120 lb)   02/10/22 54.4 kg (120 lb)   21 54.4 kg (120 lb)   21 53.5 kg (118 lb)          Intake/Output Summary (Last 24 hours) at 2022 0839  Last data filed at 2022 2337  Gross per 24 hour   Intake --   Output 450 ml   Net -450 ml         Last shift:      No intake/output data recorded. Last 3 shifts: 1901 -  0700  In: 750 [I.V.:750]  Out: 450 [Urine:450]       Physical Exam:       Constitutional: pt is oriented to person, place, and time. Generalized weakness  HENT:   Head: Normocephalic and atraumatic. Eyes: Pupils are equal, round, and reactive to light. EOM are normal.   Cardiovascular: Normal rate, regular rhythm and normal heart sounds. Pulmonary/Chest: Breath sounds normal. No wheezes. No rales. Exhibits no tenderness. Abdominal: Soft. Bowel sounds are normal. There is no abdominal tenderness. There is no rebound and no guarding. Musculoskeletal: Normal range of motion. Neurological: pt is alert and oriented to person, place, and time. Alert. Normal strength. No cranial nerve deficit or sensory deficit. Labs:    Recent Labs     07/20/22 0628 07/19/22 0616 07/18/22  1648   WBC 5.4 2.5* 4.4   HGB 12.2 13.2 13.1    206 199       Recent Labs     07/20/22 0628 07/19/22 0616 07/18/22  1648    135* 128*   K 3.8 3.3* 3.7    99 94*   CO2 23 27 23   * 100 78   BUN 17 17 18   CREA 0.63* 0.78 0.90   CA 7.2* 8.0* 8.2*   ALB 3.0* 3.3* 3.2*   ALT 26 23 22       No results for input(s): PH, PCO2, PO2, HCO3, FIO2 in the last 72 hours. No results for input(s): CPK, CKNDX, TROIQ in the last 72 hours. No lab exists for component: CPKMB  No results found for: BNPP, BNP   No results found for: CULTNo results found for: TSH, TSHEXT, TSHEXT    Imaging:    CXR Results  (Last 48 hours)                 07/18/22 1737  XR CHEST PORT Final result    Impression:  Hypoinspiratory examination. Chronic small left pleural effusion   with left basilar atelectasis. Superimposed left lower lobe pneumonia cannot be   excluded. Narrative:  INDICATION:  Fatigue        COMPARISON: August 2021       FINDINGS: Single AP portable view of the chest obtained at 1732 demonstrates a   stable cardiomediastinal silhouette. The lungs are hypoinspiratory and the   patient is rotated leftward. There is a chronic small left pleural effusion with   left basilar opacification.  There is a chronic right lateral chest wall   deformity with pleural thickening. Results from [place] encounter on 07/18/22    XR CHEST PORT    Narrative  INDICATION:  Fatigue    COMPARISON: August 2021    FINDINGS: Single AP portable view of the chest obtained at 1732 demonstrates a  stable cardiomediastinal silhouette. The lungs are hypoinspiratory and the  patient is rotated leftward. There is a chronic small left pleural effusion with  left basilar opacification. There is a chronic right lateral chest wall  deformity with pleural thickening. Impression  Hypoinspiratory examination. Chronic small left pleural effusion  with left basilar atelectasis. Superimposed left lower lobe pneumonia cannot be  excluded. Results from [place] encounter on 08/26/21    [facility] XR TECHNOLOGIST SERVICE    Narrative  COMPLIANCE ONLY    INDICATION: intra op    FINDINGS:    No intraoperative fluoroscopic views were submitted during fluoroscopic  assistance. Impression  Intraoperative views not provided. Results from [place] encounter on 08/11/21    XR CHEST PORT    Narrative  Chest, frontal view, 8/11/2021    History: AICD, pacemaker. Comparison: Including chest 3/20/2020. Findings: Small electronic device is again seen at the left lower hemithorax. Retrocardiac lucency suggests hiatal hernia. The cardiac silhouette is within  normal limits. Lung volumes are low. Apical pleural thickening is seen. There  is pulmonary vascular congestion and hydrostatic edema. Blunting of the  costophrenic angles may be due to scarring, atelectasis or small pleural  effusions. No pneumothorax is identified. Degenerative changes are present in  the thoracic spine. Chronic deformity of the right scapula and right-sided ribs  are again noted. Impression  Electronic device at the left lower hemithorax. Hydrostatic edema. Blunting of the costophrenic angles as above.     Results from [place] encounter on 02/10/22    CT HEAD WO CONT    Narrative  Technique: Multiple continuous axial images were obtained from the skull base to  the vertex without administration of intravenous contrast.    Comment on dose reduction: All CT scans at this facility are performed using  dose reduction optimization technique as appropriate to perform the exam  including the following; automated exposure control, adjustments of the mA  and/or kV according to patient size, or use of iterative reconstructed  technique. Comparison examination: None    Findings:  The ventricles and sulci are prominent consistent with age-related changes of  atrophy. Periventricular hypodensity is identified consistent with changes of  chronic small vessel disease. No evidence of midline shift, mass lesion, or  extra-axial fluid collection. No evidence of parenchymal hemorrhage or  infarction in a major vascular territory. The osseous structures are intact, the paranasal sinuses are clear. Extracalvarial soft tissue hematoma posterior vertex . Impression  No acute intracranial process. Extracalvarial soft tissue hematoma posterior  vertex . IMPRESSION:   COVID-19  Hypoxia  Pleural effusion  Atelectasis  Hypokalemia   Chronic atrial fibrillation      RECOMMENDATIONS/PLAN:     2L nasal Cannula oxygen as salvage oxygen delivery device to provide high concentration of oxygen to overcome refractory hypoxia; Agree with rocephin and zithromax IV antibiotics pending culture results   Started him on baricitinib  Hypokalemia, replace potassium  Patient on 2L nasal cannula, will try to wean. Chest x-ray shows small left pleural effusion which was present before atelectasis possible infiltrate  Aspiration precautions  Patient is confused today, trying to remove his nasal cannula and get out of bed.        7/19: Patient complaining of cough with sputum production. Lungs are clear. 7/20: Lungs are clear. Patient is confused, getting out of bed and removing his nasal cannula. Sugey Alvarez MD

## 2022-07-20 NOTE — PROGRESS NOTES
PHYSICAL THERAPY EVALUATION  Patient: Dana Meade (7 y.o. male)  Date: 7/20/2022  Primary Diagnosis: Hypoxia [R09.02]  COVID [U07.1]       Precautions: falls       ASSESSMENT  Pt is a 79 yo M with PMH of CVA, A-fib, AICD, presenting to ED with generalized weakness. Found to have pneumonia, and COVID in ED. Admitted 7/20/22 COVID-19, pneumonitis, HTN. Prior level received from patient and wife. Pt primarily indep for bed mobility however needed some help for the last few days. Once upright, able to use rollator or quad cane for mobility. Does have a history of falls. Pt lives with wife, who is around to help at all times. Lives in 2 level house, with main floor living, and has ramp access to enter. Based on the objective data described below, the patient presents with decr LE strength, decreased standing balance, decr activity tolerance, and decr functional indep / mobility. Mobility performed this session: Supine>sit: primarily completed with stand by A. Cueing for B UE use throughout scooting closer to EOB. Once at EOB, SpO2 assessed which was stable on RA. Cueing on hand placement for sit to stand transfer, as pt had tendency to reach for RW. Once upright cueing on static standing prior to mobility. Able to ambulate towards chair, Lokesh primarily initially with unsteadiness throughout midstance B LE w/ RW. Once by chair, instruction on hand placement for stand to sit transfer. After rest, we performed another bout of ambulation towards bed, prior to resting, SpO2 again stable. Cueing on hand placement again for this stand to sit transfer. Once ready, we stood again with instruction again on hand placement, at times pt had difficulty with sit to stand as pt would lose balance posteriorly, however this last performance was steadiest out of all sit to stands.  Able to ambulate in room with RW, steadier gait, CGA primarily, cueing on walker navigation during functional ambulation along with gait pattern. Once back towards EOB, cueing on lateral scooting to sit higher in bed. Instruction then provided on sitting back, and sit to supine transfer. Once in supine instruction to flex B knees and press off like a bridge, to scoot higher towards HOB, able to complete well, with stand by A. Once back in supine, PT discussed plan during hospital stay with therapy, all questions invited/answered. Pt was education on activity pacing techniques throughout the session. Pt did well with session today with stable SpO2 on RA when assessed. Pt will benefit from continued skilled PT to address above deficits and return to PLOF. Current PT DC recommendation SNF vs HHPT and home w/ 24 hr A pending functional progression. Other factors to consider for discharge: current mobility, baseline mobility, DME, assistance needed at home      PLAN :  Recommendations and Planned Interventions: bed mobility training, transfer training, gait training, therapeutic exercises, neuromuscular re-education, patient and family training/education, and therapeutic activities      Frequency/Duration: Patient will be followed by physical therapy:  3-5x/week to address goals. Recommendation for discharge: (in order for the patient to meet his/her long term goals)  SNF vs HHPT and home w/ 24 hr A pending functional progression    This discharge recommendation:  Has been made in collaboration with the attending provider and/or case management    IF patient discharges home will need the following DME: to be determined (TBD)         SUBJECTIVE:   Patient stated we can walk again.     OBJECTIVE DATA SUMMARY:   HISTORY:    Past Medical History:   Diagnosis Date    Atrial fibrillation ([facility] [address]     COVID-19 vaccine series completed 03/2021    Hypercholesteremia     Hypertension     Neuropathy     Stroke ([facility] [address] 08/2021    TIA     Past Surgical History:   Procedure Laterality Date    HX CATARACT REMOVAL Left 2018    IOL    CA ABDOMEN SURGERY PROC UNLISTED Right     IHR       Home Situation  Home Environment: Private residence  # Steps to Enter:  (ramp)  Wheelchair Ramp: Yes  One/Two Story Residence: Two story (Lives on main level)  # of Interior Steps:  (yes pt doesnt go upstairs)  Living Alone: No  Support Systems: Spouse/Significant Other  Patient Expects to be Discharged to[de-identified] Home  Current DME Used/Available at Home: Walker, rolling, Walker, rollator, Cane, quad    EXAMINATION/PRESENTATION/DECISION MAKING:   Critical Behavior:  Neurologic State: Alert  Orientation Level: Oriented to person, Oriented to place, Oriented to time (Place and date with cueing)  Cognition: Follows commands       Range Of Motion:  AROM: Generally decreased, functional (Grossly BLE)                       Strength:    Strength: Generally decreased, functional (4/5 knee ext B, 3+/5 DF (within pts range), able to SLR B)                    Tone & Sensation:                  Sensation: Impaired (Reported neuropathy BLE)               Coordination:     Vision:      Functional Mobility:  Bed Mobility:     Supine to Sit: Stand-by assistance  Sit to Supine: Supervision  Scooting: Stand-by assistance  Transfers:  Sit to Stand: Stand-by assistance;Contact guard assistance  Stand to Sit: Stand-by assistance;Contact guard assistance                       Balance:   Sitting: Intact; Without support (Cueing on trunk control however)  Standing: Impaired; With support  Standing - Static: Fair;Constant support  Standing - Dynamic : Fair;Constant support  Ambulation/Gait Training:  Distance (ft): 25 Feet (ft)  Assistive Device: Walker, rolling  Ambulation - Level of Assistance: Contact guard assistance;Minimal assistance (initially Lokesh progressed to CGA)     Gait Description (WDL): Exceptions to WDL  Gait Abnormalities:  (step to, flat foot contact B, flex trunk posture, flex knee posture BLE during midstance.)         Functional Measure:  [address] [facility] [address] [id number] AM-PAC 2 [facility]         Basic Mobility Inpatient Short Form  How much difficulty does the patient currently have. .. Unable A Lot A Little None   1. Turning over in bed (including adjusting bedclothes, sheets and blankets)? [] 1   [] 2   [] 3   [x] 4   2. Sitting down on and standing up from a chair with arms ( e.g., wheelchair, bedside commode, etc.)   [] 1   [] 2   [x] 3   [] 4   3. Moving from lying on back to sitting on the side of the bed? [] 1   [] 2   [] 3   [x] 4          How much help from another person does the patient currently need. .. Total A Lot A Little None   4. Moving to and from a bed to a chair (including a wheelchair)? [] 1   [] 2   [x] 3   [] 4   5. Need to walk in hospital room? [] 1   [] 2   [x] 3   [] 4   6. Climbing 3-5 steps with a railing? [] 1   [x] 2   [] 3   [] 4   © 2007, Trustees of [employer], under license to [employer]. All rights reserved     Score:  Initial: 19/24 Most Recent: (Date: 7/20/22 )   Interpretation of Tool:  Represents activities that are increasingly more difficult (i.e. Bed mobility, Transfers, Gait). Score 24 23 22-20 19-15 14-10 9-7 6   Modifier CH CI CJ CK CL CM CN         Physical Therapy Evaluation Charge Determination   History Examination Presentation Decision-Making   MEDIUM  Complexity : 1-2 comorbidities / personal factors will impact the outcome/ POC  MEDIUM Complexity : 3 Standardized tests and measures addressing body structure, function, activity limitation and / or participation in recreation  LOW Complexity : Stable, uncomplicated  Other outcome measures [facility] 6  19/24      Based on the above components, the patient evaluation is determined to be of the following complexity level: LOW     Pain Rating:  No pain reported    Activity Tolerance:   Fair, SpO2 stable on RA, and requires rest breaks    After treatment patient left in no apparent distress:   Supine in bed and Call bell within reach and RN updated.     GOALS:    Problem: Mobility Impaired (Adult and Pediatric)  Goal: *Acute Goals and Plan of Care (Insert Text)  Description: Pt stated goals: walk   Pt will be I with LE HEP in 7 days. Pt will perform bed mobility with mod I in 7 days. Pt will perform transfers with mod I in 7 days. Pt will amb  feet with LRAD safely with spv in 7 days. Outcome: Not Met       COMMUNICATION/EDUCATION:   The patients plan of care was discussed with: Registered nurse. Patient/family have participated as able in goal setting and plan of care.        Thank you for this referral.  Kendall Robert, PT, PT, DPT   Time Calculation: 34 mins

## 2022-07-20 NOTE — PROGRESS NOTES
CM reviewed chart. Patient is still on IV steroids and pending PT/OT evaluation. CM will follow for PT/OT recommendations and any discharge needs.

## 2022-07-20 NOTE — PROGRESS NOTES
PULMONARY CONSULT  [facility]    Name: Diana Montes MRN: [id number]   : 1929 Hospital: [facility]   Date: 2022  Admission date: 2022 Hospital Day: 3       HPI:     Hospital Problems  Never Reviewed            Codes Class Noted POA    Hypoxia ICD-10-CM: R09.02  ICD-9-CM: 799.02  2022 Unknown        COVID ICD-10-CM: U07.1  ICD-9-CM: 079.89  2022 Unknown              [x] High complexity decision making was performed  [x] See my orders for details      Subjective/Initial History:     I was asked by Raffi Montiel MD to see Diana Montes  a 80 y.o.  male in consultation     Excerpts from admission 2022 or consult notes as follows:     Patient is a 80y.o. year old male with significant past medical history of hypertension CVA A.  Fib AICD  came to emergency room because of generalized weakness fatigue for almost 1 week patient had multiple family members at home which are COVID-positive he typically walks with a walker but recently because of generalized weakness not able to ambulate came to emergency room seen by ER physician chest x-ray shows pneumonia COVID screening came back positive his wife has COVID-19 and she is at home along the oxygen or inhalers no history of smoking in the past      No Known Allergies     MAR reviewed and pertinent medications noted or modified as needed     Current Facility-Administered Medications   Medication    apixaban (ELIQUIS) tablet 2.5 mg    aspirin delayed-release tablet 81 mg    atorvastatin (LIPITOR) tablet 20 mg    doxazosin (CARDURA) tablet 4 mg    DULoxetine (CYMBALTA) capsule 30 mg    ranolazine ER (RANEXA) tablet 500 mg    0.9% sodium chloride infusion    acetaminophen (TYLENOL) tablet 650 mg    Or    acetaminophen (TYLENOL) suppository 650 mg    polyethylene glycol (MIRALAX) packet 17 g    ondansetron (ZOFRAN ODT) tablet 4 mg    Or    ondansetron (ZOFRAN) injection 4 mg    dexamethasone (DECADRON) 4 mg/mL injection 4 mg    baricitinib (OLUMIANT) tablet 2 mg    azithromycin (ZITHROMAX) 500 mg in 0.9% sodium chloride 250 mL ([id number])    cefTRIAXone (ROCEPHIN) 1 g in sterile water (preservative free) 10 mL IV syringe      Patient PCP: Jefe Reynoso MD  PMH:  has a past medical history of Atrial fibrillation ([facility] [address] COVID-19 vaccine series completed (2021), Hypercholesteremia, Hypertension, Neuropathy, and Stroke ([facility] [address] (2021). PSH:   has a past surgical history that includes pr abdomen surgery proc unlisted (Right) and hx cataract removal (Left, 2018). FHX: family history is not on file. SHX:  reports that he has quit smoking. He has never used smokeless tobacco. He reports that he does not use drugs. ROS:      Constitutional: Generalized weakness. HENT: Negative. Eyes: Negative. Respiratory: Negative. Cardiovascular: Negative. Gastrointestinal: Negative for abdominal pain and nausea. Skin: Negative. Neurological: Negative. Objective:     Vital Signs: Telemetry:    normal sinus rhythm Intake/Output:   Visit Vitals  BP (!) 157/90 (BP 1 Location: Left upper arm, BP Patient Position: At rest)   Pulse 88   Temp 98.9 °F (37.2 °C)   Resp 20   Ht 5' 7\" (1.702 m)   Wt 54.4 kg (120 lb)   SpO2 97%   BMI 18.79 kg/m²       Temp (24hrs), Av.1 °F (36.7 °C), Min:97.5 °F (36.4 °C), Max:98.9 °F (37.2 °C)        O2 Device: Nasal cannula O2 Flow Rate (L/min): 2 l/min       Wt Readings from Last 4 Encounters:   22 54.4 kg (120 lb)   02/10/22 54.4 kg (120 lb)   21 54.4 kg (120 lb)   21 53.5 kg (118 lb)          Intake/Output Summary (Last 24 hours) at 2022 0827  Last data filed at 2022 2337  Gross per 24 hour   Intake --   Output 450 ml   Net -450 ml         Last shift:      No intake/output data recorded.   Last 3 shifts: 1901 -  0700  In: 750 [I.V.:750]  Out: 450 [Urine:450]       Physical Exam:       Constitutional: pt is oriented to person, place, and time.  Generalized weakness  HENT:   Head: Normocephalic and atraumatic. Eyes: Pupils are equal, round, and reactive to light. EOM are normal.   Cardiovascular: Normal rate, regular rhythm and normal heart sounds. Pulmonary/Chest: Breath sounds normal. No wheezes. No rales. Exhibits no tenderness. Abdominal: Soft. Bowel sounds are normal. There is no abdominal tenderness. There is no rebound and no guarding. Musculoskeletal: Normal range of motion. Neurological: pt is alert and oriented to person, place, and time. Alert. Normal strength. No cranial nerve deficit or sensory deficit. Labs:    Recent Labs     07/20/22 0628 07/19/22  0616 07/18/22  1648   WBC 5.4 2.5* 4.4   HGB 12.2 13.2 13.1    206 199       Recent Labs     07/20/22 0628 07/19/22  0616 07/18/22  1648    135* 128*   K 3.8 3.3* 3.7    99 94*   CO2 23 27 23   * 100 78   BUN 17 17 18   CREA 0.63* 0.78 0.90   CA 7.2* 8.0* 8.2*   ALB 3.0* 3.3* 3.2*   ALT 26 23 22       No results for input(s): PH, PCO2, PO2, HCO3, FIO2 in the last 72 hours. No results for input(s): CPK, CKNDX, TROIQ in the last 72 hours. No lab exists for component: CPKMB  No results found for: BNPP, BNP   No results found for: CULTNo results found for: TSH, TSHEXT, TSHEXT    Imaging:    CXR Results  (Last 48 hours)                 07/18/22 1737  XR CHEST PORT Final result    Impression:  Hypoinspiratory examination. Chronic small left pleural effusion   with left basilar atelectasis. Superimposed left lower lobe pneumonia cannot be   excluded. Narrative:  INDICATION:  Fatigue        COMPARISON: August 2021       FINDINGS: Single AP portable view of the chest obtained at 1732 demonstrates a   stable cardiomediastinal silhouette. The lungs are hypoinspiratory and the   patient is rotated leftward. There is a chronic small left pleural effusion with   left basilar opacification.  There is a chronic right lateral chest wall   deformity with pleural thickening. Results from [place] encounter on 07/18/22    XR CHEST PORT    Narrative  INDICATION:  Fatigue    COMPARISON: August 2021    FINDINGS: Single AP portable view of the chest obtained at 1732 demonstrates a  stable cardiomediastinal silhouette. The lungs are hypoinspiratory and the  patient is rotated leftward. There is a chronic small left pleural effusion with  left basilar opacification. There is a chronic right lateral chest wall  deformity with pleural thickening. Impression  Hypoinspiratory examination. Chronic small left pleural effusion  with left basilar atelectasis. Superimposed left lower lobe pneumonia cannot be  excluded. Results from [place] encounter on 08/26/21    NC XR TECHNOLOGIST SERVICE    Narrative  COMPLIANCE ONLY    INDICATION: intra op    FINDINGS:    No intraoperative fluoroscopic views were submitted during fluoroscopic  assistance. Impression  Intraoperative views not provided. Results from [place] encounter on 08/11/21    XR CHEST PORT    Narrative  Chest, frontal view, 8/11/2021    History: AICD, pacemaker. Comparison: Including chest 3/20/2020. Findings: Small electronic device is again seen at the left lower hemithorax. Retrocardiac lucency suggests hiatal hernia. The cardiac silhouette is within  normal limits. Lung volumes are low. Apical pleural thickening is seen. There  is pulmonary vascular congestion and hydrostatic edema. Blunting of the  costophrenic angles may be due to scarring, atelectasis or small pleural  effusions. No pneumothorax is identified. Degenerative changes are present in  the thoracic spine. Chronic deformity of the right scapula and right-sided ribs  are again noted. Impression  Electronic device at the left lower hemithorax. Hydrostatic edema. Blunting of the costophrenic angles as above.     Results from [place] encounter on 02/10/22    CT HEAD WO CONT    Narrative  Technique: Multiple continuous axial images were obtained from the skull base to  the vertex without administration of intravenous contrast.    Comment on dose reduction: All CT scans at this facility are performed using  dose reduction optimization technique as appropriate to perform the exam  including the following; automated exposure control, adjustments of the mA  and/or kV according to patient size, or use of iterative reconstructed  technique. Comparison examination: None    Findings:  The ventricles and sulci are prominent consistent with age-related changes of  atrophy. Periventricular hypodensity is identified consistent with changes of  chronic small vessel disease. No evidence of midline shift, mass lesion, or  extra-axial fluid collection. No evidence of parenchymal hemorrhage or  infarction in a major vascular territory. The osseous structures are intact, the paranasal sinuses are clear. Extracalvarial soft tissue hematoma posterior vertex . Impression  No acute intracranial process. Extracalvarial soft tissue hematoma posterior  vertex . IMPRESSION:   COVID-19  Hypoxia  Pleural effusion  Atelectasis  Hypokalemia  Chronic atrial fibrillation      RECOMMENDATIONS/PLAN:     2L nasal Cannula oxygen as salvage oxygen delivery device to provide high concentration of oxygen to overcome refractory hypoxia; Agree with rocephin and zithromax IV antibiotics pending culture results   Started him on baricitinib  Hypokalemia, replace potassium  Patient on 2L nasal cannula, will try to wean. Chest x-ray shows small left pleural effusion which was present before atelectasis possible infiltrate  Aspiration precautions  Patient is confused today, trying to remove his nasal cannula and get out of bed.    Labs to follow electrolytes, renal function and and blood counts  Pt needs IV fluids with additives and Drug therapy requiring intensive monitoring for toxicity  Prescription drug management with home med reconciliation reviewed       This care involved high complexity medical decision making: I personally:  Reviewed the flowsheet and previous days notes  Reviewed and summarized records or history from previous days note or discussions with staff, family  High Risk Drug therapy requiring intensive monitoring for toxicity: eg steroids, pressors, antibiotics  Reviewed and/or ordered Clinical lab tests  Reviewed images and/or ordered Radiology tests  Reviewed the patients ECG / Telemetry  Reviewed and/or adjusted NiPPV settings  Called and arranged for Radiologic procedures or interventions  performed or ordered Diagnostic endoscopies with identified risk factors. discussed my assessment/management with : Nursing, Hospitalist and Family for coordination of care    7/19: Patient complaining of cough with sputum production. Lungs are clear. 7/20: Lungs are clear. Patient is confused, getting out of bed and removing his nasal cannula.        Lizzy Like

## 2022-07-20 NOTE — PROGRESS NOTES
Comprehensive Nutrition Assessment    Type and Reason for Visit: Initial, Positive nutrition screen (MST 4, Low BMI)    Nutrition Recommendations/Plan:   Continue diet. Ensure compact x3/d (660 kcal, 27 gm PRO). Please obtain and record weights in Vitals. Please document as % PO and ONS intake in I/Os. Malnutrition Assessment:  Malnutrition Status:  Mild malnutrition (07/20/22 1225)    Context:  Acute illness     Findings of the 6 clinical characteristics of malnutrition:   Energy Intake:  75% or less of est energy req for 7 or more days  Weight Loss:  Unable to assess     Body Fat Loss:  Unable to assess,     Muscle Mass Loss:  Unable to assess,    Fluid Accumulation:  No significant fluid accumulation,     Strength:  Not performed     Nutrition Assessment:    Admitted for hypoxia, fatigue 2/2 COVID19+ w/ PNA. Poor PO intake likely r/t baseline of small appetite per Nsg. Wife reported as endorsing poor intake PTA; consider acute medical dx. Noted h/o unsure wt loss PTA; CCR=690#. Requested CBW from RN. Pt reported as drinking more vs eating(ate <50% of Breakfast today 2/2 AMS). Continue diet, RD to add ONS. Labs: , Ca 7.2, AST 73. Meds: azithromycin, rocephin, decadron, cardura, lipitor. Nutrition Related Findings:    NFPE deferred- on droplet isolation. No N/V/D/C nor c/s issues per RN. No noted h/o dysphagia. Last BM 7/18. No edema. Wound Type: None    Current Nutrition Intake & Therapies:  Average Meal Intake: 26-50%  Average Supplement Intake: None ordered  ADULT DIET Regular; Low Fat/Low Chol/High Fiber/2 gm Na    Anthropometric Measures:  Height: 5' 7\" (170.2 cm)  Ideal Body Weight (IBW): 148 lbs (67 kg)  Current Body Wt:  54.4 kg (120 lb), 81.1 % IBW.  Stated  Current BMI (kg/m2): 18.8  Weight Adjustment: No adjustment  BMI Category: Underweight (BMI less than 22) age over 72    Estimated Daily Nutrient Needs:  Energy Requirements Based On: Kcal/kg  Weight Used for Energy Requirements: Ideal  Energy (kcal/day): 1675 kcal/d  Weight Used for Protein Requirements: Ideal  Protein (g/day): 74 gm/d  Method Used for Fluid Requirements: 1 ml/kcal  Fluid (ml/day): 1700 mL/d    Nutrition Diagnosis:   Inadequate oral intake related to cognitive or neurological impairment, acute injury/trauma as evidenced by intake 26-50%, poor intake prior to admission, BMI, weight loss, Criteria as identified in malnutrition assessment    Nutrition Interventions:   Food and/or Nutrient Delivery: Continue current diet, Start oral nutrition supplement  Nutrition Education/Counseling: No recommendations at this time  Coordination of Nutrition Care: Continue to monitor while inpatient, Feeding assistance/environmental change  Plan of Care discussed with: RN    Goals:  Goals: Meet at least 75% of estimated needs, PO intake 50% or greater, within 7 days, other (specify)  Specify Other Goals: Wt gain of 1-2 lbs/week. Nutrition Monitoring and Evaluation:   Behavioral-Environmental Outcomes: None identified  Food/Nutrient Intake Outcomes: Diet advancement/tolerance, Food and nutrient intake, Supplement intake  Physical Signs/Symptoms Outcomes: Biochemical data, Meal time behavior, Weight, Nutrition focused physical findings    Discharge Planning:    Continue oral nutrition supplement    Amarilis Hameed RD  Contact: ext. [phone] or [employer].

## 2022-07-20 NOTE — PROGRESS NOTES
Progress Note    Patient: Sylwia Wilder MRN: [id number]  SSN: [id number]    YOB: 1929  Age: 80 y.o. Sex: male      Admit Date: 7/18/2022    LOS: 2 days     Subjective:     Patient followed for Covid-19 infection with unremarkable CXR, now on room air. Afebrile. Leukopenia has resolved. LDH and CRP elevated. On Azithromycin, Ceftriaxone, Dexamethasone and Baricitinib. Patient resting comfortably, subjectively improved. No cough or SOB. Visitor at bedside. Objective:     Vitals:    07/19/22 2337 07/20/22 0000 07/20/22 0824 07/20/22 1007   BP: (!) 156/84  (!) 157/90    Pulse: 76 88     Resp: 18  20    Temp: 97.5 °F (36.4 °C)  98.9 °F (37.2 °C)    SpO2: 99%  97% 96%   Weight:       Height:            Intake and Output:  Current Shift: No intake/output data recorded. Last three shifts: 07/18 1901 - 07/20 0700  In: 750 [I.V.:750]  Out: 450 [Urine:450]    Physical Exam:      Vitals and nursing note reviewed. Constitutional:       General: He is not in acute distress. Appearance: He is ill-appearing. He is not diaphoretic. Comments: Room air SpO2 94%   HENT: unremarkable    Cardiovascular:     Rate and Rhythm: Normal rate and regular rhythm. Pulmonary:     Effort: Pulmonary effort is normal.      Breath sounds: Normal breath sounds. Abdominal:      General: Bowel sounds are normal.      Palpations: Abdomen is soft. Tenderness: There is no abdominal tenderness. Genitourinary:     Comments: No Rollins  Musculoskeletal:      Right lower leg: No edema. Left lower leg: No edema. Skin:     Findings: No rash. Neurological:     General: No focal deficit present. Mental Status: He is alert and oriented to person, place, and time. Psychiatric:    normal behavior         Lab/Data Review:     WBC 5,400      Ferritin Pending    CRP 1.30      Assessment:     Active Problems:    Hypoxia (7/18/2022)      COVID (7/18/2022)    1.  Covid-19 infection on supplemental oxygen (nasal cannula), Day #2 Dexamethasone and baricitinib  2. Leukopenia, possibly secondary to #1, resolved  3. Elevated LDH and CRP, secondary to #1  3. Abnormal CXR, clinical significance unclear, Day #2 Azithromycin and Ceftriaxone    Plan:   1. No indication for Remdesivir  2. Continue Dexamethasone/baricitinib per Pulmonary  3. Reasonable to continue Azithromycin and Ceftriaxone given CXR findings  4.  In am, repeat CRP and LDH       Signed By: Jalil Casas MD     July 20, 2022

## 2022-07-21 NOTE — PROGRESS NOTES
Spiritual Care Assessment/Progress Note  [facility]      NAME: Lillian Zhang      MRN: [id number]  AGE: 80 y.o. SEX: male  Anabaptism Affiliation: Taoist   Language: English     7/21/2022     Total Time (in minutes): 10     Spiritual Assessment begun in [facility] through conversation with:         [x]Patient        [] Family    [] Friend(s)        Reason for Consult: Initial visit     Spiritual beliefs: (Please include comment if needed)     [] Identifies with a chuck tradition:         [] Supported by a chuck community:            [] Claims no spiritual orientation:           [] Seeking spiritual identity:                [] Adheres to an individual form of spirituality:           [x] Not able to assess:                           Identified resources for coping:      [] Prayer                               [] Music                  [] Guided Imagery     [] Family/friends                 [] Pet visits     [] Devotional reading                         [x] Unknown     [] Other:                                            Interventions offered during this visit: (See comments for more details)    Patient Interventions: Initial visit, Other (comment) (Silent support and prayer)           Plan of Care:     [] Support spiritual and/or cultural needs    [] Support AMD and/or advance care planning process      [] Support grieving process   [] Coordinate Rites and/or Rituals    [] Coordination with community clergy   [] No spiritual needs identified at this time   [] Detailed Plan of Care below (See Comments)  [] Make referral to Music Therapy  [] Make referral to Pet Therapy     [] Make referral to Addiction services  [] Make referral to [facility]  [] Make referral to Spiritual Care Partner  [] No future visits requested        [x] Contact Spiritual Care for further referrals     Comments:  Visit attempted for patient in the [place] [address] unit for initial assessment.   Staff was providing care for the patient during the visit. There were no visitors in the room. Provided silent support and prayer. Contact chaplains for further spiritual care and support. asa Dove M.Div.    can be reached by calling the  at [facility]  [phone]

## 2022-07-21 NOTE — PROGRESS NOTES
PROGRESS NOTE    Patient: Haydee Wiggins MRN: [id number]  SSN: [id number]    YOB: 1929  Age: 80 y.o. Sex: male      Admit Date: 7/18/2022    LOS: 3 days       Subjective     Chief Complaint   Patient presents with    Fatigue       HPI: Patient is a 80y.o. year old male with significant past medical history of hypertension CVA A. Fib AICD  came to emergency room because of generalized weakness fatigue for almost 1 week patient had multiple family members at home which are COVID-positive he typically walks with a walker but recently because of generalized weakness not able to ambulate came to emergency room seen by ER physician chest x-ray shows pneumonia COVID screening came back positive     Discussed with the patient wife patient is full code      7/19  Patient laying in bed comfortably. No complaints at this time. He states he is not having any difficulty breathing. O2: 96% on 2L NC  WBC: 2.5  Potassium: 3.3    CXR:  Hypoinspiratory examination. Chronic small left pleural effusion  with left basilar atelectasis. Superimposed left lower lobe pneumonia cannot be  Excluded. 7/20  Patient laying in bed comfortably without complaints at this time. NAD. Patient not wearing nasal canula when I entered the room. O2 sat 96%  WBC: 2.5>5.4    7/21  Patient sitting in bed comfortably but anxious to be discharged. Per PT - pt will need more PT after discharge. Review of Systems:  Constitutional: Generalized weakness. HENT: Negative. Eyes: Negative. Respiratory: Negative. Cardiovascular: Negative. Gastrointestinal: Negative for abdominal pain and nausea. Skin: Negative. Neurological: Negative.       Objective     Visit Vitals  BP (!) 160/87 (BP 1 Location: Left lower arm)   Pulse 86   Temp 97.5 °F (36.4 °C)   Resp 18   Ht 5' 7\" (1.702 m)   Wt 54.4 kg (120 lb)   SpO2 94%   BMI 18.79 kg/m²    O2 Flow Rate (L/min): 2 l/min O2 Device: None (Room air)    Physical Exam:   Constitutional: pt is oriented to person, place, and time. Generalized weakness  HENT:   Head: Normocephalic and atraumatic. Eyes: Pupils are equal, round, and reactive to light. EOM are normal.   Cardiovascular: Normal rate, regular rhythm and normal heart sounds. Pulmonary/Chest: Breath sounds normal. No wheezes. No rales. Exhibits no tenderness. Abdominal: Soft. Bowel sounds are normal. There is no abdominal tenderness. There is no rebound and no guarding. Musculoskeletal: Normal range of motion. Neurological: pt is alert and oriented to person, place, and time. Alert. Normal strength. No cranial nerve deficit or sensory deficit. Intake & Output:  Current Shift: No intake/output data recorded. Last three shifts: 07/19 1901 - 07/21 0700  In: -   Out: 450 [Urine:450]    Lab/Data Review:  Lab results reviewed. For significant abnormal values and values requiring intervention, see assessment and plan. 24 Hour Results:    No results found for this or any previous visit (from the past 24 hour(s)). Imaging:    XR CHEST PORT   Final Result   Hypoinspiratory examination. Chronic small left pleural effusion   with left basilar atelectasis. Superimposed left lower lobe pneumonia cannot be   excluded.                 Assessment     Acute hypoxic respiratory failure on 2 L  COVID-19 pneumonitis  hypertension,   stroke and   Afib  Neutropenia    Plan   Increase Cardura to 8mg PO QD    Begin decadron 4mg IV every 6 hours   give Potassium 40mEq IV once    Eliquis 2.5mg PO BID  Aspirin 81mg PO every day  Lipitor 20mg PO every day  Cardura 4mg PO every day  Cymbalta 30mg  PO every day  Ranolazine 500mg PO BID  Miralax 17g PO every day  Zofran 4mg tab PO Q8h  Olumiant 2mg PO every day  Zithromax 500mg IV every day  Recphin 1g IV every day  Norvasc 5mg po qd      PT OT consult    Current Facility-Administered Medications:     amLODIPine (NORVASC) tablet 5 mg, 5 mg, Oral, DAILY, Cristina Chapa MD    mirtazapine (REMERON) tablet 15 mg, 15 mg, Oral, QHS, Mahogany Chapa MD, 15 mg at 07/20/22 2047    melatonin tablet 3 mg, 3 mg, Oral, QHS PRN, Mahogany Chapa MD, 3 mg at 07/20/22 2046    apixaban (ELIQUIS) tablet 2.5 mg, 2.5 mg, Oral, BID, Miguel Chapa MD, 2.5 mg at 07/20/22 2046    aspirin delayed-release tablet 81 mg, 81 mg, Oral, DAILY, Mahogany Chapa MD, 81 mg at 07/20/22 0825    atorvastatin (LIPITOR) tablet 20 mg, 20 mg, Oral, DAILY, Mahogany Chapa MD, 20 mg at 07/20/22 0825    doxazosin (CARDURA) tablet 4 mg, 4 mg, Oral, QHS, Mahogany Chapa MD, 4 mg at 07/20/22 2045    DULoxetine (CYMBALTA) capsule 30 mg, 30 mg, Oral, DAILY, Mahogany Chapa MD, 30 mg at 07/20/22 0825    ranolazine ER (RANEXA) tablet 500 mg, 500 mg, Oral, BID, Mahogany Chapa MD, 500 mg at 07/20/22 2046    0.9% sodium chloride infusion, 75 mL/hr, IntraVENous, CONTINUOUS, Mahogany Chapa MD, Last Rate: 75 mL/hr at 07/20/22 1946, 75 mL/hr at 07/20/22 1946    acetaminophen (TYLENOL) tablet 650 mg, 650 mg, Oral, Q6H PRN **OR** acetaminophen (TYLENOL) suppository 650 mg, 650 mg, Rectal, Q6H PRN, Mahogany Chapa MD    polyethylene glycol (MIRALAX) packet 17 g, 17 g, Oral, DAILY PRN, Mahogany Chapa MD    ondansetron (ZOFRAN ODT) tablet 4 mg, 4 mg, Oral, Q8H PRN **OR** ondansetron (ZOFRAN) injection 4 mg, 4 mg, IntraVENous, Q6H PRN, Mahogany Chapa MD    dexamethasone (DECADRON) 4 mg/mL injection 4 mg, 4 mg, IntraVENous, Q6H, Mahogany Chapa MD, 4 mg at 07/21/22 0533    baricitinib (OLUMIANT) tablet 2 mg, 2 mg, Oral, DAILY, Mahogany Chapa MD, 2 mg at 07/20/22 0825    azithromycin (ZITHROMAX) 500 mg in 0.9% sodium chloride 250 mL ([id number]), 500 mg, IntraVENous, Q24H, Mahogany Chapa MD, Last Rate: 250 mL/hr at 07/20/22 1946, 500 mg at 07/20/22 1946    cefTRIAXone (ROCEPHIN) 1 g in sterile water (preservative free) 10 mL IV syringe, 1 g, IntraVENous, Q24H, Mahogany Chapa MD, 1 g at 07/20/22 1946    Current Outpatient Medications   Medication Instructions    apixaban (ELIQUIS) 2.5 mg, Oral, 2 TIMES DAILY    aspirin delayed-release 81 mg tablet Oral, DAILY    atorvastatin (LIPITOR) 20 mg, Oral, DAILY    cholecalciferol, vitamin D3, (Vitamin D3) 50 mcg (2,000 unit) tab Oral, EVERY BEDTIME    clopidogreL (PLAVIX) 75 mg, Oral, DAILY    doxazosin (CARDURA) 4 mg tablet EVERY BEDTIME    DULoxetine (CYMBALTA) 30 mg capsule duloxetine 30 mg capsule,delayed release    gabapentin (NEURONTIN) 300 mg capsule EVERY BEDTIME    hydrochlorothiazide (MICROZIDE PO) 12.5 mg, DAILY    mirtazapine (REMERON) 15 mg, Oral, EVERY BEDTIME    ranolazine ER (RANEXA) 500 mg, Oral, 2 TIMES DAILY         Signed By: Patricia Redmond     July 21, 2022

## 2022-07-21 NOTE — PROGRESS NOTES
CM reviewed chart. Patient being recommended for either SNF vs HH with family care. CM called patient's wife to discuss recommendations. Wife states that she is thinking they would prefer [facility] and that patient has had this service in the past, but she would like to speak with her  and will then make a decision. CM will follow up with wife in the morning on final decision and continue to follow.

## 2022-07-21 NOTE — PROGRESS NOTES
Patient's wife at bedside. Met with CM to explain that her and patient had discussed options and they were going to go with SNF for rehab at discharge. They gave choice for [facility]. [facility] [address] [facility] and 3. [facility]. Choice letter signed and placed on chart. Referrals sent. Patient will need authorization once accepted at facility. CM will continue to follow.

## 2022-07-21 NOTE — PROGRESS NOTES
Progress Note    Patient: Cyndie Jean-Baptiste MRN: [id number]  SSN: [id number]    YOB: 1929  Age: 80 y.o. Sex: male      Admit Date: 7/18/2022    LOS: 3 days     Subjective:     Patient followed for Covid-19 infection with unremarkable CXR, now on room air. Afebrile. Leukopenia has resolved. LDH and CRP elevated. On Azithromycin, Ceftriaxone, Dexamethasone and Baricitinib. Patient resting comfortably but  complaining of generalized weakness. No SOB or cough. Objective:     Vitals:    07/20/22 1933 07/20/22 2350 07/21/22 0728 07/21/22 1117   BP: (!) 156/92 (!) 150/82 (!) 160/87 138/81   Pulse: 83 79 86 84   Resp: 18 18 18 19   Temp: 97.4 °F (36.3 °C) 97.5 °F (36.4 °C) 97.5 °F (36.4 °C) 98.1 °F (36.7 °C)   SpO2: 95% 95% 94% 96%   Weight:       Height:            Intake and Output:  Current Shift: 07/21 0701 - 07/21 1900  In: 120 [P.O.:120]  Out: -   Last three shifts: 07/19 1901 - 07/21 0700  In: -   Out: 450 [Urine:450]    Physical Exam:      Vitals and nursing note reviewed. Constitutional:       General: He is not in acute distress. Appearance: He is ill-appearing. He is not diaphoretic. Comments: Room air SpO2 98%   HENT: unremarkable    Cardiovascular:     Rate and Rhythm: Normal rate and regular rhythm. Pulmonary:     Effort: Pulmonary effort is normal.      Breath sounds: Normal breath sounds. Abdominal:      General: Bowel sounds are normal.      Palpations: Abdomen is soft. Tenderness: There is no abdominal tenderness. Genitourinary:     Comments: No Rollins  Musculoskeletal:      Right lower leg: No edema. Left lower leg: No edema. Skin:     Findings: No rash. Neurological:     General: No focal deficit present. Mental Status: He is alert and oriented to person, place, and time.    Psychiatric:    normal behavior         Lab/Data Review:     WBC 5,400     <357  Ferritin 155    CRP 0.63 <1.30      Assessment:     Active Problems:    Hypoxia (7/18/2022)      COVID (7/18/2022)  1. Covid-19 infection on supplemental oxygen (nasal cannula), Day #3 Dexamethasone and baricitinib  2. Leukopenia, possibly secondary to #1, resolved  3. Elevated LDH and CRP, secondary to #1  3. Abnormal CXR, clinical significance unclear, Day #3 Azithromycin and Ceftriaxone    Comment:  Patient remains on room air with decreasing CRP. Plan:   1. No indication for Remdesivir  2. Continue Dexamethasone/baricitinib per Pulmonary  3. Reasonable to continue Azithromycin and Ceftriaxone given CXR findings, but could transition to Levaquin po if discharge is planned  4.  In am, repeat CRP and LDH       Signed By: Sanam Roy MD     July 21, 2022

## 2022-07-21 NOTE — PROGRESS NOTES
PHYSICAL THERAPY TREATMENT  Patient: Haydee Wiggins (78 y.o. male)  Date: 7/21/2022  Diagnosis: Hypoxia [R09.02]  COVID [U07.1] <principal problem not specified>      Precautions:    Chart, physical therapy assessment, plan of care and goals were reviewed. ASSESSMENT  Patient continues with skilled PT services and is progressing towards goals. Patient supine in bed upon approach and agreed to therapy session today. Patient was oriented to person, knew he was in a hospital in [facility] but could not name it and disoriented to time. Patient then re oriented. Patient also found to have had BM in the bed. Patient was SBA for bed mobility to assist with lukas care. Patient then was SBA for supine<>sit and scooting to EOB. Patient then was min A for STS to RW with min VC for hand placement to push off bed as well as correct placement on walker. Patient then ambulated with RW to toilet at [place] [address]. Patient demonstrated slow gait with flexed posture in shoulders,hips and BLE knees, step to gait pattern and flat foot on BLE, but no LOB or knee buckling. Patient then required mod VC/TC for proper sitting technique onto toilet as patient attempted to sit before in correct position. Patient then required min A to stand while performing lukas care with patient having increasing posterior lean the longer he remained standing. Patient required to take 2-3 minute rest break on toilet before finishing lukas care in seated position. Patient then was min A for STS from toilet to walker and ambulated back to EOB( 25 feet total). Patient presented with same gait deviations as previous ambulation. When at EOB patient attempted impulsive STS onto bed with therapist having to provide min A for correct sitting sequence patient educated on proper sitting technique with patient verbalizing understanding. Patient had post ambulation O2 of 86% but lam up to 96% after 2 minutes sitting EOB with PLB.  Patient then returned to supine in bed at [facility] for sit>supine and had brief re applied. Patient then left supine in bed with call bell within reach and all needs meet. Current Level of Function Impacting Discharge (mobility/balance): impaired balance, general weakness, poor activity tolerance    Other factors to consider for discharge: PLOF, assistance at home, level of deficits, acute medical state, impulsiveness, poor safety awareness          PLAN :  Patient continues to benefit from skilled intervention to address the above impairments. Continue treatment per established plan of care. to address goals. Recommendation for discharge: (in order for the patient to meet his/her long term goals)  Madan Kimball    This discharge recommendation:  Has been made in collaboration with the attending provider and/or case management    IF patient discharges home will need the following DME: gait belt and rollator       SUBJECTIVE:   Patient stated I think im going to have to use the bathroom soon.     OBJECTIVE DATA SUMMARY:   Critical Behavior:  Neurologic State: Alert, Confused  Orientation Level: Oriented to person, Disoriented to time, Disoriented to situation, Disoriented to place (k[facility] but not name of [facility])  Cognition: Decreased attention/concentration, Impaired decision making, Impulsive, Follows commands, Poor safety awareness     Functional Mobility Training:  Bed Mobility:  Rolling: Stand-by assistance  Supine to Sit: Stand-by assistance  Sit to Supine: Stand-by assistance  Scooting: Stand-by assistance  Transfers:  Sit to Stand: Minimum assistance  Stand to Sit: Minimum assistance  Balance:  Sitting: Intact; Without support  Standing: Impaired; With support  Standing - Static: Poor;Fair;Constant support  Standing - Dynamic : Poor;Constant support  Ambulation/Gait Training:  Distance (ft): 25 Feet (ft)  Assistive Device: Walker, rolling  Ambulation - Level of Assistance: Contact guard assistance  Gait Abnormalities: Step to gait; Other (flexed posuture at hips and shoulders, flex BLE at knees, flat foot)  Pain Rating:  No pain reported during session    Activity Tolerance:   Fair, requires rest breaks, and observed SOB with activity  Please refer to the flowsheet for vital signs taken during this treatment. After treatment patient left in no apparent distress:   Supine in bed, Call bell within reach, Bed / chair alarm activated, and Side rails x 3    COMMUNICATION/COLLABORATION:   The patients plan of care was discussed with: Registered nurse. Problem: Mobility Impaired (Adult and Pediatric)  Goal: *Acute Goals and Plan of Care (Insert Text)  Description: Pt stated goals: walk   Pt will be I with LE HEP in 7 days. Pt will perform bed mobility with mod I in 7 days. Pt will perform transfers with mod I in 7 days. Pt will amb  feet with LRAD safely with spv in 7 days.    Outcome: Progressing Towards Goal       Morna Ramp, PTA   Time Calculation: 60 mins

## 2022-07-21 NOTE — PROGRESS NOTES
PROGRESS NOTE    Patient: Kulwant Mcnally MRN: [id number]  SSN: [id number]    YOB: 1929  Age: 80 y.o. Sex: male      Admit Date: 7/18/2022    LOS: 3 days       Subjective     Chief Complaint   Patient presents with    Fatigue       HPI: Patient is a 80y.o. year old male with significant past medical history of hypertension CVA A. Fib AICD  came to emergency room because of generalized weakness fatigue for almost 1 week patient had multiple family members at home which are COVID-positive he typically walks with a walker but recently because of generalized weakness not able to ambulate came to emergency room seen by ER physician chest x-ray shows pneumonia COVID screening came back positive     Discussed with the patient wife patient is full code      7/19  Patient laying in bed comfortably. No complaints at this time. He states he is not having any difficulty breathing. O2: 96% on 2L NC  WBC: 2.5  Potassium: 3.3    CXR:  Hypoinspiratory examination. Chronic small left pleural effusion  with left basilar atelectasis. Superimposed left lower lobe pneumonia cannot be  Excluded. 7/20  Patient laying in bed comfortably without complaints at this time. NAD. Patient not wearing nasal canula when I entered the room. O2 sat 96%  WBC: 2.5>5.4    7/21  Patient sitting in bed comfortably but anxious to be discharged. Per PT - pt will need more PT after discharge. Review of Systems:  Constitutional: Generalized weakness. HENT: Negative. Eyes: Negative. Respiratory: Negative. Cardiovascular: Negative. Gastrointestinal: Negative for abdominal pain and nausea. Skin: Negative. Neurological: Negative.       Objective     Visit Vitals  /81 (BP 1 Location: Left upper arm)   Pulse 84   Temp 98.1 °F (36.7 °C)   Resp 19   Ht 5' 7\" (1.702 m)   Wt 54.4 kg (120 lb)   SpO2 96%   BMI 18.79 kg/m²    O2 Flow Rate (L/min): 2 l/min O2 Device: None (Room air)    Physical Exam:   Constitutional: pt is oriented to person, place, and time. Generalized weakness  HENT:   Head: Normocephalic and atraumatic. Eyes: Pupils are equal, round, and reactive to light. EOM are normal.   Cardiovascular: Normal rate, regular rhythm and normal heart sounds. Pulmonary/Chest: Breath sounds normal. No wheezes. No rales. Exhibits no tenderness. Abdominal: Soft. Bowel sounds are normal. There is no abdominal tenderness. There is no rebound and no guarding. Musculoskeletal: Normal range of motion. Neurological: pt is alert and oriented to person, place, and time. Alert. Normal strength. No cranial nerve deficit or sensory deficit. Intake & Output:  Current Shift: 07/21 0701 - 07/21 1900  In: 120 [P.O.:120]  Out: -   Last three shifts: 07/19 1901 - 07/21 0700  In: -   Out: 450 [Urine:450]    Lab/Data Review:  Lab results reviewed. For significant abnormal values and values requiring intervention, see assessment and plan. 24 Hour Results:    Recent Results (from the past 24 hour(s))   LD    Collection Time: 07/21/22  6:56 AM   Result Value Ref Range     (H) 85 - 241 U/L   C REACTIVE PROTEIN, QT    Collection Time: 07/21/22  6:56 AM   Result Value Ref Range    C-Reactive protein 0.63 (H) 0.00 - 0.60 mg/dL           Imaging:    XR CHEST PORT   Final Result   Hypoinspiratory examination. Chronic small left pleural effusion   with left basilar atelectasis. Superimposed left lower lobe pneumonia cannot be   excluded.                 Assessment     Acute hypoxic respiratory failure on 2 L  COVID-19 pneumonitis  hypertension,   stroke and   Afib  Neutropenia    Plan   Increase Cardura to 8mg PO QD    Begin decadron 4mg IV every 6 hours   give Potassium 40mEq IV once    Eliquis 2.5mg PO BID  Aspirin 81mg PO every day  Lipitor 20mg PO every day  Cardura 4mg PO every day  Cymbalta 30mg  PO every day  Ranolazine 500mg PO BID  Miralax 17g PO every day  Zofran 4mg tab PO Q8h  Olumiant 2mg PO every day  Zithromax 500mg IV every day  Recphin 1g IV every day  Norvasc 5mg po qd      PT OT consult    Possible discharge home tomorrow    Current Facility-Administered Medications:     amLODIPine (NORVASC) tablet 5 mg, 5 mg, Oral, DAILY, Mahogany Chapa MD, 5 mg at 07/21/22 1122    mirtazapine (REMERON) tablet 15 mg, 15 mg, Oral, QHS, Mahogany Chapa MD, 15 mg at 07/20/22 2047    melatonin tablet 3 mg, 3 mg, Oral, QHS PRN, Felicia Herndon MD, 3 mg at 07/20/22 2046    apixaban (ELIQUIS) tablet 2.5 mg, 2.5 mg, Oral, BID, Mahogany Chapa MD, 2.5 mg at 07/21/22 1122    aspirin delayed-release tablet 81 mg, 81 mg, Oral, DAILY, Mahogany Chapa MD, 81 mg at 07/21/22 1122    atorvastatin (LIPITOR) tablet 20 mg, 20 mg, Oral, DAILY, Felicia Herndon MD, 20 mg at 07/21/22 1122    doxazosin (CARDURA) tablet 4 mg, 4 mg, Oral, QHS, Mahogany Chapa MD, 4 mg at 07/20/22 2045    DULoxetine (CYMBALTA) capsule 30 mg, 30 mg, Oral, DAILY, Felicia Herndon MD, 30 mg at 07/21/22 1122    ranolazine ER (RANEXA) tablet 500 mg, 500 mg, Oral, BID, Felicia Herndon MD, 500 mg at 07/21/22 1121    0.9% sodium chloride infusion, 75 mL/hr, IntraVENous, CONTINUOUS, Mahogany Chapa MD, Last Rate: 75 mL/hr at 07/21/22 1122, 75 mL/hr at 07/21/22 1122    acetaminophen (TYLENOL) tablet 650 mg, 650 mg, Oral, Q6H PRN **OR** acetaminophen (TYLENOL) suppository 650 mg, 650 mg, Rectal, Q6H PRN, Mahogany Chapa MD    polyethylene glycol (MIRALAX) packet 17 g, 17 g, Oral, DAILY PRN, Mahogany Chapa MD    ondansetron (ZOFRAN ODT) tablet 4 mg, 4 mg, Oral, Q8H PRN **OR** ondansetron (ZOFRAN) injection 4 mg, 4 mg, IntraVENous, Q6H PRN, Mahogany Chapa MD    dexamethasone (DECADRON) 4 mg/mL injection 4 mg, 4 mg, IntraVENous, Q6H, Mahogany Chapa MD, 4 mg at 07/21/22 0533    baricitinib (OLUMIANT) tablet 2 mg, 2 mg, Oral, DAILY, Mahogany Chapa MD, 2 mg at 07/21/22 1121    azithromycin (ZITHROMAX) 500 mg in 0.9% sodium chloride 250 mL ([id number]), 500 mg, IntraVENous, Q24H, Mahogany Chapa MD, Last Rate: 250 mL/hr at 07/20/22 1946, 500 mg at 07/20/22 1946    cefTRIAXone (ROCEPHIN) 1 g in sterile water (preservative free) 10 mL IV syringe, 1 g, IntraVENous, Q24H, Mahogayn Chapa MD, 1 g at 07/20/22 1946    Current Outpatient Medications   Medication Instructions    apixaban (ELIQUIS) 2.5 mg, Oral, 2 TIMES DAILY    aspirin delayed-release 81 mg tablet Oral, DAILY    atorvastatin (LIPITOR) 20 mg, Oral, DAILY    cholecalciferol, vitamin D3, (Vitamin D3) 50 mcg (2,000 unit) tab Oral, EVERY BEDTIME    clopidogreL (PLAVIX) 75 mg, Oral, DAILY    doxazosin (CARDURA) 4 mg tablet EVERY BEDTIME    DULoxetine (CYMBALTA) 30 mg capsule duloxetine 30 mg capsule,delayed release    gabapentin (NEURONTIN) 300 mg capsule EVERY BEDTIME    hydrochlorothiazide (MICROZIDE PO) 12.5 mg, DAILY    mirtazapine (REMERON) 15 mg, Oral, EVERY BEDTIME    ranolazine ER (RANEXA) 500 mg, Oral, 2 TIMES DAILY         Signed By: Maria Fernanda Roman MD     July 21, 2022

## 2022-07-21 NOTE — PROGRESS NOTES
PULMONARY NOTE  [facility]    Name: Fernanda Albarado MRN: [id number]   : 1929 Hospital: [facility]   Date: 2022  Admission date: 2022 Hospital Day: 4       HPI:     Hospital Problems  Never Reviewed            Codes Class Noted POA    Hypoxia ICD-10-CM: R09.02  ICD-9-CM: 799.02  2022 Unknown        COVID ICD-10-CM: U07.1  ICD-9-CM: 079.89  2022 Unknown            [x] High complexity decision making was performed  [x] See my orders for details      Subjective/Initial History:     I was asked by Evelina Washington MD to see Fernanda Albarado  a 80 y.o.  male in consultation     Excerpts from admission 2022 or consult notes as follows:     Patient is a 80y.o. year old male with significant past medical history of hypertension CVA A.  Fib AICD  came to emergency room because of generalized weakness fatigue for almost 1 week patient had multiple family members at home which are COVID-positive he typically walks with a walker but recently because of generalized weakness not able to ambulate came to emergency room seen by ER physician chest x-ray shows pneumonia COVID screening came back positive his wife has COVID-19 and she is at home along the oxygen or inhalers no history of smoking in the past      No Known Allergies     MAR reviewed and pertinent medications noted or modified as needed     Current Facility-Administered Medications   Medication    dexAMETHasone (DECADRON) tablet 4 mg    amLODIPine (NORVASC) tablet 5 mg    mirtazapine (REMERON) tablet 15 mg    melatonin tablet 3 mg    apixaban (ELIQUIS) tablet 2.5 mg    aspirin delayed-release tablet 81 mg    atorvastatin (LIPITOR) tablet 20 mg    doxazosin (CARDURA) tablet 4 mg    DULoxetine (CYMBALTA) capsule 30 mg    ranolazine ER (RANEXA) tablet 500 mg    acetaminophen (TYLENOL) tablet 650 mg    Or    acetaminophen (TYLENOL) suppository 650 mg    polyethylene glycol (MIRALAX) packet 17 g    ondansetron (ZOFRAN ODT) tablet 4 mg    Or    ondansetron (ZOFRAN) injection 4 mg    baricitinib (OLUMIANT) tablet 2 mg    azithromycin (ZITHROMAX) 500 mg in 0.9% sodium chloride 250 mL ([id number])    cefTRIAXone (ROCEPHIN) 1 g in sterile water (preservative free) 10 mL IV syringe      Patient PCP: oHa Whitten MD  PMH:  has a past medical history of Atrial fibrillation ([facility] [address] COVID-19 vaccine series completed (2021), Hypercholesteremia, Hypertension, Neuropathy, and Stroke ([facility] [address] (2021). PSH:   has a past surgical history that includes pr abdomen surgery proc unlisted (Right) and hx cataract removal (Left, 2018). FHX: family history is not on file. SHX:  reports that he has quit smoking. He has never used smokeless tobacco. He reports that he does not use drugs. ROS:      Constitutional: Generalized weakness. HENT: Negative. Eyes: Negative. Respiratory: Negative. Cardiovascular: Negative. Gastrointestinal: Negative for abdominal pain and nausea. Skin: Negative. Neurological: Negative.       Objective:     Vital Signs: Telemetry:    normal sinus rhythm Intake/Output:   Visit Vitals  /76 (BP 1 Location: Left upper arm, BP Patient Position: Semi fowlers)   Pulse 84   Temp 98 °F (36.7 °C)   Resp 19   Ht 5' 7\" (1.702 m)   Wt 54.4 kg (120 lb)   SpO2 95%   BMI 18.79 kg/m²       Temp (24hrs), Av.8 °F (36.6 °C), Min:97.4 °F (36.3 °C), Max:98.5 °F (36.9 °C)        O2 Device: None (Room air) O2 Flow Rate (L/min): 2 l/min       Wt Readings from Last 4 Encounters:   22 54.4 kg (120 lb)   02/10/22 54.4 kg (120 lb)   21 54.4 kg (120 lb)   21 53.5 kg (118 lb)          Intake/Output Summary (Last 24 hours) at 2022 1549  Last data filed at 2022 1100  Gross per 24 hour   Intake 120 ml   Output --   Net 120 ml         Last shift:      701 - 1900  In: 120 [P.O.:120]  Out: -   Last 3 shifts: 1901 - 700  In: -   Out: 450 [Urine:450]       Physical Exam: Constitutional: Awake alert may be mildly confused repeats things knows he is in the hospital his name and the year  HENT: Mouth is clear  Head: Normocephalic and atraumatic. Eyes: Pupils are equal, round, and reactive to light. EOM are normal.   Cardiovascular: Normal rate, regular rhythm and normal heart sounds. Pulmonary/Chest: Breath sounds normal. No wheezes. No rales. Exhibits no tenderness. Abdominal: Soft. Bowel sounds are normal. There is no abdominal tenderness. There is no rebound and no guarding. Musculoskeletal: Normal range of motion. Neurological: pt is alert and oriented to person, place, and time. Alert. Normal strength. No cranial nerve deficit or sensory deficit. Labs:    Recent Labs     07/20/22  0628 07/19/22  0616 07/18/22  1648   WBC 5.4 2.5* 4.4   HGB 12.2 13.2 13.1    206 199       Recent Labs     07/20/22  0628 07/19/22 0616 07/18/22  1648    135* 128*   K 3.8 3.3* 3.7    99 94*   CO2 23 27 23   * 100 78   BUN 17 17 18   CREA 0.63* 0.78 0.90   CA 7.2* 8.0* 8.2*   ALB 3.0* 3.3* 3.2*   ALT 26 23 22       7/21 room air oxygen saturation 94%  CRP 0.63, 1.3  Ferritin 155    Imaging:    CXR Results  (Last 48 hours)      None          Results from Hospital Encounter encounter on 07/18/22    XR CHEST PORT    Narrative  INDICATION:  Fatigue    COMPARISON: August 2021    FINDINGS: Single AP portable view of the chest obtained at 1732 demonstrates a  stable cardiomediastinal silhouette. The lungs are hypoinspiratory and the  patient is rotated leftward. There is a chronic small left pleural effusion with  left basilar opacification. There is a chronic right lateral chest wall  deformity with pleural thickening. Impression  Hypoinspiratory examination. Chronic small left pleural effusion  with left basilar atelectasis. Superimposed left lower lobe pneumonia cannot be  excluded.       Results from [place] encounter on 08/26/21    [facility] XR TECHNOLOGIST SERVICE    Narrative  COMPLIANCE ONLY    INDICATION: intra op    FINDINGS:    No intraoperative fluoroscopic views were submitted during fluoroscopic  assistance. Impression  Intraoperative views not provided. Results from [place] encounter on 08/11/21    XR CHEST PORT    Narrative  Chest, frontal view, 8/11/2021    History: AICD, pacemaker. Comparison: Including chest 3/20/2020. Findings: Small electronic device is again seen at the left lower hemithorax. Retrocardiac lucency suggests hiatal hernia. The cardiac silhouette is within  normal limits. Lung volumes are low. Apical pleural thickening is seen. There  is pulmonary vascular congestion and hydrostatic edema. Blunting of the  costophrenic angles may be due to scarring, atelectasis or small pleural  effusions. No pneumothorax is identified. Degenerative changes are present in  the thoracic spine. Chronic deformity of the right scapula and right-sided ribs  are again noted. Impression  Electronic device at the left lower hemithorax. Hydrostatic edema. Blunting of the costophrenic angles as above. Results from [place] encounter on 02/10/22    CT HEAD WO CONT    Narrative  Technique: Multiple continuous axial images were obtained from the skull base to  the vertex without administration of intravenous contrast.    Comment on dose reduction: All CT scans at this facility are performed using  dose reduction optimization technique as appropriate to perform the exam  including the following; automated exposure control, adjustments of the mA  and/or kV according to patient size, or use of iterative reconstructed  technique. Comparison examination: None    Findings:  The ventricles and sulci are prominent consistent with age-related changes of  atrophy. Periventricular hypodensity is identified consistent with changes of  chronic small vessel disease.  No evidence of midline shift, mass lesion, or  extra-axial fluid collection. No evidence of parenchymal hemorrhage or  infarction in a major vascular territory. The osseous structures are intact, the paranasal sinuses are clear. Extracalvarial soft tissue hematoma posterior vertex . Impression  No acute intracranial process. Extracalvarial soft tissue hematoma posterior  vertex . IMPRESSION:   COVID-19 infection  COVID pneumonitis  Hypoxic respiratory failure resolved now on room air  Pleural effusion minimal if any  Atelectasis  Hypokalemia repleted  Chronic atrial fibrillation      RECOMMENDATIONS/PLAN:     On room air we will will maintain as is  Agree with rocephin and zithromax I  We will switch IV Decadron to oral  Continue baricitinib          7/19: Patient complaining of cough with sputum production. Lungs are clear. 7/20: Lungs are clear. Patient is confused, getting out of bed and removing his nasal cannula.  7/21 wife is in the room he does not seem confused at present is cooperative does repeat himself several times respirations nonlabored lungs are clear. Will discontinue IV fluids.   Change Decadron to oral      Nkechi Austin MD

## 2022-07-22 NOTE — PROGRESS NOTES
CM heard back from the facilities referrals were sent to yesterday. All have declined due to patient being COVID positive. CM spoke with patient's wife to explain that [facility] will accept COVID positive. Family unsure of whether they want SNF vs HH if they cannot have one of the original facilities. After much discussion, patient and wife have decided to sent referral to [facility]. CM has sent referral and is awaiting response on whether patient can be accepted. CM explained that authorization will be needed and this could take a few days. CM will continue to follow.

## 2022-07-22 NOTE — DISCHARGE SUMMARY
PROGRESS NOTE    Patient: Yue Novoa MRN: [id number]  SSN: [id number]    YOB: 1929  Age: 80 y.o. Sex: male      Admit Date: 7/18/2022    LOS: 4 days       Subjective     Chief Complaint   Patient presents with    Fatigue       HPI: Patient is a 80y.o. year old male with significant past medical history of hypertension CVA A. Fib AICD  came to emergency room because of generalized weakness fatigue for almost 1 week patient had multiple family members at home which are COVID-positive he typically walks with a walker but recently because of generalized weakness not able to ambulate came to emergency room seen by ER physician chest x-ray shows pneumonia COVID screening came back positive     Discussed with the patient wife patient is full code      7/19  Patient laying in bed comfortably. No complaints at this time. He states he is not having any difficulty breathing. O2: 96% on 2L NC  WBC: 2.5  Potassium: 3.3    CXR:  Hypoinspiratory examination. Chronic small left pleural effusion  with left basilar atelectasis. Superimposed left lower lobe pneumonia cannot be  Excluded. 7/20  Patient laying in bed comfortably without complaints at this time. NAD. Patient not wearing nasal canula when I entered the room. O2 sat 96%  WBC: 2.5>5.4    7/21  Patient sitting in bed comfortably but anxious to be discharged. Per PT - pt will need more PT after discharge. 7/22  Patient laying in bed comfortably. NAD  Switch to decadron oral   Continue antibiotics    Review of Systems:  Constitutional: Generalized weakness. HENT: Negative. Eyes: Negative. Respiratory: Negative. Cardiovascular: Negative. Gastrointestinal: Negative for abdominal pain and nausea. Skin: Negative. Neurological: Negative.       Objective     Visit Vitals  /75   Pulse 99   Temp 97.4 °F (36.3 °C)   Resp 18   Ht 5' 7\" (1.702 m)   Wt 54.4 kg (120 lb)   SpO2 94%   BMI 18.79 kg/m²    O2 Flow Rate (L/min): 2 l/min O2 Device: None (Room air)    Physical Exam:   Constitutional: pt is oriented to person, place, and time. Generalized weakness  HENT:   Head: Normocephalic and atraumatic. Eyes: Pupils are equal, round, and reactive to light. EOM are normal.   Cardiovascular: Normal rate, regular rhythm and normal heart sounds. Pulmonary/Chest: Breath sounds normal. No wheezes. No rales. Exhibits no tenderness. Abdominal: Soft. Bowel sounds are normal. There is no abdominal tenderness. There is no rebound and no guarding. Musculoskeletal: Normal range of motion. Neurological: pt is alert and oriented to person, place, and time. Alert. Normal strength. No cranial nerve deficit or sensory deficit. Intake & Output:  Current Shift: No intake/output data recorded. Last three shifts: 07/20 1901 - 07/22 0700  In: 480 [P.O.:480]  Out: -     Lab/Data Review:  Lab results reviewed. For significant abnormal values and values requiring intervention, see assessment and plan. 24 Hour Results:    Recent Results (from the past 24 hour(s))   C REACTIVE PROTEIN, QT    Collection Time: 07/22/22  6:35 AM   Result Value Ref Range    C-Reactive protein 0.39 0.00 - 0.60 mg/dL   LD    Collection Time: 07/22/22  6:35 AM   Result Value Ref Range     (H) 85 - 241 U/L             Imaging:    XR CHEST PORT   Final Result   Hypoinspiratory examination. Chronic small left pleural effusion   with left basilar atelectasis. Superimposed left lower lobe pneumonia cannot be   excluded.                 Assessment     Acute hypoxic respiratory failure on 2 L  COVID-19 pneumonitis  hypertension,   stroke and   Afib  Neutropenia    Plan   Switch to oral decadron per Pulmonary    Eliquis 2.5mg PO BID  Aspirin 81mg PO every day  Lipitor 20mg PO every day  Cardura 4mg PO every day  Cymbalta 30mg  PO every day  Ranolazine 500mg PO BID  Miralax 17g PO every day  Zofran 4mg tab PO Q8h  Olumiant 2mg PO every day  Zithromax 500mg IV every day  Recphin 1g IV every day  Norvasc 5mg po qd      PT OT consult    Possible discharge    Current Facility-Administered Medications:     dexAMETHasone (DECADRON) tablet 4 mg, 4 mg, Oral, Q12H, Hilaria Potts MD, 4 mg at 07/22/22 0858    amLODIPine (NORVASC) tablet 5 mg, 5 mg, Oral, DAILY, Kayli Chapa MD, 5 mg at 07/22/22 1819    mirtazapine (REMERON) tablet 15 mg, 15 mg, Oral, QHS, Mahogany Chapa MD, 15 mg at 07/21/22 2136    melatonin tablet 3 mg, 3 mg, Oral, QHS PRN, Mahogany Chapa MD, 3 mg at 07/21/22 2136    apixaban (ELIQUIS) tablet 2.5 mg, 2.5 mg, Oral, BID, Kayli Chapa MD, 2.5 mg at 07/22/22 0588    aspirin delayed-release tablet 81 mg, 81 mg, Oral, DAILY, Mahogany Chapa MD, 81 mg at 07/22/22 0858    atorvastatin (LIPITOR) tablet 20 mg, 20 mg, Oral, DAILY, Mahogany Chapa MD, 20 mg at 07/22/22 3419    doxazosin (CARDURA) tablet 4 mg, 4 mg, Oral, QHS, Mahogany Chapa MD, 4 mg at 07/21/22 2136    DULoxetine (CYMBALTA) capsule 30 mg, 30 mg, Oral, DAILY, Mahogany Chapa MD, 30 mg at 07/22/22 3119    ranolazine ER (RANEXA) tablet 500 mg, 500 mg, Oral, BID, Kayli Chapa MD, 500 mg at 07/22/22 5119    acetaminophen (TYLENOL) tablet 650 mg, 650 mg, Oral, Q6H PRN **OR** acetaminophen (TYLENOL) suppository 650 mg, 650 mg, Rectal, Q6H PRN, Mahogany Chapa MD    polyethylene glycol (MIRALAX) packet 17 g, 17 g, Oral, DAILY PRN, Mahogany Chapa MD    ondansetron (ZOFRAN ODT) tablet 4 mg, 4 mg, Oral, Q8H PRN **OR** ondansetron (ZOFRAN) injection 4 mg, 4 mg, IntraVENous, Q6H PRN, Mahogany Chapa MD    baricitinib (OLUMIANT) tablet 2 mg, 2 mg, Oral, DAILY, Mahogany Chapa MD, 2 mg at 07/22/22 0858    azithromycin (ZITHROMAX) 500 mg in 0.9% sodium chloride 250 mL ([id number]), 500 mg, IntraVENous, Q24H, Mahogany Chapa MD, Last Rate: 250 mL/hr at 07/21/22 1947, 500 mg at 07/21/22 1947    cefTRIAXone (ROCEPHIN) 1 g in sterile water (preservative free) 10 mL IV syringe, 1 g, IntraVENous, Q24H, Josué Biggs MD, 1 g at 07/21/22 1946    Current Outpatient Medications   Medication Instructions    apixaban (ELIQUIS) 2.5 mg, Oral, 2 TIMES DAILY    aspirin delayed-release 81 mg tablet Oral, DAILY    atorvastatin (LIPITOR) 20 mg, Oral, DAILY    cholecalciferol, vitamin D3, (Vitamin D3) 50 mcg (2,000 unit) tab Oral, EVERY BEDTIME    clopidogreL (PLAVIX) 75 mg, Oral, DAILY    doxazosin (CARDURA) 4 mg tablet EVERY BEDTIME    DULoxetine (CYMBALTA) 30 mg capsule duloxetine 30 mg capsule,delayed release    gabapentin (NEURONTIN) 300 mg capsule EVERY BEDTIME    hydrochlorothiazide (MICROZIDE PO) 12.5 mg, DAILY    mirtazapine (REMERON) 15 mg, Oral, EVERY BEDTIME    ranolazine ER (RANEXA) 500 mg, Oral, 2 TIMES DAILY         Signed By: Kenn Hernandez MD     July 22, 2022

## 2022-07-22 NOTE — PROGRESS NOTES
PULMONARY NOTE  [facility]    Name: Kulwant Mcnally MRN: [id number]   : 1929 Hospital: [facility]   Date: 2022  Admission date: 2022 Hospital Day: 5       HPI:     Hospital Problems  Never Reviewed            Codes Class Noted POA    Hypoxia ICD-10-CM: R09.02  ICD-9-CM: 799.02  2022 Unknown        COVID ICD-10-CM: U07.1  ICD-9-CM: 079.89  2022 Unknown          [x] High complexity decision making was performed  [x] See my orders for details      Subjective/Initial History:     I was asked by Bessie Fowler MD to see Kulwant Mcnally  a 80 y.o.  male in consultation     Excerpts from admission 2022 or consult notes as follows:     Patient is a 80y.o. year old male with significant past medical history of hypertension CVA A.  Fib AICD  came to emergency room because of generalized weakness fatigue for almost 1 week patient had multiple family members at home which are COVID-positive he typically walks with a walker but recently because of generalized weakness not able to ambulate came to emergency room seen by ER physician chest x-ray shows pneumonia COVID screening came back positive his wife has COVID-19 and she is at home along the oxygen or inhalers no history of smoking in the past      No Known Allergies     MAR reviewed and pertinent medications noted or modified as needed     Current Facility-Administered Medications   Medication    dexAMETHasone (DECADRON) tablet 4 mg    amLODIPine (NORVASC) tablet 5 mg    mirtazapine (REMERON) tablet 15 mg    melatonin tablet 3 mg    apixaban (ELIQUIS) tablet 2.5 mg    aspirin delayed-release tablet 81 mg    atorvastatin (LIPITOR) tablet 20 mg    doxazosin (CARDURA) tablet 4 mg    DULoxetine (CYMBALTA) capsule 30 mg    ranolazine ER (RANEXA) tablet 500 mg    acetaminophen (TYLENOL) tablet 650 mg    Or    acetaminophen (TYLENOL) suppository 650 mg    polyethylene glycol (MIRALAX) packet 17 g    ondansetron (ZOFRAN ODT) tablet 4 mg    Or    ondansetron (ZOFRAN) injection 4 mg    baricitinib (OLUMIANT) tablet 2 mg    azithromycin (ZITHROMAX) 500 mg in 0.9% sodium chloride 250 mL (Ilyx2Tik)    cefTRIAXone (ROCEPHIN) 1 g in sterile water (preservative free) 10 mL IV syringe      Patient PCP: Jarett Archuleta MD  PMH:  has a past medical history of Atrial fibrillation ([facility] [address] COVID-19 vaccine series completed (2021), Hypercholesteremia, Hypertension, Neuropathy, and Stroke ([facility] [address] (2021). PSH:   has a past surgical history that includes pr abdomen surgery proc unlisted (Right) and hx cataract removal (Left, 2018). FHX: family history is not on file. SHX:  reports that he has quit smoking. He has never used smokeless tobacco. He reports that he does not use drugs. ROS:      Constitutional: Generalized weakness. HENT: Negative. Eyes: Negative. Respiratory: Negative. Cardiovascular: Negative. Gastrointestinal: Negative for abdominal pain and nausea. Skin: Negative. Neurological: Negative. Objective:     Vital Signs: Telemetry:    normal sinus rhythm Intake/Output:   Visit Vitals  /75   Pulse 99   Temp 97.4 °F (36.3 °C)   Resp 18   Ht 5' 7\" (1.702 m)   Wt 54.4 kg (120 lb)   SpO2 94%   BMI 18.79 kg/m²       Temp (24hrs), Av.8 °F (36.6 °C), Min:97.4 °F (36.3 °C), Max:98 °F (36.7 °C)        O2 Device: None (Room air) O2 Flow Rate (L/min): 2 l/min       Wt Readings from Last 4 Encounters:   22 54.4 kg (120 lb)   02/10/22 54.4 kg (120 lb)   21 54.4 kg (120 lb)   21 53.5 kg (118 lb)          Intake/Output Summary (Last 24 hours) at 2022 1314  Last data filed at 2022 1626  Gross per 24 hour   Intake 360 ml   Output --   Net 360 ml         Last shift:      No intake/output data recorded.   Last 3 shifts: 1901 -  0700  In: 480 [P.O.:480]  Out: -        Physical Exam:       Constitutional: Awake alert may be mildly confused repeats things knows he is in the hospital his name and the year  HENT: Mouth is clear  Head: Normocephalic and atraumatic. Eyes: Pupils are equal, round, and reactive to light. EOM are normal.   Cardiovascular: Normal rate, regular rhythm and normal heart sounds. Pulmonary/Chest: Breath sounds normal. No wheezes. No rales. Exhibits no tenderness. Abdominal: Soft. Bowel sounds are normal. There is no abdominal tenderness. There is no rebound and no guarding. Musculoskeletal: Normal range of motion. Neurological: pt is alert and oriented to person, place, and time. Alert. Normal strength. No cranial nerve deficit or sensory deficit. Labs:    Recent Labs     07/20/22  0628   WBC 5.4   HGB 12.2          Recent Labs     07/20/22  0628      K 3.8      CO2 23   *   BUN 17   CREA 0.63*   CA 7.2*   ALB 3.0*   ALT 26     7/22 room air oxygen saturation 94%  7/21 room air oxygen saturation 94%  CRP 0.39, 0.63, 1.3  Ferritin 155    Imaging:    CXR Results  (Last 48 hours)      None          Results from Hospital Encounter encounter on 07/18/22    XR CHEST PORT    Narrative  INDICATION:  Fatigue    COMPARISON: August 2021    FINDINGS: Single AP portable view of the chest obtained at 1732 demonstrates a  stable cardiomediastinal silhouette. The lungs are hypoinspiratory and the  patient is rotated leftward. There is a chronic small left pleural effusion with  left basilar opacification. There is a chronic right lateral chest wall  deformity with pleural thickening. Impression  Hypoinspiratory examination. Chronic small left pleural effusion  with left basilar atelectasis. Superimposed left lower lobe pneumonia cannot be  excluded. Results from [place] encounter on 08/26/21    NC XR TECHNOLOGIST SERVICE    Narrative  COMPLIANCE ONLY    INDICATION: intra op    FINDINGS:    No intraoperative fluoroscopic views were submitted during fluoroscopic  assistance.     Impression  Intraoperative views not provided. Results from [place] encounter on 08/11/21    XR CHEST PORT    Narrative  Chest, frontal view, 8/11/2021    History: AICD, pacemaker. Comparison: Including chest 3/20/2020. Findings: Small electronic device is again seen at the left lower hemithorax. Retrocardiac lucency suggests hiatal hernia. The cardiac silhouette is within  normal limits. Lung volumes are low. Apical pleural thickening is seen. There  is pulmonary vascular congestion and hydrostatic edema. Blunting of the  costophrenic angles may be due to scarring, atelectasis or small pleural  effusions. No pneumothorax is identified. Degenerative changes are present in  the thoracic spine. Chronic deformity of the right scapula and right-sided ribs  are again noted. Impression  Electronic device at the left lower hemithorax. Hydrostatic edema. Blunting of the costophrenic angles as above. Results from [place] encounter on 02/10/22    CT HEAD WO CONT    Narrative  Technique: Multiple continuous axial images were obtained from the skull base to  the vertex without administration of intravenous contrast.    Comment on dose reduction: All CT scans at this facility are performed using  dose reduction optimization technique as appropriate to perform the exam  including the following; automated exposure control, adjustments of the mA  and/or kV according to patient size, or use of iterative reconstructed  technique. Comparison examination: None    Findings:  The ventricles and sulci are prominent consistent with age-related changes of  atrophy. Periventricular hypodensity is identified consistent with changes of  chronic small vessel disease. No evidence of midline shift, mass lesion, or  extra-axial fluid collection. No evidence of parenchymal hemorrhage or  infarction in a major vascular territory. The osseous structures are intact, the paranasal sinuses are clear.     Extracalvarial soft tissue hematoma posterior vertex . Impression  No acute intracranial process. Extracalvarial soft tissue hematoma posterior  vertex . IMPRESSION:   COVID-19 infection  COVID pneumonitis  Hypoxic respiratory failure resolved now on room air  Pleural effusion minimal if any  Atelectasis  Hypokalemia repleted  Chronic atrial fibrillation      RECOMMENDATIONS/PLAN:     On room air we will will maintain as is  Tolerating switch to oral Decadron  Continue baricitinib          7/19: Patient complaining of cough with sputum production. Lungs are clear. 7/20: Lungs are clear. Patient is confused, getting out of bed and removing his nasal cannula.  7/21 wife is in the room he does not seem confused at present is cooperative does repeat himself several times respirations nonlabored lungs are clear. Will discontinue IV fluids. Change Decadron to oral  7/22 doing well at this point. Respiratory status stable. Agree with discharged her rehab.   Will not follow further      Janice Eng MD

## 2022-07-22 NOTE — PROGRESS NOTES
OCCUPATIONAL THERAPY EVALUATION  Patient: Cyndie Jean-Baptiste (32 y.o. male)  Date: 7/22/2022  Primary Diagnosis: Hypoxia [R09.02]  COVID [U07.1]       Precautions: fall risk, COVID-19 (+)       ASSESSMENT  Pt is a 81 y/o M with PMH of CVA, afib, AICD presenting to [facility] with c/o generalized weakness and fatigue over the past week; per chart, multiple family members at home COVID-19 positive. CXR showing PNA, COVID test (+). Pt admitted 7/18/22 and being treated for COVID-19 pneumonitis. Pt received semi-supine in bed upon arrival, AXO x4 with additional time, and agreeable to OT evaluation. Spouse and son also present with pt permission. Per pt report, pt lives with spouse in a two-story home (1st floor setup/stays on main level) with ramped entrance, was IND with ADLs and Mod I using a rollator for mobility at [facility]. Spouse reports pt with hx of at least two recent falls over the past three months. Based on current observations, pt presents with deficits in generalized strength/AROM, bed mobility, static/dynamic sitting balance, static/dynamic standing balance, and functional activity tolerance impacting overall performance of ADLs and functional transfers/mobility. Pt currently requires CGA with additional time for bed mobility and supine><sit, total A to don clean socks to feet 2/2 limited anterior reach and impaired dynamic sitting balance with UE support required on bed rail. Basic grooming (washing face) completed s/p setup. Sit><stand transfer performed to/from EOB with min A and additional time using gait belt and RW, however pt demos poor standing balance limited by generalized LE weakness tolerating approx 1 min standing to change out chux pad (simulated total A toileting), unable to take any steps at this time returning to bedside min A and CGA sit>supine. Overall, pt tolerates session fair with c/o mild SOB with OOB activity and increased work of breathing noted (Spo2 remained 94%> on room air).  Pt would benefit from continued skilled OT services to address current impairments and improve IND and safety with self cares and functional transfers/mobility. Current OT d/c recommendation SNF once medically appropriate. Other factors to consider for discharge: family/social support, DME, time since onset, severity of deficits, functional baseline     Patient will benefit from skilled therapy intervention to address the above noted impairments. PLAN :  Recommendations and Planned Interventions: self care training, functional mobility training, therapeutic exercise, balance training, therapeutic activities, endurance activities, neuromuscular re-education, patient education, and family training/education    Frequency/Duration: Patient will be followed by occupational therapy:  3-5x/week to address goals. Recommendation for discharge: (in order for the patient to meet his/her long term goals)  Madan Kimball    This discharge recommendation:  Has been made in collaboration with the attending provider and/or case management       SUBJECTIVE:   Patient stated I feel really weak today.     OBJECTIVE DATA SUMMARY:   HISTORY:   Past Medical History:   Diagnosis Date    Atrial fibrillation ([facility] [address]     COVID-19 vaccine series completed 03/2021    Hypercholesteremia     Hypertension     Neuropathy     Stroke ([facility] [address] 08/2021    TIA     Past Surgical History:   Procedure Laterality Date    HX CATARACT REMOVAL Left 2018    IOL    [place] ABDOMEN SURGERY PROC UNLISTED Right     IHR       Expanded or extensive additional review of patient history:     Home Situation  Home Environment: Private residence  # Steps to Enter:  (ramp)  Wheelchair Ramp: Yes  One/Two Story Residence: Two story, live on 1st floor  # of Interior Steps:  (yes pt doesnt go upstairs)  Living Alone: No  Support Systems: Spouse/Significant Other  Patient Expects to be Discharged to[de-identified] Home with home health  Current DME Used/Available at Home: [employer] rollator  Tub or Shower Type: Shower (walk in shower w/built in seat and GB)      EXAMINATION OF PERFORMANCE DEFICITS:  Cognitive/Behavioral Status:  Neurologic State: Alert  Orientation Level: Oriented X4  Cognition: Follows commands    Hearing: Auditory  Auditory Impairment: None (some difficullty)      Range of Motion:  AROM: Generally decreased, functional                         Strength:  Strength: Generally decreased, functional                Coordination:  Coordination: Generally decreased, functional  Fine Motor Skills-Upper: Left Intact; Right Intact    Gross Motor Skills-Upper: Left Intact; Right Intact    Tone & Sensation:    Sensation: Intact                      Balance:  Sitting: Impaired; With support  Sitting - Static: Fair (occasional)  Sitting - Dynamic: Fair (occasional)  Standing: Impaired; With support  Standing - Static: Poor;Constant support  Standing - Dynamic : Poor;Constant support    Functional Mobility and Transfers for ADLs:  Bed Mobility:  Rolling: Contact guard assistance  Supine to Sit: Contact guard assistance  Sit to Supine: Contact guard assistance  Scooting: Contact guard assistance    Transfers:  Sit to Stand: Minimum assistance; Additional time  Stand to Sit: Minimum assistance      ADL Intervention and task modifications:       Grooming  Grooming Assistance: Set-up; Stand-by assistance  Position Performed: Seated edge of bed  Washing Face: Set-up; Stand-by assistance                   Lower Body Dressing Assistance  Socks: Total assistance (dependent)  Position Performed: Seated edge of bed              Therapeutic Exercise:  Pt educated on UE HEP to complete throughout the day to improve ROM and strength with good understanding verbalized and demonstrated. Functional Measure:    [facility] AM-PACTM \"6 Clicks\"                                                       Daily Activity Inpatient Short Form  How much help from another person does the patient currently need. .. Total; A Lot A Little None   1. Putting on and taking off regular lower body clothing? [x]  1 []  2 []  3 []  4   2. Bathing (including washing, rinsing, drying)? []  1 [x]  2 []  3 []  4   3. Toileting, which includes using toilet, bedpan or urinal? [x] 1 []  2 []  3 []  4   4. Putting on and taking off regular upper body clothing? []  1 []  2 [x]  3 []  4   5. Taking care of personal grooming such as brushing teeth? []  1 []  2 [x]  3 []  4   6. Eating meals? []  1 []  2 []  3 [x]  4   © , Trustees of [facility] [place] [place] [employer], under license to [employer]. All rights reserved     Score:      Interpretation of Tool:  Represents clinically-significant functional categories (i.e. Activities of daily living). Percentage of Impairment CH    0%   CI    1-19% CJ    20-39% CK    40-59% CL    60-79% CM    80-99% CN     100%   [facility]  Score 6-24 24 23 20-22 15-19 10-14 7-9 6         Occupational Therapy Evaluation Charge Determination   History Examination Decision-Making   LOW Complexity : Brief history review  LOW Complexity : 1-3 performance deficits relating to physical, cognitive , or psychosocial skils that result in activity limitations and / or participation restrictions  MEDIUM Complexity : Patient may present with comorbidities that affect occupational performnce.  Miniml to moderate modification of tasks or assistance (eg, physical or verbal ) with assesment(s) is necessary to enable patient to complete evaluation       Based on the above components, the patient evaluation is determined to be of the following complexity level: LOW   Pain Ratin/10    Activity Tolerance:   Fair, SpO2 stable on RA, requires frequent rest breaks, and observed SOB with activity    After treatment patient left in no apparent distress:    Supine in bed, Call bell within reach, Caregiver / family present, and Side rails x 3    COMMUNICATION/EDUCATION:   The patients plan of care was discussed with: Physical therapist, Registered nurse, and Case management. Patient/family have participated as able in goal setting and plan of care. and Patient/family agree to work toward stated goals and plan of care. This patients plan of care is appropriate for delegation to [facility].     Thank you for this referral.  Ivelisse Hanna  Time Calculation: 28 mins   Problem: Self Care Deficits Care Plan (Adult)  Goal: *Acute Goals and Plan of Care (Insert Text)  Description: Pt stated goal \"to gain my strength back\"  Pt will be Mod I sup <> sit in prep for EOB ADLs  Pt will be Mod I grooming standing sink side LRAD  Pt will be Mod I UB dressing sitting EOB/long sit   Pt will be Mod I LE dressing sitting EOB/long sit  Pt will be Mod I sit <>  prep for toileting LRAD  Pt will be Mod I toileting/toilet transfer/cloth mgmt LRAD  Pt will be IND following UE HEP in prep for self care tasks     Outcome: Not Met

## 2022-07-22 NOTE — PROGRESS NOTES
Patient was accepted by [facility]. They have started authorization. They will have a COVID bed available Monday morning. CM made patient's wife aware and she verbalized understanding. CM will continue to follow.

## 2022-07-22 NOTE — PROGRESS NOTES
PROGRESS NOTE    Patient: Selin Montgomery MRN: [id number]  SSN: [id number]    YOB: 1929  Age: 80 y.o. Sex: male      Admit Date: 7/18/2022    LOS: 4 days       Subjective     Chief Complaint   Patient presents with    Fatigue       HPI: Patient is a 80y.o. year old male with significant past medical history of hypertension CVA A. Fib AICD  came to emergency room because of generalized weakness fatigue for almost 1 week patient had multiple family members at home which are COVID-positive he typically walks with a walker but recently because of generalized weakness not able to ambulate came to emergency room seen by ER physician chest x-ray shows pneumonia COVID screening came back positive     Discussed with the patient wife patient is full code      7/19  Patient laying in bed comfortably. No complaints at this time. He states he is not having any difficulty breathing. O2: 96% on 2L NC  WBC: 2.5  Potassium: 3.3    CXR:  Hypoinspiratory examination. Chronic small left pleural effusion  with left basilar atelectasis. Superimposed left lower lobe pneumonia cannot be  Excluded. 7/20  Patient laying in bed comfortably without complaints at this time. NAD. Patient not wearing nasal canula when I entered the room. O2 sat 96%  WBC: 2.5>5.4    7/21  Patient sitting in bed comfortably but anxious to be discharged. Per PT - pt will need more PT after discharge. 7/22  Patient laying in bed comfortably. NAD  Switch to decadron oral   Continue antibiotics    Review of Systems:  Constitutional: Generalized weakness. HENT: Negative. Eyes: Negative. Respiratory: Negative. Cardiovascular: Negative. Gastrointestinal: Negative for abdominal pain and nausea. Skin: Negative. Neurological: Negative.       Objective     Visit Vitals  /75   Pulse 99   Temp 97.4 °F (36.3 °C)   Resp 18   Ht 5' 7\" (1.702 m)   Wt 54.4 kg (120 lb)   SpO2 94%   BMI 18.79 kg/m²    O2 Flow Rate (L/min): 2 l/min O2 Device: None (Room air)    Physical Exam:   Constitutional: pt is oriented to person, place, and time. Generalized weakness  HENT:   Head: Normocephalic and atraumatic. Eyes: Pupils are equal, round, and reactive to light. EOM are normal.   Cardiovascular: Normal rate, regular rhythm and normal heart sounds. Pulmonary/Chest: Breath sounds normal. No wheezes. No rales. Exhibits no tenderness. Abdominal: Soft. Bowel sounds are normal. There is no abdominal tenderness. There is no rebound and no guarding. Musculoskeletal: Normal range of motion. Neurological: pt is alert and oriented to person, place, and time. Alert. Normal strength. No cranial nerve deficit or sensory deficit. Intake & Output:  Current Shift: No intake/output data recorded. Last three shifts: 07/20 1901 - 07/22 0700  In: 480 [P.O.:480]  Out: -     Lab/Data Review:  Lab results reviewed. For significant abnormal values and values requiring intervention, see assessment and plan. 24 Hour Results:    Recent Results (from the past 24 hour(s))   C REACTIVE PROTEIN, QT    Collection Time: 07/22/22  6:35 AM   Result Value Ref Range    C-Reactive protein 0.39 0.00 - 0.60 mg/dL   LD    Collection Time: 07/22/22  6:35 AM   Result Value Ref Range     (H) 85 - 241 U/L             Imaging:    XR CHEST PORT   Final Result   Hypoinspiratory examination. Chronic small left pleural effusion   with left basilar atelectasis. Superimposed left lower lobe pneumonia cannot be   excluded.                 Assessment     Acute hypoxic respiratory failure on 2 L  COVID-19 pneumonitis  hypertension,   stroke and   Afib  Neutropenia    Plan   Switch to oral decadron per Pulmonary    Eliquis 2.5mg PO BID  Aspirin 81mg PO every day  Lipitor 20mg PO every day  Cardura 4mg PO every day  Cymbalta 30mg  PO every day  Ranolazine 500mg PO BID  Miralax 17g PO every day  Zofran 4mg tab PO Q8h  Olumiant 2mg PO every day  Zithromax 500mg IV every day  Recphin 1g IV every day  Norvasc 5mg po qd      PT OT consult    Possible discharge    Current Facility-Administered Medications:     dexAMETHasone (DECADRON) tablet 4 mg, 4 mg, Oral, Q12H, Cara Vincent MD, 4 mg at 07/22/22 0858    amLODIPine (NORVASC) tablet 5 mg, 5 mg, Oral, DAILY, Иван Chapa MD, 5 mg at 07/22/22 1573    mirtazapine (REMERON) tablet 15 mg, 15 mg, Oral, QHS, Mahogany Chapa MD, 15 mg at 07/21/22 2136    melatonin tablet 3 mg, 3 mg, Oral, QHS PRN, Mahogany Chapa MD, 3 mg at 07/21/22 2136    apixaban (ELIQUIS) tablet 2.5 mg, 2.5 mg, Oral, BID, Иван Chapa MD, 2.5 mg at 07/22/22 7115    aspirin delayed-release tablet 81 mg, 81 mg, Oral, DAILY, Mahogany Chapa MD, 81 mg at 07/22/22 0858    atorvastatin (LIPITOR) tablet 20 mg, 20 mg, Oral, DAILY, Mahogany Chapa MD, 20 mg at 07/22/22 8245    doxazosin (CARDURA) tablet 4 mg, 4 mg, Oral, QHS, Mahogany Chapa MD, 4 mg at 07/21/22 2136    DULoxetine (CYMBALTA) capsule 30 mg, 30 mg, Oral, DAILY, Mahogany Chapa MD, 30 mg at 07/22/22 0644    ranolazine ER (RANEXA) tablet 500 mg, 500 mg, Oral, BID, Иван Chapa MD, 500 mg at 07/22/22 7519    acetaminophen (TYLENOL) tablet 650 mg, 650 mg, Oral, Q6H PRN **OR** acetaminophen (TYLENOL) suppository 650 mg, 650 mg, Rectal, Q6H PRN, Mahogany Chapa MD    polyethylene glycol (MIRALAX) packet 17 g, 17 g, Oral, DAILY PRN, Mahogany Chapa MD    ondansetron (ZOFRAN ODT) tablet 4 mg, 4 mg, Oral, Q8H PRN **OR** ondansetron (ZOFRAN) injection 4 mg, 4 mg, IntraVENous, Q6H PRN, Mahogany Chapa MD    baricitinib (OLUMIANT) tablet 2 mg, 2 mg, Oral, DAILY, Mahogany Chapa MD, 2 mg at 07/22/22 0858    azithromycin (ZITHROMAX) 500 mg in 0.9% sodium chloride 250 mL ([id number]), 500 mg, IntraVENous, Q24H, Mahogany Chapa MD, Last Rate: 250 mL/hr at 07/21/22 1947, 500 mg at 07/21/22 1947    cefTRIAXone (ROCEPHIN) 1 g in sterile water (preservative free) 10 mL IV syringe, 1 g, IntraVENous, Q24H, Monique Dobbs MD, 1 g at 07/21/22 1946    Current Outpatient Medications   Medication Instructions    apixaban (ELIQUIS) 2.5 mg, Oral, 2 TIMES DAILY    aspirin delayed-release 81 mg tablet Oral, DAILY    atorvastatin (LIPITOR) 20 mg, Oral, DAILY    cholecalciferol, vitamin D3, (Vitamin D3) 50 mcg (2,000 unit) tab Oral, EVERY BEDTIME    clopidogreL (PLAVIX) 75 mg, Oral, DAILY    doxazosin (CARDURA) 4 mg tablet EVERY BEDTIME    DULoxetine (CYMBALTA) 30 mg capsule duloxetine 30 mg capsule,delayed release    gabapentin (NEURONTIN) 300 mg capsule EVERY BEDTIME    hydrochlorothiazide (MICROZIDE PO) 12.5 mg, DAILY    mirtazapine (REMERON) 15 mg, Oral, EVERY BEDTIME    ranolazine ER (RANEXA) 500 mg, Oral, 2 TIMES DAILY         Signed By: Amando Lerma     July 22, 2022 no

## 2022-07-22 NOTE — DISCHARGE SUMMARY
Discharge Summary       PATIENT ID: Fernanda Albarado  MRN: [id number]   YOB: 1929    DATE OF ADMISSION: 7/18/2022  4:35 PM    DATE OF DISCHARGE:   PRIMARY CARE PROVIDER: Jamie Cartagena MD     ATTENDING PHYSICIAN: Evelina Washington  DISCHARGING PROVIDER: Celestina Chapa      CONSULTATIONS: IP CONSULT TO PULMONOLOGY  IP CONSULT TO INFECTIOUS DISEASES    PROCEDURES/SURGERIES: * No surgery found *    ADMITTING DIAGNOSES:    Patient Active Problem List    Diagnosis Date Noted    Hypoxia 07/18/2022    COVID 07/18/2022    Carotid stenosis 08/26/2021       DISCHARGE DIAGNOSES / PLAN:      Acute hypoxic respiratory failure on 2 L  COVID-19 pneumonitis  hypertension,  stroke and   Afib  Neutropenia        DISCHARGE MEDICATIONS:  Current Discharge Medication List        START taking these medications    Details   amLODIPine (NORVASC) 5 mg tablet Take 1 Tablet by mouth in the morning. Qty: 30 Tablet, Refills: 0  Start date: 7/23/2022      dexAMETHasone (DECADRON) 4 mg tablet 1 tab twice daily  Qty: 10 Tablet, Refills: 0  Start date: 7/22/2022           CONTINUE these medications which have NOT CHANGED    Details   apixaban (Eliquis) 2.5 mg tablet Take 2.5 mg by mouth two (2) times a day. aspirin delayed-release 81 mg tablet Take  by mouth daily. cholecalciferol, vitamin D3, (Vitamin D3) 50 mcg (2,000 unit) tab Take  by mouth nightly. mirtazapine (REMERON) 15 mg tablet Take 15 mg by mouth nightly. atorvastatin (LIPITOR) 20 mg tablet Take 20 mg by mouth daily. ranolazine ER (RANEXA) 500 mg SR tablet Take 500 mg by mouth two (2) times a day. DULoxetine (CYMBALTA) 30 mg capsule duloxetine 30 mg capsule,delayed release      doxazosin (CARDURA) 4 mg tablet nightly.            STOP taking these medications       clopidogreL (Plavix) 75 mg tab Comments:   Reason for Stopping:         gabapentin (NEURONTIN) 300 mg capsule Comments:   Reason for Stopping:         hydrochlorothiazide (Felicitas Kim PO) Comments:   Reason for Stopping:                 NOTIFY YOUR PHYSICIAN FOR ANY OF THE FOLLOWING:   Fever over 101 degrees for 24 hours. Chest pain, shortness of breath, fever, chills, nausea, vomiting, diarrhea, change in mentation, falling, weakness, bleeding. Severe pain or pain not relieved by medications. Or, any other signs or symptoms that you may have questions about. DISPOSITION:  x  Home With:   OT  PT  HH  RN       Long term SNF/Inpatient Rehab    Independent/assisted living    Hospice    Other:       PATIENT CONDITION AT DISCHARGE: Stable      PHYSICAL EXAMINATION AT DISCHARGE:  General:          Alert, cooperative, no distress, appears stated age. HEENT:           Atraumatic, anicteric sclerae, pink conjunctivae                          No oral ulcers, mucosa moist, throat clear, dentition fair  Neck:               Supple, symmetrical  Lungs:             Clear to auscultation bilaterally. No Wheezing or Rhonchi. No rales. Chest wall:      No tenderness  No Accessory muscle use. Heart:              Regular  rhythm,  No  murmur   No edema  Abdomen:        Soft, non-tender. Not distended. Bowel sounds normal  Extremities:     No cyanosis. No clubbing,                            Skin turgor normal, Capillary refill normal  Skin:                Not pale. Not Jaundiced  No rashes   Psych:             Not anxious or agitated. Neurologic:      Alert, moves all extremities, answers questions appropriately and responds to commands     XR CHEST PORT   Final Result   Hypoinspiratory examination. Chronic small left pleural effusion   with left basilar atelectasis. Superimposed left lower lobe pneumonia cannot be   excluded.               Recent Results (from the past 24 hour(s))   C REACTIVE PROTEIN, QT    Collection Time: 07/22/22  6:35 AM   Result Value Ref Range    C-Reactive protein 0.39 0.00 - 0.60 mg/dL   LD    Collection Time: 07/22/22  6:35 AM   Result Value Ref Range     (H) 85 - 241 U/L          HOSPITAL COURSE:    Patient is a 80y.o. year old male with significant past medical history of hypertension CVA A. Fib AICD  came to emergency room because of generalized weakness fatigue for almost 1 week patient had multiple family members at home which are COVID-positive he typically walks with a walker but recently because of generalized weakness not able to ambulate came to emergency room seen by ER physician chest x-ray shows pneumonia COVID screening came back positive     Discussed with the patient wife patient is full code        7/19  Patient laying in bed comfortably. No complaints at this time. He states he is not having any difficulty breathing. O2: 96% on 2L NC  WBC: 2.5  Potassium: 3.3     CXR:  Hypoinspiratory examination. Chronic small left pleural effusion  with left basilar atelectasis. Superimposed left lower lobe pneumonia cannot be  Excluded. 7/20  Patient laying in bed comfortably without complaints at this time. NAD. Patient not wearing nasal canula when I entered the room. O2 sat 96%  WBC: 2.5>5.4     7/21  Patient sitting in bed comfortably but anxious to be discharged. Per PT - pt will need more PT after discharge. 7/22  Patient laying in bed comfortably.  NAD  Switch to decadron oral  Continue antibiotics    To skilled care rehab    Follow-up with PCP in 2 weeks      Signed:   Bisi Beasley MD  7/22/2022  12:36 PM

## 2022-07-22 NOTE — DISCHARGE INSTRUCTIONS
Discharge Instructions       PATIENT ID: Suzie Chery  MRN: [id number]   YOB: 1929    DATE OF ADMISSION: [unfilled]    DATE OF DISCHARGE: 7/22/2022    PRIMARY CARE PROVIDER: @PCP@     ATTENDING PHYSICIAN: [unfilled]  DISCHARGING PROVIDER: Nicole Bell MD    To contact this individual call [phone] and ask the  to page. If unavailable ask to be transferred the Adult Hospitalist Department. DISCHARGE DIAGNOSES COVID    CONSULTATIONS: [unfilled]    PROCEDURES/SURGERIES: * No surgery found *    PENDING TEST RESULTS:   At the time of discharge the following test results are still pending: None    [facility]   [facility]     ADDITIONAL CARE RECOMMENDATIONS: Discharge to skilled care rehab    DIET: Cardiac Diet      ACTIVITY: Activity as tolerated    Wound care: Wound Care Order: submitted to [facility] Please view [email or web address]    EQUIPMENT needed: ***      DISCHARGE MEDICATIONS:   See Medication Reconciliation Form    It is important that you take the medication exactly as they are prescribed. Keep your medication in the bottles provided by the pharmacist and keep a list of the medication names, dosages, and times to be taken in your wallet. Do not take other medications without consulting your doctor. NOTIFY YOUR PHYSICIAN FOR ANY OF THE FOLLOWING:   Fever over 101 degrees for 24 hours. Chest pain, shortness of breath, fever, chills, nausea, vomiting, diarrhea, change in mentation, falling, weakness, bleeding. Severe pain or pain not relieved by medications. Or, any other signs or symptoms that you may have questions about.       DISPOSITION:    Home With:   OT  PT  HH  RN       SNF/Inpatient Rehab/LTAC    Independent/assisted living    Hospice    Other:         PROBLEM LIST Updated:  Yes ***       Signed:   Nicole Bell MD  7/22/2022  12:35 PM    Discharge Instructions       PATIENT ID: Suzie Chery  MRN: [id number]   DATE OF BIRTH: 6/29/1929    DATE OF ADMISSION: [unfilled]    DATE OF DISCHARGE: 7/22/2022    PRIMARY CARE PROVIDER: @PCP@     ATTENDING PHYSICIAN: [unfilled]  DISCHARGING PROVIDER: Eliseo Johnson MD    To contact this individual call [phone] and ask the  to page. If unavailable ask to be transferred the Adult Hospitalist Department. DISCHARGE DIAGNOSES ***    CONSULTATIONS: [unfilled]    PROCEDURES/SURGERIES: * No surgery found *    PENDING TEST RESULTS:   At the time of discharge the following test results are still pending: ***    [facility]   [facility]     ADDITIONAL CARE RECOMMENDATIONS: ***    DIET: {diet:47603}      ACTIVITY: {discharge activity:83525}    Wound care: {Psychiatric Wound Care Instructions:00462}    EQUIPMENT needed: ***      DISCHARGE MEDICATIONS:   See Medication Reconciliation Form    It is important that you take the medication exactly as they are prescribed. Keep your medication in the bottles provided by the pharmacist and keep a list of the medication names, dosages, and times to be taken in your wallet. Do not take other medications without consulting your doctor. NOTIFY YOUR PHYSICIAN FOR ANY OF THE FOLLOWING:   Fever over 101 degrees for 24 hours. Chest pain, shortness of breath, fever, chills, nausea, vomiting, diarrhea, change in mentation, falling, weakness, bleeding. Severe pain or pain not relieved by medications. Or, any other signs or symptoms that you may have questions about.       DISPOSITION:    Home With:   OT  PT  HH  RN       SNF/Inpatient Rehab/LTAC    Independent/assisted living    Hospice    Other:         PROBLEM LIST Updated:  Yes ***       Signed:   Eliseo Johnson MD  7/22/2022  12:35 PM

## 2022-07-22 NOTE — PROGRESS NOTES
Progress Note    Patient: Haydee Wiggins MRN: [id number]  SSN: [id number]    YOB: 1929  Age: 80 y.o. Sex: male      Admit Date: 7/18/2022    LOS: 4 days     Subjective:     Patient followed for Covid-19 infection with unremarkable CXR, now on room air. Afebrile. Leukopenia has resolved. LDH and CRP elevated. On Azithromycin, Ceftriaxone, Dexamethasone and Baricitinib. Patient resting comfortably with no complaints. It appears that he may be discharged today. Objective:     Vitals:    07/21/22 1117 07/21/22 1530 07/21/22 1925 07/22/22 0827   BP: 138/81 125/76 134/79 139/75   Pulse: 84 94 89 99   Resp: 19 19 16 18   Temp: 98.1 °F (36.7 °C) 98 °F (36.7 °C) 97.9 °F (36.6 °C) 97.4 °F (36.3 °C)   SpO2: 96% 95% 94%    Weight:       Height:            Intake and Output:  Current Shift: No intake/output data recorded. Last three shifts: 07/20 1901 - 07/22 0700  In: 480 [P.O.:480]  Out: -     Physical Exam:      Vitals and nursing note reviewed. Constitutional:       General: He is not in acute distress. Appearance: He is ill-appearing. He is not diaphoretic. Comments: Room air SpO2 98%   HENT: unremarkable    Cardiovascular:     Rate and Rhythm: Normal rate and regular rhythm. Pulmonary:     Effort: Pulmonary effort is normal.      Breath sounds: Normal breath sounds. Abdominal:      General: Bowel sounds are normal.      Palpations: Abdomen is soft. Tenderness: There is no abdominal tenderness. Genitourinary:     Comments: No Rollins  Musculoskeletal:      Right lower leg: No edema. Left lower leg: No edema. Skin:     Findings: No rash. Neurological:     General: No focal deficit present. Mental Status: He is alert and oriented to person, place, and time.    Psychiatric:    normal behavior         Lab/Data Review:     WBC 5,400     <360 <357  Ferritin 155    CRP 0.39 < 0.63 <1.30      Assessment:     Active Problems:    Hypoxia (7/18/2022)      COVID (7/18/2022)  1. Covid-19 infection on supplemental oxygen (nasal cannula), Day #3 Dexamethasone and baricitinib  2. Leukopenia, possibly secondary to #1, resolved  3. Elevated LDH and CRP, secondary to #1  3. Abnormal CXR, clinical significance unclear, Day #3 Azithromycin and Ceftriaxone    Comment:  Patient remains on room air with now normal CRP. Unclear why LDH increasing. Plan:   1. No indication for Remdesivir  2.  Continue Dexamethasone/baricitinib per Pulmonary  3. Reasonable to transition to Levaquin po at discharge     Signed By: Aranza Crump MD     July 22, 2022

## 2022-07-23 NOTE — PROGRESS NOTES
Progress Note    Patient: Radha Dowell MRN: [id number]  SSN: [id number]    YOB: 1929  Age: 80 y.o. Sex: male      Admit Date: 7/18/2022    LOS: 5 days     Subjective:     Patient followed for Covid-19 infection with unremarkable CXR, now on room air. Afebrile. Leukopenia has resolved. LDH and CRP elevated. On Azithromycin, Dexamethasone and Baricitinib. Patient resting comfortably with no complaints. It appears that he has been discharged but pending authorization to go to [facility]. Wife at bedside. Objective:     Vitals:    07/22/22 1951 07/22/22 2340 07/23/22 0711 07/23/22 0745   BP: (!) 152/81 123/73 (!) 141/71    Pulse: 90 79 74    Resp: 18 18 20    Temp: 98 °F (36.7 °C) 98 °F (36.7 °C) 97.3 °F (36.3 °C)    SpO2: 94% 95% 96% 97%   Weight:       Height:            Intake and Output:  Current Shift: No intake/output data recorded. Last three shifts: No intake/output data recorded. Physical Exam:      Vitals and nursing note reviewed. Constitutional:       General: He is not in acute distress. Appearance: He is ill-appearing. He is not diaphoretic. Comments: Room air SpO2 98%   HENT: unremarkable    Cardiovascular:     Rate and Rhythm: Normal rate and regular rhythm. Pulmonary:     Effort: Pulmonary effort is normal.      Breath sounds: Normal breath sounds. Abdominal:      General: Bowel sounds are normal.      Palpations: Abdomen is soft. Tenderness: There is no abdominal tenderness. Genitourinary:     Comments: No Rollins  Musculoskeletal:      Right lower leg: No edema. Left lower leg: No edema. Skin:     Findings: No rash. Neurological:     General: No focal deficit present. Mental Status: He is alert and oriented to person, place, and time.    Psychiatric:    normal behavior         Lab/Data Review:     WBC 5,400     <360 <357  Ferritin 155    CRP 0.39 < 0.63 <1.30      Assessment:     Active Problems:    Hypoxia (7/18/2022)      COVID (7/18/2022)  1. Covid-19 infection on supplemental oxygen (nasal cannula), Day #3 Dexamethasone and baricitinib  2. Leukopenia, possibly secondary to #1, resolved  3. Elevated LDH and CRP, secondary to #1  3. Abnormal CXR, clinical significance unclear, Day #3 Azithromycin and Ceftriaxone    Comment:  Patient remains on room air with now normal CRP. Unclear why LDH increasing. Plan:   1. No indication for Remdesivir  2. Continue Dexamethasone/baricitinib per Pulmonary  3. Azithromycin is completed today  4.  Will continue to follow prn    Signed By: Aranza Crump MD     July 23, 2022

## 2022-07-23 NOTE — DISCHARGE SUMMARY
Discharge Summary       PATIENT ID: Fernanda Albarado  MRN: [id number]   YOB: 1929    DATE OF ADMISSION: 7/18/2022  4:35 PM    DATE OF DISCHARGE:   PRIMARY CARE PROVIDER: Jamie Cartagena MD     ATTENDING PHYSICIAN: Evelina Washington  DISCHARGING PROVIDER: Celestina Chapa      CONSULTATIONS: IP CONSULT TO PULMONOLOGY  IP CONSULT TO INFECTIOUS DISEASES    PROCEDURES/SURGERIES: * No surgery found *    ADMITTING DIAGNOSES:    Patient Active Problem List    Diagnosis Date Noted    Hypoxia 07/18/2022    COVID 07/18/2022    Carotid stenosis 08/26/2021       DISCHARGE DIAGNOSES / PLAN:      Acute hypoxic respiratory failure on 2 L  COVID-19 pneumonitis  hypertension,  stroke and   Afib  Neutropenia        DISCHARGE MEDICATIONS:  Current Discharge Medication List        START taking these medications    Details   amLODIPine (NORVASC) 5 mg tablet Take 1 Tablet by mouth in the morning. Qty: 30 Tablet, Refills: 0  Start date: 7/23/2022      dexAMETHasone (DECADRON) 4 mg tablet 1 tab twice daily  Qty: 10 Tablet, Refills: 0  Start date: 7/22/2022           CONTINUE these medications which have NOT CHANGED    Details   apixaban (Eliquis) 2.5 mg tablet Take 2.5 mg by mouth two (2) times a day. aspirin delayed-release 81 mg tablet Take  by mouth daily. cholecalciferol, vitamin D3, (Vitamin D3) 50 mcg (2,000 unit) tab Take  by mouth nightly. mirtazapine (REMERON) 15 mg tablet Take 15 mg by mouth nightly. atorvastatin (LIPITOR) 20 mg tablet Take 20 mg by mouth daily. ranolazine ER (RANEXA) 500 mg SR tablet Take 500 mg by mouth two (2) times a day. DULoxetine (CYMBALTA) 30 mg capsule duloxetine 30 mg capsule,delayed release      doxazosin (CARDURA) 4 mg tablet nightly.            STOP taking these medications       clopidogreL (Plavix) 75 mg tab Comments:   Reason for Stopping:         gabapentin (NEURONTIN) 300 mg capsule Comments:   Reason for Stopping:         hydrochlorothiazide (Felicitas Kim PO) Comments:   Reason for Stopping:                 NOTIFY YOUR PHYSICIAN FOR ANY OF THE FOLLOWING:   Fever over 101 degrees for 24 hours. Chest pain, shortness of breath, fever, chills, nausea, vomiting, diarrhea, change in mentation, falling, weakness, bleeding. Severe pain or pain not relieved by medications. Or, any other signs or symptoms that you may have questions about. DISPOSITION:  x  Home With:   OT  PT  HH  RN       Long term SNF/Inpatient Rehab    Independent/assisted living    Hospice    Other:       PATIENT CONDITION AT DISCHARGE: Stable      PHYSICAL EXAMINATION AT DISCHARGE:  General:          Alert, cooperative, no distress, appears stated age. HEENT:           Atraumatic, anicteric sclerae, pink conjunctivae                          No oral ulcers, mucosa moist, throat clear, dentition fair  Neck:               Supple, symmetrical  Lungs:             Clear to auscultation bilaterally. No Wheezing or Rhonchi. No rales. Chest wall:      No tenderness  No Accessory muscle use. Heart:              Regular  rhythm,  No  murmur   No edema  Abdomen:        Soft, non-tender. Not distended. Bowel sounds normal  Extremities:     No cyanosis. No clubbing,                            Skin turgor normal, Capillary refill normal  Skin:                Not pale. Not Jaundiced  No rashes   Psych:             Not anxious or agitated. Neurologic:      Alert, moves all extremities, answers questions appropriately and responds to commands     XR CHEST PORT   Final Result   Hypoinspiratory examination. Chronic small left pleural effusion   with left basilar atelectasis. Superimposed left lower lobe pneumonia cannot be   excluded. No results found for this or any previous visit (from the past 24 hour(s)). HOSPITAL COURSE:    Patient is a 80y.o. year old male with significant past medical history of hypertension CVA A.  Fib AICD  came to emergency room because of generalized weakness fatigue for almost 1 week patient had multiple family members at home which are COVID-positive he typically walks with a walker but recently because of generalized weakness not able to ambulate came to emergency room seen by ER physician chest x-ray shows pneumonia COVID screening came back positive     Discussed with the patient wife patient is full code        7/19  Patient laying in bed comfortably. No complaints at this time. He states he is not having any difficulty breathing. O2: 96% on 2L NC  WBC: 2.5  Potassium: 3.3     CXR:  Hypoinspiratory examination. Chronic small left pleural effusion  with left basilar atelectasis. Superimposed left lower lobe pneumonia cannot be  Excluded. 7/20  Patient laying in bed comfortably without complaints at this time. NAD. Patient not wearing nasal canula when I entered the room. O2 sat 96%  WBC: 2.5>5.4     7/21  Patient sitting in bed comfortably but anxious to be discharged. Per PT - pt will need more PT after discharge. 7/22  Patient laying in bed comfortably.  NAD  Switch to decadron oral  Continue antibiotics    To skilled care rehab    Follow-up with PCP in 2 weeks    Pneumonitis on room air  Signed:   Agnieszka Ott MD  7/23/2022  12:36 PM

## 2022-07-23 NOTE — PROGRESS NOTES
Problem: Falls - Risk of  Goal: *Absence of Falls  Description: Document Michelle Schaffer Fall Risk and appropriate interventions in the flowsheet.   Outcome: Progressing Towards Goal  Note: Fall Risk Interventions:  Mobility Interventions: Bed/chair exit alarm    Mentation Interventions: Bed/chair exit alarm    Medication Interventions: Bed/chair exit alarm    Elimination Interventions: Bed/chair exit alarm    History of Falls Interventions: Bed/chair exit alarm         Problem: Patient Education: Go to Patient Education Activity  Goal: Patient/Family Education  Outcome: Progressing Towards Goal

## 2022-07-23 NOTE — PROGRESS NOTES
0845. CM reviewed the chart. Patient has been accepted to [facility] pending auth approval.   Possible dc at the beginning of the week. Clinicals uploaded via [employer] [id number].      [address], [address]

## 2022-07-23 NOTE — PROGRESS NOTES
Problem: Mobility Impaired (Adult and Pediatric)  Goal: *Acute Goals and Plan of Care (Insert Text)  Description: Pt stated goals: walk   Pt will be I with LE HEP in 7 days. Pt will perform bed mobility with mod I in 7 days. Pt will perform transfers with mod I in 7 days. Pt will amb  feet with LRAD safely with spv in 7 days. Outcome: Progressing Towards Goal   PHYSICAL THERAPY TREATMENT  Patient: Haydee Wiggins (57 y.o. male)  Date: 7/23/2022  Diagnosis: Hypoxia [R09.02]  COVID [U07.1] <principal problem not specified>      Precautions:    Chart, physical therapy assessment, plan of care and goals were reviewed. ASSESSMENT  Patient continues with skilled PT services and is progressing towards goals. Upon entry into room, patient semi-supine in bed and agreeable to work with PT; asking for assistance putting his hearing aid in. Bed mobility performed with CGA to come to EOB with incr time and heavy use of bed rails. Patient sat EOB with fair sitting balance but some L sided lean. He performed 2 stands with RW and modA with difficulty coming fully erect and unable to stand for more than approx 15-20 seconds with modA to maintain standing. Attempted to take sidesteps to [facility] during second stand but patient unable to take big enough steps and needed to sit. Incr time needed for rest breaks due to SOB. Returned patient to supine, Lokesh at LEs and patient able to scoot to [facility] with CGA and trendelenburg feature on bed. Positioned patient in bed to sit up and eat lunch. He will benefit from continued skilled PT Services to improve his strength, balance and endurance to decrease assistance from caregivers. Current Level of Function Impacting Discharge (mobility/balance): decr strength, decr sitting/standing balance, decr endurance     Other factors to consider for discharge: [place], medical hx, COVID+         PLAN :  Patient continues to benefit from skilled intervention to address the above impairments. Continue treatment per established plan of care. to address goals. Recommendation for discharge: (in order for the patient to meet his/her long term goals)  Madan Kimball versus YONG with 24/7 care    This discharge recommendation:  Has been made in collaboration with the attending provider and/or case management    IF patient discharges home will need the following DME: rolling walker and to be determined (TBD)       SUBJECTIVE:   Patient stated I need help putting my hearing aid in.    OBJECTIVE DATA SUMMARY:   Critical Behavior:  Neurologic State: Alert  Orientation Level: Oriented to situation, Oriented to person, Disoriented to time, Disoriented to place  Cognition: Appropriate for age attention/concentration, Follows commands, Memory loss, Poor safety awareness     Functional Mobility Training:  Bed Mobility:     Supine to Sit: Contact guard assistance  Sit to Supine: Minimum assistance  Scooting: Contact guard assistance    Transfers:  Sit to Stand: Moderate assistance;Assist x1  Stand to Sit: Minimum assistance;Assist x1    Balance:  Sitting: Impaired; With support  Sitting - Static: Fair (occasional)  Sitting - Dynamic: Poor (constant support)  Standing: Impaired;Pull to stand; With support  Standing - Static: Poor;Constant support  Standing - Dynamic : Poor;Constant support      Therapeutic Exercises:   Not today     Pain Rating:  No pain     Activity Tolerance:   Poor, SpO2 stable on RA, requires frequent rest breaks, and observed SOB with activity  Please refer to the flowsheet for vital signs taken during this treatment. After treatment patient left in no apparent distress:   Supine in bed, Call bell within reach, and Side rails x 3    COMMUNICATION/COLLABORATION:   The patients plan of care was discussed with: Physical therapist, Registered nurse, and Certified nursing assistant/patient care technician.      Triny Malin   Time Calculation: 33 mins

## 2022-07-24 NOTE — PROGRESS NOTES
This RN was told to call in a cardiology consult after 8:00 am. This called and spoke with Dr. Viji Varghese.

## 2022-07-24 NOTE — PROGRESS NOTES
Problem: Self Care Deficits Care Plan (Adult)  Goal: *Acute Goals and Plan of Care (Insert Text)  Description: Pt stated goal \"to gain my strength back\"  Pt will be Mod I sup <> sit in prep for EOB ADLs  Pt will be Mod I grooming standing sink side LRAD  Pt will be Mod I UB dressing sitting EOB/long sit   Pt will be Mod I LE dressing sitting EOB/long sit  Pt will be Mod I sit <>  prep for toileting LRAD  Pt will be Mod I toileting/toilet transfer/cloth mgmt LRAD  Pt will be IND following UE HEP in prep for self care tasks  Outcome: Progressing Towards Goal     Problem: Patient Education: Go to Patient Education Activity  Goal: Patient/Family Education  Outcome: Progressing Towards Goal     OCCUPATIONAL THERAPY TREATMENT  Patient: Micheal Montanez (57 y.o. male)  Date: 7/24/2022  Diagnosis: Hypoxia [R09.02]  COVID [U07.1] <principal problem not specified>      Precautions:    Chart, occupational therapy assessment, plan of care, and goals were reviewed. ASSESSMENT  Patient continues with skilled OT services and is progressing towards goals. Pt received semi-supine in bed and agreeable to OT co-treatment with PTA. Co-treatment so to improve pt's safety with transfers and OOB activity. Pt A&Oxself and situation, unable to report time. Pt transferred to EOB with SBA, c/o back pain 5/10. Agreeable to eat breakfast, took bite of cho and one bite of eggs prior to ceasing. Reported low appetite but accepting of Ensure. Pt had food still in mouth after drink and directed to finish swallowing food before standing. After attempts, pt requested to spit out food. Provided water for pt to rinse mouth out as well. Using gait belt, pt required min Ax2 for sit>stand to RW, Pt improved in standing tolerance and tolerance with transfer to bedside chair, CGA Ax2. Pt SPO2 in sitting desat to 84%, recovered within ~30 seconds to 96% on RA.  Pt completed bathroom mobility with CGAx2, and toilet transfer with mod A for lifting assistance. Pt required rest break upon toilet transfer, visibly with SOB, SPO2 ranged 86-95%. Provided cues for deep breathing/pursed lip breathing. Pt able to return to EOB with one seated rest break, and transferred seated>supine with SBA. Recommend pt continue with acute OT services to address decreased standing tolerance/balance, decreased activity tolerance, and safety with ADL routine. Pt may be appropriate for Ax1 in future sessions. Recommend to discharge to SNF when medically stable. Current Level of Function Impacting Discharge (ADLs): CGA-mod A    Other factors to consider for discharge: medical status, 24/7 supervision, DME needs         PLAN :  Patient continues to benefit from skilled intervention to address the above impairments. Continue treatment per established plan of care. to address goals. Recommendation for discharge: (in order for the patient to meet his/her long term goals)  Madan Kimball    This discharge recommendation:  Has not yet been discussed the attending provider and/or case management    IF patient discharges home will need the following DME: bedside commode, walker: rollator, and walker: rolling       SUBJECTIVE:   Patient stated I can't hack much more.     OBJECTIVE DATA SUMMARY:   Cognitive/Behavioral Status:  Neurologic State: Alert  Orientation Level: Oriented to person;Oriented to situation;Disoriented to time  Cognition: Appropriate for age attention/concentration; Follows commands;Memory loss      Functional Mobility and Transfers for ADLs:  Bed Mobility:  Supine to Sit: Stand-by assistance  Sit to Supine: Stand-by assistance  Scooting: Stand-by assistance    Transfers:  Sit to Stand: Minimum assistance;Assist x2  Functional Transfers  Bathroom Mobility: Contact guard assistance  Toilet Transfer :  Moderate assistance;Assist x1      Balance:  Sitting: Intact  Sitting - Static: Good (unsupported)  Sitting - Dynamic: Fair (occasional)  Standing: Impaired; With support  Standing - Static: Fair;Constant support  Standing - Dynamic : Fair;Constant support    ADL Intervention:  Feeding  Food to Mouth: Independent  Drink to Mouth: Set-up    Grooming  Grooming Assistance: Set-up  Position Performed: Seated in chair  Washing Face: Set-up      Pain:  5/10 back pain    Activity Tolerance:   Fair, requires rest breaks, and observed SOB with activity  Please refer to the flowsheet for vital signs taken during this treatment. After treatment patient left in no apparent distress:   Supine in bed, Call bell within reach, and Bed / chair alarm activated    COMMUNICATION/COLLABORATION:   The patients plan of care was discussed with: Physical therapy assistant and Registered nurse.      Andrea Santiago OT  Time Calculation: 39 mins

## 2022-07-24 NOTE — PROGRESS NOTES
Problem: Falls - Risk of  Goal: *Absence of Falls  Description: Document John Mandujano Fall Risk and appropriate interventions in the flowsheet.   Outcome: Not Met  Note: Fall Risk Interventions:  Mobility Interventions: Bed/chair exit alarm    Mentation Interventions: Bed/chair exit alarm, Adequate sleep, hydration, pain control    Medication Interventions: Bed/chair exit alarm    Elimination Interventions: Bed/chair exit alarm, Call light in reach    History of Falls Interventions: Bed/chair exit alarm

## 2022-07-24 NOTE — PROGRESS NOTES
Problem: Mobility Impaired (Adult and Pediatric)  Goal: *Acute Goals and Plan of Care (Insert Text)  Description: Pt stated goals: walk   Pt will be I with LE HEP in 7 days. Pt will perform bed mobility with mod I in 7 days. Pt will perform transfers with mod I in 7 days. Pt will amb  feet with LRAD safely with spv in 7 days. Outcome: Progressing Towards Goal   PHYSICAL THERAPY TREATMENT  Patient: Darling Rogers (89 y.o. male)  Date: 7/24/2022  Diagnosis: Hypoxia [R09.02]  COVID [U07.1] <principal problem not specified>      Precautions:    Chart, physical therapy assessment, plan of care and goals were reviewed. ASSESSMENT  Patient continues with skilled PT services and is progressing towards goals. Co-treat with OT due to level of assist required for sit<>stand transfers and ambulation for safety of patient/clinician. Upon entry into room, patient semi-supine in bed and agreeable to work with therapy. Bed mobility performed with SBA and use of bed rails to come to sitting. Patient sat EOB and worked on dynamic sitting balance tasks and improving upright tolerance. Sit<>stand from bed with RW and Lokesh x 2. Patient with forward flexed posture in standing. Patient ambulated a few feet to chair with CGA x 2 and RW. O2 in sitting 97% on RA but patient reports feeling SOB so rested for a few min. Sit<>stand from chair with modA and RW and patient ambulated into restroom and performed a toilet transfer with grab bars. ModA to stand from toilet. Patient ambulated back to bed and was awfully fatigued at end of session. Sao2 remained WNL throughout session but instructed patient in deep breathing as he became SOB. SBA to return to semi-supine. Patient will benefit from continued skilled PT services to improve his strength for bed mobility and transfers to decrease assistance from caregivers. Patient may be OK with assist x 1 in future sessions for OOB mobility.      Current Level of Function Impacting Discharge (mobility/balance): decr strength, decr mobility, decr strength     Other factors to consider for discharge: COVID+, medical hx, [place]         PLAN :  Patient continues to benefit from skilled intervention to address the above impairments. Continue treatment per established plan of care. to address goals. Recommendation for discharge: (in order for the patient to meet his/her long term goals)  Madan Kimball    This discharge recommendation:  Has been made in collaboration with the attending provider and/or case management    IF patient discharges home will need the following DME: rolling walker and to be determined (TBD)       SUBJECTIVE:   Patient stated alright, I am ready to get moving.     OBJECTIVE DATA SUMMARY:   Critical Behavior:  Neurologic State: Alert  Orientation Level: Oriented to person, Disoriented to time, Oriented to situation  Cognition: Appropriate for age attention/concentration, Follows commands, Memory loss     Functional Mobility Training:  Bed Mobility:     Supine to Sit: Stand-by assistance  Sit to Supine: Stand-by assistance  Scooting: Stand-by assistance    Transfers:  Sit to Stand: Minimum assistance;Assist x2  Stand to Sit: Minimum assistance;Assist x2    Balance:  Sitting: Intact  Sitting - Static: Good (unsupported)  Sitting - Dynamic: Fair (occasional)  Standing: Impaired; With support  Standing - Static: Fair;Constant support  Standing - Dynamic : Fair;Constant support  Ambulation/Gait Training:  Distance (ft): 25 Feet (ft)  Assistive Device: Gait belt;Walker, rolling  Ambulation - Level of Assistance: Contact guard assistance;Assist x2      Therapeutic Exercises:   Not today     Pain Ratin/10 in back     Activity Tolerance:   Fair, SpO2 stable on RA, requires rest breaks, and observed SOB with activity  Please refer to the flowsheet for vital signs taken during this treatment.     After treatment patient left in no apparent distress:   Supine in bed, Call bell within reach, Bed / chair alarm activated, and Side rails x 3    COMMUNICATION/COLLABORATION:   The patients plan of care was discussed with: Physical therapist, Occupational therapist, and Registered nurse.      Ladonna Dill   Time Calculation: 40 mins

## 2022-07-24 NOTE — CONSULTS
Pt seen by Cardiology NP this am.   Svt overnight, ST versus afib in setting of known afib on eliquis with acute pneumonitis. Note plans for discharge. K repleted    Ok to dc home from cardiology standpoint with OP fuv in  2 weeks.    Pleas call back if anything else is needed, thank you

## 2022-07-24 NOTE — ROUTINE PROCESS
At [facility] stated that tele said patient had a 20 bt burst of svt's. I just called Dr. Conner Sanchez and told him this and that patient has a history of afib gets Eliquis. He is now in NSR 79 per tele no afib all night and this is first of svt's. He wants a tsh level and cardio consult.

## 2022-07-24 NOTE — PROGRESS NOTES
DC order noted  Chart reviewed. [facility] has accepted the pt for post hospital care. They expect a Covid+ bed to be available on Monday 07/25.   Delay entered

## 2022-07-24 NOTE — PROGRESS NOTES
Discharge Summary       PATIENT ID: Cullen Du  MRN: [id number]   YOB: 1929    DATE OF ADMISSION: 7/18/2022  4:35 PM    DATE OF DISCHARGE:   PRIMARY CARE PROVIDER: Linsey Orr MD     ATTENDING PHYSICIAN: Shani Cummins  DISCHARGING PROVIDER: Emmanuelle Chapa      CONSULTATIONS: IP CONSULT TO PULMONOLOGY  IP CONSULT TO INFECTIOUS DISEASES  IP CONSULT TO CARDIOLOGY    PROCEDURES/SURGERIES: * No surgery found *    ADMITTING DIAGNOSES:    Patient Active Problem List    Diagnosis Date Noted    Hypoxia 07/18/2022    COVID 07/18/2022    Carotid stenosis 08/26/2021       DISCHARGE DIAGNOSES / PLAN:      Acute hypoxic respiratory failure on 2 L  COVID-19 pneumonitis  hypertension,  stroke and   Afib  Neutropenia        DISCHARGE MEDICATIONS:  Current Discharge Medication List        START taking these medications    Details   amLODIPine (NORVASC) 5 mg tablet Take 1 Tablet by mouth in the morning. Qty: 30 Tablet, Refills: 0  Start date: 7/23/2022      dexAMETHasone (DECADRON) 4 mg tablet 1 tab twice daily  Qty: 10 Tablet, Refills: 0  Start date: 7/22/2022           CONTINUE these medications which have NOT CHANGED    Details   apixaban (Eliquis) 2.5 mg tablet Take 2.5 mg by mouth two (2) times a day. aspirin delayed-release 81 mg tablet Take  by mouth daily. cholecalciferol, vitamin D3, (Vitamin D3) 50 mcg (2,000 unit) tab Take  by mouth nightly. mirtazapine (REMERON) 15 mg tablet Take 15 mg by mouth nightly. atorvastatin (LIPITOR) 20 mg tablet Take 20 mg by mouth daily. ranolazine ER (RANEXA) 500 mg SR tablet Take 500 mg by mouth two (2) times a day. DULoxetine (CYMBALTA) 30 mg capsule duloxetine 30 mg capsule,delayed release      doxazosin (CARDURA) 4 mg tablet nightly.            STOP taking these medications       clopidogreL (Plavix) 75 mg tab Comments:   Reason for Stopping:         gabapentin (NEURONTIN) 300 mg capsule Comments:   Reason for Stopping: hydrochlorothiazide (MICROZIDE PO) Comments:   Reason for Stopping:                 NOTIFY YOUR PHYSICIAN FOR ANY OF THE FOLLOWING:   Fever over 101 degrees for 24 hours. Chest pain, shortness of breath, fever, chills, nausea, vomiting, diarrhea, change in mentation, falling, weakness, bleeding. Severe pain or pain not relieved by medications. Or, any other signs or symptoms that you may have questions about. DISPOSITION:  x  Home With:   OT  PT  HH  RN       Long term SNF/Inpatient Rehab    Independent/assisted living    Hospice    Other:       PATIENT CONDITION AT DISCHARGE: Stable      PHYSICAL EXAMINATION AT DISCHARGE:  General:          Alert, cooperative, no distress, appears stated age. HEENT:           Atraumatic, anicteric sclerae, pink conjunctivae                          No oral ulcers, mucosa moist, throat clear, dentition fair  Neck:               Supple, symmetrical  Lungs:             Clear to auscultation bilaterally. No Wheezing or Rhonchi. No rales. Chest wall:      No tenderness  No Accessory muscle use. Heart:              Regular  rhythm,  No  murmur   No edema  Abdomen:        Soft, non-tender. Not distended. Bowel sounds normal  Extremities:     No cyanosis. No clubbing,                            Skin turgor normal, Capillary refill normal  Skin:                Not pale. Not Jaundiced  No rashes   Psych:             Not anxious or agitated. Neurologic:      Alert, moves all extremities, answers questions appropriately and responds to commands     XR CHEST PORT   Final Result   Hypoinspiratory examination. Chronic small left pleural effusion   with left basilar atelectasis. Superimposed left lower lobe pneumonia cannot be   excluded.               Recent Results (from the past 24 hour(s))   TSH 3RD GENERATION    Collection Time: 07/24/22  4:42 AM   Result Value Ref Range    TSH 1.94 0.36 - 3.74 uIU/mL            HOSPITAL COURSE:    Patient is a 80y.o. year old male with significant past medical history of hypertension CVA A. Fib AICD  came to emergency room because of generalized weakness fatigue for almost 1 week patient had multiple family members at home which are COVID-positive he typically walks with a walker but recently because of generalized weakness not able to ambulate came to emergency room seen by ER physician chest x-ray shows pneumonia COVID screening came back positive     Discussed with the patient wife patient is full code        7/19  Patient laying in bed comfortably. No complaints at this time. He states he is not having any difficulty breathing. O2: 96% on 2L NC  WBC: 2.5  Potassium: 3.3     CXR:  Hypoinspiratory examination. Chronic small left pleural effusion  with left basilar atelectasis. Superimposed left lower lobe pneumonia cannot be  Excluded. 7/20  Patient laying in bed comfortably without complaints at this time. NAD. Patient not wearing nasal canula when I entered the room. O2 sat 96%  WBC: 2.5>5.4     7/21  Patient sitting in bed comfortably but anxious to be discharged. Per PT - pt will need more PT after discharge. 7/22  Patient laying in bed comfortably.  NAD  Switch to decadron oral  Continue antibiotics    To skilled care rehab    Follow-up with PCP in 2 weeks    Pneumonitis on room air  Authorization to skilled pending  Signed:   Cassandra Crouch MD  7/24/2022  12:36 PM

## 2022-07-24 NOTE — PROGRESS NOTES
Progress Note    Patient: Michelle Bautista MRN: [id number]  SSN: [id number]    YOB: 1929  Age: 80 y.o. Sex: male      Admit Date: 7/18/2022    LOS: 6 days     Subjective:     Patient followed for Covid-19 infection with unremarkable CXR, now on room air. Afebrile. Leukopenia has resolved. LDH and CRP elevated. Now on Dexamethasone and Baricitinib. Patient sitting up in bedside chair with no complaints. It appears that he has been accepted at [facility] pending availability  of Covid-19 bed. Overnight he had SVT and Cardiology has been consulted. No palpitations. Objective:     Vitals:    07/23/22 1547 07/23/22 1915 07/23/22 2334 07/24/22 1048   BP: 106/68 107/72 (!) 104/54 104/64   Pulse: 80 78 77 85   Resp: 20 18 20 18   Temp: 99 °F (37.2 °C) 99 °F (37.2 °C) 98.9 °F (37.2 °C) 97.5 °F (36.4 °C)   SpO2: 95% 96% 97% 91%   Weight:       Height:            Intake and Output:  Current Shift: No intake/output data recorded. Last three shifts: 07/22 1901 - 07/24 0700  In: 61 [P.O.:60]  Out: -     Physical Exam:      Vitals and nursing note reviewed. Constitutional:       General: He is not in acute distress. Appearance: He is ill-appearing. He is not diaphoretic. Comments: Room air SpO2 97%   HENT: unremarkable    Cardiovascular:     Rate and Rhythm: Normal rate and regular rhythm. Pulmonary:     Effort: Pulmonary effort is normal.      Breath sounds: Normal breath sounds. Abdominal:      General: Bowel sounds are normal.      Palpations: Abdomen is soft. Tenderness: There is no abdominal tenderness. Genitourinary:     Comments: External urinary device  Musculoskeletal:      Right lower leg: No edema. Left lower leg: No edema. Skin:     Findings: No rash. Neurological:     General: No focal deficit present. Mental Status: He is alert and oriented to person, place, and time.    Psychiatric:    normal behavior         Lab/Data Review:     WBC 5,400     <360 <357  Ferritin 155    CRP 0.39 < 0.63 <1.30      Assessment:     Active Problems:    Hypoxia (7/18/2022)      COVID (7/18/2022)  1. Covid-19 infection on supplemental oxygen (nasal cannula), Day #3 Dexamethasone and baricitinib  2. Leukopenia, possibly secondary to #1, resolved  3. Elevated LDH and CRP, secondary to #1  3. Abnormal CXR, clinical significance unclear, Day #3 Azithromycin and Ceftriaxone    Comment:  Patient remains on room air with now normal CRP. Unclear why LDH increasing. Plan:   1. No indication for Remdesivir  2. Continue Dexamethasone/baricitinib per Pulmonary  3.  Will continue to follow prn    Signed By: Hector Quezada MD     July 24, 2022

## 2022-07-25 NOTE — DISCHARGE SUMMARY
Discharge Summary       PATIENT ID: Johnny Treviño  MRN: [id number]   YOB: 1929    DATE OF ADMISSION: 7/18/2022  4:35 PM    DATE OF DISCHARGE:   PRIMARY CARE PROVIDER: Arely Sims MD     ATTENDING PHYSICIAN: Alan Pickering  DISCHARGING PROVIDER: Ashlyn Chapa      CONSULTATIONS: IP CONSULT TO PULMONOLOGY  IP CONSULT TO INFECTIOUS DISEASES  IP CONSULT TO CARDIOLOGY    PROCEDURES/SURGERIES: * No surgery found *    ADMITTING DIAGNOSES:    Patient Active Problem List    Diagnosis Date Noted    Hypoxia 07/18/2022    COVID 07/18/2022    Carotid stenosis 08/26/2021       DISCHARGE DIAGNOSES / PLAN:      Acute hypoxic respiratory failure on 2 L  COVID-19 pneumonitis  hypertension,  stroke and   Afib  Neutropenia        DISCHARGE MEDICATIONS:  Current Discharge Medication List        START taking these medications    Details   amLODIPine (NORVASC) 5 mg tablet Take 1 Tablet by mouth in the morning. Qty: 30 Tablet, Refills: 0  Start date: 7/23/2022      dexAMETHasone (DECADRON) 4 mg tablet 1 tab twice daily  Qty: 10 Tablet, Refills: 0  Start date: 7/22/2022           CONTINUE these medications which have NOT CHANGED    Details   apixaban (Eliquis) 2.5 mg tablet Take 2.5 mg by mouth two (2) times a day. aspirin delayed-release 81 mg tablet Take  by mouth daily. cholecalciferol, vitamin D3, (Vitamin D3) 50 mcg (2,000 unit) tab Take  by mouth nightly. mirtazapine (REMERON) 15 mg tablet Take 15 mg by mouth nightly. atorvastatin (LIPITOR) 20 mg tablet Take 20 mg by mouth daily. ranolazine ER (RANEXA) 500 mg SR tablet Take 500 mg by mouth two (2) times a day. DULoxetine (CYMBALTA) 30 mg capsule duloxetine 30 mg capsule,delayed release      doxazosin (CARDURA) 4 mg tablet nightly.            STOP taking these medications       clopidogreL (Plavix) 75 mg tab Comments:   Reason for Stopping:         gabapentin (NEURONTIN) 300 mg capsule Comments:   Reason for Stopping: hydrochlorothiazide (MICROZIDE PO) Comments:   Reason for Stopping:                 NOTIFY YOUR PHYSICIAN FOR ANY OF THE FOLLOWING:   Fever over 101 degrees for 24 hours. Chest pain, shortness of breath, fever, chills, nausea, vomiting, diarrhea, change in mentation, falling, weakness, bleeding. Severe pain or pain not relieved by medications. Or, any other signs or symptoms that you may have questions about. DISPOSITION:  x  Home With:   OT  PT  HH  RN       Long term SNF/Inpatient Rehab    Independent/assisted living    Hospice    Other:       PATIENT CONDITION AT DISCHARGE: Stable      PHYSICAL EXAMINATION AT DISCHARGE:  General:          Alert, cooperative, no distress, appears stated age. HEENT:           Atraumatic, anicteric sclerae, pink conjunctivae                          No oral ulcers, mucosa moist, throat clear, dentition fair  Neck:               Supple, symmetrical  Lungs:             Clear to auscultation bilaterally. No Wheezing or Rhonchi. No rales. Chest wall:      No tenderness  No Accessory muscle use. Heart:              Regular  rhythm,  No  murmur   No edema  Abdomen:        Soft, non-tender. Not distended. Bowel sounds normal  Extremities:     No cyanosis. No clubbing,                            Skin turgor normal, Capillary refill normal  Skin:                Not pale. Not Jaundiced  No rashes   Psych:             Not anxious or agitated. Neurologic:      Alert, moves all extremities, answers questions appropriately and responds to commands     XR CHEST PORT   Final Result   Hypoinspiratory examination. Chronic small left pleural effusion   with left basilar atelectasis. Superimposed left lower lobe pneumonia cannot be   excluded.               Recent Results (from the past 24 hour(s))   EKG, 12 LEAD, INITIAL    Collection Time: 07/24/22  5:35 PM   Result Value Ref Range    Ventricular Rate 88 BPM    Atrial Rate 88 BPM    P-R Interval 184 ms    QRS Duration 98 ms    Q-T Interval 372 ms    QTC Calculation (Bezet) 450 ms    Calculated P Axis 85 degrees    Calculated R Axis -19 degrees    Calculated T Axis 146 degrees    Diagnosis       Sinus rhythm with occasional Premature ventricular complexes  Septal infarct (cited on or before 11-AUG-2021)  Abnormal ECG  When compared with ECG of 24-JUL-2022 17:34, (Unconfirmed)  Premature ventricular complexes are now Present  Confirmed by Ry Gannon M.D., Cecilia Serve ([id number]) on 7/25/2022 9:17:49 AM     EKG, 12 LEAD, SUBSEQUENT    Collection Time: 07/25/22  6:18 AM   Result Value Ref Range    Ventricular Rate 117 BPM    Atrial Rate 120 BPM    QRS Duration 90 ms    Q-T Interval 286 ms    QTC Calculation (Bezet) 398 ms    Calculated R Axis -7 degrees    Calculated T Axis -168 degrees    Diagnosis       Atrial fibrillation with rapid ventricular response with premature   ventricular or aberrantly conducted complexes  ST & T wave abnormality, consider lateral ischemia  Abnormal ECG  No previous ECGs available  Confirmed by Vanessa Bautista ([id number]) on 7/25/2022 9:21:33 AM     LD    Collection Time: 07/25/22  6:37 AM   Result Value Ref Range     (H) 85 - 241 U/L            HOSPITAL COURSE:    Patient is a 80y.o. year old male with significant past medical history of hypertension CVA A. Fib AICD  came to emergency room because of generalized weakness fatigue for almost 1 week patient had multiple family members at home which are COVID-positive he typically walks with a walker but recently because of generalized weakness not able to ambulate came to emergency room seen by ER physician chest x-ray shows pneumonia COVID screening came back positive     Discussed with the patient wife patient is full code        7/19  Patient laying in bed comfortably. No complaints at this time. He states he is not having any difficulty breathing. O2: 96% on 2L NC  WBC: 2.5  Potassium: 3.3     CXR:  Hypoinspiratory examination.  Chronic small left pleural effusion  with left basilar atelectasis. Superimposed left lower lobe pneumonia cannot be  Excluded. 7/20  Patient laying in bed comfortably without complaints at this time. NAD. Patient not wearing nasal canula when I entered the room. O2 sat 96%  WBC: 2.5>5.4     7/21  Patient sitting in bed comfortably but anxious to be discharged. Per PT - pt will need more PT after discharge. 7/22  Patient laying in bed comfortably. NAD  Switch to decadron oral  Continue antibiotics    To skilled care rehab    Follow-up with PCP in 2 weeks    7/25  Patient laying in bed comfortably. Patient more confused today. Patient had episode of SVT vs afib last night - cleared by cardiology for discharge.     Signed:   Sylvie Ramirez MD  7/25/2022  12:36 PM

## 2022-07-25 NOTE — PROGRESS NOTES
Patient has authorization to discharge to [facility] at [address]. Patient will be going to Room 231B. Report can be called to [phone]. CM spoke with patient's wife in room. She is agreeable to plan. Primary RN made aware. Discharge plan of care/case management plan validated with provider discharge order. Medicare pt has received, reviewed, and signed 2nd IM letter informing them of their right to appeal the discharge. Signed copied has been placed on pt bedside chart.

## 2022-07-25 NOTE — PROGRESS NOTES
Progress Note    Patient: Radha Dowell MRN: [id number]  SSN: [id number]    YOB: 1929  Age: 80 y.o. Sex: male      Admit Date: 7/18/2022    LOS: 7 days     Subjective:     Patient followed for Covid-19 infection with unremarkable CXR, now on room air. Afebrile. Leukopenia has resolved. LDH and CRP elevated. Now on Dexamethasone and Baricitinib. Patient sitting up in bedside chair with no complaints. It appears that he has been accepted at [facility] pending availability  of Covid-19 bed. Overnight he had SVT and Cardiology has been consulted. No palpitations. Objective:     Vitals:    07/25/22 0730 07/25/22 1025 07/25/22 1030 07/25/22 1511   BP: 134/68  120/73 113/66   Pulse: 88  76 75   Resp: 18  20 19   Temp: 98.6 °F (37 °C)  98.6 °F (37 °C) 98 °F (36.7 °C)   SpO2: 93% (!) 88% 93% 94%   Weight:       Height:            Intake and Output:  Current Shift: No intake/output data recorded. Last three shifts: 07/23 1901 - 07/25 0700  In: 200 [P.O.:200]  Out: -     Physical Exam:      Vitals and nursing note reviewed. Constitutional:       General: He is not in acute distress. Appearance: He is ill-appearing. He is not diaphoretic. Comments: Room air SpO2 97%   HENT: unremarkable    Cardiovascular:     Rate and Rhythm: Normal rate and regular rhythm. Pulmonary:     Effort: Pulmonary effort is normal.      Breath sounds: Normal breath sounds. Abdominal:      General: Bowel sounds are normal.      Palpations: Abdomen is soft. Tenderness: There is no abdominal tenderness. Genitourinary:     Comments: External urinary device  Musculoskeletal:      Right lower leg: No edema. Left lower leg: No edema. Skin:     Findings: No rash. Neurological:     General: No focal deficit present. Mental Status: He is alert and oriented to person, place, and time.    Psychiatric:    normal behavior         Lab/Data Review:     WBC 16,000     <433 <360 <357  Ferritin 155    CRP 0.39 < 0.63 <1.30      Assessment:     Active Problems:    Hypoxia (7/18/2022)      COVID (7/18/2022)  1. Covid-19 infection on supplemental oxygen (nasal cannula), Day #3 Dexamethasone and baricitinib  2. Leukopenia, possibly secondary to #1, resolved  3. Elevated LDH and CRP, secondary to #1  3. Abnormal CXR, clinical significance unclear, Day #3 Azithromycin and Ceftriaxone    Comment:  Patient remains on room air with now normal CRP. Unclear why LDH increasing. Plan:   1. No indication for Remdesivir  2. Continue Dexamethasone/baricitinib per Pulmonary  3.  Will continue to follow prn    Signed By: Linda Dela Cruz MD     July 25, 2022

## 2022-07-25 NOTE — PROGRESS NOTES
OCCUPATIONAL THERAPY TREATMENT  Patient: Justice Bowman (76 y.o. male)  Date: 7/25/2022  Diagnosis: Hypoxia [R09.02]  COVID [U07.1] <principal problem not specified>      Precautions:    Chart, occupational therapy assessment, plan of care, and goals were reviewed. ASSESSMENT  Patient continues with skilled OT services and is progressing towards goals. Pt. Received semi-supine in bed and agreeable to tx session. Pt. Demonstrates with decreased activity tolerance and increased amount of assistance with all be mobility and OOB transfer this tx session. Pt. C/o increased pain in stefani thigh region 5/10 pain . Pt. Performed bed mobility with Mod A x2, Max A for scooting to EOB, requiring increased cues for foot placement while seated at EOB to increase sitting balance. Pt. Initially requiring Max A for sitting balance d/t posterior lean, once educated on hand placement and up-right sitting  pt able to increase sitting balance. Pt. Performed sit> stand on two attempts with Mod A x2 and use of RW for support upon standing. Pt demonstrating heavy posterior lean while standing up-right requiring constant support. Pt. Performed 1 side steps with v/cs and constant support from therapist. Pt. Unable to tolerate standing up-right for increased time d.t fatigue requiring seated RB. Pt. SpO2 remain between 93% /94% throughout tx session. While seated at EOB, therapist educated pt on weight shifting to increase core strength and balance while seated at EOB , pt. Required Mod A x2 for sitting balance during dynamic activity at EOB. Pt. Returned semi-supine in bed with Max A x2. Pt. And family member educated on  UE therex. pt. performed UE therex while sitting up-right in bed to increase tolerance/ endurance and ROM. Pt. Left semi-supine in bed with needs met. REC. SNF when medically appropriate for discharge.       Current Level of Function Impacting Discharge (ADLs): assistance with bed mobility, sitting balance and sit> stand transfers, unsteady while sitting up-right with no support, decreased activity tolerance    Other factors to consider for discharge: PLOF, time since on set         PLAN :  Patient continues to benefit from skilled intervention to address the above impairments. Continue treatment per established plan of care. to address goals. Recommendation for discharge: (in order for the patient to meet his/her long term goals)  Therapy up to 5 days/week in SNF setting    This discharge recommendation:  Has been made in collaboration with the attending provider and/or case management    IF patient discharges home will need the following DME: TBD       SUBJECTIVE:   Patient stated My legs hurt.     OBJECTIVE DATA SUMMARY:   Cognitive/Behavioral Status:  Neurologic State: Alert     Cognition: Impaired decision making; Follows commands    Functional Mobility and Transfers for ADLs:  Bed Mobility:  Supine to Sit: Moderate assistance  Sit to Supine: Maximum assistance;Assist x2  Scooting: Maximum assistance    Transfers:  Sit to Stand: Moderate assistance;Assist x2; Additional time          Balance:  Sitting: Intact  Sitting - Static: Good (unsupported)  Sitting - Dynamic: Fair (occasional)  Standing: Impaired; With support  Standing - Static: Constant support; Fair  Standing - Dynamic : Constant support; Fair      Therapeutic Exercises: stefani UE's  Exercise Sets Reps AROM AAROM PROM Self PROM Comments   Shoulder flex/ext 1 5 [x] [] [] []    Elbow flex/ext 1 5 [x] [] [] []    Wrist flex/ext 1 5 [x] [] [] []       [] [] [] []      Pain:  5/10 pain reported    Activity Tolerance:   Fair and requires rest breaks  Please refer to the flowsheet for vital signs taken during this treatment.     After treatment patient left in no apparent distress:   Supine in bed, Call bell within reach, Bed / chair alarm activated, and Caregiver / family present    COMMUNICATION/COLLABORATION:   The patients plan of care was discussed with: Physical therapy assistant and Registered nurse.  Co-tx with PTA for assistance with bed mobility and transfers, decreased activity tolerance     Bakari Douglas  Time Calculation: 38 mins    Problem: Self Care Deficits Care Plan (Adult)  Goal: *Acute Goals and Plan of Care (Insert Text)  Description: Pt stated goal \"to gain my strength back\"  Pt will be Mod I sup <> sit in prep for EOB ADLs  Pt will be Mod I grooming standing sink side LRAD  Pt will be Mod I UB dressing sitting EOB/long sit   Pt will be Mod I LE dressing sitting EOB/long sit  Pt will be Mod I sit <>  prep for toileting LRAD  Pt will be Mod I toileting/toilet transfer/cloth mgmt LRAD  Pt will be IND following UE HEP in prep for self care tasks     Outcome: Progressing Towards Goal

## 2022-07-25 NOTE — PROGRESS NOTES
Problem: Mobility Impaired (Adult and Pediatric)  Goal: *Acute Goals and Plan of Care (Insert Text)  Description: Pt stated goals: walk   Pt will be I with LE HEP in 7 days. Pt will perform bed mobility with mod I in 7 days. Pt will perform transfers with mod I in 7 days. Pt will amb  feet with LRAD safely with spv in 7 days. Outcome: Progressing Towards Goal   PHYSICAL THERAPY TREATMENT  Patient: Vicky Sanchez (51 y.o. male)  Date: 7/25/2022  Diagnosis: Hypoxia [R09.02]  COVID [U07.1] <principal problem not specified>      Precautions:    Chart, physical therapy assessment, plan of care and goals were reviewed. ASSESSMENT  Patient continues with skilled PT services and is progressing towards goals. Pt. Semi supine upon arrival and agreeable to work with therapy. Wife present during session. Pt. [place]. Supine to sit with mod assist. Scoot to EOB with max assist X 1. Pt. Theadore Justin heavily  posteriorly while sitting EOB initially  and required mod/max assist for maintaining balance. Once pt. Sat 5 min EOB, pt. Required min assist to maintain balance. Pt. Had difficulty with performing lateral  weight shifts (elbow on side to side) as well with sitting EOB. Sit to stand with RW and Mod assist X 2. Pt. Took 1 side step with RW and Max assist X 2. Pt. Unable to weight shift in standing to take side steps  and  pt. Is leaning posteriorly requiring max assist for standing. O2 sats remained 94% during activities. Pt. Fatigues easily and requires rest breaks often. Current Level of Function Impacting Discharge (mobility/balance): A X 2/decreased mobility    Other factors to consider for discharge: safety/ low activity tolerance         PLAN :  Patient continues to benefit from skilled intervention to address the above impairments. Continue treatment per established plan of care. to address goals.     Recommendation for discharge: (in order for the patient to meet his/her long term goals)  Skilled Nursing Facility    This discharge recommendation:  Has been made in collaboration with the attending provider and/or case management    IF patient discharges home will need the following DME: rolling walker       SUBJECTIVE:   Patient stated \"I will try. \" CO RX with RICHARD for increased mobility and safety. OBJECTIVE DATA SUMMARY:   Critical Behavior:  Neurologic State: Alert  Orientation Level: Oriented to person  Cognition: Impaired decision making, Follows commands     Functional Mobility Training:  Bed Mobility:     Supine to Sit: Moderate assistance  Sit to Supine: Maximum assistance;Assist x2  Scooting: Maximum assistance        Transfers:  Sit to Stand: Moderate assistance;Assist x2; Additional time  Stand to Sit: Moderate assistance;Assist x2              Worked on static standing balance with RW and Max assist X 2. Balance:  Sitting: Intact  Sitting - Static: Good (unsupported)  Sitting - Dynamic: Fair (occasional)  Standing: Impaired; With support  Standing - Static: Constant support; Fair  Standing - Dynamic : Constant support; Fair  Ambulation/Gait Training:                      Therapeutic Exercises: Therapeutic Exercises:       EXERCISE   Sets   Reps   Active Active Assist   Passive Self ROM   Comments   Ankle Pumps  10 [x] [] [] []    Quad Sets/Glut Sets  5 [x] [] [] []    Hamstring Sets   [] [] [] []    Short Arc Quads   [] [] [] []    Heel Slides 2 5 [x] [x] [] [] Min asssit   Straight Leg Raises   [] [] [] []    Hip abd/add 2 5 [x] [x] [] [] Min assist   Long Arc Quads 2 4 [x] [] [] []    Marching   [] [] [] []       [] [] [] []     Reviewed exercises with pt. And wife to do 2 times a day. Pain Ratin/10 bilat LEs      Activity Tolerance:   Fair and requires frequent rest breaks  Please refer to the flowsheet for vital signs taken during this treatment.     After treatment patient left in no apparent distress:   Supine in bed, Call bell within reach, Bed / chair alarm activated, Caregiver / family present, Side rails x 3, and RN notified of session. COMMUNICATION/COLLABORATION:   The patients plan of care was discussed with: Occupational therapy assistant and Registered nurse.      Ramsey Dickey   Time Calculation: 38 mins

## 2022-07-25 NOTE — PROGRESS NOTES
Focus: Discharge    Pt k+ =2.8. This RN notified Dr. Yandy Brown of new lab results and new orders received to hold patient discharge today and order potassium 40 mEq oral q4 hr x3 doses. This RN attempted to call pt's spouse, no answer so voicemail left. [facility] facility notified of patient not being discharge today.

## 2022-07-25 NOTE — DISCHARGE SUMMARY
Discharge Summary       PATIENT ID: Cyndie Jean-Baptiste  MRN: [id number]   YOB: 1929    DATE OF ADMISSION: 7/18/2022  4:35 PM    DATE OF DISCHARGE:   PRIMARY CARE PROVIDER: Jignesh Burch MD     ATTENDING PHYSICIAN: Kenn Hernandez  DISCHARGING PROVIDER: Antonietta Chapa      CONSULTATIONS: IP CONSULT TO PULMONOLOGY  IP CONSULT TO INFECTIOUS DISEASES  IP CONSULT TO CARDIOLOGY    PROCEDURES/SURGERIES: * No surgery found *    ADMITTING DIAGNOSES:    Patient Active Problem List    Diagnosis Date Noted    Hypoxia 07/18/2022    COVID 07/18/2022    Carotid stenosis 08/26/2021       DISCHARGE DIAGNOSES / PLAN:      Acute hypoxic respiratory failure on 2 L  COVID-19 pneumonitis  hypertension,  stroke and   Afib  Neutropenia        DISCHARGE MEDICATIONS:  Current Discharge Medication List        START taking these medications    Details   amLODIPine (NORVASC) 5 mg tablet Take 1 Tablet by mouth in the morning. Qty: 30 Tablet, Refills: 0  Start date: 7/23/2022      dexAMETHasone (DECADRON) 4 mg tablet 1 tab twice daily  Qty: 10 Tablet, Refills: 0  Start date: 7/22/2022           CONTINUE these medications which have NOT CHANGED    Details   apixaban (Eliquis) 2.5 mg tablet Take 2.5 mg by mouth two (2) times a day. aspirin delayed-release 81 mg tablet Take  by mouth daily. cholecalciferol, vitamin D3, (Vitamin D3) 50 mcg (2,000 unit) tab Take  by mouth nightly. mirtazapine (REMERON) 15 mg tablet Take 15 mg by mouth nightly. atorvastatin (LIPITOR) 20 mg tablet Take 20 mg by mouth daily. ranolazine ER (RANEXA) 500 mg SR tablet Take 500 mg by mouth two (2) times a day. DULoxetine (CYMBALTA) 30 mg capsule duloxetine 30 mg capsule,delayed release      doxazosin (CARDURA) 4 mg tablet nightly.            STOP taking these medications       clopidogreL (Plavix) 75 mg tab Comments:   Reason for Stopping:         gabapentin (NEURONTIN) 300 mg capsule Comments:   Reason for Stopping: hydrochlorothiazide (MICROZIDE PO) Comments:   Reason for Stopping:                 NOTIFY YOUR PHYSICIAN FOR ANY OF THE FOLLOWING:   Fever over 101 degrees for 24 hours. Chest pain, shortness of breath, fever, chills, nausea, vomiting, diarrhea, change in mentation, falling, weakness, bleeding. Severe pain or pain not relieved by medications. Or, any other signs or symptoms that you may have questions about. DISPOSITION:  x  Home With:   OT  PT  HH  RN       Long term SNF/Inpatient Rehab    Independent/assisted living    Hospice    Other:       PATIENT CONDITION AT DISCHARGE: Stable      PHYSICAL EXAMINATION AT DISCHARGE:  General:          Alert, cooperative, no distress, appears stated age. HEENT:           Atraumatic, anicteric sclerae, pink conjunctivae                          No oral ulcers, mucosa moist, throat clear, dentition fair  Neck:               Supple, symmetrical  Lungs:             Clear to auscultation bilaterally. No Wheezing or Rhonchi. No rales. Chest wall:      No tenderness  No Accessory muscle use. Heart:              Regular  rhythm,  No  murmur   No edema  Abdomen:        Soft, non-tender. Not distended. Bowel sounds normal  Extremities:     No cyanosis. No clubbing,                            Skin turgor normal, Capillary refill normal  Skin:                Not pale. Not Jaundiced  No rashes   Psych:             Not anxious or agitated. Neurologic:      Alert, moves all extremities, answers questions appropriately and responds to commands     XR CHEST PORT   Final Result   Hypoinspiratory examination. Chronic small left pleural effusion   with left basilar atelectasis. Superimposed left lower lobe pneumonia cannot be   excluded. No results found for this or any previous visit (from the past 24 hour(s)). HOSPITAL COURSE:    Patient is a 80y.o. year old male with significant past medical history of hypertension CVA A.  Fib AICD  came to emergency room because of generalized weakness fatigue for almost 1 week patient had multiple family members at home which are COVID-positive he typically walks with a walker but recently because of generalized weakness not able to ambulate came to emergency room seen by ER physician chest x-ray shows pneumonia COVID screening came back positive     Discussed with the patient wife patient is full code        7/19  Patient laying in bed comfortably. No complaints at this time. He states he is not having any difficulty breathing. O2: 96% on 2L NC  WBC: 2.5  Potassium: 3.3     CXR:  Hypoinspiratory examination. Chronic small left pleural effusion  with left basilar atelectasis. Superimposed left lower lobe pneumonia cannot be  Excluded. 7/20  Patient laying in bed comfortably without complaints at this time. NAD. Patient not wearing nasal canula when I entered the room. O2 sat 96%  WBC: 2.5>5.4     7/21  Patient sitting in bed comfortably but anxious to be discharged. Per PT - pt will need more PT after discharge. 7/22  Patient laying in bed comfortably. NAD  Switch to decadron oral  Continue antibiotics    To skilled care rehab    Follow-up with PCP in 2 weeks    7/25  Patient laying in bed comfortably. Patient more confused today. Patient had episode of SVT vs afib last night - cleared by cardiology for discharge.     SignedCharm Conchita  7/25/2022  12:36 PM

## 2022-07-25 NOTE — PROGRESS NOTES
Focus: Impaired Gas Exchange    Pt a&o x2, person and place only. Pt c/o sob at rest and with exertion, new onset productive, frequent cough. Phlegm appears thick and yellow/greenish colored. Spouse at bedside concerned with pt's new cough. Dr. Dayanara Roberts notified, new orders received: Chest x-ray, CBC, BMP, ABG, Mucinex 600 mg po bid. This RN acting on orders.

## 2022-07-26 NOTE — PROGRESS NOTES
Report called to Darcy Lezama at [facility], chart reviewed, all questions offered and answered. Patient is ready for transport.

## 2022-07-26 NOTE — PROGRESS NOTES
CM reviewed chart and saw that patient did not discharge due to potassium being low yesterday evening. Repeat potassium this morning was hemolyzed and result was inaccurate. CM spoke with lab who stated physician would have to order repeat lab to have a re-draw done. CM spoke with primary physician and had STAT serum potassium ordered. New potassium result was slightly elevated. CM reported to primary physician who gave order for kayexalate 30mg once. Primary physician stated that once patient has received medication he can be discharged and for labs to be redrawn tomorrow at [facility]. CM has made primary physician aware. CM spoke with [facility]. They are aware of need for re-draw and patient can come today anytime after 2pm.    Patient will be going to [facility] at [address] [facility]. Patient will be going to Room [address]. Report can be called to [phone]. Discharge plan of care/case management plan validated with provider discharge order.

## 2022-07-26 NOTE — DISCHARGE SUMMARY
Discharge Summary       PATIENT ID: Blaire Francisco  MRN: [id number]   YOB: 1929    DATE OF ADMISSION: 7/18/2022  4:35 PM    DATE OF DISCHARGE:   PRIMARY CARE PROVIDER: Michelle Mazariegos MD     ATTENDING PHYSICIAN: Tyra Curtis  DISCHARGING PROVIDER: Roxann Chapa      CONSULTATIONS: IP CONSULT TO PULMONOLOGY  IP CONSULT TO INFECTIOUS DISEASES  IP CONSULT TO CARDIOLOGY    PROCEDURES/SURGERIES: * No surgery found *    ADMITTING DIAGNOSES:    Patient Active Problem List    Diagnosis Date Noted    Hypoxia 07/18/2022    COVID 07/18/2022    Carotid stenosis 08/26/2021       DISCHARGE DIAGNOSES / PLAN:      Acute hypoxic respiratory failure on 2 L  COVID-19 pneumonitis  hypertension,  stroke and   Afib  Neutropenia        DISCHARGE MEDICATIONS:  Current Discharge Medication List        START taking these medications    Details   amLODIPine (NORVASC) 5 mg tablet Take 1 Tablet by mouth in the morning. Qty: 30 Tablet, Refills: 0  Start date: 7/23/2022      dexAMETHasone (DECADRON) 4 mg tablet 1 tab twice daily  Qty: 10 Tablet, Refills: 0  Start date: 7/22/2022           CONTINUE these medications which have NOT CHANGED    Details   apixaban (Eliquis) 2.5 mg tablet Take 2.5 mg by mouth two (2) times a day. aspirin delayed-release 81 mg tablet Take  by mouth daily. cholecalciferol, vitamin D3, (Vitamin D3) 50 mcg (2,000 unit) tab Take  by mouth nightly. mirtazapine (REMERON) 15 mg tablet Take 15 mg by mouth nightly. atorvastatin (LIPITOR) 20 mg tablet Take 20 mg by mouth daily. ranolazine ER (RANEXA) 500 mg SR tablet Take 500 mg by mouth two (2) times a day. DULoxetine (CYMBALTA) 30 mg capsule duloxetine 30 mg capsule,delayed release      doxazosin (CARDURA) 4 mg tablet nightly.            STOP taking these medications       clopidogreL (Plavix) 75 mg tab Comments:   Reason for Stopping:         gabapentin (NEURONTIN) 300 mg capsule Comments:   Reason for Stopping: hydrochlorothiazide (MICROZIDE PO) Comments:   Reason for Stopping:                 NOTIFY YOUR PHYSICIAN FOR ANY OF THE FOLLOWING:   Fever over 101 degrees for 24 hours. Chest pain, shortness of breath, fever, chills, nausea, vomiting, diarrhea, change in mentation, falling, weakness, bleeding. Severe pain or pain not relieved by medications. Or, any other signs or symptoms that you may have questions about. DISPOSITION:  x  Home With:   OT  PT  HH  RN       Long term SNF/Inpatient Rehab    Independent/assisted living    Hospice    Other:       PATIENT CONDITION AT DISCHARGE: Stable      PHYSICAL EXAMINATION AT DISCHARGE:  General:          Alert, cooperative, no distress, appears stated age. HEENT:           Atraumatic, anicteric sclerae, pink conjunctivae                          No oral ulcers, mucosa moist, throat clear, dentition fair  Neck:               Supple, symmetrical  Lungs:             Clear to auscultation bilaterally. No Wheezing or Rhonchi. No rales. Chest wall:      No tenderness  No Accessory muscle use. Heart:              Regular  rhythm,  No  murmur   No edema  Abdomen:        Soft, non-tender. Not distended. Bowel sounds normal  Extremities:     No cyanosis. No clubbing,                            Skin turgor normal, Capillary refill normal  Skin:                Not pale. Not Jaundiced  No rashes   Psych:             Not anxious or agitated. Neurologic:      Alert, moves all extremities, answers questions appropriately and responds to commands     XR CHEST PORT   Final Result   Patchy bilateral interstitial and alveolar opacities are compatible   with COVID pneumonia. XR CHEST PORT   Final Result   Hypoinspiratory examination. Chronic small left pleural effusion   with left basilar atelectasis. Superimposed left lower lobe pneumonia cannot be   excluded.               Recent Results (from the past 24 hour(s))   BLOOD GAS + IONIZED CALCIUM    Collection Time: 07/25/22 1:45 PM   Result Value Ref Range    pH 7.53 (H) 7.35 - 7.45      PCO2 32 (L) 35 - 45 mmHg    PO2 64 (L) 80 - 100 mmHg    BICARBONATE 26 22 - 26 mmol/L    BASE EXCESS 4.0 (H) 0 - 3 mmol/L    O2 SATURATION 93 (L) 95 - 99 %    Calcium, ionized 1.07 (L) 1.13 - 1.32 mmol/L    O2 METHOD Room air      FIO2 0.21 %    Sample source Arterial      SITE Left Radial      MANUELA'S TEST YES      Carboxy-Hgb 0.3 (L) 1 - 2 %    Methemoglobin 0.4 0 - 1.4 %    Oxyhemoglobin 92.0 (L) 95 - 99 %    Performed by Marce Posadas     Critical value read back Called to Dana Lowery RN on 07/25/2022 at 13:54             HOSPITAL COURSE:    Patient is a 80y.o. year old male with significant past medical history of hypertension CVA A. Fib AICD  came to emergency room because of generalized weakness fatigue for almost 1 week patient had multiple family members at home which are COVID-positive he typically walks with a walker but recently because of generalized weakness not able to ambulate came to emergency room seen by ER physician chest x-ray shows pneumonia COVID screening came back positive     Discussed with the patient wife patient is full code        7/19  Patient laying in bed comfortably. No complaints at this time. He states he is not having any difficulty breathing. O2: 96% on 2L NC  WBC: 2.5  Potassium: 3.3     CXR:  Hypoinspiratory examination. Chronic small left pleural effusion  with left basilar atelectasis. Superimposed left lower lobe pneumonia cannot be  Excluded. 7/20  Patient laying in bed comfortably without complaints at this time. NAD. Patient not wearing nasal canula when I entered the room. O2 sat 96%  WBC: 2.5>5.4     7/21  Patient sitting in bed comfortably but anxious to be discharged. Per PT - pt will need more PT after discharge. 7/22  Patient laying in bed comfortably.  NAD  Switch to decadron oral  Continue antibiotics    To skilled care rehab    Follow-up with PCP in 2 weeks    7/25  Patient laying in bed comfortably. Patient more confused today. Patient had episode of SVT vs afib last night - cleared by cardiology for discharge. 7/26  Patient laying in bed comfortably. NAD  Patient more lucid today  Potassium 2.8 yesterday  Specimen hemolyzed - needs recollection  LD: 462  Transportation for 3 pm to go to SNF    CXR 7/25:Patchy bilateral interstitial and alveolar opacities are compatible  with COVID pneumonia.       SignedSigmund Lady Lake  7/26/2022  12:36 PM

## 2022-07-26 NOTE — PROGRESS NOTES
Progress Note    Patient: Davion Beasley MRN: [id number]  SSN: [id number]    YOB: 1929  Age: 80 y.o. Sex: male      Admit Date: 7/18/2022    LOS: 8 days     Subjective:     Patient followed for Covid-19 infection with unremarkable CXR, now on room air. Afebrile. Leukopenia has resolved. LDH and CRP elevated. Now on Dexamethasone and Baricitinib. It appears that he has been accepted at [facility] pending availability  of Covid-19 bed. It appears that patient has been discharged. Objective:     Vitals:    07/25/22 1932 07/25/22 2319 07/26/22 0838 07/26/22 0918   BP: 118/68 133/80 135/68    Pulse: 72  72 60   Resp: 19 18 18 25   Temp: 97.9 °F (36.6 °C) 99.2 °F (37.3 °C) 98.7 °F (37.1 °C)    SpO2: 94% 94% 92%    Weight:       Height:            Intake and Output:  Current Shift: No intake/output data recorded. Last three shifts: 07/24 1901 - 07/26 0700  In: 440 [P.O.:440]  Out: -     Physical Exam:      Vitals and nursing note reviewed. Constitutional:       General: He is not in acute distress. Appearance: He is ill-appearing. He is not diaphoretic. Comments: Room air SpO2 97%   HENT: unremarkable    Cardiovascular:     Rate and Rhythm: Normal rate and regular rhythm. Pulmonary:     Effort: Pulmonary effort is normal.      Breath sounds: Normal breath sounds. Abdominal:      General: Bowel sounds are normal.      Palpations: Abdomen is soft. Tenderness: There is no abdominal tenderness. Genitourinary:     Comments: External urinary device  Musculoskeletal:      Right lower leg: No edema. Left lower leg: No edema. Skin:     Findings: No rash. Neurological:     General: No focal deficit present. Mental Status: He is alert and oriented to person, place, and time.    Psychiatric:    normal behavior         Lab/Data Review:     WBC 16,000     <433 <360 <357  Ferritin 155    CRP 0.39 < 0.63 <1.30      Assessment:     Active Problems:    Hypoxia (7/18/2022)      COVID (7/18/2022)  1. Covid-19 infection on supplemental oxygen (nasal cannula), Day #4 Dexamethasone and baricitinib  2. Leukopenia, possibly secondary to #1, resolved  3. Elevated LDH and CRP, secondary to #1  3. Abnormal CXR, clinical significance unclear     Comment:  WBC increased likely due to steroids. Plan:   1. No indication for Remdesivir  2. Continue Dexamethasone/baricitinib per Pulmonary  3.  Cleared for discharge from ID standpoint    Signed By: Elan Lawson MD     July 26, 2022

## 2022-07-26 NOTE — DISCHARGE SUMMARY
Discharge Summary       PATIENT ID: Brandan Pascual  MRN: [id number]   YOB: 1929    DATE OF ADMISSION: 7/18/2022  4:35 PM    DATE OF DISCHARGE:   PRIMARY CARE PROVIDER: Dereck Henriquez MD     ATTENDING PHYSICIAN: Jose Daniel Salvador  DISCHARGING PROVIDER: Boogie Chapa      CONSULTATIONS: IP CONSULT TO PULMONOLOGY  IP CONSULT TO INFECTIOUS DISEASES  IP CONSULT TO CARDIOLOGY    PROCEDURES/SURGERIES: * No surgery found *    ADMITTING DIAGNOSES:    Patient Active Problem List    Diagnosis Date Noted    Hypoxia 07/18/2022    COVID 07/18/2022    Carotid stenosis 08/26/2021       DISCHARGE DIAGNOSES / PLAN:      Acute hypoxic respiratory failure on 2 L  COVID-19 pneumonitis  hypertension,  stroke and   Afib  Neutropenia        DISCHARGE MEDICATIONS:  Current Discharge Medication List        START taking these medications    Details   amLODIPine (NORVASC) 5 mg tablet Take 1 Tablet by mouth in the morning. Qty: 30 Tablet, Refills: 0  Start date: 7/23/2022      dexAMETHasone (DECADRON) 4 mg tablet 1 tab twice daily  Qty: 10 Tablet, Refills: 0  Start date: 7/22/2022           CONTINUE these medications which have NOT CHANGED    Details   apixaban (Eliquis) 2.5 mg tablet Take 2.5 mg by mouth two (2) times a day. aspirin delayed-release 81 mg tablet Take  by mouth daily. cholecalciferol, vitamin D3, (Vitamin D3) 50 mcg (2,000 unit) tab Take  by mouth nightly. mirtazapine (REMERON) 15 mg tablet Take 15 mg by mouth nightly. atorvastatin (LIPITOR) 20 mg tablet Take 20 mg by mouth daily. ranolazine ER (RANEXA) 500 mg SR tablet Take 500 mg by mouth two (2) times a day. DULoxetine (CYMBALTA) 30 mg capsule duloxetine 30 mg capsule,delayed release      doxazosin (CARDURA) 4 mg tablet nightly.            STOP taking these medications       clopidogreL (Plavix) 75 mg tab Comments:   Reason for Stopping:         gabapentin (NEURONTIN) 300 mg capsule Comments:   Reason for Stopping: hydrochlorothiazide (MICROZIDE PO) Comments:   Reason for Stopping:                 NOTIFY YOUR PHYSICIAN FOR ANY OF THE FOLLOWING:   Fever over 101 degrees for 24 hours. Chest pain, shortness of breath, fever, chills, nausea, vomiting, diarrhea, change in mentation, falling, weakness, bleeding. Severe pain or pain not relieved by medications. Or, any other signs or symptoms that you may have questions about. DISPOSITION:  x  Home With:   OT  PT  HH  RN       Long term SNF/Inpatient Rehab    Independent/assisted living    Hospice    Other:       PATIENT CONDITION AT DISCHARGE: Stable      PHYSICAL EXAMINATION AT DISCHARGE:  General:          Alert, cooperative, no distress, appears stated age. HEENT:           Atraumatic, anicteric sclerae, pink conjunctivae                          No oral ulcers, mucosa moist, throat clear, dentition fair  Neck:               Supple, symmetrical  Lungs:             Clear to auscultation bilaterally. No Wheezing or Rhonchi. No rales. Chest wall:      No tenderness  No Accessory muscle use. Heart:              Regular  rhythm,  No  murmur   No edema  Abdomen:        Soft, non-tender. Not distended. Bowel sounds normal  Extremities:     No cyanosis. No clubbing,                            Skin turgor normal, Capillary refill normal  Skin:                Not pale. Not Jaundiced  No rashes   Psych:             Not anxious or agitated. Neurologic:      Alert, moves all extremities, answers questions appropriately and responds to commands     XR CHEST PORT   Final Result   Patchy bilateral interstitial and alveolar opacities are compatible   with COVID pneumonia. XR CHEST PORT   Final Result   Hypoinspiratory examination. Chronic small left pleural effusion   with left basilar atelectasis. Superimposed left lower lobe pneumonia cannot be   excluded.               Recent Results (from the past 24 hour(s))   BLOOD GAS + IONIZED CALCIUM    Collection Time: 07/25/22 1:45 PM   Result Value Ref Range    pH 7.53 (H) 7.35 - 7.45      PCO2 32 (L) 35 - 45 mmHg    PO2 64 (L) 80 - 100 mmHg    BICARBONATE 26 22 - 26 mmol/L    BASE EXCESS 4.0 (H) 0 - 3 mmol/L    O2 SATURATION 93 (L) 95 - 99 %    Calcium, ionized 1.07 (L) 1.13 - 1.32 mmol/L    O2 METHOD Room air      FIO2 0.21 %    Sample source Arterial      SITE Left Radial      MANUELA'S TEST YES      Carboxy-Hgb 0.3 (L) 1 - 2 %    Methemoglobin 0.4 0 - 1.4 %    Oxyhemoglobin 92.0 (L) 95 - 99 %    Performed by Anil Fuentes     Critical value read back Called to Yaw Chaudhari RN on 07/25/2022 at 08:68    METABOLIC PANEL, BASIC    Collection Time: 07/26/22  8:09 AM   Result Value Ref Range    Sodium 135 (L) 136 - 145 mmol/L    Potassium 6.1 (H) 3.5 - 5.1 mmol/L    Chloride 105 97 - 108 mmol/L    CO2 25 21 - 32 mmol/L    Anion gap 5 5 - 15 mmol/L    Glucose 105 (H) 65 - 100 mg/dL    BUN 15 6 - 20 mg/dL    Creatinine 0.49 (L) 0.70 - 1.30 mg/dL    BUN/Creatinine ratio 31 (H) 12 - 20      GFR est AA >60 >60 ml/min/1.73m2    GFR est non-AA >60 >60 ml/min/1.73m2    Calcium 7.7 (L) 8.5 - 10.1 mg/dL            HOSPITAL COURSE:    Patient is a 80y.o. year old male with significant past medical history of hypertension CVA A. Fib AICD  came to emergency room because of generalized weakness fatigue for almost 1 week patient had multiple family members at home which are COVID-positive he typically walks with a walker but recently because of generalized weakness not able to ambulate came to emergency room seen by ER physician chest x-ray shows pneumonia COVID screening came back positive     Discussed with the patient wife patient is full code        7/19  Patient laying in bed comfortably. No complaints at this time. He states he is not having any difficulty breathing. O2: 96% on 2L NC  WBC: 2.5  Potassium: 3.3     CXR:  Hypoinspiratory examination. Chronic small left pleural effusion  with left basilar atelectasis.  Superimposed left lower lobe pneumonia cannot be  Excluded. 7/20  Patient laying in bed comfortably without complaints at this time. NAD. Patient not wearing nasal canula when I entered the room. O2 sat 96%  WBC: 2.5>5.4     7/21  Patient sitting in bed comfortably but anxious to be discharged. Per PT - pt will need more PT after discharge. 7/22  Patient laying in bed comfortably. NAD  Switch to decadron oral  Continue antibiotics    To skilled care rehab    Follow-up with PCP in 2 weeks    7/25  Patient laying in bed comfortably. Patient more confused today. Patient had episode of SVT vs afib last night - cleared by cardiology for discharge. 7/26  Patient laying in bed comfortably. NAD  Patient more lucid today  Potassium 2.8 yesterday  Specimen hemolyzed - needs recollection  LD: 462  Transportation for 3 pm to go to SNF    CXR 7/25:Patchy bilateral interstitial and alveolar opacities are compatible  with COVID pneumonia.     Today potassium was 6.1 which is hemolyzed we will repeat a BMP stat      Signed:   Radha Correia MD  7/26/2022  12:36 PM

## 2022-07-26 NOTE — PROGRESS NOTES
Problem: Falls - Risk of  Goal: *Absence of Falls  Description: Document [place] Fall Risk and appropriate interventions in the flowsheet.   Outcome: Not Met  Note: Fall Risk Interventions:  Mobility Interventions: Bed/chair exit alarm    Mentation Interventions: Bed/chair exit alarm    Medication Interventions: Bed/chair exit alarm    Elimination Interventions: Call light in reach, Bed/chair exit alarm    History of Falls Interventions: Bed/chair exit alarm

## 2022-07-26 NOTE — PROGRESS NOTES
Problem: Patient Education: Go to Patient Education Activity  Goal: Patient/Family Education  Outcome: Resolved/Met     Problem: Falls - Risk of  Goal: *Absence of Falls  Description: Document [place] Fall Risk and appropriate interventions in the flowsheet.   Outcome: Resolved/Met  Note: Fall Risk Interventions:  Mobility Interventions: Bed/chair exit alarm    Mentation Interventions: Bed/chair exit alarm    Medication Interventions: Bed/chair exit alarm    Elimination Interventions: Call light in reach, Bed/chair exit alarm    History of Falls Interventions: Bed/chair exit alarm         Problem: Patient Education: Go to Patient Education Activity  Goal: Patient/Family Education  Outcome: Resolved/Met

## 2022-07-27 PROBLEM — A41.9 SEPSIS (HCC): Status: ACTIVE | Noted: 2022-01-01

## 2022-07-27 NOTE — PROGRESS NOTES
Vancomycin Dosing Consult  Day #1 of vancomycin therapy  Consult ordered by Dr. Howard Figueroa for this 80 y.o. male for indication of upper respiratory infection, sepsis.   Antibiotic regimen: Vancomycin + Zosyn  + azithromycin    Temp (24hrs), Av.5 °F (38.6 °C), Min:101.5 °F (38.6 °C), Max:101.5 °F (38.6 °C)    Recent Labs     22  1111 22  1444 22  0809 22  0636   WBC 25.1*  --   --  16.0*   CREA 0.69* 0.74 0.49* 0.55*   BUN 16 21* 15 14     Est CrCl: ~50-55 mL/min  Concomitant nephrotoxic drugs: None    Cultures:    Blood: Pending    MRSA Swab: Pending    Target range: AUC/SVITLANA 400-600      Assessment/Plan:   Patient discharged yesterday being treated for COVID-19 pneumonia readmitted, patient is febrile, tachycardic, and tachypnic, has leukocytosis w/ left shift, lactic acidosis, procal pending  SCr appears to be relatively near baseline, AUC-based dosing for now  Initiate therapy with a regimen of 1250 mg q24h for a predicted AUC of 487  Will check a level prior to dose  @ 1600  Antimicrobial stop date TBD

## 2022-07-27 NOTE — ED TRIAGE NOTES
Pt COVID+ from [facility]. Staff reports worsening confusion, 80% on 5 lpm. Was d/c'd from this facility. Given 10 dex, 2 mag, 1 duoneb, 1 of terbutaline.

## 2022-07-27 NOTE — ACP (ADVANCE CARE PLANNING)
Advance Care Planning   Healthcare Decision Maker:       Primary Decision Maker: Jose Manuel Etienne - [facility] - [phone]

## 2022-07-27 NOTE — CONSULTS
Consult  Pulmonary, Critical Care    Name: Yue Novoa MRN: [id number]   : 1929 Hospital: [facility]   Date: 2022  Admission date: 2022 Hospital Day: 1       Subjective/Interval History:   Recent COVID pneumonitis improved with Decadron and baricitinib wean to room air was discharged to rehab facility yesterday at the rehab facility he had development of hypoxia than fever was transferred back to our emergency room T-max 101.5. Wife states that yesterday he did have some green-tinged sputum. Chest x-ray shows diffuse bilateral infiltrates unchanged. There has been no nausea vomiting choking while eating    Patient Active Problem List   Diagnosis Code    Carotid stenosis I65.29    Hypoxia R09.02    COVID U07.1    Sepsis ([facility] [address] A41.9       IMPRESSION:   Acute hypoxic respiratory failure  Healthcare associated pneumonia  Recent COVID pneumonitis  Atrial fibrillation  Pyuria rule out UTI  Cardiac murmur questionable mitral regurg  Elevated BNP  Body mass index is 20.18 kg/m². Additional workup outlined below        RECOMMENDATIONS/PLAN:   Currently on noninvasive ventilator will adjust settings to maintain oxygen saturation  Agree IV Zosyn vancomycin and Zithromax  BNP is elevated creatinine is normal will decrease IV fluids  Culture blood urine and sputum  Check echocardiogram             Subjective/Initial History:   I have reviewed the flowsheet and previous  notes. S    I was asked by Markos Breen MD to see Yue Novoa a 80 y.o.  male  in consultation for a chief complaint of acute hypoxic respiratory failure fever recent COVID pneumonitis        Patient PCP: Manju Shanks MD  PMH:  has a past medical history of Atrial fibrillation ([facility] [address] COVID-19 vaccine series completed (2021), Hypercholesteremia, Hypertension, Neuropathy, and Stroke ([facility] [address] (2021).   PSH:   has a past surgical history that includes pr abdomen surgery proc unlisted (Right) and hx cataract removal (Left, 2018). FHX: family history is not on file. SHX:  reports that he has quit smoking. He has never used smokeless tobacco. He reports that he does not use drugs.   Systemic review  General weight has been stable the last several days he has not noted fever chills or sweats until this morning  Eyes no double vision or momentary blindness  ENT no facial pain over the sinuses  Musculoskeletal no swollen tender joints no myalgias  Endocrinologic no polyuria polydipsia  Neurologic no seizures or syncope awake and alert oriented  Gastrointestinal no choking or gagging when eating no nausea vomiting no indigestion  Genitourinary no pain or discomfort no burning  Cardiovascular has not noted ankle edema chest pain diaphoresis or nocturia  Respiratory abrupt worsening shortness of breath last night associated with fever    No Known Allergies   MEDS:   Current Facility-Administered Medications   Medication    piperacillin-tazobactam (ZOSYN) 3.375 g in 0.9% sodium chloride (MBP/ADV) 100 mL MBP    apixaban (ELIQUIS) tablet 2.5 mg    [START ON 7/28/2022] aspirin delayed-release tablet 81 mg    [START ON 7/28/2022] atorvastatin (LIPITOR) tablet 20 mg    doxazosin (CARDURA) tablet 4 mg    [START ON 7/28/2022] DULoxetine (CYMBALTA) capsule 30 mg    mirtazapine (REMERON) tablet 15 mg    ranolazine ER (RANEXA) tablet 500 mg    0.9% sodium chloride infusion    acetaminophen (TYLENOL) tablet 650 mg    Or    acetaminophen (TYLENOL) suppository 650 mg    polyethylene glycol (MIRALAX) packet 17 g    ondansetron (ZOFRAN ODT) tablet 4 mg    Or    ondansetron (ZOFRAN) injection 4 mg    dexamethasone (DECADRON) 4 mg/mL injection 4 mg    baricitinib (OLUMIANT) tablet 4 mg    Vancomycin - Pharmacy to Dose    [START ON 7/28/2022] vancomycin (VANCOCIN) 1,250 mg in 0.9% sodium chloride 250 mL ([id number])    [START ON 7/28/2022] Vancomycin - Trough to be drawn prior to dose 7/28 @ 1600    azithromycin (ZITHROMAX) 500 mg in 0.9% sodium chloride 250 mL ([id number])     Current Outpatient Medications   Medication Sig    dexAMETHasone (DECADRON) 4 mg tablet Take 4 mg by mouth two (2) times a day. amLODIPine (NORVASC) 5 mg tablet Take 1 Tablet by mouth in the morning. apixaban (ELIQUIS) 2.5 mg tablet Take 2.5 mg by mouth two (2) times a day. Indications: deep vein thrombosis prevention    aspirin delayed-release 81 mg tablet Take 81 mg by mouth in the morning. cholecalciferol, vitamin D3, 50 mcg (2,000 unit) tab Take 2,000 Units by mouth nightly. mirtazapine (REMERON) 15 mg tablet Take 15 mg by mouth nightly. atorvastatin (LIPITOR) 20 mg tablet Take 20 mg by mouth daily. ranolazine ER (RANEXA) 500 mg SR tablet Take 500 mg by mouth two (2) times a day. DULoxetine (CYMBALTA) 30 mg capsule Take 30 mg by mouth in the morning. doxazosin (CARDURA) 4 mg tablet Take 4 mg by mouth in the morning.         Current Facility-Administered Medications:     piperacillin-tazobactam (ZOSYN) 3.375 g in 0.9% sodium chloride (MBP/ADV) 100 mL MBP, 3.375 g, IntraVENous, Q8H, Mahogany Chapa MD, Last Rate: 25 mL/hr at 07/27/22 1214, 3.375 g at 07/27/22 1214    apixaban (ELIQUIS) tablet 2.5 mg, 2.5 mg, Oral, BID, Krish Chapa MD Lonn Sarah ON 7/28/2022] aspirin delayed-release tablet 81 mg, 81 mg, Oral, DAILY, Krish Chapa MD Lonn Sarah ON 7/28/2022] atorvastatin (LIPITOR) tablet 20 mg, 20 mg, Oral, DAILY, Mahogany Chapa MD    doxazosin (CARDURA) tablet 4 mg, 4 mg, Oral, QHS, Mahogany Chapa MD Lonn Sarah ON 7/28/2022] DULoxetine (CYMBALTA) capsule 30 mg, 30 mg, Oral, DAILY, Mahogany Chapa MD    mirtazapine (REMERON) tablet 15 mg, 15 mg, Oral, QHS, Mahogany Chapa MD    ranolazine ER (RANEXA) tablet 500 mg, 500 mg, Oral, BID, Mahogany Chapa MD    0.9% sodium chloride infusion, 25 mL/hr, IntraVENous, CONTINUOUS, Heaven Anthony MD, Last Rate: 75 mL/hr at 07/27/22 1415, 75 mL/hr at 07/27/22 1415    acetaminophen (TYLENOL) tablet 650 mg, 650 mg, Oral, Q6H PRN **OR** acetaminophen (TYLENOL) suppository 650 mg, 650 mg, Rectal, Q6H PRN, Mahogany Chapa MD    polyethylene glycol (MIRALAX) packet 17 g, 17 g, Oral, DAILY PRN, Narayan Chapa MD    ondansetron (ZOFRAN ODT) tablet 4 mg, 4 mg, Oral, Q8H PRN **OR** ondansetron (ZOFRAN) injection 4 mg, 4 mg, IntraVENous, Q6H PRN, Mahogany Chapa MD    dexamethasone (DECADRON) 4 mg/mL injection 4 mg, 4 mg, IntraVENous, Q6H, Mahogany Chapa MD, 4 mg at 07/27/22 1405    baricitinib (OLUMIANT) tablet 4 mg, 4 mg, Oral, Q24H, Mahogany Chapa MD    Vancomycin - Pharmacy to Dose, , Other, Rx Dosing/Monitoring, MD [facility] 7/28/2022] vancomycin (VANCOCIN) 1,250 mg in 0.9% sodium chloride 250 mL ([id number]), 1,250 mg, IntraVENous, Q24H, Narayan Chapa MD    [START ON 7/28/2022] Vancomycin - Trough to be drawn prior to dose 7/28 @ 1600, , Other, ONCE, Mahogany Chapa MD    azithromycin (ZITHROMAX) 500 mg in 0.9% sodium chloride 250 mL ([id number]), 500 mg, IntraVENous, Q24H, Mahogany Chapa MD    Current Outpatient Medications:     dexAMETHasone (DECADRON) 4 mg tablet, Take 4 mg by mouth two (2) times a day., Disp: , Rfl:     amLODIPine (NORVASC) 5 mg tablet, Take 1 Tablet by mouth in the morning., Disp: 30 Tablet, Rfl: 0    apixaban (ELIQUIS) 2.5 mg tablet, Take 2.5 mg by mouth two (2) times a day. Indications: deep vein thrombosis prevention, Disp: , Rfl:     aspirin delayed-release 81 mg tablet, Take 81 mg by mouth in the morning., Disp: , Rfl:     cholecalciferol, vitamin D3, 50 mcg (2,000 unit) tab, Take 2,000 Units by mouth nightly., Disp: , Rfl:     mirtazapine (REMERON) 15 mg tablet, Take 15 mg by mouth nightly., Disp: , Rfl:     atorvastatin (LIPITOR) 20 mg tablet, Take 20 mg by mouth daily. , Disp: , Rfl:     ranolazine ER (RANEXA) 500 mg SR tablet, Take 500 mg by mouth two (2) times a day., Disp: , Rfl:     DULoxetine (CYMBALTA) 30 mg capsule, Take 30 mg by mouth in the morning., Disp: , Rfl:     doxazosin (CARDURA) 4 mg tablet, Take 4 mg by mouth in the morning., Disp: , Rfl:       Objective:     Vital Signs: Telemetry:    normal sinus rhythm Intake/Output:   Visit Vitals  /69   Pulse 89   Temp 98.6 °F (37 °C)   Resp 22   Ht 5' 6\" (1.676 m)   Wt 56.7 kg (125 lb)   SpO2 100%   BMI 20.18 kg/m²       Temp (24hrs), Av.1 °F (37.8 °C), Min:98.6 °F (37 °C), Max:101.5 °F (38.6 °C)        O2 Device: BIPAP           Body mass index is 20.18 kg/m². Wt Readings from Last 4 Encounters:   22 56.7 kg (125 lb)   22 54.4 kg (120 lb)   02/10/22 54.4 kg (120 lb)   21 54.4 kg (120 lb)        No intake or output data in the 24 hours ending 22 1623    Last shift:      No intake/output data recorded. Last 3 shifts: No intake/output data recorded. Hemodynamics:    CO:    CI:    CVP:    SVR:   PAP Systolic:    PAP Diastolic:    PVR:    YO21:       Ventilator Settings:      Mode Rate TV Press PEEP FiO2 PIP Min. Vent               60 %     16.7 l/min      Physical Exam:    General:  male; in bed on noninvasive ventilator looks comfortable at this point no accessory muscles in use  HEENT: NCAT,   Eyes: anicteric; conjunctiva clear extraocular movements intact  Neck: no nodes, no accessory MM use. No definite JVD  Chest: no deformity,   Cardiac: Regular rate and rhythm occasional PVCs has somewhat harsh systolic murmur  Lungs: Clear anteriorly with rales posteriorly no wheezing  Abd: Thin soft positive bowel sounds no tenderness  Ext: Trace edema; no joint swelling;  No clubbing  : clear urine  Neuro: Awake alert oriented speech garbled due to wearing noninvasive ventilator mask he seems oriented moves all 4 extremities  Psych- no agitation, oriented to person;   Skin: warm, dry, no cyanosis;   Pulses: Brachial and radial pulses intact  Capillary: Normal capillary refill      Labs:    Recent Labs     22  1111 22  0636   WBC 25.1* 16.0*   HGB 11.7* 11.4*    240   INR 1.1  --    APTT 26.8  --      Recent Labs     07/27/22  1428 07/27/22  1111 07/26/22  1444 07/26/22  1231 07/26/22  0809   NA  --  132* 133*  --  135*   K  --  4.2 5.6* 5.6* 6.1*   CL  --  99 102  --  105   CO2  --  22 24  --  25   GLU  --  123* 200*  --  105*   BUN  --  16 21*  --  15   CREA  --  0.69* 0.74  --  0.49*   CA  --  7.7* 8.0*  --  7.7*   LAC 2.0 3.2*  --   --   --    ALB  --  2.6*  --   --   --    ALT  --  43  --   --   --      Recent Labs     07/27/22  1346 07/25/22  1345   PH  --  7.53*   PCO2  --  32*   PO2  --  64*   HCO3  --  26   FIO2 100 0.21     COVID-19 antigen remains positive  BNP 3310  Urinalysis with 20-50 WBCs    Imaging:  I have personally reviewed the patients radiographs and have reviewed the reports:    CXR Results  (Last 48 hours)                 07/27/22 1135  XR CHEST SNGL V Final result    Impression:  Stable bilateral pulmonary infiltrates. Narrative: Indication: Hypoxia       Comparison: 7/25/2022       Portable exam of the chest obtained at 1135 demonstrates normal heart size. Bilateral pulmonary infiltrates have not changed compared to the prior   examination. The osseous structures are unremarkable. Large hiatal hernia is   again noted. Results from Hospital Encounter encounter on 07/27/22    XR CHEST SNGL V    Narrative  Indication: Hypoxia    Comparison: 7/25/2022    Portable exam of the chest obtained at 1135 demonstrates normal heart size. Bilateral pulmonary infiltrates have not changed compared to the prior  examination. The osseous structures are unremarkable. Large hiatal hernia is  again noted. Impression  Stable bilateral pulmonary infiltrates. Results from [place] encounter on 07/18/22    XR CHEST PORT    Narrative  INDICATION: Shortness of breath. COVID positive.     COMPARISON: 7/18/2022    FINDINGS: AP portable imaging of the chest performed at 1:35 PM demonstrates a  stable cardiomediastinal silhouette. There are patchy bilateral interstitial and  alveolar opacities. . No significant osseous abnormalities are seen. Impression  Patchy bilateral interstitial and alveolar opacities are compatible  with COVID pneumonia. XR CHEST PORT    Narrative  INDICATION:  Fatigue    COMPARISON: August 2021    FINDINGS: Single AP portable view of the chest obtained at 1732 demonstrates a  stable cardiomediastinal silhouette. The lungs are hypoinspiratory and the  patient is rotated leftward. There is a chronic small left pleural effusion with  left basilar opacification. There is a chronic right lateral chest wall  deformity with pleural thickening. Impression  Hypoinspiratory examination. Chronic small left pleural effusion  with left basilar atelectasis. Superimposed left lower lobe pneumonia cannot be  excluded. Results from [place] encounter on 02/10/22    CT HEAD WO CONT    Narrative  Technique: Multiple continuous axial images were obtained from the skull base to  the vertex without administration of intravenous contrast.    Comment on dose reduction: All CT scans at this facility are performed using  dose reduction optimization technique as appropriate to perform the exam  including the following; automated exposure control, adjustments of the mA  and/or kV according to patient size, or use of iterative reconstructed  technique. Comparison examination: None    Findings:  The ventricles and sulci are prominent consistent with age-related changes of  atrophy. Periventricular hypodensity is identified consistent with changes of  chronic small vessel disease. No evidence of midline shift, mass lesion, or  extra-axial fluid collection. No evidence of parenchymal hemorrhage or  infarction in a major vascular territory. The osseous structures are intact, the paranasal sinuses are clear. Extracalvarial soft tissue hematoma posterior vertex .     Impression  No acute intracranial process. Extracalvarial soft tissue hematoma posterior  vertex . Session fever with recent hospitalization possible healthcare associated pneumonia agree with Zosyn vancomycin and Zithromax. We will attempt to collect sputum for culture. Have requested urine culture blood cultures have already been obtained. He has a significant systolic murmur with elevated BNP he may have mitral regurg giving him some degree of fluid overload he only has trace edema. We will check an echocardiogram.  Creatinine is normal will decrease IV fluids for the time being. Chest x-ray with diffuse infiltrates thought to be residual inflammatory change from COVID pneumonitis but may represent some degree of fluid overload. Oxygenation is well-maintained now on noninvasive ventilator have decreased FiO2 to 45% will attempt conversion to nasal oxygen in a.m. Time of care 50 minutes           Thank you for allowing us to participate in the care of this patient.   We will follow along with you     Kalyan Armendariz MD

## 2022-07-27 NOTE — H&P
History and Physical    NAME: Юлия Hogan   :  1929   MRN:  [id number]     Date/Time:  2022 12:16 PM    Patient PCP: Mali Weller MD  ______________________________________________________________________             Subjective:     CHIEF COMPLAINT: Hypoxia        HISTORY OF PRESENT ILLNESS:       Patient is a 80y.o. year old male with a significant PMH of Afib, hypercholeseterolemia, HTN, neuropathy, and hx of stroke presented to the ED today due to hypoxia and worsening confusion. The patient was discharged from the hospital yesterday after being hospitalized d/t COVID-19. Per nursing facility his O2 was in the 80s and it was 89% upon admission. The patient was stable at that time of discharge yesterday and not using any oxygen. The patient is now on BiPAP with an O2 sat of 100%. Febrile at 101.5  WBC 25.1  Hypertensive on admission  Respiratory rate at 31  CXR: Stable bilateral pulmonary infiltrates. Patient was given:  10mg Decadron  2g Magnesium  Duoneb treatment  Terbutaline treatment        Past Medical History:   Diagnosis Date    Atrial fibrillation ([address]     COVID-19 vaccine series completed 2021    Hypercholesteremia     Hypertension     Neuropathy     Stroke ([facility] [address] 2021    TIA        Past Surgical History:   Procedure Laterality Date    HX CATARACT REMOVAL Left     IOL    [place] ABDOMEN SURGERY PROC UNLISTED Right     IHR       Social History     Tobacco Use    Smoking status: Former    Smokeless tobacco: Never    Tobacco comments:     quit 30 years ago , smoked  pipe and cigar   Substance Use Topics    Alcohol use: Not on file        No family history on file. No Known Allergies     Prior to Admission medications    Medication Sig Start Date End Date Taking?  Authorizing Provider   amLODIPine (NORVASC) 5 mg tablet Take 1 Tablet by mouth in the morning. 22   Mohiuddin, Romana Dallas, MD   dexAMETHasone (DECADRON) 4 mg tablet 1 tab twice daily 22   Mohiuddin, Romana Dallas, MD   apixaban (Eliquis) 2.5 mg tablet Take 2.5 mg by mouth two (2) times a day. Other, MD Nova   aspirin delayed-release 81 mg tablet Take  by mouth daily. Provider, Historical   cholecalciferol, vitamin D3, (Vitamin D3) 50 mcg (2,000 unit) tab Take  by mouth nightly. Provider, Historical   mirtazapine (REMERON) 15 mg tablet Take 15 mg by mouth nightly. Other, MD Nova   atorvastatin (LIPITOR) 20 mg tablet Take 20 mg by mouth daily. Other, MD Nova   ranolazine ER (RANEXA) 500 mg SR tablet Take 500 mg by mouth two (2) times a day. Other, MD Nova   DULoxetine (CYMBALTA) 30 mg capsule duloxetine 30 mg capsule,delayed release    Other, MD Nova   doxazosin (CARDURA) 4 mg tablet nightly. Inocencio, MD Nova         Current Facility-Administered Medications:     piperacillin-tazobactam (ZOSYN) 3.375 g in 0.9% sodium chloride (MBP/ADV) 100 mL MBP, 3.375 g, IntraVENous, Q8H, Mayda Jensen DO, Last Rate: 25 mL/hr at 07/27/22 1214, 3.375 g at 07/27/22 1214    Current Outpatient Medications:     amLODIPine (NORVASC) 5 mg tablet, Take 1 Tablet by mouth in the morning., Disp: 30 Tablet, Rfl: 0    dexAMETHasone (DECADRON) 4 mg tablet, 1 tab twice daily, Disp: 10 Tablet, Rfl: 0    apixaban (Eliquis) 2.5 mg tablet, Take 2.5 mg by mouth two (2) times a day., Disp: , Rfl:     aspirin delayed-release 81 mg tablet, Take  by mouth daily. , Disp: , Rfl:     cholecalciferol, vitamin D3, (Vitamin D3) 50 mcg (2,000 unit) tab, Take  by mouth nightly., Disp: , Rfl:     mirtazapine (REMERON) 15 mg tablet, Take 15 mg by mouth nightly., Disp: , Rfl:     atorvastatin (LIPITOR) 20 mg tablet, Take 20 mg by mouth daily. , Disp: , Rfl:     ranolazine ER (RANEXA) 500 mg SR tablet, Take 500 mg by mouth two (2) times a day., Disp: , Rfl:     DULoxetine (CYMBALTA) 30 mg capsule, duloxetine 30 mg capsule,delayed release, Disp: , Rfl:     doxazosin (CARDURA) 4 mg tablet, nightly., Disp: , Rfl:     LAB DATA REVIEWED:    Recent Results (from the past 24 hour(s))   POTASSIUM    Collection Time: 07/26/22 12:31 PM   Result Value Ref Range    Potassium 5.6 (H) 3.5 - 5.1 mmol/L   METABOLIC PANEL, BASIC    Collection Time: 07/26/22  2:44 PM   Result Value Ref Range    Sodium 133 (L) 136 - 145 mmol/L    Potassium 5.6 (H) 3.5 - 5.1 mmol/L    Chloride 102 97 - 108 mmol/L    CO2 24 21 - 32 mmol/L    Anion gap 7 5 - 15 mmol/L    Glucose 200 (H) 65 - 100 mg/dL    BUN 21 (H) 6 - 20 mg/dL    Creatinine 0.74 0.70 - 1.30 mg/dL    BUN/Creatinine ratio 28 (H) 12 - 20      GFR est AA >60 >60 ml/min/1.73m2    GFR est non-AA >60 >60 ml/min/1.73m2    Calcium 8.0 (L) 8.5 - 85.7 mg/dL   METABOLIC PANEL, COMPREHENSIVE    Collection Time: 07/27/22 11:11 AM   Result Value Ref Range    Sodium 132 (L) 136 - 145 mmol/L    Potassium 4.2 3.5 - 5.1 mmol/L    Chloride 99 97 - 108 mmol/L    CO2 22 21 - 32 mmol/L    Anion gap 11 5 - 15 mmol/L    Glucose 123 (H) 65 - 100 mg/dL    BUN 16 6 - 20 mg/dL    Creatinine 0.69 (L) 0.70 - 1.30 mg/dL    BUN/Creatinine ratio 23 (H) 12 - 20      GFR est AA >60 >60 ml/min/1.73m2    GFR est non-AA >60 >60 ml/min/1.73m2    Calcium 7.7 (L) 8.5 - 10.1 mg/dL    Bilirubin, total 1.0 0.2 - 1.0 mg/dL    AST (SGOT) 55 (H) 15 - 37 U/L    ALT (SGPT) 43 12 - 78 U/L    Alk.  phosphatase 84 45 - 117 U/L    Protein, total 6.0 (L) 6.4 - 8.2 g/dL    Albumin 2.6 (L) 3.5 - 5.0 g/dL    Globulin 3.4 2.0 - 4.0 g/dL    A-G Ratio 0.8 (L) 1.1 - 2.2     LACTIC ACID    Collection Time: 07/27/22 11:11 AM   Result Value Ref Range    Lactic acid 3.2 (HH) 0.4 - 2.0 mmol/L   LD    Collection Time: 07/27/22 11:11 AM   Result Value Ref Range     (H) 85 - 241 U/L   CBC WITH AUTOMATED DIFF    Collection Time: 07/27/22 11:11 AM   Result Value Ref Range    WBC 25.1 (H) 4.1 - 11.1 K/uL    RBC 3.65 (L) 4.10 - 5.70 M/uL    HGB 11.7 (L) 12.1 - 17.0 g/dL    HCT 34.8 (L) 36.6 - 50.3 %    MCV 95.3 80.0 - 99.0 FL    MCH 32.1 26.0 - 34.0 PG    MCHC 33.6 30.0 - 36.5 g/dL    RDW 15.0 (H) 11.5 - 14.5 %    PLATELET 441 595 - 550 K/uL    MPV 10.4 8.9 - 12.9 FL    NRBC 0.2 (H) 0.0  WBC    ABSOLUTE NRBC 0.04 (H) 0.00 - 0.01 K/uL    NEUTROPHILS 89 (H) 32 - 75 %    LYMPHOCYTES 5 (L) 12 - 49 %    MONOCYTES 4 (L) 5 - 13 %    EOSINOPHILS 0 0 - 7 %    BASOPHILS 0 0 - 1 %    IMMATURE GRANULOCYTES 2 (H) 0 - 0.5 %    ABS. NEUTROPHILS 22.5 (H) 1.8 - 8.0 K/UL    ABS. LYMPHOCYTES 1.2 0.8 - 3.5 K/UL    ABS. MONOCYTES 0.9 0.0 - 1.0 K/UL    ABS. EOSINOPHILS 0.0 0.0 - 0.4 K/UL    ABS. BASOPHILS 0.0 0.0 - 0.1 K/UL    ABS. IMM. GRANS. 0.4 (H) 0.00 - 0.04 K/UL    DF AUTOMATED     PTT    Collection Time: 07/27/22 11:11 AM   Result Value Ref Range    aPTT 26.8 21.2 - 34.1 sec    aPTT, therapeutic range   82 - 109 sec   PROTHROMBIN TIME + INR    Collection Time: 07/27/22 11:11 AM   Result Value Ref Range    Prothrombin time 14.4 11.9 - 14.6 sec    INR 1.1 0.9 - 1.1     TROPONIN-HIGH SENSITIVITY    Collection Time: 07/27/22 11:15 AM   Result Value Ref Range    Troponin-High Sensitivity 64 0 - 76 ng/L   COVID-19 RAPID TEST    Collection Time: 07/27/22 11:15 AM   Result Value Ref Range    COVID-19 rapid test DETECTED (A) Not Detected         XR Results (most recent):  Results from Hospital Encounter encounter on 07/27/22    XR CHEST SNGL V    Narrative  Indication: Hypoxia    Comparison: 7/25/2022    Portable exam of the chest obtained at 1135 demonstrates normal heart size. Bilateral pulmonary infiltrates have not changed compared to the prior  examination. The osseous structures are unremarkable. Large hiatal hernia is  again noted. Impression  Stable bilateral pulmonary infiltrates. XR CHEST SNGL V   Final Result   Stable bilateral pulmonary infiltrates. Review of Systems:  Constitutional: Negative for chills and fever. HENT: Negative. Eyes: Negative. Respiratory: SOB    Cardiovascular: Negative. Gastrointestinal: Negative for abdominal pain and nausea. Skin: Negative. Neurological: Negative. Objective:   VITALS:    Visit Vitals  /85   Pulse 97   Temp (!) 101.5 °F (38.6 °C)   Resp 17   Ht 5' 6\" (1.676 m)   Wt 56.7 kg (125 lb)   SpO2 100%   BMI 20.18 kg/m²       Physical Exam:   Constitutional: pt is oriented to person, place, and time. HENT:   Head: Normocephalic and atraumatic. Eyes: Pupils are equal, round, and reactive to light. EOM are normal.   Cardiovascular: Normal rate, regular rhythm and normal heart sounds. Pulmonary/Chest: Breath sounds normal. No wheezes. No rales. Exhibits no tenderness. Abdominal: Soft. Bowel sounds are normal. There is no abdominal tenderness. There is no rebound and no guarding. Musculoskeletal: Normal range of motion. Neurological: pt is alert and oriented to person, place, and time. Alert. Normal strength. No cranial nerve deficit or sensory deficit. Displays a negative Romberg sign.         ASSESSMENT:  Hypoxia  COVID-19 Pneumonia  Sepsis  Hypertension  Stroke  Afib      PLAN:  Decadron 4mg IV Q6h  Olumiant 2mg PO QD  Zosyn 3.375g IV every day  Vancomycin per pharmacy protocol  Eliquis 2.5mg PO every day  Lipitor 20mg PO every day  Cymbalta 30mg POQD  Ranolazine 500mg PO every day  Aspirin 81mg PO every day  Remeron 15mg PO every day  Doxazosin 4mg PO QPM      ________________________________________________________________________    Signed: Amando Lerma

## 2022-07-27 NOTE — ED NOTES
TRANSFER - OUT REPORT:    Verbal report given to wendy(name) on Johnny Treviño  being transferred to icu(unit) for routine progression of care       Report consisted of patients Situation, Background, Assessment and   Recommendations(SBAR). Information from the following report(s) SBAR and ED Summary was reviewed with the receiving nurse. Lines:   Peripheral IV 07/27/22 Anterior; Left Forearm (Active)        Opportunity for questions and clarification was provided.       Patient transported with:   Monitor  Tech

## 2022-07-27 NOTE — PROGRESS NOTES
Reason for Admission:   Covid                 RUR Score:    N/A       PCP: First and Last name:  Cici Marie MD     Name of Practice:   Are you a current patient: Yes/No:Yes   Approximate date of last visit: Seen @ the facility. Can you do a virtual visit with your PCP:              Resources/supports as identified by patient/family:  Placement                 Top Challenges facing patient (as identified by patient/family and CM): Finances/Medication cost?  No                  Transportation? No              Support system or lack thereof? No                     Living arrangements? No             Self-care/ADLs/Cognition? No          Current Advanced Directive/Advance Care Plan:  Full Code      Healthcare Decision Maker:       Primary Decision Maker: Gavin Bobo - [phone]    Payor Source Payor: De [place] / Plan: [employer] / Product Type: Managed Care Medicare /                             Plan for utilizing home health:  None. Wife Sivan Castillo ) signed Choice Letter for pt to return to [facility] . Referral sent via Hank. Pt uses rollator. Transition of Care Plan:   D/C Plan is to return to [facility] via transportation.

## 2022-07-27 NOTE — PROGRESS NOTES
Transition of care outreach postponed for 14 days due to patient's discharge to SNF.    D/[facility]/22 follow up 14d

## 2022-07-27 NOTE — ED PROVIDER NOTES
EMERGENCY DEPARTMENT HISTORY AND PHYSICAL EXAM      Date: 7/27/2022  Patient Name: Dana Meade      History of Presenting Illness     Chief Complaint   Patient presents with    Shortness of Breath       History Provided By: Patient    HPI: Dana Meade, 80 y.o. male with a past medical history significant diabetes, hypertension, hyperlipidemia, and A. fib, COVID  presents to the ED with cc of shortness of breath. Patient reports symptoms worsened over the course the last couple of days. He was recently admitted to the hospital for COVID-pneumonia and discharged home. Per report, patient does have been wearing his O2 via nasal cannula. Per EMS reports, patient found to be hypoxic upon their arrival.  He is with SPO2 80% on 5 L nasal cannula. Given 10 mg Decadron, 2 g of magnesium, 1 DuoNeb and 1 dose of terbutaline prior to arrival to the ED. Patient placed on BiPAP and upon arrival to the emergency department with improvement of his saturations. Further history difficult to obtain secondary to patient's acute shortness of breath. There are no other complaints, changes, or physical findings at this time.     PCP: Cornelio Roche MD    Current Facility-Administered Medications   Medication Dose Route Frequency Provider Last Rate Last Admin    piperacillin-tazobactam (ZOSYN) 3.375 g in 0.9% sodium chloride (MBP/ADV) 100 mL MBP  3.375 g IntraVENous Q8H Mahogany Chapa MD 25 mL/hr at 07/27/22 1214 3.375 g at 07/27/22 1214    apixaban (ELIQUIS) tablet 2.5 mg  2.5 mg Oral BID Anh Chapa MD Goldie Solum Finis Pacer ON 7/28/2022] aspirin delayed-release tablet 81 mg  81 mg Oral DAILY Anh Chapa MD Goldie Solum Finis Pacer ON 7/28/2022] atorvastatin (LIPITOR) tablet 20 mg  20 mg Oral DAILY Anh Chapa MD        doxazosin (CARDURA) tablet 4 mg  4 mg Oral QHS Mahogany Chapa MD Goldie Solum Finis Pacer ON 7/28/2022] DULoxetine (CYMBALTA) capsule 30 mg  30 mg Oral DAILY Anh Chapa MD        mirtazapine (REMERON) tablet 15 mg  15 mg Oral QHS Mahogany Chapa MD        ranolazine ER (RANEXA) tablet 500 mg  500 mg Oral BID Gianna Chapa MD        0.9% sodium chloride infusion  75 mL/hr IntraVENous CONTINUOUS Gianna Chapa MD        acetaminophen (TYLENOL) tablet 650 mg  650 mg Oral Q6H PRN Gianna Chapa MD        Or   [facility] acetaminophen (TYLENOL) suppository 650 mg  650 mg Rectal Q6H PRN Juan M Weinstein MD        polyethylene glycol (MIRALAX) packet 17 g  17 g Oral DAILY PRN Gianna Chapa MD        ondansetron (ZOFRAN ODT) tablet 4 mg  4 mg Oral Q8H PRN Gianna Chapa MD        Or    ondansetron [facility]) injection 4 mg  4 mg IntraVENous Q6H PRN Gianna Chapa MD        azithromycin (ZITHROMAX) 500 mg in 0.9% sodium chloride 250 mL IVPB  500 mg IntraVENous Q24H Gianna Chapa MD        dexamethasone (DECADRON) 4 mg/mL injection 4 mg  4 mg IntraVENous Q6H Mahogany Chapa MD       [facility] [START ON 7/28/2022] baricitinib (OLUMIANT) tablet 4 mg  4 mg Oral DAILY Gianna Chapa MD        sodium chloride 0.9 % bolus infusion 500 mL  500 mL IntraVENous ONCE Gianna Chapa MD        Vancomycin - Pharmacy to Dose   Other Rx Dosing/Monitoring Juan M Weinstein MD        vancomycin (VANCOCIN) 1,250 mg in 0.9% sodium chloride 250 mL ([id number])  1,250 mg IntraVENous ONCE Mahogany Chapa MD         Current Outpatient Medications   Medication Sig Dispense Refill    amLODIPine (NORVASC) 5 mg tablet Take 1 Tablet by mouth in the morning. 30 Tablet 0    dexAMETHasone (DECADRON) 4 mg tablet 1 tab twice daily 10 Tablet 0    apixaban (Eliquis) 2.5 mg tablet Take 2.5 mg by mouth two (2) times a day.  aspirin delayed-release 81 mg tablet Take  by mouth daily.  cholecalciferol, vitamin D3, (Vitamin D3) 50 mcg (2,000 unit) tab Take  by mouth nightly.  mirtazapine (REMERON) 15 mg tablet Take 15 mg by mouth nightly.  atorvastatin (LIPITOR) 20 mg tablet Take 20 mg by mouth daily.  ranolazine ER (RANEXA) 500 mg SR tablet Take 500 mg by mouth two (2) times a day.  DULoxetine (CYMBALTA) 30 mg capsule duloxetine 30 mg capsule,delayed release      doxazosin (CARDURA) 4 mg tablet nightly. Past History   Past Medical History:  Past Medical History:   Diagnosis Date    Atrial fibrillation ([address]     COVID-19 vaccine series completed 03/2021    Hypercholesteremia     Hypertension     Neuropathy     Stroke [facility]) 08/2021    TIA       Past Surgical History:  Past Surgical History:   Procedure Laterality Date    HX CATARACT REMOVAL Left 2018    IOL    OR ABDOMEN SURGERY PROC UNLISTED Right     IHR       Family History:  No family history on file. Social History:  Social History     Tobacco Use    Smoking status: Former    Smokeless tobacco: Never    Tobacco comments:     quit 30 years ago , smoked  pipe and cigar   Substance Use Topics    Drug use: Never       Allergies:  No Known Allergies  Review of Systems   Review of Systems   Respiratory:  Positive for shortness of breath. Physical Exam   Physical Exam  Constitutional:       Appearance: Normal appearance. He is ill-appearing. HENT:      Head: Normocephalic and atraumatic. Right Ear: External ear normal.      Left Ear: External ear normal.      Nose: Nose normal.      Mouth/Throat:      Mouth: Mucous membranes are moist.   Eyes:      Extraocular Movements: Extraocular movements intact. Conjunctiva/sclera: Conjunctivae normal.      Pupils: Pupils are equal, round, and reactive to light. Cardiovascular:      Rate and Rhythm: Normal rate and regular rhythm. Pulses: Normal pulses. Pulmonary:      Effort: Tachypnea present. Breath sounds: Decreased breath sounds present. Comments: On BiPAP  Abdominal:      General: Abdomen is flat. There is no distension. Musculoskeletal:         General: Normal range of motion. Cervical back: Normal range of motion.    Skin:     General: Skin is warm and dry. Neurological:      General: No focal deficit present. Mental Status: He is alert and oriented to person, place, and time. Psychiatric:         Mood and Affect: Mood normal.         Behavior: Behavior normal.         Thought Content: Thought content normal.         Judgment: Judgment normal.       Lab and Diagnostic Study Results   Labs -     Recent Results (from the past 12 hour(s))   METABOLIC PANEL, COMPREHENSIVE    Collection Time: 07/27/22 11:11 AM   Result Value Ref Range    Sodium 132 (L) 136 - 145 mmol/L    Potassium 4.2 3.5 - 5.1 mmol/L    Chloride 99 97 - 108 mmol/L    CO2 22 21 - 32 mmol/L    Anion gap 11 5 - 15 mmol/L    Glucose 123 (H) 65 - 100 mg/dL    BUN 16 6 - 20 mg/dL    Creatinine 0.69 (L) 0.70 - 1.30 mg/dL    BUN/Creatinine ratio 23 (H) 12 - 20      GFR est AA >60 >60 ml/min/1.73m2    GFR est non-AA >60 >60 ml/min/1.73m2    Calcium 7.7 (L) 8.5 - 10.1 mg/dL    Bilirubin, total 1.0 0.2 - 1.0 mg/dL    AST (SGOT) 55 (H) 15 - 37 U/L    ALT (SGPT) 43 12 - 78 U/L    Alk.  phosphatase 84 45 - 117 U/L    Protein, total 6.0 (L) 6.4 - 8.2 g/dL    Albumin 2.6 (L) 3.5 - 5.0 g/dL    Globulin 3.4 2.0 - 4.0 g/dL    A-G Ratio 0.8 (L) 1.1 - 2.2     LACTIC ACID    Collection Time: 07/27/22 11:11 AM   Result Value Ref Range    Lactic acid 3.2 (HH) 0.4 - 2.0 mmol/L   LD    Collection Time: 07/27/22 11:11 AM   Result Value Ref Range     (H) 85 - 241 U/L   CBC WITH AUTOMATED DIFF    Collection Time: 07/27/22 11:11 AM   Result Value Ref Range    WBC 25.1 (H) 4.1 - 11.1 K/uL    RBC 3.65 (L) 4.10 - 5.70 M/uL    HGB 11.7 (L) 12.1 - 17.0 g/dL    HCT 34.8 (L) 36.6 - 50.3 %    MCV 95.3 80.0 - 99.0 FL    MCH 32.1 26.0 - 34.0 PG    MCHC 33.6 30.0 - 36.5 g/dL    RDW 15.0 (H) 11.5 - 14.5 %    PLATELET 672 335 - 350 K/uL    MPV 10.4 8.9 - 12.9 FL    NRBC 0.2 (H) 0.0  WBC    ABSOLUTE NRBC 0.04 (H) 0.00 - 0.01 K/uL    NEUTROPHILS 89 (H) 32 - 75 %    LYMPHOCYTES 5 (L) 12 - 49 %    MONOCYTES 4 (L) 5 - 13 %    EOSINOPHILS 0 0 - 7 %    BASOPHILS 0 0 - 1 %    IMMATURE GRANULOCYTES 2 (H) 0 - 0.5 %    ABS. NEUTROPHILS 22.5 (H) 1.8 - 8.0 K/UL    ABS. LYMPHOCYTES 1.2 0.8 - 3.5 K/UL    ABS. MONOCYTES 0.9 0.0 - 1.0 K/UL    ABS. EOSINOPHILS 0.0 0.0 - 0.4 K/UL    ABS. BASOPHILS 0.0 0.0 - 0.1 K/UL    ABS. IMM. GRANS. 0.4 (H) 0.00 - 0.04 K/UL    DF AUTOMATED     PTT    Collection Time: 07/27/22 11:11 AM   Result Value Ref Range    aPTT 26.8 21.2 - 34.1 sec    aPTT, therapeutic range   82 - 109 sec   PROTHROMBIN TIME + INR    Collection Time: 07/27/22 11:11 AM   Result Value Ref Range    Prothrombin time 14.4 11.9 - 14.6 sec    INR 1.1 0.9 - 1.1     TROPONIN-HIGH SENSITIVITY    Collection Time: 07/27/22 11:15 AM   Result Value Ref Range    Troponin-High Sensitivity 64 0 - 76 ng/L   COVID-19 RAPID TEST    Collection Time: 07/27/22 11:15 AM   Result Value Ref Range    COVID-19 rapid test DETECTED (A) Not Detected     URINALYSIS W/MICROSCOPIC    Collection Time: 07/27/22 12:41 PM   Result Value Ref Range    Color Yellow      Appearance Hazy (A) Clear      Specific gravity 1.015 1.003 - 1.030      pH (UA) 9.0 (H) 5.0 - 8.0      Protein Negative Negative mg/dL    Glucose Negative Negative mg/dL    Ketone Negative Negative mg/dL    Bilirubin Negative Negative      Blood Negative Negative      Urobilinogen 0.1 0.1 - 1.0 EU/dL    Nitrites Negative Negative      Leukocyte Esterase Negative Negative      WBC 20-50 0 - 4 /hpf    RBC 0-5 0 - 5 /hpf    Bacteria 1+ (A) Negative /hpf       Radiologic Studies -   [unfilled]  CT Results  (Last 48 hours)      None          CXR Results  (Last 48 hours)                 07/27/22 1135  XR CHEST SNGL V Final result    Impression:  Stable bilateral pulmonary infiltrates. Narrative: Indication: Hypoxia       Comparison: 7/25/2022       Portable exam of the chest obtained at 1135 demonstrates normal heart size.    Bilateral pulmonary infiltrates have not changed compared to the prior examination. The osseous structures are unremarkable. Large hiatal hernia is   again noted. 07/25/22 1343  XR CHEST PORT Final result    Impression:  Patchy bilateral interstitial and alveolar opacities are compatible   with COVID pneumonia. Narrative:          INDICATION: Shortness of breath. COVID positive. COMPARISON: 7/18/2022       FINDINGS: AP portable imaging of the chest performed at 1:35 PM demonstrates a   stable cardiomediastinal silhouette. There are patchy bilateral interstitial and   alveolar opacities. . No significant osseous abnormalities are seen. Medical Decision Making and ED Course   - I am the first and primary provider for this patient AND AM THE PRIMARY PROVIDER OF RECORD. I reviewed the vital signs, available nursing notes, past medical history, past surgical history, family history and social history. - Initial assessment performed. The patients presenting problems have been discussed, and the staff are in agreement with the care plan formulated and outlined with them. I have encouraged them to ask questions as they arise throughout their visit. Differential Diagnosis & Medical Decision Making Provider Note:   27-year-old male presenting for evaluation of shortness of breath. Patient diagnosed with COVID and recently discharged from the hospital.  On physical examination, patient with hypoxia. Laboratory evaluation demonstrating bilateral pulmonary infiltrate. There is a leukocytosis, however patient has been on Decadron. Improvement of patient's respiratory symptoms after administration of noninvasive positive pressure ventilation. Will initiate broad-spectrum antibiotics. Patient signed out to admitting physician, Dr. Rosas Rojas Signs-Reviewed the patient's vital signs.   Patient Vitals for the past 12 hrs:   Temp Pulse Resp BP SpO2   07/27/22 1216 -- 91 (!) 35 125/70 100 %   07/27/22 1130 -- 97 17 135/85 100 % 07/27/22 1101 (!) 101.5 °F (38.6 °C) (!) 103 (!) 31 (!) 153/80 (!) 89 %       EKG interpretation: (Preliminary): Performed at 1057. EKG Interpreted by me. Shows A. fib with ventricular to 104, QRS 82, QTc 439 without evidence of ST depression or elevation. ED Course:            Procedures and Critical Care     Performed by: Sage Strickland DO  Procedures      CRITICAL CARE NOTE :  1:33 PM  Amount of Critical Care Time: 31minutes    IMPENDING DETERIORATION -Respiratory  ASSOCIATED RISK FACTORS - Hypoxia  MANAGEMENT- Bedside Assessment  INTERPRETATION -  Xrays and ECG  INTERVENTIONS - vent mngmt  CASE REVIEW - Hospitalist/Intensivist and Nursing  TREATMENT RESPONSE -Improved and Stable  PERFORMED BY - Self    NOTES   :  I have spent the above critical care time involved in lab review, consultations with specialist, family decision- making, bedside attention and documentation. This time excludes time spent in any separate billed procedures. During this entire length of time I was immediately available to the patient . Sage Strickland DO    Disposition   Disposition: Admitted to ICU Medical ICU the case was discussed with the admitting physician Eduard Zimmer      Diagnosis/Clinical Impression     Clinical Impression:   1. Acute respiratory failure with hypoxia (HCC)    2. COVID        Attestations: I, Sage Strickland DO, am the primary clinician of record. Please note that this dictation was completed with netTALK, the computer voice recognition software. Quite often unanticipated grammatical, syntax, homophones, and other interpretive errors are inadvertently transcribed by the computer software. Please disregard these errors. Please excuse any errors that have escaped final proofreading. Thank you.

## 2022-07-27 NOTE — H&P
History and Physical    NAME: Micheal Montanez   :  1929   MRN:  [id number]     Date/Time:  2022 12:16 PM    Patient PCP: Zander Talley MD  ______________________________________________________________________             Subjective:     CHIEF COMPLAINT: Hypoxia        HISTORY OF PRESENT ILLNESS:       Patient is a 80y.o. year old male with a significant PMH of Afib, hypercholeseterolemia, HTN, neuropathy, and hx of stroke presented to the ED today due to hypoxia and worsening confusion. The patient was discharged from the hospital yesterday after being hospitalized d/t COVID-19. Per nursing facility his O2 was in the 80s and it was 89% upon admission. The patient was stable at that time of discharge yesterday and not using any oxygen. The patient is now on BiPAP with an O2 sat of 100%. Febrile at 101.5  WBC 25.1  Hypertensive on admission  Respiratory rate at 31  CXR: Stable bilateral pulmonary infiltrates. Patient was given:  10mg Decadron  2g Magnesium  Duoneb treatment  Terbutaline treatment        Past Medical History:   Diagnosis Date    Atrial fibrillation ([facility] [address]     COVID-19 vaccine series completed 2021    Hypercholesteremia     Hypertension     Neuropathy     [facility]roke ([facility] 2021    TIA        Past Surgical History:   Procedure Laterality Date    HX CATARACT REMOVAL Left     IOL    MO ABDOMEN SURGERY PROC UNLISTED Right     IHR       Social History     Tobacco Use    Smoking status: Former    Smokeless tobacco: Never    Tobacco comments:     quit 30 years ago , smoked  pipe and cigar   Substance Use Topics    Alcohol use: Not on file        No family history on file. No Known Allergies     Prior to Admission medications    Medication Sig Start Date End Date Taking?  Authorizing Provider   amLODIPine (NORVASC) 5 mg tablet Take 1 Tablet by mouth in the morning. 22   Rosalinda Chapa MD   dexAMETHasone (DECADRON) 4 mg tablet 1 tab twice daily 22   Rosalinda Chapa MD   apixaban (Eliquis) 2.5 mg tablet Take 2.5 mg by mouth two (2) times a day. Nova Painter MD   aspirin delayed-release 81 mg tablet Take  by mouth daily. Provider, Historical   cholecalciferol, vitamin D3, (Vitamin D3) 50 mcg (2,000 unit) tab Take  by mouth nightly. Provider, Historical   mirtazapine (REMERON) 15 mg tablet Take 15 mg by mouth nightly. OtherNova MD   atorvastatin (LIPITOR) 20 mg tablet Take 20 mg by mouth daily. Nova Painter MD   ranolazine ER (RANEXA) 500 mg SR tablet Take 500 mg by mouth two (2) times a day. Nova Painter MD   DULoxetine (CYMBALTA) 30 mg capsule duloxetine 30 mg capsule,delayed release    OtherNova MD   doxazosin (CARDURA) 4 mg tablet nightly.     Nova Painter MD         Current Facility-Administered Medications:     piperacillin-tazobactam (ZOSYN) 3.375 g in 0.9% sodium chloride (MBP/ADV) 100 mL MBP, 3.375 g, IntraVENous, Q8H, Mahogany Chapa MD, Last Rate: 25 mL/hr at 07/27/22 1214, 3.375 g at 07/27/22 1214    apixaban (ELIQUIS) tablet 2.5 mg, 2.5 mg, Oral, BID, Emmanuelle Chapa MD    [place] ON 7/28/2022] aspirin delayed-release tablet 81 mg, 81 mg, Oral, DAILY, Emmanuelle Chapa MD    [place] ON 7/28/2022] atorvastatin (LIPITOR) tablet 20 mg, 20 mg, Oral, DAILY, Rich Ashraf MD    doxazosin (CARDURA) tablet 4 mg, 4 mg, Oral, QHS, Mahogany Chapa MD    [place] ON 7/28/2022] DULoxetine (CYMBALTA) capsule 30 mg, 30 mg, Oral, DAILY, Mahogany Chapa MD    mirtazapine (REMERON) tablet 15 mg, 15 mg, Oral, QHS, Mahogany Chapa MD    ranolazine ER (RANEXA) tablet 500 mg, 500 mg, Oral, BID, Emmanuelle Chapa MD    0.9% sodium chloride infusion, 75 mL/hr, IntraVENous, CONTINUOUS, Mahogany Chapa MD    acetaminophen (TYLENOL) tablet 650 mg, 650 mg, Oral, Q6H PRN **OR** acetaminophen (TYLENOL) suppository 650 mg, 650 mg, Rectal, Q6H PRN, Mahogany Chapa MD    polyethylene glycol (MIRALAX) packet 17 g, 17 g, Oral, DAILY PRN, Rich Ashraf MD ondansetron (ZOFRAN ODT) tablet 4 mg, 4 mg, Oral, Q8H PRN **OR** ondansetron (ZOFRAN) injection 4 mg, 4 mg, IntraVENous, Q6H PRN, Mahogany Chapa MD    azithromycin (ZITHROMAX) 500 mg in 0.9% sodium chloride 250 mL IVPB, 500 mg, IntraVENous, Q24H, Mahogany Chapa MD    dexamethasone (DECADRON) 4 mg/mL injection 4 mg, 4 mg, IntraVENous, Q6H, Mahogany Chapa MD    [START ON 7/28/2022] baricitinib (OLUMIANT) tablet 4 mg, 4 mg, Oral, DAILY, Mahogany Chapa MD    sodium chloride 0.9 % bolus infusion 500 mL, 500 mL, IntraVENous, ONCE, Mahogany Chapa MD    Current Outpatient Medications:     amLODIPine (NORVASC) 5 mg tablet, Take 1 Tablet by mouth in the morning., Disp: 30 Tablet, Rfl: 0    dexAMETHasone (DECADRON) 4 mg tablet, 1 tab twice daily, Disp: 10 Tablet, Rfl: 0    apixaban (Eliquis) 2.5 mg tablet, Take 2.5 mg by mouth two (2) times a day., Disp: , Rfl:     aspirin delayed-release 81 mg tablet, Take  by mouth daily. , Disp: , Rfl:     cholecalciferol, vitamin D3, (Vitamin D3) 50 mcg (2,000 unit) tab, Take  by mouth nightly., Disp: , Rfl:     mirtazapine (REMERON) 15 mg tablet, Take 15 mg by mouth nightly., Disp: , Rfl:     atorvastatin (LIPITOR) 20 mg tablet, Take 20 mg by mouth daily. , Disp: , Rfl:     ranolazine ER (RANEXA) 500 mg SR tablet, Take 500 mg by mouth two (2) times a day., Disp: , Rfl:     DULoxetine (CYMBALTA) 30 mg capsule, duloxetine 30 mg capsule,delayed release, Disp: , Rfl:     doxazosin (CARDURA) 4 mg tablet, nightly., Disp: , Rfl:     LAB DATA REVIEWED:    Recent Results (from the past 24 hour(s))   METABOLIC PANEL, BASIC    Collection Time: 07/26/22  2:44 PM   Result Value Ref Range    Sodium 133 (L) 136 - 145 mmol/L    Potassium 5.6 (H) 3.5 - 5.1 mmol/L    Chloride 102 97 - 108 mmol/L    CO2 24 21 - 32 mmol/L    Anion gap 7 5 - 15 mmol/L    Glucose 200 (H) 65 - 100 mg/dL    BUN 21 (H) 6 - 20 mg/dL    Creatinine 0.74 0.70 - 1.30 mg/dL    BUN/Creatinine ratio 28 (H) 12 - 20      GFR est AA >60 >60 ml/min/1.73m2    GFR est non-AA >60 >60 ml/min/1.73m2    Calcium 8.0 (L) 8.5 - 14.3 mg/dL   METABOLIC PANEL, COMPREHENSIVE    Collection Time: 07/27/22 11:11 AM   Result Value Ref Range    Sodium 132 (L) 136 - 145 mmol/L    Potassium 4.2 3.5 - 5.1 mmol/L    Chloride 99 97 - 108 mmol/L    CO2 22 21 - 32 mmol/L    Anion gap 11 5 - 15 mmol/L    Glucose 123 (H) 65 - 100 mg/dL    BUN 16 6 - 20 mg/dL    Creatinine 0.69 (L) 0.70 - 1.30 mg/dL    BUN/Creatinine ratio 23 (H) 12 - 20      GFR est AA >60 >60 ml/min/1.73m2    GFR est non-AA >60 >60 ml/min/1.73m2    Calcium 7.7 (L) 8.5 - 10.1 mg/dL    Bilirubin, total 1.0 0.2 - 1.0 mg/dL    AST (SGOT) 55 (H) 15 - 37 U/L    ALT (SGPT) 43 12 - 78 U/L    Alk. phosphatase 84 45 - 117 U/L    Protein, total 6.0 (L) 6.4 - 8.2 g/dL    Albumin 2.6 (L) 3.5 - 5.0 g/dL    Globulin 3.4 2.0 - 4.0 g/dL    A-G Ratio 0.8 (L) 1.1 - 2.2     LACTIC ACID    Collection Time: 07/27/22 11:11 AM   Result Value Ref Range    Lactic acid 3.2 (HH) 0.4 - 2.0 mmol/L   LD    Collection Time: 07/27/22 11:11 AM   Result Value Ref Range     (H) 85 - 241 U/L   CBC WITH AUTOMATED DIFF    Collection Time: 07/27/22 11:11 AM   Result Value Ref Range    WBC 25.1 (H) 4.1 - 11.1 K/uL    RBC 3.65 (L) 4.10 - 5.70 M/uL    HGB 11.7 (L) 12.1 - 17.0 g/dL    HCT 34.8 (L) 36.6 - 50.3 %    MCV 95.3 80.0 - 99.0 FL    MCH 32.1 26.0 - 34.0 PG    MCHC 33.6 30.0 - 36.5 g/dL    RDW 15.0 (H) 11.5 - 14.5 %    PLATELET 252 266 - 246 K/uL    MPV 10.4 8.9 - 12.9 FL    NRBC 0.2 (H) 0.0  WBC    ABSOLUTE NRBC 0.04 (H) 0.00 - 0.01 K/uL    NEUTROPHILS 89 (H) 32 - 75 %    LYMPHOCYTES 5 (L) 12 - 49 %    MONOCYTES 4 (L) 5 - 13 %    EOSINOPHILS 0 0 - 7 %    BASOPHILS 0 0 - 1 %    IMMATURE GRANULOCYTES 2 (H) 0 - 0.5 %    ABS. NEUTROPHILS 22.5 (H) 1.8 - 8.0 K/UL    ABS. LYMPHOCYTES 1.2 0.8 - 3.5 K/UL    ABS. MONOCYTES 0.9 0.0 - 1.0 K/UL    ABS. EOSINOPHILS 0.0 0.0 - 0.4 K/UL    ABS. BASOPHILS 0.0 0.0 - 0.1 K/UL    ABS. IMM. GRANS. 0.4 (H) 0.00 - 0.04 K/UL    DF AUTOMATED     PTT    Collection Time: 07/27/22 11:11 AM   Result Value Ref Range    aPTT 26.8 21.2 - 34.1 sec    aPTT, therapeutic range   82 - 109 sec   PROTHROMBIN TIME + INR    Collection Time: 07/27/22 11:11 AM   Result Value Ref Range    Prothrombin time 14.4 11.9 - 14.6 sec    INR 1.1 0.9 - 1.1     TROPONIN-HIGH SENSITIVITY    Collection Time: 07/27/22 11:15 AM   Result Value Ref Range    Troponin-High Sensitivity 64 0 - 76 ng/L   COVID-19 RAPID TEST    Collection Time: 07/27/22 11:15 AM   Result Value Ref Range    COVID-19 rapid test DETECTED (A) Not Detected         XR Results (most recent):  Results from Hospital Encounter encounter on 07/27/22    XR CHEST SNGL V    Narrative  Indication: Hypoxia    Comparison: 7/25/2022    Portable exam of the chest obtained at 1135 demonstrates normal heart size. Bilateral pulmonary infiltrates have not changed compared to the prior  examination. The osseous structures are unremarkable. Large hiatal hernia is  again noted. Impression  Stable bilateral pulmonary infiltrates. XR CHEST SNGL V   Final Result   Stable bilateral pulmonary infiltrates. Review of Systems:  Constitutional: Negative for chills and fever. HENT: Negative. Eyes: Negative. Respiratory: SOB    Cardiovascular: Negative. Gastrointestinal: Negative for abdominal pain and nausea. Skin: Negative. Neurological: Negative. Objective:   VITALS:    Visit Vitals  /70   Pulse 91   Temp (!) 101.5 °F (38.6 °C)   Resp (!) 35   Ht 5' 6\" (1.676 m)   Wt 56.7 kg (125 lb)   SpO2 100%   BMI 20.18 kg/m²       Physical Exam:   Constitutional: pt is oriented to person, place, and time. HENT:   Head: Normocephalic and atraumatic. Eyes: Pupils are equal, round, and reactive to light. EOM are normal.   Cardiovascular: Normal rate, regular rhythm and normal heart sounds. Pulmonary/Chest: Breath sounds normal. No wheezes. No rales. Exhibits no tenderness. Abdominal: Soft. Bowel sounds are normal. There is no abdominal tenderness. There is no rebound and no guarding. Musculoskeletal: Normal range of motion. Neurological: pt is alert and oriented to person, place, and time. Alert. Normal strength. No cranial nerve deficit or sensory deficit. Displays a negative Romberg sign.         ASSESSMENT:    Acute hypoxic respiratory failure  COVID-19 Pneumonia  Sepsis  Hypertension  Stroke  Afib      PLAN:  Decadron 4mg IV Q6h  Olumiant 4mg PO QD  Zosyn 3.375g IV every day  500 IV daily Zithromax 500 IV daily  Eliquis 2.5mg PO every day  Lipitor 20mg PO every day  Cymbalta 30mg POQD  Ranolazine 500mg PO every day  Aspirin 81mg PO every day  Remeron 15mg PO every day  Doxazosin 4mg PO QPM      Current Facility-Administered Medications:     piperacillin-tazobactam (ZOSYN) 3.375 g in 0.9% sodium chloride (MBP/ADV) 100 mL MBP, 3.375 g, IntraVENous, Q8H, Mahogany Chapa MD, Last Rate: 25 mL/hr at 07/27/22 1214, 3.375 g at 07/27/22 1214    apixaban (ELIQUIS) tablet 2.5 mg, 2.5 mg, Oral, BID, Miguel Chapa MD [facility] ON 7/28/2022] aspirin delayed-release tablet 81 mg, 81 mg, Oral, DAILY, Miguel Chapa MD [facility] ON 7/28/2022] atorvastatin (LIPITOR) tablet 20 mg, 20 mg, Oral, DAILY, Miguel Chapa MD    doxazosin (CARDURA) tablet 4 mg, 4 mg, Oral, QHS, Mahogany Chapa MD [facility] ON 7/28/2022] DULoxetine (CYMBALTA) capsule 30 mg, 30 mg, Oral, DAILY, Mahogany Chapa MD    mirtazapine (REMERON) tablet 15 mg, 15 mg, Oral, QHS, Mahogany Chapa MD    ranolazine ER (RANEXA) tablet 500 mg, 500 mg, Oral, BID, Miguel Chapa MD    0.9% sodium chloride infusion, 75 mL/hr, IntraVENous, CONTINUOUS, Mahogany Chapa MD    acetaminophen (TYLENOL) tablet 650 mg, 650 mg, Oral, Q6H PRN **OR** acetaminophen (TYLENOL) suppository 650 mg, 650 mg, Rectal, Q6H PRN, Mahogany Chapa MD    polyethylene glycol (MIRALAX) packet 17 g, 17 g, Oral, DAILY PRN, Izabella Canela MD    ondansetron (ZOFRAN ODT) tablet 4 mg, 4 mg, Oral, Q8H PRN **OR** ondansetron (ZOFRAN) injection 4 mg, 4 mg, IntraVENous, Q6H PRN, Gautam Chapa MD    azithromycin (ZITHROMAX) 500 mg in 0.9% sodium chloride 250 mL IVPB, 500 mg, IntraVENous, Q24H, Mahogany Chapa MD    dexamethasone (DECADRON) 4 mg/mL injection 4 mg, 4 mg, IntraVENous, Q6H, Mahogany Chapa MD    [START ON 7/28/2022] baricitinib (OLUMIANT) tablet 4 mg, 4 mg, Oral, DAILY, Mahogany Chapa MD    sodium chloride 0.9 % bolus infusion 500 mL, 500 mL, IntraVENous, ONCE, Mahogany Chapa MD    Current Outpatient Medications:     amLODIPine (NORVASC) 5 mg tablet, Take 1 Tablet by mouth in the morning., Disp: 30 Tablet, Rfl: 0    dexAMETHasone (DECADRON) 4 mg tablet, 1 tab twice daily, Disp: 10 Tablet, Rfl: 0    apixaban (Eliquis) 2.5 mg tablet, Take 2.5 mg by mouth two (2) times a day., Disp: , Rfl:     aspirin delayed-release 81 mg tablet, Take  by mouth daily. , Disp: , Rfl:     cholecalciferol, vitamin D3, (Vitamin D3) 50 mcg (2,000 unit) tab, Take  by mouth nightly., Disp: , Rfl:     mirtazapine (REMERON) 15 mg tablet, Take 15 mg by mouth nightly., Disp: , Rfl:     atorvastatin (LIPITOR) 20 mg tablet, Take 20 mg by mouth daily. , Disp: , Rfl:     ranolazine ER (RANEXA) 500 mg SR tablet, Take 500 mg by mouth two (2) times a day., Disp: , Rfl:     DULoxetine (CYMBALTA) 30 mg capsule, duloxetine 30 mg capsule,delayed release, Disp: , Rfl:     doxazosin (CARDURA) 4 mg tablet, nightly., Disp: , Rfl:            ________________________________________________________________________    Signed: Maxwell Bolds, MD

## 2022-07-27 NOTE — PROGRESS NOTES
Admission Medication Reconciliation:    Information obtained from:  Transfer papers [facility])    Comments/Recommendations: Reviewed PTA medications and patient's allergies. Allergies:  Patient has no known allergies. Significant PMH/Disease States:   Past Medical History:   Diagnosis Date    Atrial fibrillation ([address]     COVID-19 vaccine series completed 03/2021    Hypercholesteremia     Hypertension     Neuropathy     Stroke [facility]) 08/2021    TIA     Chief Complaint for this Admission:    Chief Complaint   Patient presents with    Shortness of Breath     Prior to Admission Medications:   Prior to Admission Medications   Prescriptions Last Dose Informant Patient Reported? Taking? DULoxetine (CYMBALTA) 30 mg capsule  Transfer Papers Yes Yes   Sig: Take 30 mg by mouth in the morning. amLODIPine (NORVASC) 5 mg tablet  Transfer Papers No Yes   Sig: Take 1 Tablet by mouth in the morning. apixaban (ELIQUIS) 2.5 mg tablet  Transfer Papers Yes Yes   Sig: Take 2.5 mg by mouth two (2) times a day. Indications: deep vein thrombosis prevention   aspirin delayed-release 81 mg tablet  Transfer Papers Yes Yes   Sig: Take 81 mg by mouth in the morning. atorvastatin (LIPITOR) 20 mg tablet  Transfer Papers Yes Yes   Sig: Take 20 mg by mouth daily. cholecalciferol, vitamin D3, 50 mcg (2,000 unit) tab  Transfer Papers Yes Yes   Sig: Take 2,000 Units by mouth nightly. dexAMETHasone (DECADRON) 4 mg tablet  Transfer Papers Yes Yes   Sig: Take 4 mg by mouth two (2) times a day. doxazosin (CARDURA) 4 mg tablet  Transfer Papers Yes Yes   Sig: Take 4 mg by mouth in the morning. mirtazapine (REMERON) 15 mg tablet  Transfer Papers Yes Yes   Sig: Take 15 mg by mouth nightly. ranolazine ER (RANEXA) 500 mg SR tablet  Transfer Papers Yes Yes   Sig: Take 500 mg by mouth two (2) times a day.       Facility-Administered Medications: None       Dana Bowman

## 2022-07-28 NOTE — PROGRESS NOTES
Vancomycin Dosing Consult  Day #2 of vancomycin therapy  Consult ordered by Dr. Angie Grayson for this 80 y.o. male for indication of upper respiratory infection, sepsis.   Antibiotic regimen: Vancomycin + Zosyn  + azithromycin    Temp (24hrs), Av.6 °F (36.4 °C), Min:97 °F (36.1 °C), Max:98.2 °F (36.8 °C)    Recent Labs     22  0415 22  1111 22  1444   WBC 10.7 25.1*  --    CREA 0.59*  0.56* 0.69* 0.74   BUN 14  15 16 21*     Est CrCl: ~50-55 mL/min  Concomitant nephrotoxic drugs: None    Cultures:    Blood: Pending    MRSA Swab: Detected     Target range: AUC/SVITLANA 400-600    Recent level history:  Date/Time Dose & Interval Measured Level (mcg/mL) Associated AUC/SVITLANA    @ 1525 1250 mg q24h 7.6 480       Assessment/Plan:   Last febrile , leukocytosis much improved, CRP elevated, lactic acidosis resolved  SCr is stable  Extrapolated AUC is therapeutic, continue current regimen  Next level prior to dose  @ 1600  Antimicrobial stop date TBD

## 2022-07-28 NOTE — PROGRESS NOTES
Vancomycin Dosing Consult  Day #2 of vancomycin therapy  Consult ordered by Dr. Virgin Gilford for this 80 y.o. male for indication of upper respiratory infection, sepsis.   Antibiotic regimen: Vancomycin + Zosyn  + azithromycin    Temp (24hrs), Av.6 °F (36.4 °C), Min:97 °F (36.1 °C), Max:98.2 °F (36.8 °C)    Recent Labs     22  0415 22  1111 22  1444   WBC 10.7 25.1*  --    CREA 0.59*  0.56* 0.69* 0.74   BUN 14  15 16 21*     Est CrCl: ~50-55 mL/min  Concomitant nephrotoxic drugs: None    Cultures:    Blood: Pending    MRSA Swab: Detected     Target range: AUC/SVITLANA 400-600    Recent level history:  Date/Time Dose & Interval Measured Level (mcg/mL) Associated AUC/SVITLANA    @ 1525 1250 mg q24h 7.6 480       Assessment/Plan:   Last febrile , leukocytosis much improved, CRP elevated, lactic acidosis resolved  SCr is stable  Extrapolated AUC of 480 is therapeutic, continue current regimen  Next level prior to dose  @ 1600  Antimicrobial stop date TBD

## 2022-07-28 NOTE — PROGRESS NOTES
PROGRESS NOTE    Patient: Micheal Montanez MRN: [id number]  SSN: [id number]    YOB: 1929  Age: 80 y.o. Sex: male      Admit Date: 7/27/2022    LOS: 1 day       Subjective     Chief Complaint   Patient presents with    Shortness of Breath       HPI: Patient is a 80y.o. year old male with a significant PMH of Afib, hypercholesterolemia, HTN, neuropathy, and hx of stroke presented to the ED today due to hypoxia and worsening confusion. The patient was discharged from the hospital yesterday after being hospitalized d/t COVID-19. Per nursing facility his O2 was in the 80s and it was 89% upon admission. The patient was stable at that time of discharge yesterday and not using any oxygen. The patient is now on BiPAP with an O2 sat of 100%. Febrile at 101.5  WBC 25.1  Hypertensive on admission  Respiratory rate at 31  CXR: Stable bilateral pulmonary infiltrates. Patient was given:  10mg Decadron  2g Magnesium  Duoneb treatment  Terbutaline treatment     7/28  Pt sitting in bed, responds to voice  P02 53 on ABG this morning, pt placed on BiPap  SpO2 stable  CRP=16.1    XR CHEST PORT  Diffuse interstitial airspace opacities are not  significantly changed. Trace left effusion. Biapical pleural thickening. Heartsize is enlarged      Review of Systems   Unable to perform ROS: Acuity of condition       Objective     Visit Vitals  BP (!) 132/59 (BP 1 Location: Right upper arm, BP Patient Position: At rest)   Pulse (!) 116   Temp 97 °F (36.1 °C)   Resp 21   Ht 5' 6\" (1.676 m)   Wt 56.7 kg (125 lb)   SpO2 93%   BMI 20.18 kg/m²    O2 Flow Rate (L/min): 5 l/min O2 Device: Nasal cannula    Physical Exam:   Physical Exam  Constitutional:       Appearance: He is normal weight. HENT:      Head: Normocephalic and atraumatic. Cardiovascular:      Rate and Rhythm: Normal rate and regular rhythm. Pulses: Normal pulses. Heart sounds: Normal heart sounds.    Pulmonary:      Effort: Pulmonary effort is normal.      Breath sounds: Normal breath sounds. Musculoskeletal:         General: Normal range of motion. Cervical back: Normal range of motion and neck supple. Neurological:      General: No focal deficit present. Mental Status: He is alert. He is disoriented. Intake & Output:  Current Shift: No intake/output data recorded. Last three shifts: 07/26 1901 - 07/28 0700  In: 1577.1 [P.O.:400; I.V.:1177.1]  Out: 700 [Urine:700]    Lab/Data Review: All lab results for the last 24 hours reviewed. 24 Hour Results:    Recent Results (from the past 24 hour(s))   EKG, 12 LEAD, INITIAL    Collection Time: 07/27/22 10:57 AM   Result Value Ref Range    Ventricular Rate 104 BPM    Atrial Rate 119 BPM    QRS Duration 82 ms    Q-T Interval 334 ms    QTC Calculation (Bezet) 439 ms    Calculated R Axis -22 degrees    Calculated T Axis 131 degrees    Diagnosis       Atrial fibrillation with rapid ventricular response with premature   ventricular or aberrantly conducted complexes  Anteroseptal infarct , old  T wave abnormality, consider lateral ischemia  Abnormal ECG  When compared with ECG of 25-JUL-2022 06:18,  Nonspecific T wave abnormality no longer evident in Inferior leads    Confirmed by Cathleen High MD, Chente Cee ([id number]) on 7/27/2022 9:64:93 PM     METABOLIC PANEL, COMPREHENSIVE    Collection Time: 07/27/22 11:11 AM   Result Value Ref Range    Sodium 132 (L) 136 - 145 mmol/L    Potassium 4.2 3.5 - 5.1 mmol/L    Chloride 99 97 - 108 mmol/L    CO2 22 21 - 32 mmol/L    Anion gap 11 5 - 15 mmol/L    Glucose 123 (H) 65 - 100 mg/dL    BUN 16 6 - 20 mg/dL    Creatinine 0.69 (L) 0.70 - 1.30 mg/dL    BUN/Creatinine ratio 23 (H) 12 - 20      GFR est AA >60 >60 ml/min/1.73m2    GFR est non-AA >60 >60 ml/min/1.73m2    Calcium 7.7 (L) 8.5 - 10.1 mg/dL    Bilirubin, total 1.0 0.2 - 1.0 mg/dL    AST (SGOT) 55 (H) 15 - 37 U/L    ALT (SGPT) 43 12 - 78 U/L    Alk.  phosphatase 84 45 - 117 U/L    Protein, total 6.0 (L) 6.4 - 8.2 g/dL    Albumin 2.6 (L) 3.5 - 5.0 g/dL    Globulin 3.4 2.0 - 4.0 g/dL    A-G Ratio 0.8 (L) 1.1 - 2.2     LACTIC ACID    Collection Time: 07/27/22 11:11 AM   Result Value Ref Range    Lactic acid 3.2 (HH) 0.4 - 2.0 mmol/L   LD    Collection Time: 07/27/22 11:11 AM   Result Value Ref Range     (H) 85 - 241 U/L   FERRITIN    Collection Time: 07/27/22 11:11 AM   Result Value Ref Range    Ferritin 231 26 - 388 ng/mL   CBC WITH AUTOMATED DIFF    Collection Time: 07/27/22 11:11 AM   Result Value Ref Range    WBC 25.1 (H) 4.1 - 11.1 K/uL    RBC 3.65 (L) 4.10 - 5.70 M/uL    HGB 11.7 (L) 12.1 - 17.0 g/dL    HCT 34.8 (L) 36.6 - 50.3 %    MCV 95.3 80.0 - 99.0 FL    MCH 32.1 26.0 - 34.0 PG    MCHC 33.6 30.0 - 36.5 g/dL    RDW 15.0 (H) 11.5 - 14.5 %    PLATELET 206 068 - 749 K/uL    MPV 10.4 8.9 - 12.9 FL    NRBC 0.2 (H) 0.0  WBC    ABSOLUTE NRBC 0.04 (H) 0.00 - 0.01 K/uL    NEUTROPHILS 89 (H) 32 - 75 %    LYMPHOCYTES 5 (L) 12 - 49 %    MONOCYTES 4 (L) 5 - 13 %    EOSINOPHILS 0 0 - 7 %    BASOPHILS 0 0 - 1 %    IMMATURE GRANULOCYTES 2 (H) 0 - 0.5 %    ABS. NEUTROPHILS 22.5 (H) 1.8 - 8.0 K/UL    ABS. LYMPHOCYTES 1.2 0.8 - 3.5 K/UL    ABS. MONOCYTES 0.9 0.0 - 1.0 K/UL    ABS. EOSINOPHILS 0.0 0.0 - 0.4 K/UL    ABS. BASOPHILS 0.0 0.0 - 0.1 K/UL    ABS. IMM.  GRANS. 0.4 (H) 0.00 - 0.04 K/UL    DF AUTOMATED     PTT    Collection Time: 07/27/22 11:11 AM   Result Value Ref Range    aPTT 26.8 21.2 - 34.1 sec    aPTT, therapeutic range   82 - 109 sec   PROTHROMBIN TIME + INR    Collection Time: 07/27/22 11:11 AM   Result Value Ref Range    Prothrombin time 14.4 11.9 - 14.6 sec    INR 1.1 0.9 - 1.1     TROPONIN-HIGH SENSITIVITY    Collection Time: 07/27/22 11:15 AM   Result Value Ref Range    Troponin-High Sensitivity 64 0 - 76 ng/L   COVID-19 RAPID TEST    Collection Time: 07/27/22 11:15 AM   Result Value Ref Range    COVID-19 rapid test DETECTED (A) Not Detected     URINALYSIS W/MICROSCOPIC    Collection Time: 07/27/22 12:41 PM   Result Value Ref Range    Color Yellow      Appearance Hazy (A) Clear      Specific gravity 1.015 1.003 - 1.030      pH (UA) 9.0 (H) 5.0 - 8.0      Protein Negative Negative mg/dL    Glucose Negative Negative mg/dL    Ketone Negative Negative mg/dL    Bilirubin Negative Negative      Blood Negative Negative      Urobilinogen 0.1 0.1 - 1.0 EU/dL    Nitrites Negative Negative      Leukocyte Esterase Negative Negative      WBC 20-50 0 - 4 /hpf    RBC 0-5 0 - 5 /hpf    Bacteria 1+ (A) Negative /hpf   NT-PRO BNP    Collection Time: 07/27/22 12:41 PM   Result Value Ref Range    NT pro-BNP 3,310 (H) <450 pg/mL   MRSA SCREEN - PCR (NASAL)    Collection Time: 07/27/22  1:31 PM   Result Value Ref Range    MRSA by PCR, Nasal DETECTED (A) Not Detected     BLOOD GAS, VENOUS    Collection Time: 07/27/22  1:46 PM   Result Value Ref Range    VENOUS PH 7.499 (H) 7.32 - 7.42      VENOUS PCO2 30.3 (L) 41 - 51 mmHg    VENOUS PO2 83 (H) 25 - 40 mmHg    VENOUS BICARBONATE 23 (L) 24 - 25 mmol/L    VENOUS BASE EXCESS 0.6 0 - 3 mmol/L    O2 METHOD BiPAP      FIO2 100 %    Sample source Venous      SITE OTHER      Performed by Rosey Taylor     TEMPERATURE 98.7     LACTIC ACID    Collection Time: 07/27/22  2:28 PM   Result Value Ref Range    Lactic acid 2.0 0.4 - 2.0 mmol/L   BLOOD GAS, ARTERIAL    Collection Time: 07/28/22  2:22 AM   Result Value Ref Range    pH 7.49 (H) 7.35 - 7.45      PCO2 33 (L) 35 - 45 mmHg    PO2 53 (L) 80 - 100 mmHg    O2 SATURATION 88 (L) 95 - 99 %    BICARBONATE 24 22 - 26 mmol/L    BASE EXCESS 0.9 0 - 3 mmol/L    O2 METHOD Nasal Cannula      O2 FLOW RATE 5.00 L/min    FIO2 40.0 %    Sample source Arterial      SITE Right Brachial      MANUELA'S TEST YES      Carboxy-Hgb 0.0 (L) 1 - 2 %    Methemoglobin 0.5 0 - 1.4 %    Oxyhemoglobin 87.2 (L) 95 - 99 %    Performed by Lisseth Ogden     TEMPERATURE 90.0     METABOLIC PANEL, COMPREHENSIVE    Collection Time: 07/28/22  4:15 AM   Result Value Ref Range Sodium 135 (L) 136 - 145 mmol/L    Potassium 3.4 (L) 3.5 - 5.1 mmol/L    Chloride 104 97 - 108 mmol/L    CO2 23 21 - 32 mmol/L    Anion gap 8 5 - 15 mmol/L    Glucose 110 (H) 65 - 100 mg/dL    BUN 15 6 - 20 mg/dL    Creatinine 0.56 (L) 0.70 - 1.30 mg/dL    BUN/Creatinine ratio 27 (H) 12 - 20      GFR est AA >60 >60 ml/min/1.73m2    GFR est non-AA >60 >60 ml/min/1.73m2    Calcium 7.1 (L) 8.5 - 10.1 mg/dL    Bilirubin, total 0.8 0.2 - 1.0 mg/dL    AST (SGOT) 39 (H) 15 - 37 U/L    ALT (SGPT) 35 12 - 78 U/L    Alk.  phosphatase 67 45 - 117 U/L    Protein, total 5.7 (L) 6.4 - 8.2 g/dL    Albumin 2.2 (L) 3.5 - 5.0 g/dL    Globulin 3.5 2.0 - 4.0 g/dL    A-G Ratio 0.6 (L) 1.1 - 2.2     RENAL FUNCTION PANEL    Collection Time: 07/28/22  4:15 AM   Result Value Ref Range    Sodium 136 136 - 145 mmol/L    Potassium 3.3 (L) 3.5 - 5.1 mmol/L    Chloride 103 97 - 108 mmol/L    CO2 24 21 - 32 mmol/L    Anion gap 9 5 - 15 mmol/L    Glucose 109 (H) 65 - 100 mg/dL    BUN 14 6 - 20 mg/dL    Creatinine 0.59 (L) 0.70 - 1.30 mg/dL    BUN/Creatinine ratio 24 (H) 12 - 20      GFR est AA >60 >60 ml/min/1.73m2    GFR est non-AA >60 >60 ml/min/1.73m2    Calcium 7.0 (L) 8.5 - 10.1 mg/dL    Phosphorus 2.3 (L) 2.6 - 4.7 mg/dL    Albumin 2.2 (L) 3.5 - 5.0 g/dL   MAGNESIUM    Collection Time: 07/28/22  4:15 AM   Result Value Ref Range    Magnesium 2.1 1.6 - 2.4 mg/dL   NT-PRO BNP    Collection Time: 07/28/22  4:15 AM   Result Value Ref Range    NT pro-BNP 2,705 (H) <450 pg/mL   C REACTIVE PROTEIN, QT    Collection Time: 07/28/22  4:15 AM   Result Value Ref Range    C-Reactive protein 16.10 (H) 0.00 - 0.60 mg/dL   CBC WITH AUTOMATED DIFF    Collection Time: 07/28/22  4:15 AM   Result Value Ref Range    WBC 10.7 4.1 - 11.1 K/uL    RBC 3.15 (L) 4.10 - 5.70 M/uL    HGB 9.8 (L) 12.1 - 17.0 g/dL    HCT 29.9 (L) 36.6 - 50.3 %    MCV 94.9 80.0 - 99.0 FL    MCH 31.1 26.0 - 34.0 PG    MCHC 32.8 30.0 - 36.5 g/dL    RDW 15.0 (H) 11.5 - 14.5 %    PLATELET 093 150 - 400 K/uL    MPV 9.7 8.9 - 12.9 FL    NRBC 0.2 (H) 0.0  WBC    ABSOLUTE NRBC 0.02 (H) 0.00 - 0.01 K/uL    NEUTROPHILS 95 (H) 32 - 75 %    LYMPHOCYTES 2 (L) 12 - 49 %    MONOCYTES 2 (L) 5 - 13 %    EOSINOPHILS 0 0 - 7 %    BASOPHILS 0 0 - 1 %    IMMATURE GRANULOCYTES 1 (H) 0 - 0.5 %    ABS. NEUTROPHILS 10.1 (H) 1.8 - 8.0 K/UL    ABS. LYMPHOCYTES 0.2 (L) 0.8 - 3.5 K/UL    ABS. MONOCYTES 0.2 0.0 - 1.0 K/UL    ABS. EOSINOPHILS 0.0 0.0 - 0.4 K/UL    ABS. BASOPHILS 0.0 0.0 - 0.1 K/UL    ABS. IMM. GRANS. 0.1 (H) 0.00 - 0.04 K/UL    DF AUTOMATED     LD    Collection Time: 07/28/22  4:15 AM   Result Value Ref Range     (H) 85 - 241 U/L         Imaging:    XR CHEST SNGL V   Final Result   Stable bilateral pulmonary infiltrates.          XR CHEST PORT    (Results Pending)          Assessment     Acute hypoxic respiratory failure  Respiratory alkalosis  COVID-19 Pneumonia  Sepsis  Hypertension  Stroke  Afib    Plan   Continue meds:  Eliquis 2.5mg PO BID  ASA 81 mg po daily  Lipitor 20mg PO every day  Zithromax 500 IV daily  Olumiant 4mg PO QD  Decadron 4mg IV Q6h  Doxazosin 4mg PO QHS  Cymbalta 30mg POQD  Remeron 15mg PO every day  Mupirocin 2% ointment both nostrils BID  Zosyn 3.375g IV q8hrs  Ranolazine 500mg PO every day  Vancomycin 1,250 mg IV daily    Continue 0.9% sodium chloride infusion    Repeat CRP, CBC, procal, lactic acid, LD  Blood cultures pending    Urine and sputum cultures  Echo results pending      Current Facility-Administered Medications:     piperacillin-tazobactam (ZOSYN) 3.375 g in 0.9% sodium chloride (MBP/ADV) 100 mL MBP, 3.375 g, IntraVENous, Q8H, Mahogany Chapa MD, Last Rate: 25 mL/hr at 07/28/22 0413, 3.375 g at 07/28/22 0413    apixaban (ELIQUIS) tablet 2.5 mg, 2.5 mg, Oral, BID, Mahogany Chapa MD, 2.5 mg at 07/27/22 2136    aspirin delayed-release tablet 81 mg, 81 mg, Oral, DAILY, Mahogany Chapa MD    atorvastatin (LIPITOR) tablet 20 mg, 20 mg, Oral, DAILY, Eduard Nubia Haskins MD    doxazosin (CARDURA) tablet 4 mg, 4 mg, Oral, QHS, Mahogany Chapa MD, 4 mg at 07/27/22 2136    DULoxetine (CYMBALTA) capsule 30 mg, 30 mg, Oral, DAILY, Nubia Chapa MD    mirtazapine (REMERON) tablet 15 mg, 15 mg, Oral, QHS, Mahogany Chapa MD, 15 mg at 07/27/22 2136    ranolazine ER (RANEXA) tablet 500 mg, 500 mg, Oral, BID, Mahogany Chapa MD, 500 mg at 07/27/22 2136    0.9% sodium chloride infusion, 25 mL/hr, IntraVENous, CONTINUOUS, Keven Peña MD, Last Rate: 25 mL/hr at 07/27/22 2149, 25 mL/hr at 07/27/22 2149    acetaminophen (TYLENOL) tablet 650 mg, 650 mg, Oral, Q6H PRN **OR** acetaminophen (TYLENOL) suppository 650 mg, 650 mg, Rectal, Q6H PRN, Mahogany Chapa MD    polyethylene glycol (MIRALAX) packet 17 g, 17 g, Oral, DAILY PRN, Mahogany Chapa MD    ondansetron (ZOFRAN ODT) tablet 4 mg, 4 mg, Oral, Q8H PRN **OR** ondansetron (ZOFRAN) injection 4 mg, 4 mg, IntraVENous, Q6H PRN, Mahogany Chapa MD    dexamethasone (DECADRON) 4 mg/mL injection 4 mg, 4 mg, IntraVENous, Q6H, Mahogany Chapa MD, 4 mg at 07/28/22 0546    baricitinib (OLUMIANT) tablet 4 mg, 4 mg, Oral, Q24H, Mahogany Chapa MD, 4 mg at 07/27/22 1853    Vancomycin - Pharmacy to Dose, , Other, Rx Dosing/Monitoring, Nelly Seay MD    vancomycin (VANCOCIN) 1,250 mg in 0.9% sodium chloride 250 mL ([id number]), 1,250 mg, IntraVENous, Q24H, Mahogany Chapa MD    Vancomycin - Trough to be drawn prior to dose 7/28 @ 1600, , Other, ONCE, Mahogany Chapa MD    azithromycin (ZITHROMAX) 500 mg in 0.9% sodium chloride 250 mL ([id number]), 500 mg, IntraVENous, Q24H, Mahogany Chapa MD, Last Rate: 250 mL/hr at 07/27/22 1853, 500 mg at 07/27/22 1853    mupirocin (BACTROBAN) 2 % ointment, , Both Nostrils, BID, Kvng Laura MD    Current Outpatient Medications   Medication Instructions    amLODIPine (NORVASC) 5 mg, Oral, DAILY    apixaban (ELIQUIS) 2.5 mg, Oral, 2 TIMES DAILY    aspirin delayed-release 81 mg, Oral, DAILY    atorvastatin (LIPITOR) 20 mg, Oral, DAILY    cholecalciferol (vitamin D3) 2,000 Units, Oral, EVERY BEDTIME    dexAMETHasone (DECADRON) 4 mg, Oral, 2 TIMES DAILY    doxazosin (CARDURA) 4 mg, Oral, DAILY    DULoxetine (CYMBALTA) 30 mg, Oral, DAILY    mirtazapine (REMERON) 15 mg, Oral, EVERY BEDTIME    ranolazine ER (RANEXA) 500 mg, Oral, 2 TIMES DAILY         Signed By: Evie Nguyen     July 28, 2022

## 2022-07-28 NOTE — PROGRESS NOTES
Consult  Pulmonary, Critical Care    Name: Tracee Lovell MRN: [id number]   : 1929 Hospital: [facility]   Date: 2022  Admission date: 2022 Hospital Day: 2       Subjective/Interval History:   Recent COVID pneumonitis improved with Decadron and baricitinib wean to room air was discharged to rehab facility yesterday at the rehab facility he had development of hypoxia than fever was transferred back to our emergency room T-max 101.5. Wife states that yesterday he did have some green-tinged sputum. Chest x-ray shows diffuse bilateral infiltrates unchanged. There has been no nausea vomiting choking while eating   remains on BiPAP. Blood gas this morning on nasal oxygen showed hypoxia but currently he is running 94% on 5 L awake alert no specific complaints    Patient Active Problem List   Diagnosis Code    Carotid stenosis I65.29    Hypoxia R09.02    COVID U07.1    Sepsis ([address] A41.9       IMPRESSION:   Acute hypoxic respiratory failure currently on BiPAP we will try nasal oxygen today  Healthcare associated pneumonia  Diffuse pulmonary infiltrates questionable some degree pulmonary edema  Recent COVID pneumonitis  Atrial fibrillation  Pyuria rule out UTI  Cardiac murmur questionable mitral regurg  Elevated BNP  Body mass index is 20.18 kg/m². Additional workup outlined below        RECOMMENDATIONS/PLAN:   Currently on noninvasive ventilator we will try nasal oxygen daytime and noninvasive ventilator at night  Agree IV Zosyn vancomycin and Zithromax  BNP is elevated creatinine is normal will 1 dose Lasix  Culture blood urine and sputum  Check echocardiogram             Subjective/Initial History:   I have reviewed the flowsheet and previous  notes. S    I was asked by Maxwell Resendez MD to see Tracee Lovell a 80 y.o.    male  in consultation for a chief complaint of acute hypoxic respiratory failure fever recent COVID pneumonitis        Patient PCP: Izabella Canela, MD  PMH:  has a past medical history of Atrial fibrillation ([facility] [address] COVID-19 vaccine series completed (03/2021), Hypercholesteremia, Hypertension, Neuropathy, and Stroke ([facility] [address] (08/2021). PSH:   has a past surgical history that includes pr abdomen surgery proc unlisted (Right) and hx cataract removal (Left, 2018). FHX: family history is not on file. SHX:  reports that he has quit smoking. He has never used smokeless tobacco. He reports that he does not use drugs.   Systemic review  General weight has been stable the last several days he has not noted fever chills or sweats until this morning  Eyes no double vision or momentary blindness  ENT no facial pain over the sinuses  Musculoskeletal no swollen tender joints no myalgias  Endocrinologic no polyuria polydipsia  Neurologic no seizures or syncope awake and alert oriented  Gastrointestinal no choking or gagging when eating no nausea vomiting no indigestion  Genitourinary no pain or discomfort no burning  Cardiovascular has not noted ankle edema chest pain diaphoresis or nocturia  Respiratory abrupt worsening shortness of breath last night associated with fever    No Known Allergies   MEDS:   Current Facility-Administered Medications   Medication    potassium phosphate, monobasic (K-PHOS) tablet 1,000 mg    piperacillin-tazobactam (ZOSYN) 3.375 g in 0.9% sodium chloride (MBP/ADV) 100 mL MBP    apixaban (ELIQUIS) tablet 2.5 mg    aspirin delayed-release tablet 81 mg    atorvastatin (LIPITOR) tablet 20 mg    doxazosin (CARDURA) tablet 4 mg    DULoxetine (CYMBALTA) capsule 30 mg    mirtazapine (REMERON) tablet 15 mg    ranolazine ER (RANEXA) tablet 500 mg    0.9% sodium chloride infusion    acetaminophen (TYLENOL) tablet 650 mg    Or    acetaminophen (TYLENOL) suppository 650 mg    polyethylene glycol (MIRALAX) packet 17 g    ondansetron (ZOFRAN ODT) tablet 4 mg    Or    ondansetron (ZOFRAN) injection 4 mg    dexamethasone (DECADRON) 4 mg/mL injection 4 mg baricitinib (OLUMIANT) tablet 4 mg    Vancomycin - Pharmacy to Dose    vancomycin (VANCOCIN) 1,250 mg in 0.9% sodium chloride 250 mL ([id number])    Vancomycin - Trough to be drawn prior to dose 7/28 @ 1600    azithromycin (ZITHROMAX) 500 mg in 0.9% sodium chloride 250 mL ([id number])    mupirocin (BACTROBAN) 2 % ointment        Current Facility-Administered Medications:     potassium phosphate, monobasic (K-PHOS) tablet 1,000 mg, 2 Tablet, Oral, Q2H, Mahogany Chapa MD    piperacillin-tazobactam (ZOSYN) 3.375 g in 0.9% sodium chloride (MBP/ADV) 100 mL MBP, 3.375 g, IntraVENous, Q8H, Mahogany Chapa MD, Last Rate: 25 mL/hr at 07/28/22 1133, 3.375 g at 07/28/22 1133    apixaban (ELIQUIS) tablet 2.5 mg, 2.5 mg, Oral, BID, Mahogany Chapa MD, 2.5 mg at 07/28/22 0901    aspirin delayed-release tablet 81 mg, 81 mg, Oral, DAILY, Mahogany Chapa MD, 81 mg at 07/28/22 0901    atorvastatin (LIPITOR) tablet 20 mg, 20 mg, Oral, DAILY, Mahogany Chapa MD, 20 mg at 07/28/22 0901    doxazosin (CARDURA) tablet 4 mg, 4 mg, Oral, QHS, Mahogany Chapa MD, 4 mg at 07/27/22 2136    DULoxetine (CYMBALTA) capsule 30 mg, 30 mg, Oral, DAILY, Mahogany Chapa MD, 30 mg at 07/28/22 0901    mirtazapine (REMERON) tablet 15 mg, 15 mg, Oral, QHS, Mahogany Chapa MD, 15 mg at 07/27/22 2136    ranolazine ER (RANEXA) tablet 500 mg, 500 mg, Oral, BID, Mahogany Chapa MD, 500 mg at 07/28/22 0901    0.9% sodium chloride infusion, 25 mL/hr, IntraVENous, CONTINUOUS, Latrell Ro MD, Last Rate: 25 mL/hr at 07/27/22 2149, 25 mL/hr at 07/27/22 2149    acetaminophen (TYLENOL) tablet 650 mg, 650 mg, Oral, Q6H PRN, 650 mg at 07/28/22 1133 **OR** acetaminophen (TYLENOL) suppository 650 mg, 650 mg, Rectal, Q6H PRN, Mahogany Chapa MD    polyethylene glycol (MIRALAX) packet 17 g, 17 g, Oral, DAILY PRN, Mahogany Chapa MD    ondansetron (ZOFRAN ODT) tablet 4 mg, 4 mg, Oral, Q8H PRN **OR** ondansetron (ZOFRAN) injection 4 mg, 4 mg, IntraVENous, Q6H PRN, Kayli Chapa MD    dexamethasone (DECADRON) 4 mg/mL injection 4 mg, 4 mg, IntraVENous, Q6H, Mahogany Chapa MD, 4 mg at 22 1134    baricitinib (OLUMIANT) tablet 4 mg, 4 mg, Oral, Q24H, Mahogany Chapa MD, 4 mg at 22 1853    Vancomycin - Pharmacy to Dose, , Other, Rx Dosing/Monitoring, Jennie Kennedy MD    vancomycin (VANCOCIN) 1,250 mg in 0.9% sodium chloride 250 mL ([id number]), 1,250 mg, IntraVENous, Q24H, Kayli Chapa MD    Vancomycin - Trough to be drawn prior to dose  @ 1600, , Other, ONCE, Mahogany Chapa MD    azithromycin (ZITHROMAX) 500 mg in 0.9% sodium chloride 250 mL ([id number]), 500 mg, IntraVENous, Q24H, Mahogany Chapa MD, Last Rate: 250 mL/hr at 22 1853, 500 mg at 22 1853    mupirocin (BACTROBAN) 2 % ointment, , Both Nostrils, BID, Kole Covington MD, Given at 22 0901      Objective:     Vital Signs: Telemetry:    normal sinus rhythm Intake/Output:   Visit Vitals  BP (!) 132/59 (BP 1 Location: Right upper arm, BP Patient Position: At rest)   Pulse (!) 116   Temp 97.4 °F (36.3 °C)   Resp 21   Ht 5' 6\" (1.676 m)   Wt 56.7 kg (125 lb)   SpO2 93%   BMI 20.18 kg/m²       Temp (24hrs), Av.8 °F (36.6 °C), Min:97 °F (36.1 °C), Max:98.6 °F (37 °C)        O2 Device: BIPAP O2 Flow Rate (L/min): 5 l/min         Body mass index is 20.18 kg/m². Wt Readings from Last 4 Encounters:   22 56.7 kg (125 lb)   22 54.4 kg (120 lb)   02/10/22 54.4 kg (120 lb)   21 54.4 kg (120 lb)          Intake/Output Summary (Last 24 hours) at 2022 1151  Last data filed at 2022 0600  Gross per 24 hour   Intake 1577.08 ml   Output 700 ml   Net 877.08 ml       Last shift:      No intake/output data recorded. Last 3 shifts:  1901 -  0700  In: 1577.1 [P.O.:400;  I.V.:1177.1]  Out: 700 [Urine:700]       Hemodynamics:    CO:    CI:    CVP:    SVR:   PAP Systolic:    PAP Diastolic:    PVR:    PD17:       Ventilator Settings: Mode Rate TV Press PEEP FiO2 PIP Min. Vent               45 %     16.2 l/min      Physical Exam:    General:  male; in bed on noninvasive ventilator looks comfortable at this point no accessory muscles in use  HEENT: NCAT,   Eyes: anicteric; conjunctiva clear extraocular movements intact  Neck: no nodes, no accessory MM use. No definite JVD  Chest: no deformity,   Cardiac: Regular rate and rhythm occasional PVCs has somewhat harsh systolic murmur  Lungs: Clear anteriorly with rales posteriorly no wheezing  Abd: Thin soft positive bowel sounds no tenderness  Ext: Trace edema; no joint swelling;  No clubbing  : clear urine  Neuro: Awake alert oriented speech garbled due to wearing noninvasive ventilator mask he seems oriented moves all 4 extremities  Psych- no agitation, oriented to person;   Skin: warm, dry, no cyanosis;   Pulses: Brachial and radial pulses intact  Capillary: Normal capillary refill      Labs:    Recent Labs     07/28/22  0415 07/27/22  1111   WBC 10.7 25.1*   HGB 9.8* 11.7*    323   INR  --  1.1   APTT  --  26.8       Recent Labs     07/28/22  0415 07/27/22  1428 07/27/22  1111 07/26/22  1444     135*  --  132* 133*   K 3.3*  3.4*  --  4.2 5.6*     104  --  99 102   CO2 24  23  --  22 24   *  110*  --  123* 200*   BUN 14  15  --  16 21*   CREA 0.59*  0.56*  --  0.69* 0.74   CA 7.0*  7.1*  --  7.7* 8.0*   MG 2.1  --   --   --    PHOS 2.3*  --   --   --    LAC  --  2.0 3.2*  --    ALB 2.2*  2.2*  --  2.6*  --    ALT 35  --  43  --        Recent Labs     07/28/22  0222 07/27/22  1346 07/25/22  1345   PH 7.49*  --  7.53*   PCO2 33*  --  32*   PO2 53*  --  64*   HCO3 24  --  26   FIO2 40.0 100 0.21       COVID-19 antigen remains positive  BNP 2705 3310  CRP 16.1  Urinalysis with 20-50 WBCs  Echo pending  Imaging:  I have personally reviewed the patients radiographs and have reviewed the reports:    CXR Results  (Last 48 hours)                 07/28/22 0415  XR CHEST PORT Final result    Impression:  No significant change        Narrative:  EXAM: XR CHEST PORT       INDICATION: Respiratory failure       COMPARISON: 7/27/2022       FINDINGS: A portable AP radiograph of the chest was obtained at 405 hours. Patient's on cardiac monitor. . Diffuse interstitial airspace opacities are not   significantly changed. Trace left effusion. Biapical pleural thickening. Joni Confer Heart   size is enlarged. .  The bones and soft tissues are grossly within normal limits. 07/27/22 1135  XR CHEST SNGL V Final result    Impression:  Stable bilateral pulmonary infiltrates. Narrative: Indication: Hypoxia       Comparison: 7/25/2022       Portable exam of the chest obtained at 1135 demonstrates normal heart size. Bilateral pulmonary infiltrates have not changed compared to the prior   examination. The osseous structures are unremarkable. Large hiatal hernia is   again noted. Results from Hospital Encounter encounter on 07/27/22    XR CHEST PORT    Narrative  EXAM: XR CHEST PORT    INDICATION: Respiratory failure    COMPARISON: 7/27/2022    FINDINGS: A portable AP radiograph of the chest was obtained at 405 hours. Patient's on cardiac monitor. . Diffuse interstitial airspace opacities are not  significantly changed. Trace left effusion. Biapical pleural thickening. Joni Confer Heart  size is enlarged. .  The bones and soft tissues are grossly within normal limits. Impression  No significant change      XR CHEST SNGL V    Narrative  Indication: Hypoxia    Comparison: 7/25/2022    Portable exam of the chest obtained at 1135 demonstrates normal heart size. Bilateral pulmonary infiltrates have not changed compared to the prior  examination. The osseous structures are unremarkable. Large hiatal hernia is  again noted. Impression  Stable bilateral pulmonary infiltrates.       Results from [place] encounter on 07/18/22    XR CHEST PORT    Narrative  INDICATION: Shortness of breath. COVID positive. COMPARISON: 7/18/2022    FINDINGS: AP portable imaging of the chest performed at 1:35 PM demonstrates a  stable cardiomediastinal silhouette. There are patchy bilateral interstitial and  alveolar opacities. . No significant osseous abnormalities are seen. Impression  Patchy bilateral interstitial and alveolar opacities are compatible  with COVID pneumonia. Results from [place] encounter on 02/10/22    CT HEAD WO CONT    Narrative  Technique: Multiple continuous axial images were obtained from the skull base to  the vertex without administration of intravenous contrast.    Comment on dose reduction: All CT scans at this facility are performed using  dose reduction optimization technique as appropriate to perform the exam  including the following; automated exposure control, adjustments of the mA  and/or kV according to patient size, or use of iterative reconstructed  technique. Comparison examination: None    Findings:  The ventricles and sulci are prominent consistent with age-related changes of  atrophy. Periventricular hypodensity is identified consistent with changes of  chronic small vessel disease. No evidence of midline shift, mass lesion, or  extra-axial fluid collection. No evidence of parenchymal hemorrhage or  infarction in a major vascular territory. The osseous structures are intact, the paranasal sinuses are clear. Extracalvarial soft tissue hematoma posterior vertex . Impression  No acute intracranial process. Extracalvarial soft tissue hematoma posterior  vertex . Session fever with recent hospitalization possible healthcare associated pneumonia agree with Zosyn vancomycin and Zithromax. We will attempt to collect sputum for culture. Have requested urine culture blood cultures have already been obtained.   He has a significant systolic murmur with elevated BNP he may have mitral regurg giving him some degree of fluid overload he only has trace edema. We will check an echocardiogram.  Creatinine is normal will decrease IV fluids for the time being. Chest x-ray with diffuse infiltrates thought to be residual inflammatory change from COVID pneumonitis but may represent some degree of fluid overload. Oxygenation is well-maintained now on noninvasive ventilator have decreased FiO2 to 45% will attempt conversion to nasal oxygen in a.m.  728 awake alert currently on noninvasive ventilator have placed on 5 L oxygen saturation 94%. If he maintains that we will leave him on nasal oxygen today and use noninvasive ventilator at night. Nursing noted that when he drifts off to sleep he will have desaturation. BNP remains elevated neck veins are chest visible chest x-ray still diffuse infiltrates we will give 1 dose Lasix today  Time of care 30 minutes           Thank you for allowing us to participate in the care of this patient.   We will follow along with you     Chase Calvo MD

## 2022-07-28 NOTE — CONSULTS
Consult Date: 7/27/2022    Consults  Sepsis    Subjective   This is a 80year old male followed by me up until yesterday for Covid-19 infection with no significant respiratory component. He had been on room air and afebrile for several days. He was discharged to [facility]  yesterday but was brought back to ED by EMS because of mental status changes and SOB with hypoxia. WBC had increased to 25,100. He was placed on BIPAP. In the ED he was febrile 101.5, tachycardic and tachypneic but not hypotensive. Urinalysis showed  pyuria and bacteria. CXR showed stable bilateral pulmonary infiltrates. Images reviewed by me. Blood cultures sent and patient was started on Vancomycin, Azithromycin, Zosyn, Baricitinib and Dexamethasone. ID has been consulted for this reason. Also seen by Pulmonary. Patient seen in the ICU where he is on BIPAP and unable to communicate. Son at [facility]. Past Medical History:   Diagnosis Date    Atrial fibrillation ([facility] [address]     COVID-19 vaccine series completed 03/2021    Hypercholesteremia     Hypertension     Neuropathy     Stroke ([facility] [address] 08/2021    TIA      Past Surgical History:   Procedure Laterality Date    HX CATARACT REMOVAL Left 2018    IOL    OH ABDOMEN SURGERY PROC UNLISTED Right     IHR     No family history on file.    Social History     Tobacco Use    Smoking status: Former    Smokeless tobacco: Never    Tobacco comments:     quit 30 years ago , smoked  pipe and cigar   Substance Use Topics    Alcohol use: Not on file       Current Facility-Administered Medications   Medication Dose Route Frequency Provider Last Rate Last Admin    piperacillin-tazobactam (ZOSYN) 3.375 g in 0.9% sodium chloride (MBP/ADV) 100 mL MBP  3.375 g IntraVENous Q8H Mahogany Chapa MD 25 mL/hr at 07/27/22 2012 3.375 g at 07/27/22 2012    apixaban (ELIQUIS) tablet 2.5 mg  2.5 mg Oral BID Mahogany Chapa MD   2.5 mg at 07/27/22 2136    [START ON 7/28/2022] aspirin delayed-release tablet 81 mg  81 mg Oral MD Norm Coats ON 7/28/2022] atorvastatin (LIPITOR) tablet 20 mg  20 mg Oral DAILY Иван Chapa MD        doxazosin (CARDURA) tablet 4 mg  4 mg Oral QHS Mahogany Chapa MD   4 mg at 07/27/22 2136    [START ON 7/28/2022] DULoxetine (CYMBALTA) capsule 30 mg  30 mg Oral DAILY Иван Chapa MD        mirtazapine (REMERON) tablet 15 mg  15 mg Oral QHS Mahogany Chapa MD   15 mg at 07/27/22 2136    ranolazine ER (RANEXA) tablet 500 mg  500 mg Oral BID Иван Chapa MD   500 mg at 07/27/22 2136    0.9% sodium chloride infusion  25 mL/hr IntraVENous CONTINUOUS Cara Vincent MD 25 mL/hr at 07/27/22 2149 25 mL/hr at 07/27/22 2149    acetaminophen (TYLENOL) tablet 650 mg  650 mg Oral Q6H PRN Иван Chapa MD        Or    acetaminophen (TYLENOL) suppository 650 mg  650 mg Rectal Q6H PRN Иван Chapa MD        polyethylene glycol (MIRALAX) packet 17 g  17 g Oral DAILY PRN Иван Chapa MD        ondansetron (ZOFRAN ODT) tablet 4 mg  4 mg Oral Q8H PRN Иван Chapa MD        Or    ondansetron (ZOFRAN) injection 4 mg  4 mg IntraVENous Q6H PRN Иван Chapa MD        dexamethasone (DECADRON) 4 mg/mL injection 4 mg  4 mg IntraVENous Q6H Mahogany Chapa MD   4 mg at 07/27/22 1853    baricitinib (OLUMIANT) tablet 4 mg  4 mg Oral Q24H Eduarda Rowland MD   4 mg at 07/27/22 1853    Vancomycin - Pharmacy to Dose   Other Rx Dosing/Monitoring MD Norm Vaughn ON 7/28/2022] vancomycin (VANCOCIN) 1,250 mg in 0.9% sodium chloride 250 mL ([id number])  1,250 mg IntraVENous Q24H MD Néstor Vaughnlan Jeanne ON 7/28/2022] Vancomycin - Trough to be drawn prior to dose 7/28 @ 1600   Other ONCE Иван Chapa MD        azithromycin (ZITHROMAX) 500 mg in 0.9% sodium chloride 250 mL ([id number])  500 mg IntraVENous Q24H Eduarda Rowland  mL/hr at 07/27/22 1853 500 mg at 07/27/22 1853        Review of Systems   Unable to perform ROS: Acuity of condition Objective     Vital signs for last 24 hours:  Visit Vitals  /79   Pulse 86   Temp 98.2 °F (36.8 °C)   Resp 15   Ht 5' 6\" (1.676 m)   Wt 125 lb (56.7 kg)   SpO2 97%   BMI 20.18 kg/m²       Intake/Output this shift:  Current Shift: No intake/output data recorded. Last 3 Shifts: No intake/output data recorded. Data Review:   Recent Results (from the past 24 hour(s))   EKG, 12 LEAD, INITIAL    Collection Time: 07/27/22 10:57 AM   Result Value Ref Range    Ventricular Rate 104 BPM    Atrial Rate 119 BPM    QRS Duration 82 ms    Q-T Interval 334 ms    QTC Calculation (Bezet) 439 ms    Calculated R Axis -22 degrees    Calculated T Axis 131 degrees    Diagnosis       Atrial fibrillation with rapid ventricular response with premature   ventricular or aberrantly conducted complexes  Anteroseptal infarct , old  T wave abnormality, consider lateral ischemia  Abnormal ECG  When compared with ECG of 25-JUL-2022 06:18,  Nonspecific T wave abnormality no longer evident in Inferior leads    Confirmed by Cathleen High MD, Chente Cee ([id number]) on 7/27/2022 6:08:11 PM     METABOLIC PANEL, COMPREHENSIVE    Collection Time: 07/27/22 11:11 AM   Result Value Ref Range    Sodium 132 (L) 136 - 145 mmol/L    Potassium 4.2 3.5 - 5.1 mmol/L    Chloride 99 97 - 108 mmol/L    CO2 22 21 - 32 mmol/L    Anion gap 11 5 - 15 mmol/L    Glucose 123 (H) 65 - 100 mg/dL    BUN 16 6 - 20 mg/dL    Creatinine 0.69 (L) 0.70 - 1.30 mg/dL    BUN/Creatinine ratio 23 (H) 12 - 20      GFR est AA >60 >60 ml/min/1.73m2    GFR est non-AA >60 >60 ml/min/1.73m2    Calcium 7.7 (L) 8.5 - 10.1 mg/dL    Bilirubin, total 1.0 0.2 - 1.0 mg/dL    AST (SGOT) 55 (H) 15 - 37 U/L    ALT (SGPT) 43 12 - 78 U/L    Alk.  phosphatase 84 45 - 117 U/L    Protein, total 6.0 (L) 6.4 - 8.2 g/dL    Albumin 2.6 (L) 3.5 - 5.0 g/dL    Globulin 3.4 2.0 - 4.0 g/dL    A-G Ratio 0.8 (L) 1.1 - 2.2     LACTIC ACID    Collection Time: 07/27/22 11:11 AM   Result Value Ref Range    Lactic acid 3.2 (HH) 0.4 - 2.0 mmol/L   LD    Collection Time: 07/27/22 11:11 AM   Result Value Ref Range     (H) 85 - 241 U/L   FERRITIN    Collection Time: 07/27/22 11:11 AM   Result Value Ref Range    Ferritin 231 26 - 388 ng/mL   CBC WITH AUTOMATED DIFF    Collection Time: 07/27/22 11:11 AM   Result Value Ref Range    WBC 25.1 (H) 4.1 - 11.1 K/uL    RBC 3.65 (L) 4.10 - 5.70 M/uL    HGB 11.7 (L) 12.1 - 17.0 g/dL    HCT 34.8 (L) 36.6 - 50.3 %    MCV 95.3 80.0 - 99.0 FL    MCH 32.1 26.0 - 34.0 PG    MCHC 33.6 30.0 - 36.5 g/dL    RDW 15.0 (H) 11.5 - 14.5 %    PLATELET 435 694 - 594 K/uL    MPV 10.4 8.9 - 12.9 FL    NRBC 0.2 (H) 0.0  WBC    ABSOLUTE NRBC 0.04 (H) 0.00 - 0.01 K/uL    NEUTROPHILS 89 (H) 32 - 75 %    LYMPHOCYTES 5 (L) 12 - 49 %    MONOCYTES 4 (L) 5 - 13 %    EOSINOPHILS 0 0 - 7 %    BASOPHILS 0 0 - 1 %    IMMATURE GRANULOCYTES 2 (H) 0 - 0.5 %    ABS. NEUTROPHILS 22.5 (H) 1.8 - 8.0 K/UL    ABS. LYMPHOCYTES 1.2 0.8 - 3.5 K/UL    ABS. MONOCYTES 0.9 0.0 - 1.0 K/UL    ABS. EOSINOPHILS 0.0 0.0 - 0.4 K/UL    ABS. BASOPHILS 0.0 0.0 - 0.1 K/UL    ABS. IMM.  GRANS. 0.4 (H) 0.00 - 0.04 K/UL    DF AUTOMATED     PTT    Collection Time: 07/27/22 11:11 AM   Result Value Ref Range    aPTT 26.8 21.2 - 34.1 sec    aPTT, therapeutic range   82 - 109 sec   PROTHROMBIN TIME + INR    Collection Time: 07/27/22 11:11 AM   Result Value Ref Range    Prothrombin time 14.4 11.9 - 14.6 sec    INR 1.1 0.9 - 1.1     TROPONIN-HIGH SENSITIVITY    Collection Time: 07/27/22 11:15 AM   Result Value Ref Range    Troponin-High Sensitivity 64 0 - 76 ng/L   COVID-19 RAPID TEST    Collection Time: 07/27/22 11:15 AM   Result Value Ref Range    COVID-19 rapid test DETECTED (A) Not Detected     URINALYSIS W/MICROSCOPIC    Collection Time: 07/27/22 12:41 PM   Result Value Ref Range    Color Yellow      Appearance Hazy (A) Clear      Specific gravity 1.015 1.003 - 1.030      pH (UA) 9.0 (H) 5.0 - 8.0      Protein Negative Negative mg/dL    Glucose Negative Negative mg/dL    Ketone Negative Negative mg/dL    Bilirubin Negative Negative      Blood Negative Negative      Urobilinogen 0.1 0.1 - 1.0 EU/dL    Nitrites Negative Negative      Leukocyte Esterase Negative Negative      WBC 20-50 0 - 4 /hpf    RBC 0-5 0 - 5 /hpf    Bacteria 1+ (A) Negative /hpf   NT-PRO BNP    Collection Time: 07/27/22 12:41 PM   Result Value Ref Range    NT pro-BNP 3,310 (H) <450 pg/mL   MRSA SCREEN - PCR (NASAL)    Collection Time: 07/27/22  1:31 PM   Result Value Ref Range    MRSA by PCR, Nasal DETECTED (A) Not Detected     BLOOD GAS, VENOUS    Collection Time: 07/27/22  1:46 PM   Result Value Ref Range    VENOUS PH 7.499 (H) 7.32 - 7.42      VENOUS PCO2 30.3 (L) 41 - 51 mmHg    VENOUS PO2 83 (H) 25 - 40 mmHg    VENOUS BICARBONATE 23 (L) 24 - 25 mmol/L    VENOUS BASE EXCESS 0.6 0 - 3 mmol/L    O2 METHOD BiPAP      FIO2 100 %    Sample source Venous      SITE OTHER      Performed by Winsome Montiel     TEMPERATURE 98.7     LACTIC ACID    Collection Time: 07/27/22  2:28 PM   Result Value Ref Range    Lactic acid 2.0 0.4 - 2.0 mmol/L     CXR (7/27)        Physical Exam  Vitals and nursing note reviewed. Constitutional:       General: He is in acute distress. Appearance: He is ill-appearing. HENT:      Head: Normocephalic and atraumatic. Nose:      Comments: BIPAP  Eyes:      Pupils: Pupils are equal, round, and reactive to light. Cardiovascular:      Rate and Rhythm: Regular rhythm. Tachycardia present. Heart sounds: No murmur heard. Pulmonary:      Breath sounds: Rhonchi present. No wheezing or rales. Abdominal:      General: Bowel sounds are normal.      Palpations: Abdomen is soft. Tenderness: There is no abdominal tenderness. There is no right CVA tenderness or left CVA tenderness. Genitourinary:     Comments: External urinary catheter  Musculoskeletal:      Right lower leg: No edema. Left lower leg: No edema. Skin:     Findings: No rash.    Neurological: General: No focal deficit present. Mental Status: He is alert and oriented to person, place, and time. Psychiatric:         Behavior: Behavior normal.       ASSESSMENT/PLAN    Sepsis with fever, tachycardia, tachypnea and leukocytosis  Possible UTI with pyuria and bacteria  Covid-19 infection  ? Pneumonia with stable bilateral infiltrates, ?aspiration     Acute hypoxic respiratory failure  6. MRSA nasal colonization    Comment:  Sepsis may be due to UTI. Unclear what is responsible for his respiratory failure. Continue Vancomycin, Zosyn, Zithromax  Continue Baricitinib and Dexamethasone per Pulmonary  Follow-up blood and urine culture  Start Mupirocin nasal ointment  In am, repeat CBC, check CRP, procal, LDH, ferritin      Luis Manuel Francisco MD

## 2022-07-28 NOTE — PROGRESS NOTES
Progress Note    Patient: Юлия Hogan MRN: [id number]  SSN: [id number]    YOB: 1929  Age: 80 y.o. Sex: male      Admit Date: 7/27/2022    LOS: 1 day     Subjective:   Patient followed for sepsis with recent Covid-19 infection, respiratory failure with possible pneumonia and possible UTI. He was on BIPAP but  now on Nasal cannula. Afebrile with now normal WBC but CRP elevated. Patient is awake and responsive. Wife at the bedside. Objective:     Vitals:    07/28/22 0415 07/28/22 0500 07/28/22 0600 07/28/22 0700   BP: (!) 144/72 116/70 (!) 132/59    Pulse: 89 81 (!) 116    Resp: 29 19 21    Temp:    97 °F (36.1 °C)   SpO2: (!) 89% 92% 93%    Weight:       Height:            Intake and Output:  Current Shift: No intake/output data recorded. Last three shifts: 07/26 1901 - 07/28 0700  In: 1577.1 [P.O.:400; I.V.:1177.1]  Out: 700 [Urine:700]    Physical Exam:   Vitals and nursing note reviewed. Constitutional:       General: He is in acute distress. Appearance: He is ill-appearing. HENT:     Head: Normocephalic and atraumatic. Nose:     Comments: Inasal cannula 5L/min  Eyes:     Pupils: Pupils are equal, round, and reactive to light. Cardiovascular:     Rate and Rhythm: Regular rhythm. Tachycardia present. Heart sounds: No murmur heard. Pulmonary:     Breath sounds: Rhonchi present. No wheezing or rales. Abdominal:     General: Bowel sounds are normal.     Palpations: Abdomen is soft. Tenderness: There is no abdominal tenderness. There is no right CVA tenderness or left CVA tenderness. Genitourinary:     Comments: External urinary catheter  Musculoskeletal:     Right lower leg: No edema. Left lower leg: No edema. Skin:     Findings: No rash. Neurological:     General: No focal deficit present. Mental Status: He is alert and oriented to person, place, and time.   Psychiatric:         Behavior: Behavior normal.    Lab/Data Review:     WBC 10,700     <606  Ferritin <231    CRP 16.10    Blood cultures (7/27) pending  Urine culture ordered    CXR (7/28) Diffuse interstitial airspace opacities are not significantly changed  Assessment:     Active Problems:    COVID (7/18/2022)      Sepsis ([address] (7/27/2022)    Sepsis with fever, tachycardia, tachypnea, leukocytosis, elevated CRP  Possible UTI with pyuria and bacteria  Covid-19 infection  ? Pneumonia with stable bilateral infiltrates, ?aspiration     Acute hypoxic respiratory failure  6. MRSA nasal colonization, Day #2 Mupirocin ointment       Comment:  Sepsis may be due to UTI. Unclear what is responsible for his respiratory failure. WBC precipitously declined though patient remains on steroids.     Plan:      Continue Vancomycin, Zosyn, Zithromax  Continue Baricitinib and Dexamethasone per Pulmonary  Follow-up blood cultures  Awaiting urine culture  Continue Mupirocin nasal ointment  In am, repeat CBC, CRP, procal, LDH       Signed By: Sanders Goldmann, MD     July 28, 2022

## 2022-07-28 NOTE — PROGRESS NOTES
PROGRESS NOTE    Patient: Vince Guillen MRN: [id number]  SSN: [id number]    YOB: 1929  Age: 80 y.o. Sex: male      Admit Date: 7/27/2022    LOS: 1 day       Subjective     Chief Complaint   Patient presents with    Shortness of Breath       HPI: Patient is a 80y.o. year old male with a significant PMH of Afib, hypercholesterolemia, HTN, neuropathy, and hx of stroke presented to the ED today due to hypoxia and worsening confusion. The patient was discharged from the hospital yesterday after being hospitalized d/t COVID-19. Per nursing facility his O2 was in the 80s and it was 89% upon admission. The patient was stable at that time of discharge yesterday and not using any oxygen. The patient is now on BiPAP with an O2 sat of 100%. Febrile at 101.5  WBC 25.1  Hypertensive on admission  Respiratory rate at 31  CXR: Stable bilateral pulmonary infiltrates. Patient was given:  10mg Decadron  2g Magnesium  Duoneb treatment  Terbutaline treatment     7/28  Pt sitting in bed, responds to voice  P02 53 on ABG this morning, pt placed on BiPap  SpO2 stable  CRP=16.1    XR CHEST PORT  Diffuse interstitial airspace opacities are not  significantly changed. Trace left effusion. Biapical pleural thickening. Heartsize is enlarged      Review of Systems   Unable to perform ROS: Acuity of condition       Objective     Visit Vitals  BP (!) 132/59 (BP 1 Location: Right upper arm, BP Patient Position: At rest)   Pulse (!) 116   Temp 97 °F (36.1 °C)   Resp 21   Ht 5' 6\" (1.676 m)   Wt 56.7 kg (125 lb)   SpO2 93%   BMI 20.18 kg/m²    O2 Flow Rate (L/min): 5 l/min O2 Device: BIPAP    Physical Exam:   Physical Exam  Constitutional:       Appearance: He is normal weight. HENT:      Head: Normocephalic and atraumatic. Cardiovascular:      Rate and Rhythm: Normal rate and regular rhythm. Pulses: Normal pulses. Heart sounds: Normal heart sounds.    Pulmonary:      Effort: Pulmonary effort is normal. Breath sounds: Normal breath sounds. Musculoskeletal:         General: Normal range of motion. Cervical back: Normal range of motion and neck supple. Neurological:      General: No focal deficit present. Mental Status: He is alert. He is disoriented. Intake & Output:  Current Shift: No intake/output data recorded. Last three shifts: 07/26 1901 - 07/28 0700  In: 1577.1 [P.O.:400; I.V.:1177.1]  Out: 700 [Urine:700]    Lab/Data Review: All lab results for the last 24 hours reviewed.        24 Hour Results:    Recent Results (from the past 24 hour(s))   TROPONIN-HIGH SENSITIVITY    Collection Time: 07/27/22 11:15 AM   Result Value Ref Range    Troponin-High Sensitivity 64 0 - 76 ng/L   COVID-19 RAPID TEST    Collection Time: 07/27/22 11:15 AM   Result Value Ref Range    COVID-19 rapid test DETECTED (A) Not Detected     URINALYSIS W/MICROSCOPIC    Collection Time: 07/27/22 12:41 PM   Result Value Ref Range    Color Yellow      Appearance Hazy (A) Clear      Specific gravity 1.015 1.003 - 1.030      pH (UA) 9.0 (H) 5.0 - 8.0      Protein Negative Negative mg/dL    Glucose Negative Negative mg/dL    Ketone Negative Negative mg/dL    Bilirubin Negative Negative      Blood Negative Negative      Urobilinogen 0.1 0.1 - 1.0 EU/dL    Nitrites Negative Negative      Leukocyte Esterase Negative Negative      WBC 20-50 0 - 4 /hpf    RBC 0-5 0 - 5 /hpf    Bacteria 1+ (A) Negative /hpf   NT-PRO BNP    Collection Time: 07/27/22 12:41 PM   Result Value Ref Range    NT pro-BNP 3,310 (H) <450 pg/mL   MRSA SCREEN - PCR (NASAL)    Collection Time: 07/27/22  1:31 PM   Result Value Ref Range    MRSA by PCR, Nasal DETECTED (A) Not Detected     BLOOD GAS, VENOUS    Collection Time: 07/27/22  1:46 PM   Result Value Ref Range    VENOUS PH 7.499 (H) 7.32 - 7.42      VENOUS PCO2 30.3 (L) 41 - 51 mmHg    VENOUS PO2 83 (H) 25 - 40 mmHg    VENOUS BICARBONATE 23 (L) 24 - 25 mmol/L    VENOUS BASE EXCESS 0.6 0 - 3 mmol/L    O2 METHOD BiPAP      FIO2 100 %    Sample source Venous      SITE OTHER      Performed by Esha Johnson     TEMPERATURE 98.7     LACTIC ACID    Collection Time: 07/27/22  2:28 PM   Result Value Ref Range    Lactic acid 2.0 0.4 - 2.0 mmol/L   BLOOD GAS, ARTERIAL    Collection Time: 07/28/22  2:22 AM   Result Value Ref Range    pH 7.49 (H) 7.35 - 7.45      PCO2 33 (L) 35 - 45 mmHg    PO2 53 (L) 80 - 100 mmHg    O2 SATURATION 88 (L) 95 - 99 %    BICARBONATE 24 22 - 26 mmol/L    BASE EXCESS 0.9 0 - 3 mmol/L    O2 METHOD Nasal Cannula      O2 FLOW RATE 5.00 L/min    FIO2 40.0 %    Sample source Arterial      SITE Right Brachial      MANUELA'S TEST YES      Carboxy-Hgb 0.0 (L) 1 - 2 %    Methemoglobin 0.5 0 - 1.4 %    Oxyhemoglobin 87.2 (L) 95 - 99 %    Performed by Beverley Herrera     TEMPERATURE 91.3     METABOLIC PANEL, COMPREHENSIVE    Collection Time: 07/28/22  4:15 AM   Result Value Ref Range    Sodium 135 (L) 136 - 145 mmol/L    Potassium 3.4 (L) 3.5 - 5.1 mmol/L    Chloride 104 97 - 108 mmol/L    CO2 23 21 - 32 mmol/L    Anion gap 8 5 - 15 mmol/L    Glucose 110 (H) 65 - 100 mg/dL    BUN 15 6 - 20 mg/dL    Creatinine 0.56 (L) 0.70 - 1.30 mg/dL    BUN/Creatinine ratio 27 (H) 12 - 20      GFR est AA >60 >60 ml/min/1.73m2    GFR est non-AA >60 >60 ml/min/1.73m2    Calcium 7.1 (L) 8.5 - 10.1 mg/dL    Bilirubin, total 0.8 0.2 - 1.0 mg/dL    AST (SGOT) 39 (H) 15 - 37 U/L    ALT (SGPT) 35 12 - 78 U/L    Alk.  phosphatase 67 45 - 117 U/L    Protein, total 5.7 (L) 6.4 - 8.2 g/dL    Albumin 2.2 (L) 3.5 - 5.0 g/dL    Globulin 3.5 2.0 - 4.0 g/dL    A-G Ratio 0.6 (L) 1.1 - 2.2     RENAL FUNCTION PANEL    Collection Time: 07/28/22  4:15 AM   Result Value Ref Range    Sodium 136 136 - 145 mmol/L    Potassium 3.3 (L) 3.5 - 5.1 mmol/L    Chloride 103 97 - 108 mmol/L    CO2 24 21 - 32 mmol/L    Anion gap 9 5 - 15 mmol/L    Glucose 109 (H) 65 - 100 mg/dL    BUN 14 6 - 20 mg/dL    Creatinine 0.59 (L) 0.70 - 1.30 mg/dL    BUN/Creatinine ratio 24 (H) 12 - 20      GFR est AA >60 >60 ml/min/1.73m2    GFR est non-AA >60 >60 ml/min/1.73m2    Calcium 7.0 (L) 8.5 - 10.1 mg/dL    Phosphorus 2.3 (L) 2.6 - 4.7 mg/dL    Albumin 2.2 (L) 3.5 - 5.0 g/dL   MAGNESIUM    Collection Time: 07/28/22  4:15 AM   Result Value Ref Range    Magnesium 2.1 1.6 - 2.4 mg/dL   NT-PRO BNP    Collection Time: 07/28/22  4:15 AM   Result Value Ref Range    NT pro-BNP 2,705 (H) <450 pg/mL   C REACTIVE PROTEIN, QT    Collection Time: 07/28/22  4:15 AM   Result Value Ref Range    C-Reactive protein 16.10 (H) 0.00 - 0.60 mg/dL   CBC WITH AUTOMATED DIFF    Collection Time: 07/28/22  4:15 AM   Result Value Ref Range    WBC 10.7 4.1 - 11.1 K/uL    RBC 3.15 (L) 4.10 - 5.70 M/uL    HGB 9.8 (L) 12.1 - 17.0 g/dL    HCT 29.9 (L) 36.6 - 50.3 %    MCV 94.9 80.0 - 99.0 FL    MCH 31.1 26.0 - 34.0 PG    MCHC 32.8 30.0 - 36.5 g/dL    RDW 15.0 (H) 11.5 - 14.5 %    PLATELET 416 157 - 848 K/uL    MPV 9.7 8.9 - 12.9 FL    NRBC 0.2 (H) 0.0  WBC    ABSOLUTE NRBC 0.02 (H) 0.00 - 0.01 K/uL    NEUTROPHILS 95 (H) 32 - 75 %    LYMPHOCYTES 2 (L) 12 - 49 %    MONOCYTES 2 (L) 5 - 13 %    EOSINOPHILS 0 0 - 7 %    BASOPHILS 0 0 - 1 %    IMMATURE GRANULOCYTES 1 (H) 0 - 0.5 %    ABS. NEUTROPHILS 10.1 (H) 1.8 - 8.0 K/UL    ABS. LYMPHOCYTES 0.2 (L) 0.8 - 3.5 K/UL    ABS. MONOCYTES 0.2 0.0 - 1.0 K/UL    ABS. EOSINOPHILS 0.0 0.0 - 0.4 K/UL    ABS. BASOPHILS 0.0 0.0 - 0.1 K/UL    ABS. IMM.  GRANS. 0.1 (H) 0.00 - 0.04 K/UL    DF AUTOMATED     LD    Collection Time: 07/28/22  4:15 AM   Result Value Ref Range     (H) 85 - 241 U/L   ECHO ADULT COMPLETE    Collection Time: 07/28/22  9:45 AM   Result Value Ref Range    LV EDV A2C 32 mL    LV EDV A4C 56 mL    LV ESV A2C 15 mL    LV ESV A4C 14 mL    IVSd 0.8 0.6 - 1.0 cm    LVIDd 4.4 4.2 - 5.9 cm    LVIDs 2.5 cm    LVOT Diameter 1.9 cm    LVOT Mean Gradient 1 mmHg    LVOT VTI 15.2 cm    LVOT Peak Velocity 0.9 m/s    LVOT Peak Gradient 3 mmHg    LVPWd 0.9 0.6 - 1.0 cm LV Ejection Fraction A2C 53 %    LV Ejection Fraction A4C 75 %    LVOT Area 2.8 cm2    LVOT SV 43.1 ml    LA Minor Axis 5.4 cm    LA Major Yancey 4.3 cm    LA Area 2C 16.6 cm2    LA Area 4C 15.3 cm2    LA Volume BP 44 18 - 58 mL    LA Diameter 2.6 cm    AR .0 ms    AR Max Velocity PISA 3.1 m/s    AV Peak Velocity 1.7 m/s    AV Peak Gradient 11 mmHg    AV Mean Gradient 5 mmHg    AV VTI 28.6 cm    AV Mean Velocity 1.1 m/s    AV Area by VTI 1.5 cm2    AV Area by Peak Velocity 1.5 cm2    Aortic Root 3.4 cm    Ascending Aorta 2.3 cm    MV Nyquist Velocity 92 cm/s    MR .0 cm    MV Mean Gradient 2 mmHg    MV VTI 19.5 cm    MV Mean Velocity 0.6 m/s    MR Peak Velocity 6.1 m/s    MR Peak Gradient 149 mmHg    MV Max Velocity 0.9 m/s    MV Peak Gradient 3 mmHg    MV A Velocity 0.57 m/s    MV E Velocity 0.72 m/s    MV Area by VTI 2.2 cm2    ID Max Velocity 1.2 m/s    Pulmonary Artery EDP 6 mmHg    PV Mean Gradient 1 mmHg    PV VTI 10.9 cm    PV Mean Velocity 0.5 m/s    PV Max Velocity 0.7 m/s    PV Peak Gradient 2 mmHg    TR Max Velocity 2.32 m/s    TR Peak Gradient 22 mmHg    TAPSE 1.9 1.7 cm    Fractional Shortening 2D 43 28 - 44 %    LV ESV Index A4C 9 mL/m2    LV EDV Index A4C 34 mL/m2    LV ESV Index A2C 9 mL/m2    LV EDV Index A2C 20 mL/m2    LVIDd Index 2.68 cm/m2    LVIDs Index 1.52 cm/m2    LV RWT Ratio 0.41     LV Mass 2D 118.6 88 - 224 g    LV Mass 2D Index 72.3 49 - 115 g/m2    MV E/A 1.26     LA Volume Index BP 27 16 - 34 ml/m2    LVOT Stroke Volume Index 26.3 mL/m2    LA Size Index 1.59 cm/m2    LA/AO Root Ratio 0.76     Ao Root Index 2.07 cm/m2    Ascending Aorta Index 1.40 cm/m2    AV Velocity Ratio 0.53     LVOT:AV VTI Index 0.53     ADALGISA/BSA VTI 0.9 cm2/m2    ADALGISA/BSA Peak Velocity 0.9 cm2/m2    MV:LVOT VTI Index 1.28     Est. RA Pressure 3 mmHg    MV EROA Cont Eq 0.4 cm2    RVSP 25 mmHg         Imaging:    XR CHEST PORT   Final Result   No significant change       XR CHEST SNGL V   Final Result Stable bilateral pulmonary infiltrates.                 Assessment     Acute hypoxic respiratory failure on bipap 45%  Respiratory alkalosis  COVID-19 Pneumonia  Sepsis  Hypertension  Stroke  Afib  MRSA nares    Plan   Continue meds:  Eliquis 2.5mg PO BID  ASA 81 mg po daily  Lipitor 20mg PO every day  Zithromax 500 IV daily  Olumiant 4mg PO QD  Decadron 4mg IV Q6h  Doxazosin 4mg PO QHS  Cymbalta 30mg POQD  Remeron 15mg PO every day  Mupirocin 2% ointment both nostrils BID  Zosyn 3.375g IV q8hrs  Ranolazine 500mg PO every day  Vancomycin 1,250 mg IV daily  Replace potassium    Continue 0.9% sodium chloride infusion    Repeat CRP, CBC, procal, lactic acid, LD  Blood cultures pending    Urine and sputum cultures  Echo results pending      Current Facility-Administered Medications:     piperacillin-tazobactam (ZOSYN) 3.375 g in 0.9% sodium chloride (MBP/ADV) 100 mL MBP, 3.375 g, IntraVENous, Q8H, Mahogany Chapa MD, Last Rate: 25 mL/hr at 07/28/22 0413, 3.375 g at 07/28/22 0413    apixaban (ELIQUIS) tablet 2.5 mg, 2.5 mg, Oral, BID, Mahogany Chapa MD, 2.5 mg at 07/28/22 0901    aspirin delayed-release tablet 81 mg, 81 mg, Oral, DAILY, Mahogany Chapa MD, 81 mg at 07/28/22 0901    atorvastatin (LIPITOR) tablet 20 mg, 20 mg, Oral, DAILY, Mahogany Chapa MD, 20 mg at 07/28/22 0901    doxazosin (CARDURA) tablet 4 mg, 4 mg, Oral, QHS, Mahogany Chapa MD, 4 mg at 07/27/22 2136    DULoxetine (CYMBALTA) capsule 30 mg, 30 mg, Oral, DAILY, Mahogany Chapa MD, 30 mg at 07/28/22 0901    mirtazapine (REMERON) tablet 15 mg, 15 mg, Oral, QHS, Mahogany Chapa MD, 15 mg at 07/27/22 2136    ranolazine ER (RANEXA) tablet 500 mg, 500 mg, Oral, BID, Mahogany Chapa MD, 500 mg at 07/28/22 0901    0.9% sodium chloride infusion, 25 mL/hr, IntraVENous, CONTINUOUS, Sivlia Mcmahan MD, Last Rate: 25 mL/hr at 07/27/22 2149, 25 mL/hr at 07/27/22 2149    acetaminophen (TYLENOL) tablet 650 mg, 650 mg, Oral, Q6H PRN **OR** acetaminophen (TYLENOL) suppository 650 mg, 650 mg, Rectal, Q6H PRN, Mahogany Chapa MD    polyethylene glycol (MIRALAX) packet 17 g, 17 g, Oral, DAILY PRN, Ronaldo Chapa MD    ondansetron (ZOFRAN ODT) tablet 4 mg, 4 mg, Oral, Q8H PRN **OR** ondansetron (ZOFRAN) injection 4 mg, 4 mg, IntraVENous, Q6H PRN, Mahogany Chapa MD    dexamethasone (DECADRON) 4 mg/mL injection 4 mg, 4 mg, IntraVENous, Q6H, Mahogany Chapa MD, 4 mg at 07/28/22 0546    baricitinib (OLUMIANT) tablet 4 mg, 4 mg, Oral, Q24H, Mahogany Chapa MD, 4 mg at 07/27/22 1853    Vancomycin - Pharmacy to Dose, , Other, Rx Dosing/Monitoring, Mariajose Bundy MD    vancomycin (VANCOCIN) 1,250 mg in 0.9% sodium chloride 250 mL ([id number]), 1,250 mg, IntraVENous, Q24H, Ronaldo Chapa MD    Vancomycin - Trough to be drawn prior to dose 7/28 @ 1600, , Other, ONCE, Mahogany Chapa MD    azithromycin (ZITHROMAX) 500 mg in 0.9% sodium chloride 250 mL ([id number]), 500 mg, IntraVENous, Q24H, Mahogany Chapa MD, Last Rate: 250 mL/hr at 07/27/22 1853, 500 mg at 07/27/22 1853    mupirocin (BACTROBAN) 2 % ointment, , Both Nostrils, BID, Mee James MD, Given at 07/28/22 0901    Current Outpatient Medications   Medication Instructions    amLODIPine (NORVASC) 5 mg, Oral, DAILY    apixaban (ELIQUIS) 2.5 mg, Oral, 2 TIMES DAILY    aspirin delayed-release 81 mg, Oral, DAILY    atorvastatin (LIPITOR) 20 mg, Oral, DAILY    cholecalciferol (vitamin D3) 2,000 Units, Oral, EVERY BEDTIME    dexAMETHasone (DECADRON) 4 mg, Oral, 2 TIMES DAILY    doxazosin (CARDURA) 4 mg, Oral, DAILY    DULoxetine (CYMBALTA) 30 mg, Oral, DAILY    mirtazapine (REMERON) 15 mg, Oral, EVERY BEDTIME    ranolazine ER (RANEXA) 500 mg, Oral, 2 TIMES DAILY         Signed By: Eliseo Johnson MD     July 28, 2022

## 2022-07-29 NOTE — PROGRESS NOTES
Recent clincals sent via [facility] to [facility].           MARLENE Sorto

## 2022-07-29 NOTE — PROGRESS NOTES
Progress Note    Patient: Cyndie Jean-Baptiste MRN: [id number]  SSN: [id number]    YOB: 1929  Age: 80 y.o. Sex: male      Admit Date: 7/27/2022    LOS: 2 days     Subjective:   Patient followed for sepsis with recent Covid-19 infection, respiratory failure with possible pneumonia and possible UTI. No urine culture has been sent yet. He is back on BIPAP. He remains afebrile with increased WBC today but decreasing CRP. CXR today is worsening. Objective:     Vitals:    07/29/22 0410 07/29/22 0507 07/29/22 0607 07/29/22 0857   BP:       Pulse:  88 92    Resp:  28 26    Temp:       SpO2: 97% 97% 95% 99%   Weight:       Height:            Intake and Output:  Current Shift: No intake/output data recorded. Last three shifts: 07/27 1901 - 07/29 0700  In: 0154 [P.O.:400; I.V.:2330]  Out: 3000 [Urine:3000]    Physical Exam:   Vitals and nursing note reviewed. Constitutional:       General: He is in acute distress. Appearance: He is ill-appearing. HENT:     Head: Normocephalic and atraumatic. Nose:     Comments: BIPAP mask  Eyes:     Pupils: Pupils are equal, round, and reactive to light. Cardiovascular:     Rate and Rhythm: Regular rhythm. Heart sounds: No murmur heard. Pulmonary: clear bilaterally  Abdominal:     General: Bowel sounds are normal.     Palpations: Abdomen is soft. Tenderness: There is no abdominal tenderness. There is no right CVA tenderness or left CVA tenderness. Genitourinary:     Comments: External urinary catheter  Musculoskeletal:     Right lower leg: No edema. Left lower leg: No edema. Skin:     Findings: No rash. Neurological:     General: No focal deficit present. Mental Status: He is alert and oriented to person, place, and time.   Psychiatric:         Behavior: Behavior normal.    Lab/Data Review:     WBC 13,100     <498 <606  Ferritin <231    CRP 9.24 <16.10  Procal 0.15    Blood cultures (7/27) No growth 2 days  Urine culture ordered    CXR (7/29) Slightly increased diffuse bilateral interstitial and alveolar  opacities, compatible with COVID pneumonia. Assessment:     Active Problems:    COVID (7/18/2022)      Sepsis ([address] (7/27/2022)  Sepsis with fever, tachycardia, tachypnea, leukocytosis, elevated CRP, Day #3 Vancomycin, Zosyn, Zithromax  Possible UTI with pyuria and bacteria  Covid-19 infection  ? Pneumonia with stable bilateral infiltrates, ?aspiration     Acute hypoxic respiratory failure  6. MRSA nasal colonization, Day #3 Mupirocin ointment       Comment:  Sepsis may be due to UTI, but unfortunately urine culture was not sent and would likely be false negative at this point. CRP decreasing but LDH increasing. Reasonable to de-escalate antibiotics.     Plan:      Discontinue Vancomycin  Continue Zosyn, Zithromax  Continue Baricitinib and Dexamethasone per Pulmonary  Follow-up blood cultures  Spoke with Staff about sending urine culture  Continue Mupirocin nasal ointment  In am, repeat CBC, CRP, procal, LDH       Signed By: Kieran Perry MD     July 29, 2022

## 2022-07-29 NOTE — PROGRESS NOTES
Patient refusing BIPAP, new order received from DR. Sherly Kaur for High flow NC. Will continue to monitor.

## 2022-07-29 NOTE — PROGRESS NOTES
PROGRESS NOTE    Patient: Marvin Gonzáles MRN: [id number]  SSN: [id number]    YOB: 1929  Age: 80 y.o. Sex: male      Admit Date: 7/27/2022    LOS: 2 days       Subjective     Chief Complaint   Patient presents with    Shortness of Breath       HPI: Patient is a 80y.o. year old male with a significant PMH of Afib, hypercholesterolemia, HTN, neuropathy, and hx of stroke presented to the ED today due to hypoxia and worsening confusion. The patient was discharged from the hospital yesterday after being hospitalized d/t COVID-19. Per nursing facility his O2 was in the 80s and it was 89% upon admission. The patient was stable at that time of discharge yesterday and not using any oxygen. The patient is now on BiPAP with an O2 sat of 100%. Febrile at 101.5  WBC 25.1  Hypertensive on admission  Respiratory rate at 31  CXR: Stable bilateral pulmonary infiltrates. Patient was given:  10mg Decadron  2g Magnesium  Duoneb treatment  Terbutaline treatment     7/28  Pt sitting in bed, responds to voice  P02 53 on ABG this morning, pt placed on BiPap  SpO2 stable  CRP=16.1    XR CHEST PORT  Diffuse interstitial airspace opacities are not  significantly changed. Trace left effusion. Biapical pleural thickening. Heartsize is enlarged      7/29    Patient on BiPAP now on 70% O2  Patient pulled out BiPAP last night put on high flow      Review of Systems   Unable to perform ROS: Acuity of condition       Objective     Visit Vitals  BP (!) 149/89 (BP 1 Location: Right upper arm, BP Patient Position: At rest)   Pulse 92   Temp 97.2 °F (36.2 °C)   Resp 26   Ht 5' 6\" (1.676 m)   Wt 56.7 kg (125 lb)   SpO2 94%   BMI 20.18 kg/m²    O2 Flow Rate (L/min): 40 l/min O2 Device: BIPAP    Physical Exam:   Physical Exam  Constitutional:       Appearance: He is normal weight. HENT:      Head: Normocephalic and atraumatic. Cardiovascular:      Rate and Rhythm: Normal rate and regular rhythm. Pulses: Normal pulses. Heart sounds: Normal heart sounds. Pulmonary:      Effort: Pulmonary effort is normal.      Breath sounds: Normal breath sounds. Musculoskeletal:         General: Normal range of motion. Cervical back: Normal range of motion and neck supple. Neurological:      General: No focal deficit present. Mental Status: He is alert. He is disoriented. Intake & Output:  Current Shift: No intake/output data recorded. Last three shifts: 07/27 1901 - 07/29 0700  In: 0849 [P.O.:400; I.V.:2330]  Out: 3000 [Urine:3000]    Lab/Data Review: All lab results for the last 24 hours reviewed. 24 Hour Results:    Recent Results (from the past 24 hour(s))   C REACTIVE PROTEIN, QT    Collection Time: 07/29/22  4:00 AM   Result Value Ref Range    C-Reactive protein 8.84 (H) 0.00 - 0.60 mg/dL   PROCALCITONIN    Collection Time: 07/29/22  4:00 AM   Result Value Ref Range    Procalcitonin 0.15 (H) 0 ng/mL   LD    Collection Time: 07/29/22  4:00 AM   Result Value Ref Range     (H) 85 - 240 U/L   METABOLIC PANEL, COMPREHENSIVE    Collection Time: 07/29/22  4:00 AM   Result Value Ref Range    Sodium 136 136 - 145 mmol/L    Potassium 3.4 (L) 3.5 - 5.1 mmol/L    Chloride 106 97 - 108 mmol/L    CO2 22 21 - 32 mmol/L    Anion gap 8 5 - 15 mmol/L    Glucose 126 (H) 65 - 100 mg/dL    BUN 17 6 - 20 mg/dL    Creatinine 0.62 (L) 0.70 - 1.30 mg/dL    BUN/Creatinine ratio 27 (H) 12 - 20      GFR est AA >60 >60 ml/min/1.73m2    GFR est non-AA >60 >60 ml/min/1.73m2    Calcium 7.0 (L) 8.5 - 10.1 mg/dL    Bilirubin, total 0.7 0.2 - 1.0 mg/dL    AST (SGOT) 39 (H) 15 - 37 U/L    ALT (SGPT) 36 12 - 78 U/L    Alk.  phosphatase 62 45 - 117 U/L    Protein, total 5.6 (L) 6.4 - 8.2 g/dL    Albumin 2.1 (L) 3.5 - 5.0 g/dL    Globulin 3.5 2.0 - 4.0 g/dL    A-G Ratio 0.6 (L) 1.1 - 2.2     RENAL FUNCTION PANEL    Collection Time: 07/29/22  4:00 AM   Result Value Ref Range    Sodium 135 (L) 136 - 145 mmol/L    Potassium 3.4 (L) 3.5 - 5.1 mmol/L    Chloride 106 97 - 108 mmol/L    CO2 20 (L) 21 - 32 mmol/L    Anion gap 9 5 - 15 mmol/L    Glucose 124 (H) 65 - 100 mg/dL    BUN 17 6 - 20 mg/dL    Creatinine 0.59 (L) 0.70 - 1.30 mg/dL    BUN/Creatinine ratio 29 (H) 12 - 20      GFR est AA >60 >60 ml/min/1.73m2    GFR est non-AA >60 >60 ml/min/1.73m2    Calcium 7.0 (L) 8.5 - 10.1 mg/dL    Phosphorus 2.2 (L) 2.6 - 4.7 mg/dL    Albumin 2.0 (L) 3.5 - 5.0 g/dL   CBC WITH AUTOMATED DIFF    Collection Time: 07/29/22  4:00 AM   Result Value Ref Range    WBC 13.1 (H) 4.1 - 11.1 K/uL    RBC 3.12 (L) 4.10 - 5.70 M/uL    HGB 9.8 (L) 12.1 - 17.0 g/dL    HCT 29.6 (L) 36.6 - 50.3 %    MCV 94.9 80.0 - 99.0 FL    MCH 31.4 26.0 - 34.0 PG    MCHC 33.1 30.0 - 36.5 g/dL    RDW 14.8 (H) 11.5 - 14.5 %    PLATELET 029 661 - 952 K/uL    MPV 9.8 8.9 - 12.9 FL    NRBC 0.0 0.0  WBC    ABSOLUTE NRBC 0.00 0.00 - 0.01 K/uL    NEUTROPHILS 95 (H) 32 - 75 %    LYMPHOCYTES 2 (L) 12 - 49 %    MONOCYTES 2 (L) 5 - 13 %    EOSINOPHILS 0 0 - 7 %    BASOPHILS 0 0 - 1 %    IMMATURE GRANULOCYTES 1 (H) 0 - 0.5 %    ABS. NEUTROPHILS 12.5 (H) 1.8 - 8.0 K/UL    ABS. LYMPHOCYTES 0.3 (L) 0.8 - 3.5 K/UL    ABS. MONOCYTES 0.3 0.0 - 1.0 K/UL    ABS. EOSINOPHILS 0.0 0.0 - 0.4 K/UL    ABS. BASOPHILS 0.0 0.0 - 0.1 K/UL    ABS. IMM.  GRANS. 0.1 (H) 0.00 - 0.04 K/UL    DF AUTOMATED     C REACTIVE PROTEIN, QT    Collection Time: 07/29/22  4:00 AM   Result Value Ref Range    C-Reactive protein 9.24 (H) 0.00 - 0.60 mg/dL   BLOOD GAS, ARTERIAL    Collection Time: 07/29/22  4:28 AM   Result Value Ref Range    pH 7.52 (H) 7.35 - 7.45      PCO2 30 (L) 35 - 45 mmHg    PO2 101 (H) 80 - 100 mmHg    O2 SATURATION 98 95 - 99 %    BICARBONATE 24 22 - 26 mmol/L    BASE EXCESS 1.9 0 - 3 mmol/L    O2 METHOD High Flow Nasal Cannula      O2 FLOW RATE 40.00 L/min    FIO2 80 %    MODE OTHER      Sample source Arterial      SITE Right Radial      MANUELA'S TEST YES      Carboxy-Hgb 0.3 (L) 1 - 2 %    Methemoglobin 0.2 0 - 1.4 % Oxyhemoglobin 97.0 95 - 99 %    Performed by Tracy Stubbs     TEMPERATURE 96.9           Imaging:    XR CHEST PORT   Final Result   Slightly increased diffuse bilateral interstitial and alveolar   opacities, compatible with COVID pneumonia. XR CHEST PORT   Final Result   No significant change       XR CHEST SNGL V   Final Result   Stable bilateral pulmonary infiltrates.          XR CHEST PORT    (Results Pending)          Assessment     Acute hypoxic respiratory failure on bipap  70%  Respiratory alkalosis  COVID-19 Pneumonia  Sepsis  Hypertension  Stroke  Afib  MRSA nares  Elevated BNP    Plan   Continue meds:  Eliquis 2.5mg PO BID  ASA 81 mg po daily  Lipitor 20mg PO every day  Zithromax 500 IV daily  Olumiant 4mg PO QD  Decadron 4mg IV Q6h  Doxazosin 4mg PO QHS  Cymbalta 30mg POQD  Remeron 15mg PO every day  Mupirocin 2% ointment both nostrils BID  Zosyn 3.375g IV q8hrs  Ranolazine 500mg PO every day  Vancomycin 1,250 mg IV daily  Replace potassium  Lasix 40 IV once    Continue 0.9% sodium chloride infusion    Repeat CRP, CBC, procal, lactic acid, LD  Blood cultures pending    Urine and sputum cultures  Echo results pending      Current Facility-Administered Medications:     hydrOXYzine (VISTARIL) injection 25 mg, 25 mg, IntraMUSCular, Q6H PRN, Mahogany Chapa MD, 25 mg at 07/29/22 4142    potassium, sodium phosphates (NEUTRA-PHOS) packet 2 Packet, 2 Packet, Oral, Q4H, Azalea Ovalle MD    furosemide (LASIX) injection 40 mg, 40 mg, IntraVENous, ONCE, Azalea Ovalle MD    hydrOXYzine pamoate (VISTARIL) capsule 25 mg, 25 mg, Oral, Q8H, Azalea Ovalle MD    potassium chloride (KLOR-CON) packet for solution 40 mEq, 40 mEq, Oral, NOW, Azalea Ovalle MD    [START ON 7/31/2022] Vancomycin - Please draw trough prior to dose 7/31 @ 1600, , Other, ONCE, Mahogany Chapa MD    piperacillin-tazobactam (ZOSYN) 3.375 g in 0.9% sodium chloride (MBP/ADV) 100 mL MBP, 3.375 g, IntraVENous, Q8H, Eduard Megan Hayden MD, Last Rate: 25 mL/hr at 07/29/22 0402, 3.375 g at 07/29/22 0402    apixaban (ELIQUIS) tablet 2.5 mg, 2.5 mg, Oral, BID, Megan Chapa MD, 2.5 mg at 07/28/22 2111    aspirin delayed-release tablet 81 mg, 81 mg, Oral, DAILY, Mahogany Chapa MD, 81 mg at 07/28/22 0901    atorvastatin (LIPITOR) tablet 20 mg, 20 mg, Oral, DAILY, Mahogany Chapa MD, 20 mg at 07/28/22 0901    doxazosin (CARDURA) tablet 4 mg, 4 mg, Oral, QHS, Mahogany Chapa MD, 4 mg at 07/28/22 2111    DULoxetine (CYMBALTA) capsule 30 mg, 30 mg, Oral, DAILY, Mahogany Chapa MD, 30 mg at 07/28/22 0901    mirtazapine (REMERON) tablet 15 mg, 15 mg, Oral, QHS, Mahogany Chapa MD, 15 mg at 07/28/22 2111    ranolazine ER (RANEXA) tablet 500 mg, 500 mg, Oral, BID, Mahogany Chapa MD, 500 mg at 07/28/22 2111    0.9% sodium chloride infusion, 25 mL/hr, IntraVENous, CONTINUOUS, Kylie Freitas MD, Last Rate: 25 mL/hr at 07/28/22 1957, 25 mL/hr at 07/28/22 1957    acetaminophen (TYLENOL) tablet 650 mg, 650 mg, Oral, Q6H PRN, 650 mg at 07/28/22 1133 **OR** acetaminophen (TYLENOL) suppository 650 mg, 650 mg, Rectal, Q6H PRN, Mahogany Chapa MD    polyethylene glycol (MIRALAX) packet 17 g, 17 g, Oral, DAILY PRN, Mahogany Chapa MD    ondansetron (ZOFRAN ODT) tablet 4 mg, 4 mg, Oral, Q8H PRN **OR** ondansetron (ZOFRAN) injection 4 mg, 4 mg, IntraVENous, Q6H PRN, Mahogany Chapa MD    dexamethasone (DECADRON) 4 mg/mL injection 4 mg, 4 mg, IntraVENous, Q6H, Mahogany Chapa MD, 4 mg at 07/29/22 4529    baricitinib (OLUMIANT) tablet 4 mg, 4 mg, Oral, Q24H, Mahogany Chapa MD, 4 mg at 07/28/22 1540    Vancomycin - Pharmacy to Dose, , Other, Rx Dosing/Monitoring, Megan Chapa MD    vancomycin (VANCOCIN) 1,250 mg in 0.9% sodium chloride 250 mL ([id number]), 1,250 mg, IntraVENous, Q24H, Mahogany Chapa MD, 1,250 mg at 07/28/22 1733    azithromycin (ZITHROMAX) 500 mg in 0.9% sodium chloride 250 mL ([id number]), 500 mg, IntraVENous, Q24H, Nelly Seay MD, Last Rate: 250 mL/hr at 07/28/22 1540, 500 mg at 07/28/22 1540    mupirocin (BACTROBAN) 2 % ointment, , Both Nostrils, BID, Kvng Laura MD, Given at 07/28/22 2112    Current Outpatient Medications   Medication Instructions    amLODIPine (NORVASC) 5 mg, Oral, DAILY    apixaban (ELIQUIS) 2.5 mg, Oral, 2 TIMES DAILY    aspirin delayed-release 81 mg, Oral, DAILY    atorvastatin (LIPITOR) 20 mg, Oral, DAILY    cholecalciferol (vitamin D3) 2,000 Units, Oral, EVERY BEDTIME    dexAMETHasone (DECADRON) 4 mg, Oral, 2 TIMES DAILY    doxazosin (CARDURA) 4 mg, Oral, DAILY    DULoxetine (CYMBALTA) 30 mg, Oral, DAILY    mirtazapine (REMERON) 15 mg, Oral, EVERY BEDTIME    ranolazine ER (RANEXA) 500 mg, Oral, 2 TIMES DAILY         Signed By: Jacinta Marques MD     July 29, 2022

## 2022-07-29 NOTE — PROGRESS NOTES
Comprehensive Nutrition Assessment    Type and Reason for Visit: Initial, Positive nutrition screen (MST 2)    Nutrition Recommendations/Plan:   Continue PO diet for comfort    Place NGT and initiate supplemental bolus TF as follows:  TF via NGT of Jevity 1.5, 300ml bolus after meals if <50% of meal consumed; provide additional 300ml bolus at HS  Flush with 225ml water after each bolus   Providing 1800kcals, 77g pro, and 1812ml water (meets>/=100% needs)  Please document TF initiation, rate,        Malnutrition Assessment:  Malnutrition Status:  Mild malnutrition (07/29/22 1426)    Context:  Acute illness     Findings of the 6 clinical characteristics of malnutrition:   Energy Intake:  75% or less of est energy req for 7 or more days (per pt and EMR report)  Weight Loss:  No significant weight loss     Body Fat Loss:  Unable to assess,     Muscle Mass Loss:  Unable to assess,    Fluid Accumulation:  No significant fluid accumulation,        Nutrition Assessment:    Readmitted for respiratory failure, +COVID-19 and UTI. At last admit (7/18-7/26), pt with poor intakes pta and consumed <50% of meals inpatient. Today, RD spoke to pt wife via phonE, wife endorsed appetite same vs worse than last admit, eating mostly pdg, applesauce, and Ensure however minimally. Discussed NGT and TF with wife, agreeable at this time. Spoke to RN and Pulomologist, agree as well, RD to place order. Labs: H/H 9.8/29.6, K+ 3.4, Cr 0.62, -126, AST 39, Phos 2.2, Alb 2.1. Meds: IVF, eliquis, azithromycin, baricitinib, dexamethasone, cymbalta, vistaril, mirtazapine, zosyn, Kphos, ranexa, vancomycin. Nutrition Related Findings:    Unable to complete NFPE d/t COVID-19 precautions. Denies issues with c/s. No N/V/D/C, last BM pta. No edema.  Wound Type: None    Current Nutrition Intake & Therapies:  Average Meal Intake: 1-25%  Average Supplement Intake: 1-25%  ADULT DIET Regular; Low Fat/Low Chol/High Fiber/2 gm Na  ADULT ORAL NUTRITION SUPPLEMENT Breakfast, Lunch, Dinner; Standard 4 oz  ADULT TUBE FEEDING Nasogastric; Standard with Fiber; Delivery Method: Bolus; Bolus Frequency: 4 Times Daily; Bolus Volume (mL): 300; Bolus Method of Infusion: Syringe Push; Water Flush Volume (mL): 225; Water Flush Frequency: After bolus    Anthropometric Measures:  Height: 5' 5.98\" (167.6 cm)  Ideal Body Weight (IBW): 142 lbs (65 kg)     Current Body Wt:  56.7 kg (125 lb) (7/27), 88 % IBW. Not specified  Current BMI (kg/m2): 20.2                          BMI Category: Underweight (BMI less than 22) age over 72  Wt Readings from Last 10 Encounters:   07/27/22 56.7 kg (125 lb)   07/18/22 54.4 kg (120 lb)   02/10/22 54.4 kg (120 lb)   09/01/21 54.4 kg (120 lb)   08/28/21 53.5 kg (118 lb)   08/26/21 53.5 kg (118 lb)   08/19/21 53.8 kg (118 lb 11.2 oz)   08/11/21 61.2 kg (135 lb)   05/24/21 53.5 kg (118 lb)   ? Veracity of wt hx, likely stated vs est wts x1 year. Estimated Daily Nutrient Needs:  Energy Requirements Based On: Kcal/kg  Weight Used for Energy Requirements: Current  Energy (kcal/day): 1531-1814kcals (27-33kcals/kg)  Weight Used for Protein Requirements: Current  Protein (g/day): 62-73g (1.1-1.3g/kg)  Method Used for Fluid Requirements: 1 ml/kcal  Fluid (ml/day): 1531-1814ml    Nutrition Diagnosis:   Inadequate oral intake related to impaired respiratory function (& poor appetite 2/2 COVID-19) as evidenced by intake 0-25%, poor intake prior to admission    Nutrition Interventions:   Food and/or Nutrient Delivery: Continue current diet, Continue oral nutrition supplement, Start tube feeding  Nutrition Education/Counseling: No recommendations at this time  Coordination of Nutrition Care: Continue to monitor while inpatient  Plan of Care discussed with: pt wife, RN, pulmonary    Goals:     Goals: Initiate nutrition support, Tolerate nutrition support at goal rate, Meet at least 75% of estimated needs, other (specify)  Specify Other Goals:  Wt maintenance within +/-0.5kg in 7 days    Nutrition Monitoring and Evaluation:   Behavioral-Environmental Outcomes: None identified  Food/Nutrient Intake Outcomes: Diet advancement/tolerance, Food and nutrient intake, Enteral nutrition intake/tolerance  Physical Signs/Symptoms Outcomes: Weight, Biochemical data, Hemodynamic status, Meal time behavior    Discharge Planning:     Too soon to determine    [employer]: Ext [phone], or via [facility]

## 2022-07-29 NOTE — PROGRESS NOTES
Consult  Pulmonary, Critical Care    Name: Micheal Montanez MRN: [id number]   : 1929 Hospital: [facility]   Date: 2022  Admission date: 2022 Hospital Day: 3       Subjective/Interval History:   Recent COVID pneumonitis improved with Decadron and baricitinib wean to room air was discharged to rehab facility yesterday at the rehab facility he had development of hypoxia than fever was transferred back to our emergency room T-max 101.5. Wife states that yesterday he did have some green-tinged sputum. Chest x-ray shows diffuse bilateral infiltrates unchanged. There has been no nausea vomiting choking while eating   remains on BiPAP. Blood gas this morning on nasal oxygen showed hypoxia but currently he is running 94% on 5 L awake alert no specific complaints   more anxiety last evening could not tolerate BiPAP and was placed on high flow. Still somewhat anxious today IM Vistaril seem to help    Patient Active Problem List   Diagnosis Code    Carotid stenosis I65.29    Hypoxia R09.02    COVID U07.1    Sepsis ([address] A41.9       IMPRESSION:   Acute hypoxic respiratory failure intolerant of BiPAP currently on nasal high flow  Healthcare associated pneumonia  Diffuse pulmonary infiltrates questionable pneumonia versus inflammatory response from COVID versus pulmonary edema from diastolic dysfunction and AR  Hypokalemia  Hypophosphatemia  Recent COVID pneumonitis  Atrial fibrillation  Moderate aortic regurg  Moderate to severe mitral regurg  Diastolic left ventricular dysfunction  Pyuria rule out UTI no culture results  Elevated BNP  Anxiety  Body mass index is 20.18 kg/m².         RECOMMENDATIONS/PLAN:   Worsening anxiety could not tolerate BiPAP last evening switch to nasal high flow will maintain as is  Will place on a regular dose of Vistaril 25 3 times daily for his anxiety  Agree IV Zosyn vancomycin and Zithromax nasal MRSA smear positive  BNP is elevated creatinine is normal repeat Lasix today  Blood culture in process no indication that urine was collected he is unable to bring up any sputum             Subjective/Initial History:   I have reviewed the flowsheet and previous  notes. S    I was asked by Bessie Fowler MD to see Kulwant Mcnally a 80 y.o.  male  in consultation for a chief complaint of acute hypoxic respiratory failure fever recent COVID pneumonitis        Patient PCP: Kathleen Steward MD  PMH:  has a past medical history of Atrial fibrillation ([facility] [address] COVID-19 vaccine series completed (03/2021), Hypercholesteremia, Hypertension, Neuropathy, and Stroke ([facility] [address] (08/2021). PSH:   has a past surgical history that includes pr abdomen surgery proc unlisted (Right) and hx cataract removal (Left, 2018). FHX: family history is not on file. SHX:  reports that he has quit smoking. He has never used smokeless tobacco. He reports that he does not use drugs.   Systemic review  General weight has been stable the last several days he has not noted fever chills or sweats until this morning  Eyes no double vision or momentary blindness  ENT no facial pain over the sinuses  Musculoskeletal no swollen tender joints no myalgias  Endocrinologic no polyuria polydipsia  Neurologic no seizures or syncope awake and alert oriented  Gastrointestinal no choking or gagging when eating no nausea vomiting no indigestion  Genitourinary no pain or discomfort no burning  Cardiovascular has not noted ankle edema chest pain diaphoresis or nocturia  Respiratory abrupt worsening shortness of breath last night associated with fever    No Known Allergies   MEDS:   Current Facility-Administered Medications   Medication    hydrOXYzine (VISTARIL) injection 25 mg    potassium, sodium phosphates (NEUTRA-PHOS) packet 2 Packet    potassium chloride SR (KLOR-CON 10) tablet 40 mEq    furosemide (LASIX) injection 40 mg    hydrOXYzine pamoate (VISTARIL) capsule 25 mg    [START ON 7/31/2022] Vancomycin - Please draw trough prior to dose 7/31 @ 1600    piperacillin-tazobactam (ZOSYN) 3.375 g in 0.9% sodium chloride (MBP/ADV) 100 mL MBP    apixaban (ELIQUIS) tablet 2.5 mg    aspirin delayed-release tablet 81 mg    atorvastatin (LIPITOR) tablet 20 mg    doxazosin (CARDURA) tablet 4 mg    DULoxetine (CYMBALTA) capsule 30 mg    mirtazapine (REMERON) tablet 15 mg    ranolazine ER (RANEXA) tablet 500 mg    0.9% sodium chloride infusion    acetaminophen (TYLENOL) tablet 650 mg    Or    acetaminophen (TYLENOL) suppository 650 mg    polyethylene glycol (MIRALAX) packet 17 g    ondansetron (ZOFRAN ODT) tablet 4 mg    Or    ondansetron (ZOFRAN) injection 4 mg    dexamethasone (DECADRON) 4 mg/mL injection 4 mg    baricitinib (OLUMIANT) tablet 4 mg    Vancomycin - Pharmacy to Dose    vancomycin (VANCOCIN) 1,250 mg in 0.9% sodium chloride 250 mL ([id number])    azithromycin (ZITHROMAX) 500 mg in 0.9% sodium chloride 250 mL ([id number])    mupirocin (BACTROBAN) 2 % ointment        Current Facility-Administered Medications:     hydrOXYzine (VISTARIL) injection 25 mg, 25 mg, IntraMUSCular, Q6H PRN, Mahogany Chapa MD, 25 mg at 07/29/22 0852    potassium, sodium phosphates (NEUTRA-PHOS) packet 2 Packet, 2 Packet, Oral, Q4H, Michael Mcallister MD    potassium chloride SR (KLOR-CON 10) tablet 40 mEq, 40 mEq, Oral, NOW, Michael Mcallister MD    furosemide (LASIX) injection 40 mg, 40 mg, IntraVENous, ONCE, Michael Mcallister MD    hydrOXYzine pamoate (VISTARIL) capsule 25 mg, 25 mg, Oral, Q8H, Michael Mcallister MD    [START ON 7/31/2022] Vancomycin - Please draw trough prior to dose 7/31 @ 1600, , Other, ONCE, Mahogany Chapa MD    piperacillin-tazobactam (ZOSYN) 3.375 g in 0.9% sodium chloride (MBP/ADV) 100 mL MBP, 3.375 g, IntraVENous, Q8H, Mahogany Chapa MD, Last Rate: 25 mL/hr at 07/29/22 0402, 3.375 g at 07/29/22 0402    apixaban (ELIQUIS) tablet 2.5 mg, 2.5 mg, Oral, BID, Mahogany Chapa MD, 2.5 mg at 07/28/22 2111    aspirin delayed-release tablet 81 mg, 81 mg, Oral, DAILY, Mahogany Chapa MD, 81 mg at 07/28/22 0901    atorvastatin (LIPITOR) tablet 20 mg, 20 mg, Oral, DAILY, Mahogany Chapa MD, 20 mg at 07/28/22 0901    doxazosin (CARDURA) tablet 4 mg, 4 mg, Oral, QHS, Mahogany Chapa MD, 4 mg at 07/28/22 2111    DULoxetine (CYMBALTA) capsule 30 mg, 30 mg, Oral, DAILY, Mahogany Chapa MD, 30 mg at 07/28/22 0901    mirtazapine (REMERON) tablet 15 mg, 15 mg, Oral, QHS, Mahogany Chapa MD, 15 mg at 07/28/22 2111    ranolazine ER (RANEXA) tablet 500 mg, 500 mg, Oral, BID, Mahogany Chapa MD, 500 mg at 07/28/22 2111    0.9% sodium chloride infusion, 25 mL/hr, IntraVENous, CONTINUOUS, Paul Carr MD, Last Rate: 25 mL/hr at 07/28/22 1957, 25 mL/hr at 07/28/22 1957    acetaminophen (TYLENOL) tablet 650 mg, 650 mg, Oral, Q6H PRN, 650 mg at 07/28/22 1133 **OR** acetaminophen (TYLENOL) suppository 650 mg, 650 mg, Rectal, Q6H PRN, Mahogany Chapa MD    polyethylene glycol (MIRALAX) packet 17 g, 17 g, Oral, DAILY PRN, Mahogany Chapa MD    ondansetron (ZOFRAN ODT) tablet 4 mg, 4 mg, Oral, Q8H PRN **OR** ondansetron (ZOFRAN) injection 4 mg, 4 mg, IntraVENous, Q6H PRN, Mahogany Chapa MD    dexamethasone (DECADRON) 4 mg/mL injection 4 mg, 4 mg, IntraVENous, Q6H, Mahogany Chapa MD, 4 mg at 07/29/22 6034    baricitinib (OLUMIANT) tablet 4 mg, 4 mg, Oral, Q24H, Mahogany Chapa MD, 4 mg at 07/28/22 1540    Vancomycin - Pharmacy to Dose, , Other, Rx Dosing/Monitoring, Omer Aggarwal MD    vancomycin (VANCOCIN) 1,250 mg in 0.9% sodium chloride 250 mL ([id number]), 1,250 mg, IntraVENous, Q24H, Mahogany Chapa MD, 1,250 mg at 07/28/22 1733    azithromycin (ZITHROMAX) 500 mg in 0.9% sodium chloride 250 mL ([id number]), 500 mg, IntraVENous, Q24H, Mahogany Chapa MD, Last Rate: 250 mL/hr at 07/28/22 1540, 500 mg at 07/28/22 1540    mupirocin (BACTROBAN) 2 % ointment, , Both Nostrils, BID, Vel Kim MD, Given at 07/28/22       Objective:     Vital Signs: Telemetry:    AFIB Intake/Output:   Visit Vitals  BP (!) 149/89 (BP 1 Location: Right upper arm, BP Patient Position: At rest)   Pulse 92   Temp 96.9 °F (36.1 °C)   Resp 26   Ht 5' 6\" (1.676 m)   Wt 56.7 kg (125 lb)   SpO2 99%   BMI 20.18 kg/m²       Temp (24hrs), Av.4 °F (36.3 °C), Min:96.9 °F (36.1 °C), Max:97.9 °F (36.6 °C)        O2 Device: BIPAP O2 Flow Rate (L/min): 40 l/min         Body mass index is 20.18 kg/m². Wt Readings from Last 4 Encounters:   22 56.7 kg (125 lb)   22 54.4 kg (120 lb)   02/10/22 54.4 kg (120 lb)   21 54.4 kg (120 lb)          Intake/Output Summary (Last 24 hours) at 2022 1055  Last data filed at 2022 0007  Gross per 24 hour   Intake 1152.92 ml   Output 2300 ml   Net -1147.08 ml         Last shift:      No intake/output data recorded. Last 3 shifts:  1901 -  0700  In: 5085 [P.O.:400; I.V.:2330]  Out: 3000 [Urine:3000]       Hemodynamics:    CO:    CI:    CVP:    SVR:   PAP Systolic:    PAP Diastolic:    PVR:    WH21:       Ventilator Settings:      Mode Rate TV Press PEEP FiO2 PIP Min. Vent               70 %     20.5 l/min      Physical Exam:    General:  male; in bed on nasal high flow oxygen looks anxious mild accessory muscle use  HEENT: NCAT,   Eyes: anicteric; conjunctiva clear extraocular movements intact  Neck: no nodes, no accessory MM use. No definite JVD  Chest: no deformity,   Cardiac: Irregular rate and rhythm occasional PVCs has somewhat harsh systolic murmur  Lungs: Clear anteriorly with rales posteriorly no wheezing  Abd: Thin soft positive bowel sounds no tenderness  Ext: Trace edema; no joint swelling;  No clubbing  : clear urine  Neuro: Awake alert anxious does not remember seeing me yesterday hard of hearing follow simple commands moves all 4 extremities  Psych- no agitation, mildly confused;   Skin: warm, dry, no cyanosis;   Pulses: Brachial and radial pulses intact  Capillary: Normal capillary refill      Labs:    Recent Labs     07/29/22  0400 07/28/22  0415 07/27/22  1111   WBC 13.1* 10.7 25.1*   HGB 9.8* 9.8* 11.7*    240 323   INR  --   --  1.1   APTT  --   --  26.8       Recent Labs     07/29/22  0400 07/28/22  0415 07/27/22  1428 07/27/22  1111     135* 136  135*  --  132*   K 3.4*  3.4* 3.3*  3.4*  --  4.2     106 103  104  --  99   CO2 22  20* 24  23  --  22   *  124* 109*  110*  --  123*   BUN 17  17 14  15  --  16   CREA 0.62*  0.59* 0.59*  0.56*  --  0.69*   CA 7.0*  7.0* 7.0*  7.1*  --  7.7*   MG  --  2.1  --   --    PHOS 2.2* 2.3*  --   --    LAC  --   --  2.0 3.2*   ALB 2.1*  2.0* 2.2*  2.2*  --  2.6*   ALT 36 35  --  43       Recent Labs     07/29/22  0428 07/28/22  0222 07/27/22  1346   PH 7.52* 7.49*  --    PCO2 30* 33*  --    PO2 101* 53*  --    HCO3 24 24  --    FIO2 80 40.0 100     7/29 on nasal high flow 40 L FiO2 80%  COVID-19 antigen remains positive  BNP 2705 3310  CRP 9.2 16.1  Procalcitonin 0.15  Urinalysis with 20-50 WBCs  Echo ejection fraction 87% diastolic dysfunction moderate aortic regurg moderate to severe mitral regurg left atrium 2.6 cm RVSP 25  Imaging:  I have personally reviewed the patients radiographs and have reviewed the reports:    CXR Results  (Last 48 hours)                 07/28/22 0415  XR CHEST PORT Final result    Impression:  No significant change        Narrative:  EXAM: XR CHEST PORT       INDICATION: Respiratory failure       COMPARISON: 7/27/2022       FINDINGS: A portable AP radiograph of the chest was obtained at 405 hours. Patient's on cardiac monitor. . Diffuse interstitial airspace opacities are not   significantly changed. Trace left effusion. Biapical pleural thickening. Benedetta Ou Heart   size is enlarged. .  The bones and soft tissues are grossly within normal limits.                07/27/22 1135  XR CHEST SNGL V Final result    Impression:  Stable bilateral pulmonary infiltrates. Narrative: Indication: Hypoxia       Comparison: 7/25/2022       Portable exam of the chest obtained at 1135 demonstrates normal heart size. Bilateral pulmonary infiltrates have not changed compared to the prior   examination. The osseous structures are unremarkable. Large hiatal hernia is   again noted. Results from Hospital Encounter encounter on 07/27/22    XR CHEST PORT    Narrative  EXAM: XR CHEST PORT    INDICATION: Respiratory failure    COMPARISON: 7/27/2022    FINDINGS: A portable AP radiograph of the chest was obtained at 405 hours. Patient's on cardiac monitor. . Diffuse interstitial airspace opacities are not  significantly changed. Trace left effusion. Biapical pleural thickening. Shaaron Money Heart  size is enlarged. .  The bones and soft tissues are grossly within normal limits. Impression  No significant change      XR CHEST SNGL V    Narrative  Indication: Hypoxia    Comparison: 7/25/2022    Portable exam of the chest obtained at 1135 demonstrates normal heart size. Bilateral pulmonary infiltrates have not changed compared to the prior  examination. The osseous structures are unremarkable. Large hiatal hernia is  again noted. Impression  Stable bilateral pulmonary infiltrates. Results from [place] encounter on 07/18/22    XR CHEST PORT    Narrative  INDICATION: Shortness of breath. COVID positive. COMPARISON: 7/18/2022    FINDINGS: AP portable imaging of the chest performed at 1:35 PM demonstrates a  stable cardiomediastinal silhouette. There are patchy bilateral interstitial and  alveolar opacities. . No significant osseous abnormalities are seen. Impression  Patchy bilateral interstitial and alveolar opacities are compatible  with COVID pneumonia.     Results from [place] encounter on 02/10/22    CT HEAD WO CONT    Narrative  Technique: Multiple continuous axial images were obtained from the skull base to  the vertex without administration of intravenous contrast.    Comment on dose reduction: All CT scans at this facility are performed using  dose reduction optimization technique as appropriate to perform the exam  including the following; automated exposure control, adjustments of the mA  and/or kV according to patient size, or use of iterative reconstructed  technique. Comparison examination: None    Findings:  The ventricles and sulci are prominent consistent with age-related changes of  atrophy. Periventricular hypodensity is identified consistent with changes of  chronic small vessel disease. No evidence of midline shift, mass lesion, or  extra-axial fluid collection. No evidence of parenchymal hemorrhage or  infarction in a major vascular territory. The osseous structures are intact, the paranasal sinuses are clear. Extracalvarial soft tissue hematoma posterior vertex . Impression  No acute intracranial process. Extracalvarial soft tissue hematoma posterior  vertex . Session fever with recent hospitalization possible healthcare associated pneumonia agree with Zosyn vancomycin and Zithromax. We will attempt to collect sputum for culture. Have requested urine culture blood cultures have already been obtained. He has a significant systolic murmur with elevated BNP he may have mitral regurg giving him some degree of fluid overload he only has trace edema. We will check an echocardiogram.  Creatinine is normal will decrease IV fluids for the time being. Chest x-ray with diffuse infiltrates thought to be residual inflammatory change from COVID pneumonitis but may represent some degree of fluid overload. Oxygenation is well-maintained now on noninvasive ventilator have decreased FiO2 to 45% will attempt conversion to nasal oxygen in a.m.  728 awake alert currently on noninvasive ventilator have placed on 5 L oxygen saturation 94%.   If he maintains that we will leave him on nasal oxygen today and use noninvasive ventilator at night. Nursing noted that when he drifts off to sleep he will have desaturation. BNP remains elevated neck veins are chest visible chest x-ray still diffuse infiltrates we will give 1 dose Lasix today  7/29 anxious intolerant of BiPAP last evening switch to nasal high flow. Chest x-ray is unchanged with diffuse infiltrates questionable healthcare associated pneumonia COVID inflammatory reaction or some degree pulmonary edema from diastolic dysfunction aortic regurg and mitral regurg repeat Lasix today replenish potassium and phosphorus nasal MRSA smear is positive we will continue vancomycin Zosyn and Zithromax remains on baricitinib and Decadron  Time of care 30 minutes           Thank you for allowing us to participate in the care of this patient.   We will follow along with you     Ken Morin MD

## 2022-07-29 NOTE — PROGRESS NOTES
Problem: Airway Clearance - Ineffective  Goal: Achieve or maintain patent airway  Outcome: Progressing Towards Goal     Problem: Body Temperature -  Risk of, Imbalanced  Goal: Ability to maintain a body temperature within defined limits  Outcome: Progressing Towards Goal  Goal: Will regain or maintain usual level of consciousness  Outcome: Progressing Towards Goal  Goal: Complications related to the disease process, condition or treatment will be avoided or minimized  Outcome: Progressing Towards Goal     Problem: Isolation Precautions - Risk of Spread of Infection  Goal: Prevent transmission of infectious organism to others  Outcome: Progressing Towards Goal     Problem: Nutrition Deficits  Goal: Optimize nutrtional status  Outcome: Progressing Towards Goal     Problem: Risk for Fluid Volume Deficit  Goal: Maintain normal heart rhythm  Outcome: Progressing Towards Goal  Goal: Maintain absence of muscle cramping  Outcome: Progressing Towards Goal  Goal: Maintain normal serum potassium, sodium, calcium, phosphorus, and pH  Outcome: Progressing Towards Goal     Problem: Loneliness or Risk for Loneliness  Goal: Demonstrate positive use of time alone when socialization is not possible  Outcome: Progressing Towards Goal     Problem: Fatigue  Goal: Verbalize increase energy and improved vitality  Outcome: Progressing Towards Goal     Problem: Patient Education: Go to Patient Education Activity  Goal: Patient/Family Education  Outcome: Progressing Towards Goal     Problem: Pressure Injury - Risk of  Goal: *Prevention of pressure injury  Description: Document Ed Scale and appropriate interventions in the flowsheet.   Outcome: Progressing Towards Goal  Note: Pressure Injury Interventions:  Sensory Interventions: Assess changes in LOC, Assess need for specialty bed, Keep linens dry and wrinkle-free, Minimize linen layers, Maintain/enhance activity level    Moisture Interventions: Absorbent underpads, Apply protective barrier, creams and emollients, Assess need for specialty bed, Check for incontinence Q2 hours and as needed, Internal/External urinary devices, Minimize layers    Activity Interventions: Pressure redistribution bed/mattress(bed type)         Nutrition Interventions: Document food/fluid/supplement intake, Discuss nutritional consult with provider                     Problem: Patient Education: Go to Patient Education Activity  Goal: Patient/Family Education  Outcome: Progressing Towards Goal     Problem: Falls - Risk of  Goal: *Absence of Falls  Description: Document Starleen Freeze Fall Risk and appropriate interventions in the flowsheet. Outcome: Progressing Towards Goal  Note: Fall Risk Interventions:  Mobility Interventions: Bed/chair exit alarm              Elimination Interventions: Call light in reach, Bed/chair exit alarm, Patient to call for help with toileting needs              Problem: Patient Education: Go to Patient Education Activity  Goal: Patient/Family Education  Outcome: Progressing Towards Goal     Problem: Risk for Spread of Infection  Goal: Prevent transmission of infectious organism to others  Description: Prevent the transmission of infectious organisms to other patients, staff members, and visitors.   Outcome: Progressing Towards Goal     Problem: Patient Education:  Go to Education Activity  Goal: Patient/Family Education  Outcome: Progressing Towards Goal     Problem: Gas Exchange - Impaired  Goal: Absence of hypoxia  Outcome: Not Progressing Towards Goal  Goal: Promote optimal lung function  Outcome: Not Progressing Towards Goal     Problem: Breathing Pattern - Ineffective  Goal: Ability to achieve and maintain a regular respiratory rate  Outcome: Not Progressing Towards Goal

## 2022-07-30 NOTE — PROGRESS NOTES
PT eval order received and acknowledged. PT eval attempted at 1215pm however pt being cleaned up by nsg at this time. Will continue to follow patient and attempt PT eval at a later time. Thank you.

## 2022-07-30 NOTE — PROGRESS NOTES
Patient continues to refuse BIPAP, remains restless and anxious despite medications administered. Will continue to monitor.

## 2022-07-30 NOTE — PROGRESS NOTES
Problem: Airway Clearance - Ineffective  Goal: Achieve or maintain patent airway  7/30/2022 1427 by Kendrick Mckeon RN  Outcome: Progressing Towards Goal  7/30/2022 1427 by Kendrick Mckeon RN  Outcome: Progressing Towards Goal     Problem: Gas Exchange - Impaired  Goal: Absence of hypoxia  7/30/2022 1427 by Kendrick Mckeon RN  Outcome: Progressing Towards Goal  7/30/2022 1427 by Kendrick Mckeon RN  Outcome: Progressing Towards Goal  Goal: Promote optimal lung function  7/30/2022 1427 by Kendrick Mckeon RN  Outcome: Progressing Towards Goal  7/30/2022 1427 by Kendrick Mckeon RN  Outcome: Progressing Towards Goal     Problem: Breathing Pattern - Ineffective  Goal: Ability to achieve and maintain a regular respiratory rate  7/30/2022 1427 by Kendrick Mckeon RN  Outcome: Progressing Towards Goal  7/30/2022 1427 by Kendrick Mckeon RN  Outcome: Progressing Towards Goal     Problem:  Body Temperature -  Risk of, Imbalanced  Goal: Ability to maintain a body temperature within defined limits  7/30/2022 1427 by Kendrick Mckeon RN  Outcome: Progressing Towards Goal  7/30/2022 1427 by Kendrick Mckeon RN  Outcome: Progressing Towards Goal  Goal: Will regain or maintain usual level of consciousness  7/30/2022 1427 by Kendrick Mckeon RN  Outcome: Progressing Towards Goal  7/30/2022 1427 by Kendrick Mckeon RN  Outcome: Progressing Towards Goal  Goal: Complications related to the disease process, condition or treatment will be avoided or minimized  7/30/2022 1427 by Kendrick Mckeon RN  Outcome: Progressing Towards Goal  7/30/2022 1427 by Kendrick Mckeon RN  Outcome: Progressing Towards Goal     Problem: Isolation Precautions - Risk of Spread of Infection  Goal: Prevent transmission of infectious organism to others  7/30/2022 1427 by Kendrick Mckeon RN  Outcome: Progressing Towards Goal  7/30/2022 1427 by Kendrick Mckeon RN  Outcome: Progressing Towards Goal     Problem: Nutrition Deficits  Goal: Optimize nutrtional status  7/30/2022 1427 by Torie Wilkins RN  Outcome: Progressing Towards Goal  7/30/2022 1427 by Torie Wilkins RN  Outcome: Progressing Towards Goal     Problem: Risk for Fluid Volume Deficit  Goal: Maintain normal heart rhythm  7/30/2022 1427 by Torie Wilkins RN  Outcome: Progressing Towards Goal  7/30/2022 1427 by Torie Wilkins RN  Outcome: Progressing Towards Goal  Goal: Maintain absence of muscle cramping  7/30/2022 1427 by Torie Wilkins RN  Outcome: Progressing Towards Goal  7/30/2022 1427 by Torie Wilkins RN  Outcome: Progressing Towards Goal  Goal: Maintain normal serum potassium, sodium, calcium, phosphorus, and pH  7/30/2022 1427 by Torie Wilkins RN  Outcome: Progressing Towards Goal  7/30/2022 1427 by Torie Wilkins RN  Outcome: Progressing Towards Goal     Problem: Loneliness or Risk for Loneliness  Goal: Demonstrate positive use of time alone when socialization is not possible  7/30/2022 1427 by Torie Wilkins RN  Outcome: Progressing Towards Goal  7/30/2022 1427 by Torie Wilkins RN  Outcome: Progressing Towards Goal     Problem: Fatigue  Goal: Verbalize increase energy and improved vitality  7/30/2022 1427 by Torie Wilkins RN  Outcome: Progressing Towards Goal  7/30/2022 1427 by Torie Wilkins RN  Outcome: Progressing Towards Goal     Problem: Patient Education: Go to Patient Education Activity  Goal: Patient/Family Education  7/30/2022 1427 by Torie Wilkins RN  Outcome: Progressing Towards Goal  7/30/2022 1427 by Torie Wilkins RN  Outcome: Progressing Towards Goal     Problem: Pressure Injury - Risk of  Goal: *Prevention of pressure injury  Description: Document Ed Scale and appropriate interventions in the flowsheet.   7/30/2022 1427 by Torie Wilkins RN  Outcome: Progressing Towards Goal  Note: Pressure Injury Interventions:  Sensory Interventions: Assess changes in LOC, Assess need for specialty bed, Avoid rigorous massage over bony prominences, Float heels, Minimize linen layers, Turn and reposition approx. every two hours (pillows and wedges if needed)    Moisture Interventions: Maintain skin hydration (lotion/cream), Minimize layers, Moisture barrier    Activity Interventions: Pressure redistribution bed/mattress(bed type)    Mobility Interventions: HOB 30 degrees or less, Pressure redistribution bed/mattress (bed type)    Nutrition Interventions: Document food/fluid/supplement intake, Discuss nutritional consult with provider, Offer support with meals,snacks and hydration                  7/30/2022 1427 by Jeanne Contreras RN  Outcome: Progressing Towards Goal  Note: Pressure Injury Interventions:  Sensory Interventions: Assess changes in LOC, Assess need for specialty bed, Avoid rigorous massage over bony prominences, Float heels, Minimize linen layers, Turn and reposition approx. every two hours (pillows and wedges if needed)    Moisture Interventions: Maintain skin hydration (lotion/cream), Minimize layers, Moisture barrier    Activity Interventions: Pressure redistribution bed/mattress(bed type)    Mobility Interventions: HOB 30 degrees or less, Pressure redistribution bed/mattress (bed type)    Nutrition Interventions: Document food/fluid/supplement intake, Discuss nutritional consult with provider, Offer support with meals,snacks and hydration                     Problem: Patient Education: Go to Patient Education Activity  Goal: Patient/Family Education  7/30/2022 1427 by Jeanne Contreras RN  Outcome: Progressing Towards Goal  7/30/2022 1427 by Jeanne Contreras RN  Outcome: Progressing Towards Goal     Problem: Falls - Risk of  Goal: *Absence of Falls  Description: Document Oliva Blood Fall Risk and appropriate interventions in the flowsheet.   7/30/2022 1427 by Jeanne Contreras RN  Outcome: Progressing Towards Goal  Note: Fall Risk Interventions:  Mobility Interventions: Bed/chair exit alarm, PT Consult for mobility concerns    Mentation Interventions: Adequate sleep, hydration, pain control, Bed/chair exit alarm, Reorient patient, Room close to nurse's station, Toileting rounds    Medication Interventions: Bed/chair exit alarm, Evaluate medications/consider consulting pharmacy, Patient to call before getting OOB    Elimination Interventions: Bed/chair exit alarm, Call light in reach, Patient to call for help with toileting needs, Toileting schedule/hourly rounds           7/30/2022 1427 by Jeanne Contreras RN  Outcome: Progressing Towards Goal     Problem: Patient Education: Go to Patient Education Activity  Goal: Patient/Family Education  7/30/2022 1427 by Jeanne Contreras RN  Outcome: Progressing Towards Goal  7/30/2022 1427 by Jeanne Contreras RN  Outcome: Progressing Towards Goal     Problem: Risk for Spread of Infection  Goal: Prevent transmission of infectious organism to others  Description: Prevent the transmission of infectious organisms to other patients, staff members, and visitors.   7/30/2022 1427 by Jeanne Contreras RN  Outcome: Progressing Towards Goal  7/30/2022 1427 by Jeanne Contreras RN  Outcome: Progressing Towards Goal     Problem: Patient Education:  Go to Education Activity  Goal: Patient/Family Education  7/30/2022 1427 by Jeanne Contreras RN  Outcome: Progressing Towards Goal  7/30/2022 1427 by Jeanne Contreras RN  Outcome: Progressing Towards Goal

## 2022-07-30 NOTE — PROGRESS NOTES
Patient refused NGT. As soon as tube was inserted into nostril, patient pulled it out and refused any further attempt to insert. Educated him on importance of nutrition for healing. He is still refusing most of his meals. He has drank 2 Ensure shakes so far today. He is also drinking plenty of water. I will continue to encourage nutrition.

## 2022-07-30 NOTE — PROGRESS NOTES
PROGRESS NOTE    Patient: Suzie Chery MRN: [id number]  SSN: [id number]    YOB: 1929  Age: 80 y.o. Sex: male      Admit Date: 7/27/2022    LOS: 3 days       Subjective     Chief Complaint   Patient presents with    Shortness of Breath       HPI: Patient is a 80y.o. year old male with a significant PMH of Afib, hypercholesterolemia, HTN, neuropathy, and hx of stroke presented to the ED today due to hypoxia and worsening confusion. The patient was discharged from the hospital yesterday after being hospitalized d/t COVID-19. Per nursing facility his O2 was in the 80s and it was 89% upon admission. The patient was stable at that time of discharge yesterday and not using any oxygen. The patient is now on BiPAP with an O2 sat of 100%. Febrile at 101.5  WBC 25.1  Hypertensive on admission  Respiratory rate at 31  CXR: Stable bilateral pulmonary infiltrates. Patient was given:  10mg Decadron  2g Magnesium  Duoneb treatment  Terbutaline treatment     7/28  Pt sitting in bed, responds to voice  P02 53 on ABG this morning, pt placed on BiPap  SpO2 stable  CRP=16.1    XR CHEST PORT  Diffuse interstitial airspace opacities are not  significantly changed. Trace left effusion. Biapical pleural thickening. Heartsize is enlarged      7/29    Patient on BiPAP now on 70% O2  Patient pulled out BiPAP last night put on high flow    7/30    On high flow 90% O2      Left Ventricle: Normal left ventricular systolic function with a visually estimated EF of 55 - 60%. Left ventricle size is normal. Normal wall thickness. Normal wall motion. Grade I diastolic dysfunction with normal LAP. Aortic Valve: Moderate regurgitation. Mitral Valve: Mildly thickened leaflet. 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 Moderate to severe regurgitation with an eccentrically directed jet and may underestimate severity. Tricuspid Valve: Moderate regurgitation.       Review of Systems   Unable to perform ROS: Acuity of condition       Objective     Visit Vitals  BP (!) 148/65   Pulse (!) 110   Temp 98 °F (36.7 °C)   Resp 28   Ht 5' 5.98\" (1.676 m)   Wt 56.7 kg (125 lb)   SpO2 96%   BMI 20.19 kg/m²    O2 Flow Rate (L/min): 60 l/min O2 Device: Heated, Hi flow nasal cannula    Physical Exam:   Physical Exam  Constitutional:       Appearance: He is normal weight. HENT:      Head: Normocephalic and atraumatic. Cardiovascular:      Rate and Rhythm: Normal rate and regular rhythm. Pulses: Normal pulses. Heart sounds: Normal heart sounds. Pulmonary:      Effort: Pulmonary effort is normal.      Breath sounds: Normal breath sounds. Musculoskeletal:         General: Normal range of motion. Cervical back: Normal range of motion and neck supple. Neurological:      General: No focal deficit present. Mental Status: He is alert. He is disoriented. Intake & Output:  Current Shift: 07/30 0701 - 07/30 1900  In: 102.1 [I.V.:102.1]  Out: 450 [Urine:450]  Last three shifts: 07/28 1901 - 07/30 0700  In: 2497.9 [I.V.:2497.9]  Out: 3100 [Urine:3100]    Lab/Data Review: All lab results for the last 24 hours reviewed.        24 Hour Results:    Recent Results (from the past 24 hour(s))   CULTURE, RESPIRATORY/SPUTUM/BRONCH W GRAM STAIN    Collection Time: 07/29/22  1:29 PM    Specimen: Sputum   Result Value Ref Range    Special Requests: No Special Requests      GRAM STAIN Rare WBCs seen      GRAM STAIN Rare Epithelial cells seen      GRAM STAIN Few Budding yeast      Culture result: PENDING    RENAL FUNCTION PANEL    Collection Time: 07/30/22  3:15 AM   Result Value Ref Range    Sodium 136 136 - 145 mmol/L    Potassium 3.4 (L) 3.5 - 5.1 mmol/L    Chloride 106 97 - 108 mmol/L    CO2 21 21 - 32 mmol/L    Anion gap 9 5 - 15 mmol/L    Glucose 109 (H) 65 - 100 mg/dL    BUN 19 6 - 20 mg/dL    Creatinine 0.55 (L) 0.70 - 1.30 mg/dL    BUN/Creatinine ratio 35 (H) 12 - 20      GFR est AA >60 >60 ml/min/1.73m2    GFR est non-AA >60 >60 ml/min/1.73m2    Calcium 6.6 (L) 8.5 - 10.1 mg/dL    Phosphorus 3.2 2.6 - 4.7 mg/dL    Albumin 2.1 (L) 3.5 - 5.0 g/dL   NT-PRO BNP    Collection Time: 07/30/22  3:15 AM   Result Value Ref Range    NT pro-BNP 2,592 (H) <450 pg/mL   CBC WITH AUTOMATED DIFF    Collection Time: 07/30/22  3:15 AM   Result Value Ref Range    WBC 15.2 (H) 4.1 - 11.1 K/uL    RBC 2.88 (L) 4.10 - 5.70 M/uL    HGB 9.0 (L) 12.1 - 17.0 g/dL    HCT 27.1 (L) 36.6 - 50.3 %    MCV 94.1 80.0 - 99.0 FL    MCH 31.3 26.0 - 34.0 PG    MCHC 33.2 30.0 - 36.5 g/dL    RDW 14.8 (H) 11.5 - 14.5 %    PLATELET 679 705 - 683 K/uL    MPV 9.8 8.9 - 12.9 FL    NRBC 0.0 0.0  WBC    ABSOLUTE NRBC 0.00 0.00 - 0.01 K/uL    NEUTROPHILS 95 (H) 32 - 75 %    LYMPHOCYTES 2 (L) 12 - 49 %    MONOCYTES 2 (L) 5 - 13 %    EOSINOPHILS 0 0 - 7 %    BASOPHILS 0 0 - 1 %    IMMATURE GRANULOCYTES 1 (H) 0 - 0.5 %    ABS. NEUTROPHILS 14.4 (H) 1.8 - 8.0 K/UL    ABS. LYMPHOCYTES 0.2 (L) 0.8 - 3.5 K/UL    ABS. MONOCYTES 0.4 0.0 - 1.0 K/UL    ABS. EOSINOPHILS 0.0 0.0 - 0.4 K/UL    ABS. BASOPHILS 0.0 0.0 - 0.1 K/UL    ABS. IMM. GRANS. 0.2 (H) 0.00 - 0.04 K/UL    DF AUTOMATED     C REACTIVE PROTEIN, QT    Collection Time: 07/30/22  3:15 AM   Result Value Ref Range    C-Reactive protein 6.18 (H) 0.00 - 6.95 mg/dL   METABOLIC PANEL, COMPREHENSIVE    Collection Time: 07/30/22  3:15 AM   Result Value Ref Range    Sodium 137 136 - 145 mmol/L    Potassium 3.4 (L) 3.5 - 5.1 mmol/L    Chloride 106 97 - 108 mmol/L    CO2 21 21 - 32 mmol/L    Anion gap 10 5 - 15 mmol/L    Glucose 110 (H) 65 - 100 mg/dL    BUN 19 6 - 20 mg/dL    Creatinine 0.52 (L) 0.70 - 1.30 mg/dL    BUN/Creatinine ratio 37 (H) 12 - 20      GFR est AA >60 >60 ml/min/1.73m2    GFR est non-AA >60 >60 ml/min/1.73m2    Calcium 6.6 (L) 8.5 - 10.1 mg/dL    Bilirubin, total 0.8 0.2 - 1.0 mg/dL    AST (SGOT) 48 (H) 15 - 37 U/L    ALT (SGPT) 37 12 - 78 U/L    Alk.  phosphatase 57 45 - 117 U/L    Protein, total 5.4 (L) 6.4 - 8.2 g/dL    Albumin 2.1 (L) 3.5 - 5.0 g/dL    Globulin 3.3 2.0 - 4.0 g/dL    A-G Ratio 0.6 (L) 1.1 - 2.2     BLOOD GAS, ARTERIAL    Collection Time: 07/30/22  4:13 AM   Result Value Ref Range    pH 7.55 (H) 7.35 - 7.45      PCO2 33 (L) 35 - 45 mmHg    PO2 98 80 - 100 mmHg    O2 SATURATION 97 95 - 99 %    BICARBONATE 28 (H) 22 - 26 mmol/L    BASE EXCESS 5.2 (H) 0 - 3 mmol/L    O2 METHOD High Flow Nasal Cannula      O2 FLOW RATE 60.00 L/min    FIO2 80 %    MODE OTHER      Sample source Arterial      SITE Left Radial      MANUELA'S TEST YES      Carboxy-Hgb 0.3 (L) 1 - 2 %    Methemoglobin 0.4 0 - 1.4 %    Oxyhemoglobin 96.7 95 - 99 %    Performed by Sheela Bonilla     TEMPERATURE 98.1           Imaging:    XR CHEST PORT   Final Result      Little interval change. XR CHEST PORT   Final Result   Slightly increased diffuse bilateral interstitial and alveolar   opacities, compatible with COVID pneumonia. XR CHEST PORT   Final Result   No significant change       XR CHEST SNGL V   Final Result   Stable bilateral pulmonary infiltrates.          XR CHEST PORT    (Results Pending)          Assessment     Acute hypoxic respiratory failure on high flow 90 %  Respiratory alkalosis  COVID-19 Pneumonia  Sepsis  Hypertension  Stroke  Afib  MRSA nares  Elevated BNP    Plan   Continue meds:  Eliquis 2.5mg PO BID  ASA 81 mg po daily  Lipitor 20mg PO every day  Zithromax 500 IV daily  Olumiant 4mg PO QD  Decadron 4mg IV Q6h  Doxazosin 4mg PO QHS  Cymbalta 30mg POQD  Remeron 15mg PO every day  Mupirocin 2% ointment both nostrils BID  Zosyn 3.375g IV q8hrs  Ranolazine 500mg PO every day  Vancomycin 1,250 mg IV daily  Replace potassium  Lasix 40 IV once    Continue 0.9% sodium chloride infusion    Repeat CRP, CBC, procal, lactic acid, LD  Blood cultures pending    Urine and sputum cultures  Echo results pending      Current Facility-Administered Medications:     potassium chloride (KLOR-CON) packet for solution 40 mEq, 40 mEq, Per [place] End, Q6H, Earline Fabry, MD    calcium gluconate 2 g/100 mL sodium chloride (ISO-OSM), 2 g, IntraVENous, ONCE, Earline Fabry, MD    furosemide (LASIX) injection 40 mg, 40 mg, IntraVENous, ONCE, Earline Fabry, MD    hydrOXYzine (VISTARIL) injection 25 mg, 25 mg, IntraMUSCular, Q6H PRN, Mahogany Chapa MD, 25 mg at 07/30/22 2330    hydrOXYzine pamoate (VISTARIL) capsule 25 mg, 25 mg, Oral, Q8H, Earline Fabry, MD, 25 mg at 07/30/22 0514    piperacillin-tazobactam (ZOSYN) 3.375 g in 0.9% sodium chloride (MBP/ADV) 100 mL MBP, 3.375 g, IntraVENous, Q8H, Mahogany Chapa MD, Last Rate: 25 mL/hr at 07/30/22 0314, 3.375 g at 07/30/22 0314    apixaban (ELIQUIS) tablet 2.5 mg, 2.5 mg, Oral, BID, Mahogany Chapa MD, 2.5 mg at 07/30/22 1189    aspirin delayed-release tablet 81 mg, 81 mg, Oral, DAILY, Mahogany Chapa MD, 81 mg at 07/30/22 0816    atorvastatin (LIPITOR) tablet 20 mg, 20 mg, Oral, DAILY, Mahogany Chapa MD, 20 mg at 07/30/22 9258    doxazosin (CARDURA) tablet 4 mg, 4 mg, Oral, QHS, Mahogany Chapa MD, 4 mg at 07/29/22 2109    DULoxetine (CYMBALTA) capsule 30 mg, 30 mg, Oral, DAILY, Mahogany Chapa MD, 30 mg at 07/30/22 0816    mirtazapine (REMERON) tablet 15 mg, 15 mg, Oral, QHS, Mahogany Chapa MD, 15 mg at 07/29/22 2100    ranolazine ER (RANEXA) tablet 500 mg, 500 mg, Oral, BID, Mahogany Chapa MD, 500 mg at 07/30/22 0826    0.9% sodium chloride infusion, 25 mL/hr, IntraVENous, CONTINUOUS, Earline Fabry, MD, Last Rate: 25 mL/hr at 07/28/22 1957, 25 mL/hr at 07/28/22 1957    acetaminophen (TYLENOL) tablet 650 mg, 650 mg, Oral, Q6H PRN, 650 mg at 07/28/22 1133 **OR** acetaminophen (TYLENOL) suppository 650 mg, 650 mg, Rectal, Q6H PRN, Mahogany Chapa MD    polyethylene glycol (MIRALAX) packet 17 g, 17 g, Oral, DAILY PRN, Mahogany Chapa MD    ondansetron (ZOFRAN ODT) tablet 4 mg, 4 mg, Oral, Q8H PRN **OR** ondansetron (ZOFRAN) injection 4 mg, 4 mg, IntraVENous, Q6H PRN, Jennie Kennedy MD    dexamethasone (DECADRON) 4 mg/mL injection 4 mg, 4 mg, IntraVENous, Q6H, Mahogany Chapa MD, 4 mg at 07/30/22 1407    baricitinib (OLUMIANT) tablet 4 mg, 4 mg, Oral, Q24H, Mahogany Chapa MD, 4 mg at 07/29/22 1709    azithromycin (ZITHROMAX) 500 mg in 0.9% sodium chloride 250 mL ([id number]), 500 mg, IntraVENous, Q24H, Mahogany Chapa MD, Last Rate: 250 mL/hr at 07/29/22 1709, 500 mg at 07/29/22 1709    mupirocin (BACTROBAN) 2 % ointment, , Both Nostrils, BID, Kole Covington MD, Given at 07/30/22 0980    Current Outpatient Medications   Medication Instructions    amLODIPine (NORVASC) 5 mg, Oral, DAILY    apixaban (ELIQUIS) 2.5 mg, Oral, 2 TIMES DAILY    aspirin delayed-release 81 mg, Oral, DAILY    atorvastatin (LIPITOR) 20 mg, Oral, DAILY    cholecalciferol (vitamin D3) 2,000 Units, Oral, EVERY BEDTIME    dexAMETHasone (DECADRON) 4 mg, Oral, 2 TIMES DAILY    doxazosin (CARDURA) 4 mg, Oral, DAILY    DULoxetine (CYMBALTA) 30 mg, Oral, DAILY    mirtazapine (REMERON) 15 mg, Oral, EVERY BEDTIME    ranolazine ER (RANEXA) 500 mg, Oral, 2 TIMES DAILY         Signed By: Shawn Goddard MD     July 30, 2022

## 2022-07-30 NOTE — PROGRESS NOTES
Consult  Pulmonary, Critical Care    Name: Skip Contreras MRN: [id number]   : 1929 Hospital: [facility]   Date: 2022  Admission date: 2022 Hospital Day: 4       Subjective/Interval History:   Recent COVID pneumonitis improved with Decadron and baricitinib wean to room air was discharged to rehab facility yesterday at the rehab facility he had development of hypoxia than fever was transferred back to our emergency room T-max 101.5. Wife states that yesterday he did have some green-tinged sputum. Chest x-ray shows diffuse bilateral infiltrates unchanged. There has been no nausea vomiting choking while eating   remains on BiPAP. Blood gas this morning on nasal oxygen showed hypoxia but currently he is running 94% on 5 L awake alert no specific complaints   more anxiety last evening could not tolerate BiPAP and was placed on high flow. Still somewhat anxious today IM Vistaril seem to help   awake alert tolerated nasal high flow all night but FiO2 is 90%. Not able to eat due to shortness of breath have talked with him about NG tube for tube feedings and he agrees    Patient Active Problem List   Diagnosis Code    Carotid stenosis I65.29    Hypoxia R09.02    COVID U07.1    Sepsis ([facility] [address] A41.9       IMPRESSION:   Acute hypoxic respiratory failure currently on nasal high flow  Healthcare associated pneumonia  Diffuse pulmonary infiltrates questionable pneumonia versus inflammatory response from COVID versus pulmonary edema from diastolic dysfunction and AR  Hypokalemia to be repleted  Hypophosphatemia repleted  Hypocalcemia to be repleted  Recent COVID pneumonitis  Atrial fibrillation  Moderate aortic regurg  Moderate to severe mitral regurg  Diastolic left ventricular dysfunction  Pyuria rule out UTI no culture results  Elevated BNP  Anxiety  Body mass index is 20.19 kg/m².         RECOMMENDATIONS/PLAN:   Rating nasal high flow at this point but FiO2 90%  Anxiety may be improved on 3 times daily dosing of Vistaril  Continue IV Zosyn vancomycin and Zithromax nasal MRSA smear positive blood culture no growth sputum culture occasional polys culture pending does not appear as if urine was ever sent  BNP is elevated creatinine is normal repeat Lasix today  Will place NG tube to ensure appropriate nutrition  Continue to replete potassium  Supplement calcium             Subjective/Initial History:   I have reviewed the flowsheet and previous  notes. I was asked by Puma Montilla MD to see Vince Guillen a 80 y.o.  male  in consultation for a chief complaint of acute hypoxic respiratory failure fever recent COVID pneumonitis        Patient PCP: Niko Valadez MD  PMH:  has a past medical history of Atrial fibrillation ([facility] [address] COVID-19 vaccine series completed (03/2021), Hypercholesteremia, Hypertension, Neuropathy, and Stroke ([facility] [address] (08/2021). PSH:   has a past surgical history that includes pr abdomen surgery proc unlisted (Right) and hx cataract removal (Left, 2018). FHX: family history is not on file. SHX:  reports that he has quit smoking. He has never used smokeless tobacco. He reports that he does not use drugs.   Systemic review  General weight has been stable the last several days he has not noted fever chills or sweats until this morning  Eyes no double vision or momentary blindness  ENT no facial pain over the sinuses  Musculoskeletal no swollen tender joints no myalgias  Endocrinologic no polyuria polydipsia  Neurologic no seizures or syncope awake and alert oriented  Gastrointestinal no choking or gagging when eating no nausea vomiting no indigestion  Genitourinary no pain or discomfort no burning  Cardiovascular has not noted ankle edema chest pain diaphoresis or nocturia  Respiratory abrupt worsening shortness of breath last night associated with fever    No Known Allergies   MEDS:   Current Facility-Administered Medications   Medication    hydrOXYzine (VISTARIL) injection 25 mg    hydrOXYzine pamoate (VISTARIL) capsule 25 mg    piperacillin-tazobactam (ZOSYN) 3.375 g in 0.9% sodium chloride (MBP/ADV) 100 mL MBP    apixaban (ELIQUIS) tablet 2.5 mg    aspirin delayed-release tablet 81 mg    atorvastatin (LIPITOR) tablet 20 mg    doxazosin (CARDURA) tablet 4 mg    DULoxetine (CYMBALTA) capsule 30 mg    mirtazapine (REMERON) tablet 15 mg    ranolazine ER (RANEXA) tablet 500 mg    0.9% sodium chloride infusion    acetaminophen (TYLENOL) tablet 650 mg    Or    acetaminophen (TYLENOL) suppository 650 mg    polyethylene glycol (MIRALAX) packet 17 g    ondansetron (ZOFRAN ODT) tablet 4 mg    Or    ondansetron (ZOFRAN) injection 4 mg    dexamethasone (DECADRON) 4 mg/mL injection 4 mg    baricitinib (OLUMIANT) tablet 4 mg    azithromycin (ZITHROMAX) 500 mg in 0.9% sodium chloride 250 mL ([id number])    mupirocin (BACTROBAN) 2 % ointment        Current Facility-Administered Medications:     hydrOXYzine (VISTARIL) injection 25 mg, 25 mg, IntraMUSCular, Q6H PRN, Mahogany Chapa MD, 25 mg at 07/30/22 0314    hydrOXYzine pamoate (VISTARIL) capsule 25 mg, 25 mg, Oral, Q8H, Alpesh Le MD, 25 mg at 07/30/22 0514    piperacillin-tazobactam (ZOSYN) 3.375 g in 0.9% sodium chloride (MBP/ADV) 100 mL MBP, 3.375 g, IntraVENous, Q8H, Mahogany Chapa MD, Last Rate: 25 mL/hr at 07/30/22 0314, 3.375 g at 07/30/22 0314    apixaban (ELIQUIS) tablet 2.5 mg, 2.5 mg, Oral, BID, Mahogany Chapa MD, 2.5 mg at 07/30/22 9861    aspirin delayed-release tablet 81 mg, 81 mg, Oral, DAILY, Mahogany Chapa MD, 81 mg at 07/30/22 0816    atorvastatin (LIPITOR) tablet 20 mg, 20 mg, Oral, DAILY, Mahogany Chapa MD, 20 mg at 07/30/22 7247    doxazosin (CARDURA) tablet 4 mg, 4 mg, Oral, QHS, Mahogany Chapa MD, 4 mg at 07/29/22 2109    DULoxetine (CYMBALTA) capsule 30 mg, 30 mg, Oral, DAILY, Mahogany Chapa MD, 30 mg at 07/30/22 0816    mirtazapine (REMERON) tablet 15 mg, 15 mg, Oral, QHS, Monique Dobbs MD, 15 mg at 22 2100    ranolazine ER (RANEXA) tablet 500 mg, 500 mg, Oral, BID, Peter Chapa MD, 500 mg at 22 0826    0.9% sodium chloride infusion, 25 mL/hr, IntraVENous, CONTINUOUS, Joshua Merino MD, Last Rate: 25 mL/hr at 22, 25 mL/hr at 22    acetaminophen (TYLENOL) tablet 650 mg, 650 mg, Oral, Q6H PRN, 650 mg at 22 1133 **OR** acetaminophen (TYLENOL) suppository 650 mg, 650 mg, Rectal, Q6H PRN, Mahogany Chapa MD    polyethylene glycol (MIRALAX) packet 17 g, 17 g, Oral, DAILY PRN, Mahogany Chapa MD    ondansetron (ZOFRAN ODT) tablet 4 mg, 4 mg, Oral, Q8H PRN **OR** ondansetron (ZOFRAN) injection 4 mg, 4 mg, IntraVENous, Q6H PRN, Mahogany Chapa MD    dexamethasone (DECADRON) 4 mg/mL injection 4 mg, 4 mg, IntraVENous, Q6H, Mahogany Chapa MD, 4 mg at 22 0514    baricitinib (OLUMIANT) tablet 4 mg, 4 mg, Oral, Q24H, Mahogany Chapa MD, 4 mg at 22 1709    azithromycin (ZITHROMAX) 500 mg in 0.9% sodium chloride 250 mL ([id number]), 500 mg, IntraVENous, Q24H, Mahogany Chapa MD, Last Rate: 250 mL/hr at 22 1709, 500 mg at 22 1709    mupirocin (BACTROBAN) 2 % ointment, , Both Nostrils, BID, Brittany Brunson MD, Given at 22 0816      Objective:     Vital Signs: Telemetry:    AFIB Intake/Output:   Visit Vitals  BP (!) 148/65   Pulse (!) 110   Temp 98 °F (36.7 °C)   Resp 28   Ht 5' 5.98\" (1.676 m)   Wt 56.7 kg (125 lb)   SpO2 96%   BMI 20.19 kg/m²       Temp (24hrs), Av.8 °F (36.6 °C), Min:97.2 °F (36.2 °C), Max:98.2 °F (36.8 °C)        O2 Device: Heated, Hi flow nasal cannula O2 Flow Rate (L/min): 60 l/min         Body mass index is 20.19 kg/m².     Wt Readings from Last 4 Encounters:   22 56.7 kg (125 lb)   22 54.4 kg (120 lb)   02/10/22 54.4 kg (120 lb)   21 54.4 kg (120 lb)          Intake/Output Summary (Last 24 hours) at 2022 0901  Last data filed at 2022 0826  Gross per 24 hour Intake 1447.08 ml   Output 2450 ml   Net -1002.92 ml         Last shift:      07/30 0701 - 07/30 1900  In: 102.1 [I.V.:102.1]  Out: 450 [Urine:450]  Last 3 shifts: 07/28 1901 - 07/30 0700  In: 2497.9 [I.V.:2497.9]  Out: 4982 [Urine:3100]       Hemodynamics:    CO:    CI:    CVP:    SVR:   PAP Systolic:    PAP Diastolic:    PVR:    QL04:       Ventilator Settings:      Mode Rate TV Press PEEP FiO2 PIP Min. Vent               90 %     30.4 l/min      Physical Exam:    General:  male; in bed on nasal high flow oxygen less anxious mild accessory muscle use  HEENT: NCAT,   Eyes: anicteric; conjunctiva clear extraocular movements intact  Neck: no nodes, no accessory MM use. No definite JVD  Chest: no deformity,   Cardiac: Irregular rate and rhythm occasional PVCs has somewhat harsh systolic murmur  Lungs: Clear anteriorly with rales posteriorly no wheezing  Abd: Thin soft positive bowel sounds no tenderness  Ext: Trace edema; no joint swelling;  No clubbing  : clear urine  Neuro: Awake alert seems calm  hard of hearing follow simple commands moves all 4 extremities  Psych- no agitation, mildly confused;   Skin: warm, dry, no cyanosis;   Pulses: Brachial and radial pulses intact  Capillary: Normal capillary refill      Labs:    Recent Labs     07/30/22 0315 07/29/22  0400 07/28/22  0415 07/27/22  1111   WBC 15.2* 13.1* 10.7 25.1*   HGB 9.0* 9.8* 9.8* 11.7*    243 240 323   INR  --   --   --  1.1   APTT  --   --   --  26.8       Recent Labs     07/30/22  0315 07/29/22  0400 07/28/22  0415 07/27/22  1428 07/27/22  1111     136 136  135* 136  135*  --  132*   K 3.4*  3.4* 3.4*  3.4* 3.3*  3.4*  --  4.2     106 106  106 103  104  --  99   CO2 21  21 22  20* 24  23  --  22   *  109* 126*  124* 109*  110*  --  123*   BUN 19  19 17  17 14  15  --  16   CREA 0.52*  0.55* 0.62*  0.59* 0.59*  0.56*  --  0.69*   CA 6.6*  6.6* 7.0*  7.0* 7.0*  7.1*  --  7.7*   MG  --   -- 2.1  --   --    PHOS 3.2 2.2* 2.3*  --   --    LAC  --   --   --  2.0 3.2*   ALB 2.1*  2.1* 2.1*  2.0* 2.2*  2.2*  --  2.6*   ALT 37 36 35  --  43       Recent Labs     07/30/22  0413 07/29/22  0428 07/28/22  0222   PH 7.55* 7.52* 7.49*   PCO2 33* 30* 33*   PO2 98 101* 53*   HCO3 28* 24 24   FIO2 80 80 40.0     7/30 on nasal high flow 40 L FiO2 80%  COVID-19 antigen remains positive  BNP 2592, 2705 3310  CRP 6.18, 9.2 16.1  Procalcitonin 0.15  Urinalysis with 20-50 WBCs  Echo ejection fraction 96% diastolic dysfunction moderate aortic regurg moderate to severe mitral regurg left atrium 2.6 cm RVSP 25  Imaging:  I have personally reviewed the patients radiographs and have reviewed the reports:    CXR Results  (Last 48 hours)                 07/30/22 0227  XR CHEST PORT Final result    Impression:      Little interval change. Narrative:  EXAM:  XR CHEST PORT       INDICATION: COVID pneumonitis       COMPARISON: Chest radiograph 7/29/2022       TECHNIQUE: Semiupright portable chest AP view       FINDINGS:        Cardiac loop recorder. Cardiomediastinal silhouette is stably enlarged. Grossly   unchanged widespread patchy interstitial and airspace opacities. Stable small   left and trace right pleural effusions. No definite pneumothorax. Several   chronic right-sided rib deformities. 07/29/22 0337  XR CHEST PORT Final result    Impression:  Slightly increased diffuse bilateral interstitial and alveolar   opacities, compatible with COVID pneumonia. Narrative:  INDICATION: Respiratory failure       COMPARISON: July 28, 2022       FINDINGS: AP portable imaging of the chest performed at 3:38 AM demonstrates a   stable cardiomediastinal silhouette. Diffuse bilateral interstitial and alveolar   opacities are slightly increased. A loop or cord are again overlies the left   chest. Old right-sided rib fractures are unchanged.                  Results from [place] encounter on 07/27/22    XR CHEST PORT    Narrative  EXAM:  XR CHEST PORT    INDICATION: COVID pneumonitis    COMPARISON: Chest radiograph 7/29/2022    TECHNIQUE: Semiupright portable chest AP view    FINDINGS:    Cardiac loop recorder. Cardiomediastinal silhouette is stably enlarged. Grossly  unchanged widespread patchy interstitial and airspace opacities. Stable small  left and trace right pleural effusions. No definite pneumothorax. Several  chronic right-sided rib deformities. Impression  Little interval change. XR CHEST PORT    Narrative  INDICATION: Respiratory failure    COMPARISON: July 28, 2022    FINDINGS: AP portable imaging of the chest performed at 3:38 AM demonstrates a  stable cardiomediastinal silhouette. Diffuse bilateral interstitial and alveolar  opacities are slightly increased. A loop or cord are again overlies the left  chest. Old right-sided rib fractures are unchanged. Impression  Slightly increased diffuse bilateral interstitial and alveolar  opacities, compatible with COVID pneumonia. XR CHEST PORT    Narrative  EXAM: XR CHEST PORT    INDICATION: Respiratory failure    COMPARISON: 7/27/2022    FINDINGS: A portable AP radiograph of the chest was obtained at 405 hours. Patient's on cardiac monitor. . Diffuse interstitial airspace opacities are not  significantly changed. Trace left effusion. Biapical pleural thickening. [place] [facility]  size is enlarged. .  The bones and soft tissues are grossly within normal limits.     Impression  No significant change    Results from Hospital Encounter encounter on 02/10/22    CT HEAD WO CONT    Narrative  Technique: Multiple continuous axial images were obtained from the skull base to  the vertex without administration of intravenous contrast.    Comment on dose reduction: All CT scans at this facility are performed using  dose reduction optimization technique as appropriate to perform the exam  including the following; automated exposure control, adjustments of the mA  and/or kV according to patient size, or use of iterative reconstructed  technique. Comparison examination: None    Findings:  The ventricles and sulci are prominent consistent with age-related changes of  atrophy. Periventricular hypodensity is identified consistent with changes of  chronic small vessel disease. No evidence of midline shift, mass lesion, or  extra-axial fluid collection. No evidence of parenchymal hemorrhage or  infarction in a major vascular territory. The osseous structures are intact, the paranasal sinuses are clear. Extracalvarial soft tissue hematoma posterior vertex . Impression  No acute intracranial process. Extracalvarial soft tissue hematoma posterior  vertex . Session fever with recent hospitalization possible healthcare associated pneumonia agree with Zosyn vancomycin and Zithromax. We will attempt to collect sputum for culture. Have requested urine culture blood cultures have already been obtained. He has a significant systolic murmur with elevated BNP he may have mitral regurg giving him some degree of fluid overload he only has trace edema. We will check an echocardiogram.  Creatinine is normal will decrease IV fluids for the time being. Chest x-ray with diffuse infiltrates thought to be residual inflammatory change from COVID pneumonitis but may represent some degree of fluid overload. Oxygenation is well-maintained now on noninvasive ventilator have decreased FiO2 to 45% will attempt conversion to nasal oxygen in a.m.  728 awake alert currently on noninvasive ventilator have placed on 5 L oxygen saturation 94%. If he maintains that we will leave him on nasal oxygen today and use noninvasive ventilator at night. Nursing noted that when he drifts off to sleep he will have desaturation.   BNP remains elevated neck veins are chest visible chest x-ray still diffuse infiltrates we will give 1 dose Lasix today  7/29 anxious intolerant of BiPAP last evening switch to nasal high flow. Chest x-ray is unchanged with diffuse infiltrates questionable healthcare associated pneumonia COVID inflammatory reaction or some degree pulmonary edema from diastolic dysfunction aortic regurg and mitral regurg repeat Lasix today replenish potassium and phosphorus nasal MRSA smear is positive we will continue vancomycin Zosyn and Zithromax remains on baricitinib and Decadron  7/30 much calmer today questionably due to Vistaril. Potassium remains low will supplement. Still has trace edema with normal creatinine we will repeat Lasix today. Have talked with him about placing NG tube for tube feedings and he agrees  Time of care 30 minutes           Thank you for allowing us to participate in the care of this patient.   We will follow along with you     Nandini Alexandra MD

## 2022-07-30 NOTE — PROGRESS NOTES
Problem: Airway Clearance - Ineffective  Goal: Achieve or maintain patent airway  Outcome: Progressing Towards Goal     Problem: Gas Exchange - Impaired  Goal: Promote optimal lung function  Outcome: Progressing Towards Goal     Problem: Body Temperature -  Risk of, Imbalanced  Goal: Ability to maintain a body temperature within defined limits  Outcome: Progressing Towards Goal  Goal: Will regain or maintain usual level of consciousness  Outcome: Progressing Towards Goal  Goal: Complications related to the disease process, condition or treatment will be avoided or minimized  Outcome: Progressing Towards Goal     Problem: Isolation Precautions - Risk of Spread of Infection  Goal: Prevent transmission of infectious organism to others  Outcome: Progressing Towards Goal     Problem: Risk for Fluid Volume Deficit  Goal: Maintain normal heart rhythm  Outcome: Progressing Towards Goal  Goal: Maintain absence of muscle cramping  Outcome: Progressing Towards Goal  Goal: Maintain normal serum potassium, sodium, calcium, phosphorus, and pH  Outcome: Progressing Towards Goal     Problem: Loneliness or Risk for Loneliness  Goal: Demonstrate positive use of time alone when socialization is not possible  Outcome: Progressing Towards Goal     Problem: Fatigue  Goal: Verbalize increase energy and improved vitality  Outcome: Progressing Towards Goal     Problem: Patient Education: Go to Patient Education Activity  Goal: Patient/Family Education  Outcome: Progressing Towards Goal     Problem: Pressure Injury - Risk of  Goal: *Prevention of pressure injury  Description: Document Ed Scale and appropriate interventions in the flowsheet.   Outcome: Progressing Towards Goal  Note: Pressure Injury Interventions:  Sensory Interventions: Assess changes in LOC, Avoid rigorous massage over bony prominences, Float heels, Keep linens dry and wrinkle-free, Minimize linen layers    Moisture Interventions: Maintain skin hydration (lotion/cream), Minimize layers, Moisture barrier, Internal/External urinary devices, Check for incontinence Q2 hours and as needed    Activity Interventions: Pressure redistribution bed/mattress(bed type)    Mobility Interventions: HOB 30 degrees or less, Pressure redistribution bed/mattress (bed type), Turn and reposition approx. every two hours(pillow and wedges)    Nutrition Interventions: Document food/fluid/supplement intake, Discuss nutritional consult with provider, Offer support with meals,snacks and hydration                     Problem: Patient Education: Go to Patient Education Activity  Goal: Patient/Family Education  Outcome: Progressing Towards Goal     Problem: Falls - Risk of  Goal: *Absence of Falls  Description: Document John Mandujano Fall Risk and appropriate interventions in the flowsheet. Outcome: Progressing Towards Goal  Note: Fall Risk Interventions:  Mobility Interventions: Bed/chair exit alarm    Mentation Interventions: Bed/chair exit alarm, Adequate sleep, hydration, pain control, More frequent rounding, Reorient patient    Medication Interventions: Bed/chair exit alarm, Evaluate medications/consider consulting pharmacy    Elimination Interventions: Call light in reach, Bed/chair exit alarm, Toileting schedule/hourly rounds, Patient to call for help with toileting needs              Problem: Patient Education: Go to Patient Education Activity  Goal: Patient/Family Education  Outcome: Progressing Towards Goal     Problem: Risk for Spread of Infection  Goal: Prevent transmission of infectious organism to others  Description: Prevent the transmission of infectious organisms to other patients, staff members, and visitors.   Outcome: Progressing Towards Goal     Problem: Patient Education:  Go to Education Activity  Goal: Patient/Family Education  Outcome: Progressing Towards Goal     Problem: Gas Exchange - Impaired  Goal: Absence of hypoxia  Outcome: Not Progressing Towards Goal     Problem: Breathing Pattern - Ineffective  Goal: Ability to achieve and maintain a regular respiratory rate  Outcome: Not Progressing Towards Goal     Problem: Nutrition Deficits  Goal: Optimize nutrtional status  Outcome: Not Progressing Towards Goal

## 2022-07-31 NOTE — PROGRESS NOTES
Consult  Pulmonary, Critical Care    Name: Shashi Gonzalez MRN: [id number]   : 1929 Hospital: [facility]   Date: 2022  Admission date: 2022 Hospital Day: 5       Subjective/Interval History:   Recent COVID pneumonitis improved with Decadron and baricitinib wean to room air was discharged to rehab facility yesterday at the rehab facility he had development of hypoxia than fever was transferred back to our emergency room T-max 101.5. Wife states that yesterday he did have some green-tinged sputum. Chest x-ray shows diffuse bilateral infiltrates unchanged. There has been no nausea vomiting choking while eating   remains on BiPAP. Blood gas this morning on nasal oxygen showed hypoxia but currently he is running 94% on 5 L awake alert no specific complaints   more anxiety last evening could not tolerate BiPAP and was placed on high flow. Still somewhat anxious today IM Vistaril seem to help   awake alert tolerated nasal high flow all night but FiO2 is 90%. Not able to eat due to shortness of breath have talked with him about NG tube for tube feedings and he agrees    Patient Active Problem List   Diagnosis Code    Carotid stenosis I65.29    Hypoxia R09.02    COVID U07.1    Sepsis ([facility] [address] A41.9       IMPRESSION:   Acute hypoxic respiratory failure currently on nasal high flow  Healthcare associated pneumonia  Diffuse pulmonary infiltrates questionable pneumonia versus inflammatory response from COVID versus pulmonary edema from diastolic dysfunction and AR  Oral thrush  Hypokalemia to be repleted  Hypophosphatemia repleted  Hypocalcemia to be repleted  Recent COVID pneumonitis  Atrial fibrillation  Moderate aortic regurg  Moderate to severe mitral regurg  Diastolic left ventricular dysfunction  Pyuria rule out UTI no culture results  Elevated BNP  Anxiety  Body mass index is 20.19 kg/m².       RECOMMENDATIONS/PLAN:   Rating nasal high flow at this point but FiO2 90%  Anxiety may be improved on 3 times daily dosing of Vistaril  Continue IV Zosyn vancomycin and Zithromax nasal MRSA smear positive blood culture no growth sputum culture occasional polys culture pending does not appear as if urine was ever sent  BNP is elevated creatinine is normal repeat Lasix today  Will place NG tube to ensure appropriate nutrition  Continue to replete potassium  Supplement calcium  Will start patient on nystatin and give 1 dose of Diflucan         Subjective/Initial History:   I have reviewed the flowsheet and previous  notes. I was asked by Oliver Perdue MD to see Gilbert Jimenez a 80 y.o.  male  in consultation for a chief complaint of acute hypoxic respiratory failure fever recent COVID pneumonitis        Patient PCP: Juan M Weinstein MD  PMH:  has a past medical history of Atrial fibrillation ([facility] [address] COVID-19 vaccine series completed (03/2021), Hypercholesteremia, Hypertension, Neuropathy, and Stroke ([facility] [address] (08/2021). PSH:   has a past surgical history that includes pr abdomen surgery proc unlisted (Right) and hx cataract removal (Left, 2018). FHX: family history is not on file. SHX:  reports that he has quit smoking. He has never used smokeless tobacco. He reports that he does not use drugs.   Systemic review  General weight has been stable the last several days he has not noted fever chills or sweats until this morning  Eyes no double vision or momentary blindness  ENT no facial pain over the sinuses  Musculoskeletal no swollen tender joints no myalgias  Endocrinologic no polyuria polydipsia  Neurologic no seizures or syncope awake and alert oriented  Gastrointestinal no choking or gagging when eating no nausea vomiting no indigestion  Genitourinary no pain or discomfort no burning  Cardiovascular has not noted ankle edema chest pain diaphoresis or nocturia  Respiratory abrupt worsening shortness of breath last night associated with fever    No Known Allergies   MEDS:   Current Facility-Administered Medications   Medication    hydrOXYzine (VISTARIL) injection 25 mg    hydrOXYzine pamoate (VISTARIL) capsule 25 mg    piperacillin-tazobactam (ZOSYN) 3.375 g in 0.9% sodium chloride (MBP/ADV) 100 mL MBP    apixaban (ELIQUIS) tablet 2.5 mg    aspirin delayed-release tablet 81 mg    atorvastatin (LIPITOR) tablet 20 mg    doxazosin (CARDURA) tablet 4 mg    DULoxetine (CYMBALTA) capsule 30 mg    mirtazapine (REMERON) tablet 15 mg    ranolazine ER (RANEXA) tablet 500 mg    0.9% sodium chloride infusion    acetaminophen (TYLENOL) tablet 650 mg    Or    acetaminophen (TYLENOL) suppository 650 mg    polyethylene glycol (MIRALAX) packet 17 g    ondansetron (ZOFRAN ODT) tablet 4 mg    Or    ondansetron (ZOFRAN) injection 4 mg    dexamethasone (DECADRON) 4 mg/mL injection 4 mg    baricitinib (OLUMIANT) tablet 4 mg    azithromycin (ZITHROMAX) 500 mg in 0.9% sodium chloride 250 mL ([id number])    mupirocin (BACTROBAN) 2 % ointment        Current Facility-Administered Medications:     hydrOXYzine (VISTARIL) injection 25 mg, 25 mg, IntraMUSCular, Q6H PRN, Mahogany Chapa MD, 25 mg at 07/30/22 0314    hydrOXYzine pamoate (VISTARIL) capsule 25 mg, 25 mg, Oral, Q8H, Keven Peña MD, 25 mg at 07/31/22 0551    piperacillin-tazobactam (ZOSYN) 3.375 g in 0.9% sodium chloride (MBP/ADV) 100 mL MBP, 3.375 g, IntraVENous, Q8H, Mahogany Chapa MD, Last Rate: 25 mL/hr at 07/31/22 0551, 3.375 g at 07/31/22 0551    apixaban (ELIQUIS) tablet 2.5 mg, 2.5 mg, Oral, BID, Mahogany Chapa MD, 2.5 mg at 07/30/22 2006    aspirin delayed-release tablet 81 mg, 81 mg, Oral, DAILY, Mahogany Chapa MD, 81 mg at 07/30/22 0816    atorvastatin (LIPITOR) tablet 20 mg, 20 mg, Oral, DAILY, Mahogany Chapa MD, 20 mg at 07/30/22 9190    doxazosin (CARDURA) tablet 4 mg, 4 mg, Oral, QHS, Mahogany Chapa MD, 4 mg at 07/30/22 2151    DULoxetine (CYMBALTA) capsule 30 mg, 30 mg, Oral, DAILY, Mahogany Chapa MD, 30 mg at 07/30/22 4891    mirtazapine (REMERON) tablet 15 mg, 15 mg, Oral, QHS, Mahogany Chapa MD, 15 mg at 22    ranolazine ER (RANEXA) tablet 500 mg, 500 mg, Oral, BID, Anh Chapa MD, 500 mg at 22    0.9% sodium chloride infusion, 25 mL/hr, IntraVENous, CONTINUOUS, Amanda Hackett MD, Last Rate: 25 mL/hr at 22, 25 mL/hr at 22    acetaminophen (TYLENOL) tablet 650 mg, 650 mg, Oral, Q6H PRN, 650 mg at 22 **OR** acetaminophen (TYLENOL) suppository 650 mg, 650 mg, Rectal, Q6H PRN, Mahogany Chapa MD    polyethylene glycol (MIRALAX) packet 17 g, 17 g, Oral, DAILY PRN, Mahogany Chapa MD    ondansetron (ZOFRAN ODT) tablet 4 mg, 4 mg, Oral, Q8H PRN **OR** ondansetron (ZOFRAN) injection 4 mg, 4 mg, IntraVENous, Q6H PRN, Mahogany Chapa MD    dexamethasone (DECADRON) 4 mg/mL injection 4 mg, 4 mg, IntraVENous, Q6H, Mahogany Chapa MD, 4 mg at 22 0551    baricitinib (OLUMIANT) tablet 4 mg, 4 mg, Oral, Q24H, Mahogany Chapa MD, 4 mg at 22 1707    azithromycin (ZITHROMAX) 500 mg in 0.9% sodium chloride 250 mL ([id number]), 500 mg, IntraVENous, Q24H, Mahogany Chapa MD, Last Rate: 250 mL/hr at 22 1541, 500 mg at 22 1541    mupirocin (BACTROBAN) 2 % ointment, , Both Nostrils, BID, Alan Donis MD, Given at 22      Objective:     Vital Signs: Telemetry:    AFIB Intake/Output:   Visit Vitals  /74   Pulse (!) 101   Temp 98 °F (36.7 °C)   Resp 25   Ht 5' 5.98\" (1.676 m)   Wt 56.7 kg (125 lb)   SpO2 98%   BMI 20.19 kg/m²       Temp (24hrs), Av °F (36.7 °C), Min:97.9 °F (36.6 °C), Max:98 °F (36.7 °C)        O2 Device: Hi flow nasal cannula, Humidifier, Heated O2 Flow Rate (L/min): 60 l/min         Body mass index is 20.19 kg/m².     Wt Readings from Last 4 Encounters:   22 56.7 kg (125 lb)   22 54.4 kg (120 lb)   02/10/22 54.4 kg (120 lb)   21 54.4 kg (120 lb)          Intake/Output Summary (Last 24 hours) at 7/31/2022 4561  Last data filed at 7/30/2022 2005  Gross per 24 hour   Intake 1920 ml   Output 2350 ml   Net -430 ml         Last shift:      No intake/output data recorded. Last 3 shifts: 07/29 1901 - 07/31 0700  In: 3367.1 [P.O.:1120; I.V.:2247.1]  Out: 3250 [Urine:3250]       Hemodynamics:    CO:    CI:    CVP:    SVR:   PAP Systolic:    PAP Diastolic:    PVR:    AQ73:       Ventilator Settings:      Mode Rate TV Press PEEP FiO2 PIP Min. Vent               90 %     30.4 l/min      Physical Exam:    General:  male; in bed on nasal high flow oxygen less anxious mild accessory muscle use  HEENT: NCAT,   Eyes: anicteric; conjunctiva clear extraocular movements intact  Neck: no nodes, no accessory MM use. No definite JVD  Chest: no deformity,   Cardiac: Irregular rate and rhythm occasional PVCs has somewhat harsh systolic murmur  Lungs: Clear anteriorly with rales posteriorly no wheezing  Abd: Thin soft positive bowel sounds no tenderness  Ext: Trace edema; no joint swelling;  No clubbing  : clear urine  Neuro: Awake alert seems calm  hard of hearing follow simple commands moves all 4 extremities  Psych- no agitation, mildly confused;   Skin: warm, dry, no cyanosis;   Pulses: Brachial and radial pulses intact  Capillary: Normal capillary refill      Labs:    Recent Labs     07/31/22  0600 07/30/22  0315 07/29/22  0400   WBC 12.8* 15.2* 13.1*   HGB 8.5* 9.0* 9.8*    247 243       Recent Labs     07/31/22  0600 07/30/22  0315 07/29/22  0400    137  136 136  135*   K 4.2 3.4*  3.4* 3.4*  3.4*    106  106 106  106   CO2 26 21  21 22  20*   * 110*  109* 126*  124*   BUN 22* 19  19 17  17   CREA 0.61* 0.52*  0.55* 0.62*  0.59*   CA 7.5* 6.6*  6.6* 7.0*  7.0*   PHOS 2.6 3.2 2.2*   ALB 2.2* 2.1*  2.1* 2.1*  2.0*   ALT  --  37 36       Recent Labs     07/30/22  0413 07/29/22  0428   PH 7.55* 7.52*   PCO2 33* 30*   PO2 98 101*   HCO3 28* 24   FIO2 80 80     7/30 on nasal high flow 40 L FiO2 80%  COVID-19 antigen remains positive  BNP 2592, 2705 3310  CRP 6.18, 9.2 16.1  Procalcitonin 0.15  Urinalysis with 20-50 WBCs  Echo ejection fraction 65% diastolic dysfunction moderate aortic regurg moderate to severe mitral regurg left atrium 2.6 cm RVSP 25  Imaging:  I have personally reviewed the patients radiographs and have reviewed the reports:    CXR Results  (Last 48 hours)                 07/31/22 0311  XR CHEST PORT Final result    Impression:  No significant change       Narrative:  EXAM: XR CHEST PORT       INDICATION: Respiratory failure       COMPARISON: 7/30/2022       FINDINGS: A portable AP radiograph of the chest was obtained at 311 hours. Cardiac monitoring leads. . Bilateral interstitial and airspace opacities without   significant change. Trace left pleural effusion. The cardiac and mediastinal   contours and pulmonary vascularity are normal.  The bones and soft tissues are   grossly within normal limits. 07/30/22 0227  XR CHEST PORT Final result    Impression:      Little interval change. Narrative:  EXAM:  XR CHEST PORT       INDICATION: COVID pneumonitis       COMPARISON: Chest radiograph 7/29/2022       TECHNIQUE: Semiupright portable chest AP view       FINDINGS:        Cardiac loop recorder. Cardiomediastinal silhouette is stably enlarged. Grossly   unchanged widespread patchy interstitial and airspace opacities. Stable small   left and trace right pleural effusions. No definite pneumothorax. Several   chronic right-sided rib deformities. Results from Hospital Encounter encounter on 07/27/22    XR CHEST PORT    Narrative  EXAM: XR CHEST PORT    INDICATION: Respiratory failure    COMPARISON: 7/30/2022    FINDINGS: A portable AP radiograph of the chest was obtained at 311 hours. Cardiac monitoring leads. . Bilateral interstitial and airspace opacities without  significant change. Trace left pleural effusion.  The cardiac and mediastinal  contours and pulmonary vascularity are normal.  The bones and soft tissues are  grossly within normal limits. Impression  No significant change      XR CHEST PORT    Narrative  EXAM:  XR CHEST PORT    INDICATION: COVID pneumonitis    COMPARISON: Chest radiograph 7/29/2022    TECHNIQUE: Semiupright portable chest AP view    FINDINGS:    Cardiac loop recorder. Cardiomediastinal silhouette is stably enlarged. Grossly  unchanged widespread patchy interstitial and airspace opacities. Stable small  left and trace right pleural effusions. No definite pneumothorax. Several  chronic right-sided rib deformities. Impression  Little interval change. XR CHEST PORT    Narrative  INDICATION: Respiratory failure    COMPARISON: July 28, 2022    FINDINGS: AP portable imaging of the chest performed at 3:38 AM demonstrates a  stable cardiomediastinal silhouette. Diffuse bilateral interstitial and alveolar  opacities are slightly increased. A loop or cord are again overlies the left  chest. Old right-sided rib fractures are unchanged. Impression  Slightly increased diffuse bilateral interstitial and alveolar  opacities, compatible with COVID pneumonia. Results from [place] encounter on 02/10/22    CT HEAD WO CONT    Narrative  Technique: Multiple continuous axial images were obtained from the skull base to  the vertex without administration of intravenous contrast.    Comment on dose reduction: All CT scans at this facility are performed using  dose reduction optimization technique as appropriate to perform the exam  including the following; automated exposure control, adjustments of the mA  and/or kV according to patient size, or use of iterative reconstructed  technique. Comparison examination: None    Findings:  The ventricles and sulci are prominent consistent with age-related changes of  atrophy.  Periventricular hypodensity is identified consistent with changes of  chronic small vessel disease. No evidence of midline shift, mass lesion, or  extra-axial fluid collection. No evidence of parenchymal hemorrhage or  infarction in a major vascular territory. The osseous structures are intact, the paranasal sinuses are clear. Extracalvarial soft tissue hematoma posterior vertex . Impression  No acute intracranial process. Extracalvarial soft tissue hematoma posterior  vertex . Session fever with recent hospitalization possible healthcare associated pneumonia agree with Zosyn vancomycin and Zithromax. We will attempt to collect sputum for culture. Have requested urine culture blood cultures have already been obtained. He has a significant systolic murmur with elevated BNP he may have mitral regurg giving him some degree of fluid overload he only has trace edema. We will check an echocardiogram.  Creatinine is normal will decrease IV fluids for the time being. Chest x-ray with diffuse infiltrates thought to be residual inflammatory change from COVID pneumonitis but may represent some degree of fluid overload. Oxygenation is well-maintained now on noninvasive ventilator have decreased FiO2 to 45% will attempt conversion to nasal oxygen in a.m.  728 awake alert currently on noninvasive ventilator have placed on 5 L oxygen saturation 94%. If he maintains that we will leave him on nasal oxygen today and use noninvasive ventilator at night. Nursing noted that when he drifts off to sleep he will have desaturation. BNP remains elevated neck veins are chest visible chest x-ray still diffuse infiltrates we will give 1 dose Lasix today  7/29 anxious intolerant of BiPAP last evening switch to nasal high flow.   Chest x-ray is unchanged with diffuse infiltrates questionable healthcare associated pneumonia COVID inflammatory reaction or some degree pulmonary edema from diastolic dysfunction aortic regurg and mitral regurg repeat Lasix today replenish potassium and phosphorus nasal MRSA smear is positive we will continue vancomycin Zosyn and Zithromax remains on baricitinib and Decadron  7/30 much calmer today questionably due to Vistaril. Potassium remains low will supplement. Still has trace edema with normal creatinine we will repeat Lasix today. Have talked with him about placing NG tube for tube feedings and he agrees  7/31 alert awake oral thrush on 90% FiO2 intermittent agitation as per nurse  Time of care 30 minutes           Thank you for allowing us to participate in the care of this patient.   We will follow along with you     Sundeep Vu MD

## 2022-07-31 NOTE — PROGRESS NOTES
PROGRESS NOTE    Patient: Kulwant Mcnally MRN: [id number]  SSN: [id number]    YOB: 1929  Age: 80 y.o. Sex: male      Admit Date: 7/27/2022    LOS: 4 days       Subjective     Chief Complaint   Patient presents with    Shortness of Breath       HPI: Patient is a 80y.o. year old male with a significant PMH of Afib, hypercholesterolemia, HTN, neuropathy, and hx of stroke presented to the ED today due to hypoxia and worsening confusion. The patient was discharged from the hospital yesterday after being hospitalized d/t COVID-19. Per nursing facility his O2 was in the 80s and it was 89% upon admission. The patient was stable at that time of discharge yesterday and not using any oxygen. The patient is now on BiPAP with an O2 sat of 100%. Febrile at 101.5  WBC 25.1  Hypertensive on admission  Respiratory rate at 31  CXR: Stable bilateral pulmonary infiltrates. Patient was given:  10mg Decadron  2g Magnesium  Duoneb treatment  Terbutaline treatment     7/28  Pt sitting in bed, responds to voice  P02 53 on ABG this morning, pt placed on BiPap  SpO2 stable  CRP=16.1    XR CHEST PORT  Diffuse interstitial airspace opacities are not  significantly changed. Trace left effusion. Biapical pleural thickening. Heartsize is enlarged      7/29    Patient on BiPAP now on 70% O2  Patient pulled out BiPAP last night put on high flow    7/30    On high flow 90% O2      Left Ventricle: Normal left ventricular systolic function with a visually estimated EF of 55 - 60%. Left ventricle size is normal. Normal wall thickness. Normal wall motion. Grade I diastolic dysfunction with normal LAP. Aortic Valve: Moderate regurgitation. Mitral Valve: Mildly thickened leaflet. 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 Moderate to severe regurgitation with an eccentrically directed jet and may underestimate severity. Tricuspid Valve: Moderate regurgitation.       7/31    On high flow 80% O2      Review of Systems   Unable to perform ROS: Acuity of condition       Objective     Visit Vitals  BP (!) 145/79   Pulse (!) 101   Temp 97.7 °F (36.5 °C)   Resp 28   Ht 5' 5.98\" (1.676 m)   Wt 56.7 kg (125 lb)   SpO2 97%   BMI 20.19 kg/m²    O2 Flow Rate (L/min): 60 l/min O2 Device: Heated, Hi flow nasal cannula    Physical Exam:   Physical Exam  Constitutional:       Appearance: He is normal weight. HENT:      Head: Normocephalic and atraumatic. Cardiovascular:      Rate and Rhythm: Normal rate and regular rhythm. Pulses: Normal pulses. Heart sounds: Normal heart sounds. Pulmonary:      Effort: Pulmonary effort is normal.      Breath sounds: Normal breath sounds. Musculoskeletal:         General: Normal range of motion. Cervical back: Normal range of motion and neck supple. Neurological:      General: No focal deficit present. Mental Status: He is alert. He is disoriented. Intake & Output:  Current Shift: No intake/output data recorded. Last three shifts: 07/29 1901 - 07/31 0700  In: 3367.1 [P.O.:1120; I.V.:2247.1]  Out: 3250 [Urine:3250]    Lab/Data Review: All lab results for the last 24 hours reviewed.        24 Hour Results:    Recent Results (from the past 24 hour(s))   RENAL FUNCTION PANEL    Collection Time: 07/31/22  6:00 AM   Result Value Ref Range    Sodium 136 136 - 145 mmol/L    Potassium 4.2 3.5 - 5.1 mmol/L    Chloride 104 97 - 108 mmol/L    CO2 26 21 - 32 mmol/L    Anion gap 6 5 - 15 mmol/L    Glucose 125 (H) 65 - 100 mg/dL    BUN 22 (H) 6 - 20 mg/dL    Creatinine 0.61 (L) 0.70 - 1.30 mg/dL    BUN/Creatinine ratio 36 (H) 12 - 20      GFR est AA >60 >60 ml/min/1.73m2    GFR est non-AA >60 >60 ml/min/1.73m2    Calcium 7.5 (L) 8.5 - 10.1 mg/dL    Phosphorus 2.6 2.6 - 4.7 mg/dL    Albumin 2.2 (L) 3.5 - 5.0 g/dL   CBC WITH AUTOMATED DIFF    Collection Time: 07/31/22  6:00 AM   Result Value Ref Range    WBC 12.8 (H) 4.1 - 11.1 K/uL    RBC 2.74 (L) 4.10 - 5.70 M/uL    HGB 8.5 (L) 12.1 - 17.0 g/dL    HCT 25.7 (L) 36.6 - 50.3 %    MCV 93.8 80.0 - 99.0 FL    MCH 31.0 26.0 - 34.0 PG    MCHC 33.1 30.0 - 36.5 g/dL    RDW 14.7 (H) 11.5 - 14.5 %    PLATELET 003 446 - 740 K/uL    MPV 9.9 8.9 - 12.9 FL    NRBC 0.0 0.0  WBC    ABSOLUTE NRBC 0.00 0.00 - 0.01 K/uL    NEUTROPHILS 95 (H) 32 - 75 %    LYMPHOCYTES 2 (L) 12 - 49 %    MONOCYTES 2 (L) 5 - 13 %    EOSINOPHILS 0 0 - 7 %    BASOPHILS 0 0 - 1 %    IMMATURE GRANULOCYTES 1 (H) 0 - 0.5 %    ABS. NEUTROPHILS 12.2 (H) 1.8 - 8.0 K/UL    ABS. LYMPHOCYTES 0.2 (L) 0.8 - 3.5 K/UL    ABS. MONOCYTES 0.3 0.0 - 1.0 K/UL    ABS. EOSINOPHILS 0.0 0.0 - 0.4 K/UL    ABS. BASOPHILS 0.0 0.0 - 0.1 K/UL    ABS. IMM. GRANS. 0.1 (H) 0.00 - 0.04 K/UL    DF AUTOMATED           Imaging:    XR CHEST PORT   Final Result   No significant change      XR CHEST PORT   Final Result      Little interval change. XR CHEST PORT   Final Result   Slightly increased diffuse bilateral interstitial and alveolar   opacities, compatible with COVID pneumonia. XR CHEST PORT   Final Result   No significant change       XR CHEST SNGL V   Final Result   Stable bilateral pulmonary infiltrates.          XR CHEST PORT    (Results Pending)          Assessment     Acute hypoxic respiratory failure on high flow 80 %  Respiratory alkalosis  COVID-19 Pneumonia  Sepsis  Hypertension  Stroke  Afib  MRSA nares  Elevated BNP    Plan   Continue meds:  Eliquis 2.5mg PO BID  ASA 81 mg po daily  Lipitor 20mg PO every day  Zithromax 500 IV daily  Olumiant 4mg PO QD  Decadron 4mg IV Q6h  Doxazosin 4mg PO QHS  Cymbalta 30mg POQD  Remeron 15mg PO every day  Mupirocin 2% ointment both nostrils BID  Zosyn 3.375g IV q8hrs  Ranolazine 500mg PO every day  Replace potassium  Lasix 40 IV once              Current Facility-Administered Medications:     nystatin (MYCOSTATIN) 100,000 unit/mL oral suspension 500,000 Units, 500,000 Units, Oral, QID, Bharath Johnson MD, 500,000 Units at 07/31/22 9937 hydrOXYzine (VISTARIL) injection 25 mg, 25 mg, IntraMUSCular, Q6H PRN, Mahogany Chapa MD, 25 mg at 07/30/22 4534    hydrOXYzine pamoate (VISTARIL) capsule 25 mg, 25 mg, Oral, Q8H, Shamar Velarde MD, 25 mg at 07/31/22 0551    piperacillin-tazobactam (ZOSYN) 3.375 g in 0.9% sodium chloride (MBP/ADV) 100 mL MBP, 3.375 g, IntraVENous, Q8H, Mahogany Chapa MD, Last Rate: 25 mL/hr at 07/31/22 0551, 3.375 g at 07/31/22 0551    apixaban (ELIQUIS) tablet 2.5 mg, 2.5 mg, Oral, BID, Mahogany Chapa MD, 2.5 mg at 07/31/22 1642    aspirin delayed-release tablet 81 mg, 81 mg, Oral, DAILY, Mahogany Chapa MD, 81 mg at 07/31/22 0846    atorvastatin (LIPITOR) tablet 20 mg, 20 mg, Oral, DAILY, Mahogany Chapa MD, 20 mg at 07/31/22 0846    doxazosin (CARDURA) tablet 4 mg, 4 mg, Oral, QHS, Mahogany Chapa MD, 4 mg at 07/30/22 2151    DULoxetine (CYMBALTA) capsule 30 mg, 30 mg, Oral, DAILY, Mahogany Chapa MD, 30 mg at 07/31/22 0846    mirtazapine (REMERON) tablet 15 mg, 15 mg, Oral, QHS, Mahogany Chapa MD, 15 mg at 07/30/22 2152    ranolazine ER (RANEXA) tablet 500 mg, 500 mg, Oral, BID, Mahogany Chapa MD, 500 mg at 07/31/22 0846    0.9% sodium chloride infusion, 25 mL/hr, IntraVENous, CONTINUOUS, Shamar Velarde MD, Last Rate: 25 mL/hr at 07/28/22 1957, 25 mL/hr at 07/28/22 1957    acetaminophen (TYLENOL) tablet 650 mg, 650 mg, Oral, Q6H PRN, 650 mg at 07/30/22 2150 **OR** acetaminophen (TYLENOL) suppository 650 mg, 650 mg, Rectal, Q6H PRN, Mahogany Chapa MD    polyethylene glycol (MIRALAX) packet 17 g, 17 g, Oral, DAILY PRN, Mahogany Chapa MD    ondansetron (ZOFRAN ODT) tablet 4 mg, 4 mg, Oral, Q8H PRN **OR** ondansetron (ZOFRAN) injection 4 mg, 4 mg, IntraVENous, Q6H PRN, Mahogany Chapa MD    dexamethasone (DECADRON) 4 mg/mL injection 4 mg, 4 mg, IntraVENous, Q6H, Mahogany Chapa MD, 4 mg at 07/31/22 0551    baricitinib (OLUMIANT) tablet 4 mg, 4 mg, Oral, Q24H, Mahogany Chapa MD, 4 mg at 07/30/22 1707    azithromycin (ZITHROMAX) 500 mg in 0.9% sodium chloride 250 mL ([id number]), 500 mg, IntraVENous, Q24H, Mahogany Chapa MD, Stopped at 07/30/22 1641    mupirocin (BACTROBAN) 2 % ointment, , Both Nostrils, BID, Idania Sparks MD, Given at 07/31/22 5106    Current Outpatient Medications   Medication Instructions    amLODIPine (NORVASC) 5 mg, Oral, DAILY    apixaban (ELIQUIS) 2.5 mg, Oral, 2 TIMES DAILY    aspirin delayed-release 81 mg, Oral, DAILY    atorvastatin (LIPITOR) 20 mg, Oral, DAILY    cholecalciferol (vitamin D3) 2,000 Units, Oral, EVERY BEDTIME    dexAMETHasone (DECADRON) 4 mg, Oral, 2 TIMES DAILY    doxazosin (CARDURA) 4 mg, Oral, DAILY    DULoxetine (CYMBALTA) 30 mg, Oral, DAILY    mirtazapine (REMERON) 15 mg, Oral, EVERY BEDTIME    ranolazine ER (RANEXA) 500 mg, Oral, 2 TIMES DAILY         Signed By: Cliff Thompson MD     July 31, 2022

## 2022-07-31 NOTE — PROGRESS NOTES
Problem: Airway Clearance - Ineffective  Goal: Achieve or maintain patent airway  Outcome: Progressing Towards Goal     Problem: Gas Exchange - Impaired  Goal: Absence of hypoxia  Outcome: Progressing Towards Goal  Goal: Promote optimal lung function  Outcome: Progressing Towards Goal     Problem: Breathing Pattern - Ineffective  Goal: Ability to achieve and maintain a regular respiratory rate  Outcome: Progressing Towards Goal     Problem:  Body Temperature -  Risk of, Imbalanced  Goal: Ability to maintain a body temperature within defined limits  Outcome: Progressing Towards Goal  Goal: Will regain or maintain usual level of consciousness  Outcome: Progressing Towards Goal  Goal: Complications related to the disease process, condition or treatment will be avoided or minimized  Outcome: Progressing Towards Goal     Problem: Isolation Precautions - Risk of Spread of Infection  Goal: Prevent transmission of infectious organism to others  Outcome: Progressing Towards Goal     Problem: Nutrition Deficits  Goal: Optimize nutrtional status  Outcome: Progressing Towards Goal     Problem: Risk for Fluid Volume Deficit  Goal: Maintain normal heart rhythm  Outcome: Progressing Towards Goal  Goal: Maintain absence of muscle cramping  Outcome: Progressing Towards Goal  Goal: Maintain normal serum potassium, sodium, calcium, phosphorus, and pH  Outcome: Progressing Towards Goal     Problem: Loneliness or Risk for Loneliness  Goal: Demonstrate positive use of time alone when socialization is not possible  Outcome: Progressing Towards Goal     Problem: Fatigue  Goal: Verbalize increase energy and improved vitality  Outcome: Progressing Towards Goal     Problem: Patient Education: Go to Patient Education Activity  Goal: Patient/Family Education  Outcome: Progressing Towards Goal     Problem: Pressure Injury - Risk of  Goal: *Prevention of pressure injury  Description: Document Ed Scale and appropriate interventions in the flowsheet. Outcome: Progressing Towards Goal  Note: Pressure Injury Interventions:  Sensory Interventions: Minimize linen layers, Pressure redistribution bed/mattress (bed type), Turn and reposition approx. every two hours (pillows and wedges if needed), Keep linens dry and wrinkle-free    Moisture Interventions: Absorbent underpads, Apply protective barrier, creams and emollients, Check for incontinence Q2 hours and as needed, Internal/External urinary devices, Maintain skin hydration (lotion/cream), Minimize layers    Activity Interventions: Pressure redistribution bed/mattress(bed type)    Mobility Interventions: HOB 30 degrees or less, Turn and reposition approx. every two hours(pillow and wedges), Float heels    Nutrition Interventions: Document food/fluid/supplement intake, Discuss nutritional consult with provider, Offer support with meals,snacks and hydration    Friction and Shear Interventions: Apply protective barrier, creams and emollients, Foam dressings/transparent film/skin sealants, HOB 30 degrees or less, Minimize layers, Transferring/repositioning devices                Problem: Patient Education: Go to Patient Education Activity  Goal: Patient/Family Education  Outcome: Progressing Towards Goal     Problem: Falls - Risk of  Goal: *Absence of Falls  Description: Document Miguel Angel Fall Risk and appropriate interventions in the flowsheet.   Outcome: Progressing Towards Goal  Note: Fall Risk Interventions:  Mobility Interventions: Bed/chair exit alarm    Mentation Interventions: Adequate sleep, hydration, pain control, Bed/chair exit alarm, Evaluate medications/consider consulting pharmacy, Reorient patient, Room close to nurse's station    Medication Interventions: Bed/chair exit alarm    Elimination Interventions: Bed/chair exit alarm, Call light in reach, Toileting schedule/hourly rounds              Problem: Patient Education: Go to Patient Education Activity  Goal: Patient/Family Education  Outcome: Progressing Towards Goal     Problem: Risk for Spread of Infection  Goal: Prevent transmission of infectious organism to others  Description: Prevent the transmission of infectious organisms to other patients, staff members, and visitors.   Outcome: Progressing Towards Goal     Problem: Patient Education:  Go to Education Activity  Goal: Patient/Family Education  Outcome: Progressing Towards Goal

## 2022-08-01 NOTE — PROGRESS NOTES
Consult  Pulmonary, Critical Care    Name: Grant Sky MRN: [id number]   : 1929 Hospital: [facility]   Date: 2022  Admission date: 2022 Hospital Day: 6       Subjective/Interval History:   Recent COVID pneumonitis improved with Decadron and baricitinib wean to room air was discharged to rehab facility yesterday at the rehab facility he had development of hypoxia than fever was transferred back to our emergency room T-max 101.5. Wife states that yesterday he did have some green-tinged sputum. Chest x-ray shows diffuse bilateral infiltrates unchanged. There has been no nausea vomiting choking while eating   remains on BiPAP. Blood gas this morning on nasal oxygen showed hypoxia but currently he is running 94% on 5 L awake alert no specific complaints   more anxiety last evening could not tolerate BiPAP and was placed on high flow. Still somewhat anxious today IM Vistaril seem to help   awake alert tolerated nasal high flow all night but FiO2 is 90%. Not able to eat due to shortness of breath have talked with him about NG tube for tube feedings and he agrees   awake alert confused FiO2 down to 50%. Nurse attempted NG insertion  and .   As soon as the tube was placed in his nose he pulled it and refused to have any further attempt he remains malnourished    Patient Active Problem List   Diagnosis Code    Carotid stenosis I65.29    Hypoxia R09.02    COVID U07.1    Sepsis ([facility] [address] A41.9       IMPRESSION:   Acute hypoxic respiratory failure currently on nasal high flow FiO2 50%  Healthcare associated pneumonia Serratia on culture  Diffuse pulmonary infiltrates questionable pneumonia versus inflammatory response from COVID versus pulmonary edema from diastolic dysfunction and AR  Oral thrush resolved  Anemia hemoglobin dropping we will add Protonix  Hypokalemia to be repleted  Hypophosphatemia repleted  Hypocalcemia to be repleted  Recent COVID pneumonitis  Atrial fibrillation  Moderate aortic regurg  Moderate to severe mitral regurg  Diastolic left ventricular dysfunction  Pyuria rule out UTI no culture results  Elevated BNP  Anxiety  Body mass index is 20.19 kg/m². RECOMMENDATIONS/PLAN:   Tolerating nasal high flow FiO2 50%  Anxiety may be improved on 3 times daily dosing of Vistaril  Continue IV Zosyn and Zithromax nasal MRSA smear positive blood culture no growth sputum culture occasional polys with Serratia does not appear as if urine was ever sent  BNP is elevated creatinine is normal repeat Lasix today  Will reattempt NG tube placement  Repeat labs today  Will start patient on nystatin and give 1 dose of Diflucan         Subjective/Initial History:   I have reviewed the flowsheet and previous  notes. I was asked by Raffi Montiel MD to see Diana Montes a 80 y.o.  male  in consultation for a chief complaint of acute hypoxic respiratory failure fever recent COVID pneumonitis        Patient PCP: Regine Rao MD  PMH:  has a past medical history of Atrial fibrillation ([facility] [address] COVID-19 vaccine series completed (03/2021), Hypercholesteremia, Hypertension, Neuropathy, and Stroke ([facility] [address] (08/2021). PSH:   has a past surgical history that includes pr abdomen surgery proc unlisted (Right) and hx cataract removal (Left, 2018). FHX: family history is not on file. SHX:  reports that he has quit smoking. He has never used smokeless tobacco. He reports that he does not use drugs.   Systemic review  General weight has been stable the last several days he has not noted fever chills or sweats until this morning  Eyes no double vision or momentary blindness  ENT no facial pain over the sinuses  Musculoskeletal no swollen tender joints no myalgias  Endocrinologic no polyuria polydipsia  Neurologic no seizures or syncope awake and alert oriented  Gastrointestinal no choking or gagging when eating no nausea vomiting no indigestion  Genitourinary no pain or discomfort no burning  Cardiovascular has not noted ankle edema chest pain diaphoresis or nocturia  Respiratory abrupt worsening shortness of breath last night associated with fever    No Known Allergies   MEDS:   Current Facility-Administered Medications   Medication    nystatin (MYCOSTATIN) 100,000 unit/mL oral suspension 500,000 Units    hydrOXYzine (VISTARIL) injection 25 mg    hydrOXYzine pamoate (VISTARIL) capsule 25 mg    piperacillin-tazobactam (ZOSYN) 3.375 g in 0.9% sodium chloride (MBP/ADV) 100 mL MBP    apixaban (ELIQUIS) tablet 2.5 mg    aspirin delayed-release tablet 81 mg    atorvastatin (LIPITOR) tablet 20 mg    doxazosin (CARDURA) tablet 4 mg    DULoxetine (CYMBALTA) capsule 30 mg    mirtazapine (REMERON) tablet 15 mg    ranolazine ER (RANEXA) tablet 500 mg    0.9% sodium chloride infusion    acetaminophen (TYLENOL) tablet 650 mg    Or    acetaminophen (TYLENOL) suppository 650 mg    polyethylene glycol (MIRALAX) packet 17 g    ondansetron (ZOFRAN ODT) tablet 4 mg    Or    ondansetron (ZOFRAN) injection 4 mg    dexamethasone (DECADRON) 4 mg/mL injection 4 mg    baricitinib (OLUMIANT) tablet 4 mg    mupirocin (BACTROBAN) 2 % ointment        Current Facility-Administered Medications:     nystatin (MYCOSTATIN) 100,000 unit/mL oral suspension 500,000 Units, 500,000 Units, Oral, QID, Bharath Johnson MD, 500,000 Units at 08/01/22 0813    hydrOXYzine (VISTARIL) injection 25 mg, 25 mg, IntraMUSCular, Q6H PRN, Mahogany Chapa MD, 25 mg at 08/01/22 0143    hydrOXYzine pamoate (VISTARIL) capsule 25 mg, 25 mg, Oral, Q8H, Latrell Ro MD, 25 mg at 08/01/22 0536    piperacillin-tazobactam (ZOSYN) 3.375 g in 0.9% sodium chloride (MBP/ADV) 100 mL MBP, 3.375 g, IntraVENous, Q8H, Mahogany Chapa MD, Last Rate: 25 mL/hr at 08/01/22 0441, 3.375 g at 08/01/22 0441    apixaban (ELIQUIS) tablet 2.5 mg, 2.5 mg, Oral, BID, Mahogany Chapa MD, 2.5 mg at 08/01/22 0813    aspirin delayed-release tablet 81 mg, 81 mg, Oral, DAILY, Mahogany Chapa MD, 81 mg at 22 07    atorvastatin (LIPITOR) tablet 20 mg, 20 mg, Oral, DAILY, Mahogany Chapa MD, 20 mg at 22    doxazosin (CARDURA) tablet 4 mg, 4 mg, Oral, QHS, Mahogany Chapa MD, 4 mg at 22    DULoxetine (CYMBALTA) capsule 30 mg, 30 mg, Oral, DAILY, Mahogany Chapa MD, 30 mg at 22    mirtazapine (REMERON) tablet 15 mg, 15 mg, Oral, QHS, Mahogany Chapa MD, 15 mg at 22    ranolazine ER (RANEXA) tablet 500 mg, 500 mg, Oral, BID, Mahogany Chapa MD, 500 mg at 22    0.9% sodium chloride infusion, 25 mL/hr, IntraVENous, CONTINUOUS, Michael Mcallister MD, Last Rate: 25 mL/hr at 22, 25 mL/hr at 22    acetaminophen (TYLENOL) tablet 650 mg, 650 mg, Oral, Q6H PRN, 650 mg at 22 **OR** acetaminophen (TYLENOL) suppository 650 mg, 650 mg, Rectal, Q6H PRN, Mahogany Chapa MD    polyethylene glycol (MIRALAX) packet 17 g, 17 g, Oral, DAILY PRN, Mahogany Chapa MD    ondansetron (ZOFRAN ODT) tablet 4 mg, 4 mg, Oral, Q8H PRN **OR** ondansetron (ZOFRAN) injection 4 mg, 4 mg, IntraVENous, Q6H PRN, Mahogany Chapa MD    dexamethasone (DECADRON) 4 mg/mL injection 4 mg, 4 mg, IntraVENous, Q6H, Mahogany Chapa MD, 4 mg at 22 0536    baricitinib (OLUMIANT) tablet 4 mg, 4 mg, Oral, Q24H, Mahogany Chapa MD, 4 mg at 22 1708    mupirocin (BACTROBAN) 2 % ointment, , Both Nostrils, BID, Jamil Arboleda MD, Given at 22 0813      Objective:     Vital Signs: Telemetry:    AFIB Intake/Output:   Visit Vitals  /73   Pulse 95   Temp 97.5 °F (36.4 °C)   Resp 18   Ht 5' 5.98\" (1.676 m)   Wt 56.7 kg (125 lb)   SpO2 98%   BMI 20.19 kg/m²       Temp (24hrs), Av.4 °F (36.3 °C), Min:96.5 °F (35.8 °C), Max:98.1 °F (36.7 °C)        O2 Device: Heated, Hi flow nasal cannula O2 Flow Rate (L/min): 60 l/min         Body mass index is 20.19 kg/m².     Wt Readings from Last 4 Encounters: 07/27/22 56.7 kg (125 lb)   07/18/22 54.4 kg (120 lb)   02/10/22 54.4 kg (120 lb)   09/01/21 54.4 kg (120 lb)          Intake/Output Summary (Last 24 hours) at 8/1/2022 1047  Last data filed at 8/1/2022 0500  Gross per 24 hour   Intake 1030 ml   Output 800 ml   Net 230 ml         Last shift:      No intake/output data recorded. Last 3 shifts: 07/30 1901 - 08/01 0700  In: 2429 [P.O.:720; I.V.:950]  Out: 1400 [Urine:1400]       Hemodynamics:    CO:    CI:    CVP:    SVR:   PAP Systolic:    PAP Diastolic:    PVR:    WL64:       Ventilator Settings:      Mode Rate TV Press PEEP FiO2 PIP Min. Vent               50 %     30.4 l/min      Physical Exam:    General:  male; in bed on nasal high flow oxygen less anxious no accessory muscle use  HEENT: NCAT,   Eyes: anicteric; conjunctiva clear extraocular movements intact  Neck: no nodes, no accessory MM use. No definite JVD  Chest: no deformity,   Cardiac: Irregular rate and rhythm occasional PVCs has somewhat harsh systolic murmur  Lungs: Clear anteriorly with rales posteriorly no wheezing  Abd: Thin soft positive bowel sounds no tenderness  Ext: Trace edema; no joint swelling;  No clubbing  : clear urine  Neuro: Awake alert seems calm  hard of hearing follow simple commands moves all 4 extremities  Psych- no agitation, mildly confused;   Skin: warm, dry, no cyanosis;   Pulses: Brachial and radial pulses intact  Capillary: Normal capillary refill      Labs:    Recent Labs     08/01/22  0400 07/31/22  0600 07/30/22  0315   WBC 11.5* 12.8* 15.2*   HGB 7.4* 8.5* 9.0*    217 247       Recent Labs     07/31/22  0600 07/30/22  0315    137  136   K 4.2 3.4*  3.4*    106  106   CO2 26 21  21   * 110*  109*   BUN 22* 19  19   CREA 0.61* 0.52*  0.55*   CA 7.5* 6.6*  6.6*   PHOS 2.6 3.2   ALB 2.2* 2.1*  2.1*   ALT  --  37       Recent Labs     08/01/22  0206 07/30/22  0413   PH 7.50* 7.55*   PCO2 36 33*   PO2 65* 98   HCO3 27* 28*   FIO2 80 80     8/1 nasal high flow 60 L FiO2 50% with PO2 65 PCO2 36 pH 7.50 7/30 on nasal high flow 40 L FiO2 80%  COVID-19 antigen remains positive  BNP 2592, 2705 3310  CRP 3.26, 6.18, 9.2 16.1  Procalcitonin 0.15  Urinalysis with 20-50 WBCs  Blood culture no growth  Sputum occasional polys with Serratia  Echo ejection fraction 83% diastolic dysfunction moderate aortic regurg moderate to severe mitral regurg left atrium 2.6 cm RVSP 25  Imaging:  I have personally reviewed the patients radiographs and have reviewed the reports:    CXR Results  (Last 48 hours)                 08/01/22 0326  XR CHEST PORT Final result    Impression:  No significant change in interstitial and airspace opacities   possibly pulmonary edema       Narrative:  EXAM: XR CHEST PORT       INDICATION: chf       COMPARISON: 7/31/2022       FINDINGS: A portable AP radiograph of the chest was obtained at 325 hours. Cardiac monitoring leads. . Bilateral interstitial and patchy airspace opacities   unchanged. [place] [facility] size is borderline. Small left pleural effusion. .  The bones   and soft tissues are grossly within normal limits. 07/31/22 0311  XR CHEST PORT Final result    Impression:  No significant change       Narrative:  EXAM: XR CHEST PORT       INDICATION: Respiratory failure       COMPARISON: 7/30/2022       FINDINGS: A portable AP radiograph of the chest was obtained at 311 hours. Cardiac monitoring leads. . Bilateral interstitial and airspace opacities without   significant change. Trace left pleural effusion. The cardiac and mediastinal   contours and pulmonary vascularity are normal.  The bones and soft tissues are   grossly within normal limits. Results from [place] encounter on 07/27/22    XR CHEST PORT    Narrative  EXAM: XR CHEST PORT    INDICATION: chf    COMPARISON: 7/31/2022    FINDINGS: A portable AP radiograph of the chest was obtained at 325 hours. Cardiac monitoring leads. . Bilateral interstitial and patchy airspace opacities  unchanged. [place] [facility] Heart size is borderline. Small left pleural effusion. .  The bones  and soft tissues are grossly within normal limits. Impression  No significant change in interstitial and airspace opacities  possibly pulmonary edema      XR CHEST PORT    Narrative  EXAM: XR CHEST PORT    INDICATION: Respiratory failure    COMPARISON: 7/30/2022    FINDINGS: A portable AP radiograph of the chest was obtained at 311 hours. Cardiac monitoring leads. . Bilateral interstitial and airspace opacities without  significant change. Trace left pleural effusion. The cardiac and mediastinal  contours and pulmonary vascularity are normal.  The bones and soft tissues are  grossly within normal limits. Impression  No significant change      XR CHEST PORT    Narrative  EXAM:  XR CHEST PORT    INDICATION: COVID pneumonitis    COMPARISON: Chest radiograph 7/29/2022    TECHNIQUE: Semiupright portable chest AP view    FINDINGS:    Cardiac loop recorder. Cardiomediastinal silhouette is stably enlarged. Grossly  unchanged widespread patchy interstitial and airspace opacities. Stable small  left and trace right pleural effusions. No definite pneumothorax. Several  chronic right-sided rib deformities. Impression  Little interval change. Results from [place] encounter on 02/10/22    CT HEAD WO CONT    Narrative  Technique: Multiple continuous axial images were obtained from the skull base to  the vertex without administration of intravenous contrast.    Comment on dose reduction: All CT scans at this facility are performed using  dose reduction optimization technique as appropriate to perform the exam  including the following; automated exposure control, adjustments of the mA  and/or kV according to patient size, or use of iterative reconstructed  technique.     Comparison examination: None    Findings:  The ventricles and sulci are prominent consistent with age-related changes of  atrophy. Periventricular hypodensity is identified consistent with changes of  chronic small vessel disease. No evidence of midline shift, mass lesion, or  extra-axial fluid collection. No evidence of parenchymal hemorrhage or  infarction in a major vascular territory. The osseous structures are intact, the paranasal sinuses are clear. Extracalvarial soft tissue hematoma posterior vertex . Impression  No acute intracranial process. Extracalvarial soft tissue hematoma posterior  vertex . Session fever with recent hospitalization possible healthcare associated pneumonia agree with Zosyn vancomycin and Zithromax. We will attempt to collect sputum for culture. Have requested urine culture blood cultures have already been obtained. He has a significant systolic murmur with elevated BNP he may have mitral regurg giving him some degree of fluid overload he only has trace edema. We will check an echocardiogram.  Creatinine is normal will decrease IV fluids for the time being. Chest x-ray with diffuse infiltrates thought to be residual inflammatory change from COVID pneumonitis but may represent some degree of fluid overload. Oxygenation is well-maintained now on noninvasive ventilator have decreased FiO2 to 45% will attempt conversion to nasal oxygen in a.m.  728 awake alert currently on noninvasive ventilator have placed on 5 L oxygen saturation 94%. If he maintains that we will leave him on nasal oxygen today and use noninvasive ventilator at night. Nursing noted that when he drifts off to sleep he will have desaturation. BNP remains elevated neck veins are chest visible chest x-ray still diffuse infiltrates we will give 1 dose Lasix today  7/29 anxious intolerant of BiPAP last evening switch to nasal high flow.   Chest x-ray is unchanged with diffuse infiltrates questionable healthcare associated pneumonia COVID inflammatory reaction or some degree pulmonary edema from diastolic dysfunction aortic regurg and mitral regurg repeat Lasix today replenish potassium and phosphorus nasal MRSA smear is positive we will continue vancomycin Zosyn and Zithromax remains on baricitinib and Decadron  7/30 much calmer today questionably due to Vistaril. Potassium remains low will supplement. Still has trace edema with normal creatinine we will repeat Lasix today. Have talked with him about placing NG tube for tube feedings and he agrees  7/31 alert awake oral thrush on 90% FiO2 intermittent agitation as per nurse  8/1 awake alert confused refusing NG tube once wife has a chance to talk with him we will attempt reinsertion. Serratia in sputum currently on Zosyn. Diffuse accentuated interstitial markings persist we will repeat Lasix today  Time of care 30 minutes           Thank you for allowing us to participate in the care of this patient.   We will follow along with you     Ken Morin MD

## 2022-08-01 NOTE — PROGRESS NOTES
PROGRESS NOTE    Patient: Radha Dowell MRN: [id number]  SSN: [id number]    YOB: 1929  Age: 80 y.o. Sex: male      Admit Date: 7/27/2022    LOS: 5 days       Subjective     Chief Complaint   Patient presents with    Shortness of Breath       HPI: Patient is a 80y.o. year old male with a significant PMH of Afib, hypercholesterolemia, HTN, neuropathy, and hx of stroke presented to the ED today due to hypoxia and worsening confusion. The patient was discharged from the hospital yesterday after being hospitalized d/t COVID-19. Per nursing facility his O2 was in the 80s and it was 89% upon admission. The patient was stable at that time of discharge yesterday and not using any oxygen. The patient is now on BiPAP with an O2 sat of 100%. Febrile at 101.5  WBC 25.1  Hypertensive on admission  Respiratory rate at 31  CXR: Stable bilateral pulmonary infiltrates. Patient was given:  10mg Decadron  2g Magnesium  Duoneb treatment  Terbutaline treatment     7/28  Pt sitting in bed, responds to voice  P02 53 on ABG this morning, pt placed on BiPap  SpO2 stable  CRP=16.1    XR CHEST PORT  Diffuse interstitial airspace opacities are not  significantly changed. Trace left effusion. Biapical pleural thickening. Heartsize is enlarged      7/29    Patient on BiPAP now on 70% O2  Patient pulled out BiPAP last night put on high flow    7/30    On high flow 90% O2      Left Ventricle: Normal left ventricular systolic function with a visually estimated EF of 55 - 60%. Left ventricle size is normal. Normal wall thickness. Normal wall motion. Grade I diastolic dysfunction with normal LAP. Aortic Valve: Moderate regurgitation. Mitral Valve: Mildly thickened leaflet. 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 Moderate to severe regurgitation with an eccentrically directed jet and may underestimate severity. Tricuspid Valve: Moderate regurgitation.       7/31    On high flow 80% O2    8/1    On high flow 70% O2  Poor appetite poor appetite      Review of Systems   Unable to perform ROS: Acuity of condition       Objective     Visit Vitals  /73   Pulse 95   Temp 97.5 °F (36.4 °C)   Resp 18   Ht 5' 5.98\" (1.676 m)   Wt 56.7 kg (125 lb)   SpO2 98%   BMI 20.19 kg/m²    O2 Flow Rate (L/min): 60 l/min O2 Device: Heated, Hi flow nasal cannula    Physical Exam:   Physical Exam  Constitutional:       Appearance: He is normal weight. HENT:      Head: Normocephalic and atraumatic. Cardiovascular:      Rate and Rhythm: Normal rate and regular rhythm. Pulses: Normal pulses. Heart sounds: Normal heart sounds. Pulmonary:      Effort: Pulmonary effort is normal.      Breath sounds: Normal breath sounds. Musculoskeletal:         General: Normal range of motion. Cervical back: Normal range of motion and neck supple. Neurological:      General: No focal deficit present. Mental Status: He is alert. He is disoriented. Intake & Output:  Current Shift: No intake/output data recorded. Last three shifts: 07/30 1901 - 08/01 0700  In: 1670 [P.O.:720; I.V.:950]  Out: 1400 [Urine:1400]    Lab/Data Review: All lab results for the last 24 hours reviewed.        24 Hour Results:    Recent Results (from the past 24 hour(s))   BLOOD GAS, ARTERIAL    Collection Time: 08/01/22  2:06 AM   Result Value Ref Range    pH 7.50 (H) 7.35 - 7.45      PCO2 36 35 - 45 mmHg    PO2 65 (L) 80 - 100 mmHg    O2 SATURATION 92 (L) 95 - 99 %    BICARBONATE 27 (H) 22 - 26 mmol/L    BASE EXCESS 3.5 (H) 0 - 3 mmol/L    O2 METHOD High Flow Nasal Cannula      O2 FLOW RATE 60.00 L/min    FIO2 80 %    Sample source Arterial      SITE Right Radial      MANUELA'S TEST YES      Carboxy-Hgb 0.2 (L) 1 - 2 %    Methemoglobin 0.8 0 - 1.4 %    Oxyhemoglobin 90.9 (L) 95 - 99 %    Performed by Carl Reed     Critical value read back Called to N/A on 08/01/2022 at 02:09     TEMPERATURE 97.7     CBC WITH AUTOMATED DIFF    Collection Time: 08/01/22  4:00 AM   Result Value Ref Range    WBC 11.5 (H) 4.1 - 11.1 K/uL    RBC 2.35 (L) 4.10 - 5.70 M/uL    HGB 7.4 (L) 12.1 - 17.0 g/dL    HCT 22.7 (L) 36.6 - 50.3 %    MCV 96.6 80.0 - 99.0 FL    MCH 31.5 26.0 - 34.0 PG    MCHC 32.6 30.0 - 36.5 g/dL    RDW 14.6 (H) 11.5 - 14.5 %    PLATELET 651 315 - 481 K/uL    MPV 10.1 8.9 - 12.9 FL    NRBC 0.0 0.0  WBC    ABSOLUTE NRBC 0.00 0.00 - 0.01 K/uL    NEUTROPHILS 95 (H) 32 - 75 %    LYMPHOCYTES 2 (L) 12 - 49 %    MONOCYTES 2 (L) 5 - 13 %    EOSINOPHILS 0 0 - 7 %    BASOPHILS 0 0 - 1 %    IMMATURE GRANULOCYTES 1 (H) 0 - 0.5 %    ABS. NEUTROPHILS 11.0 (H) 1.8 - 8.0 K/UL    ABS. LYMPHOCYTES 0.2 (L) 0.8 - 3.5 K/UL    ABS. MONOCYTES 0.2 0.0 - 1.0 K/UL    ABS. EOSINOPHILS 0.0 0.0 - 0.4 K/UL    ABS. BASOPHILS 0.0 0.0 - 0.1 K/UL    ABS. IMM. GRANS. 0.1 (H) 0.00 - 0.04 K/UL    DF AUTOMATED     C REACTIVE PROTEIN, QT    Collection Time: 08/01/22  4:00 AM   Result Value Ref Range    C-Reactive protein 3.26 (H) 0.00 - 0.60 mg/dL         Imaging:    XR CHEST PORT   Final Result   No significant change in interstitial and airspace opacities   possibly pulmonary edema      XR CHEST PORT   Final Result   No significant change      XR CHEST PORT   Final Result      Little interval change. XR CHEST PORT   Final Result   Slightly increased diffuse bilateral interstitial and alveolar   opacities, compatible with COVID pneumonia. XR CHEST PORT   Final Result   No significant change       XR CHEST SNGL V   Final Result   Stable bilateral pulmonary infiltrates.                 Assessment     Acute hypoxic respiratory failure on high flow 70 %  Respiratory alkalosis  COVID-19 Pneumonia  Sepsis  Hypertension  Stroke  Afib  MRSA nares  Elevated BNP    Plan   Continue meds:  Eliquis 2.5mg PO BID  ASA 81 mg po daily  Lipitor 20mg PO every day  Zithromax 500 IV daily  Olumiant 4mg PO QD  Decadron 4mg IV Q6h  Doxazosin 4mg PO QHS  Cymbalta 30mg POQD  Remeron 15mg PO every day  Mupirocin 2% ointment both nostrils BID  Zosyn 3.375g IV q8hrs  Ranolazine 500mg PO every day  Replace potassium  Lasix 40 IV once              Current Facility-Administered Medications:     nystatin (MYCOSTATIN) 100,000 unit/mL oral suspension 500,000 Units, 500,000 Units, Oral, QID, Bharath Johnson MD, 500,000 Units at 08/01/22 0813    hydrOXYzine (VISTARIL) injection 25 mg, 25 mg, IntraMUSCular, Q6H PRN, Mahogany Chapa MD, 25 mg at 08/01/22 0143    hydrOXYzine pamoate (VISTARIL) capsule 25 mg, 25 mg, Oral, Q8H, Tali Mccabe MD, 25 mg at 08/01/22 0536    piperacillin-tazobactam (ZOSYN) 3.375 g in 0.9% sodium chloride (MBP/ADV) 100 mL MBP, 3.375 g, IntraVENous, Q8H, Mahogany Chapa MD, Last Rate: 25 mL/hr at 08/01/22 0441, 3.375 g at 08/01/22 0441    apixaban (ELIQUIS) tablet 2.5 mg, 2.5 mg, Oral, BID, Mahogany Chapa MD, 2.5 mg at 08/01/22 1556    aspirin delayed-release tablet 81 mg, 81 mg, Oral, DAILY, Mahogany Chapa MD, 81 mg at 08/01/22 6332    atorvastatin (LIPITOR) tablet 20 mg, 20 mg, Oral, DAILY, Mahogany Chapa MD, 20 mg at 08/01/22 0813    doxazosin (CARDURA) tablet 4 mg, 4 mg, Oral, QHS, Mahogany Chapa MD, 4 mg at 07/31/22 2103    DULoxetine (CYMBALTA) capsule 30 mg, 30 mg, Oral, DAILY, Mahogany Chapa MD, 30 mg at 08/01/22 0813    mirtazapine (REMERON) tablet 15 mg, 15 mg, Oral, QHS, Mahogany Chapa MD, 15 mg at 07/31/22 2103    ranolazine ER (RANEXA) tablet 500 mg, 500 mg, Oral, BID, Mahogany Chapa MD, 500 mg at 08/01/22 5420    0.9% sodium chloride infusion, 25 mL/hr, IntraVENous, CONTINUOUS, Tali Mccabe MD, Last Rate: 25 mL/hr at 07/31/22 2027, 25 mL/hr at 07/31/22 2027    acetaminophen (TYLENOL) tablet 650 mg, 650 mg, Oral, Q6H PRN, 650 mg at 07/30/22 2150 **OR** acetaminophen (TYLENOL) suppository 650 mg, 650 mg, Rectal, Q6H PRN, Mahogany Chapa MD    polyethylene glycol (MIRALAX) packet 17 g, 17 g, Oral, DAILY PRN, Boogie Chapa MD    ondansetron (ZOFRAN ODT) tablet 4 mg, 4 mg, Oral, Q8H PRN **OR** ondansetron (ZOFRAN) injection 4 mg, 4 mg, IntraVENous, Q6H PRN, Mahogany Chapa MD    dexamethasone (DECADRON) 4 mg/mL injection 4 mg, 4 mg, IntraVENous, Q6H, Mahogany Chapa MD, 4 mg at 08/01/22 0536    baricitinib (OLUMIANT) tablet 4 mg, 4 mg, Oral, Q24H, Mahogany Chapa MD, 4 mg at 07/31/22 1708    mupirocin (BACTROBAN) 2 % ointment, , Both Nostrils, BID, Kole Covington MD, Given at 08/01/22 0308    Current Outpatient Medications   Medication Instructions    amLODIPine (NORVASC) 5 mg, Oral, DAILY    apixaban (ELIQUIS) 2.5 mg, Oral, 2 TIMES DAILY    aspirin delayed-release 81 mg, Oral, DAILY    atorvastatin (LIPITOR) 20 mg, Oral, DAILY    cholecalciferol (vitamin D3) 2,000 Units, Oral, EVERY BEDTIME    dexAMETHasone (DECADRON) 4 mg, Oral, 2 TIMES DAILY    doxazosin (CARDURA) 4 mg, Oral, DAILY    DULoxetine (CYMBALTA) 30 mg, Oral, DAILY    mirtazapine (REMERON) 15 mg, Oral, EVERY BEDTIME    ranolazine ER (RANEXA) 500 mg, Oral, 2 TIMES DAILY         Signed By: Shawn Goddard MD     August 1, 2022

## 2022-08-01 NOTE — PROGRESS NOTES
Problem: Airway Clearance - Ineffective  Goal: Achieve or maintain patent airway  Outcome: Progressing Towards Goal     Problem: Gas Exchange - Impaired  Goal: Absence of hypoxia  Outcome: Progressing Towards Goal  Goal: Promote optimal lung function  Outcome: Progressing Towards Goal     Problem: Breathing Pattern - Ineffective  Goal: Ability to achieve and maintain a regular respiratory rate  Outcome: Progressing Towards Goal     Problem:  Body Temperature -  Risk of, Imbalanced  Goal: Ability to maintain a body temperature within defined limits  Outcome: Progressing Towards Goal  Goal: Will regain or maintain usual level of consciousness  Outcome: Progressing Towards Goal  Goal: Complications related to the disease process, condition or treatment will be avoided or minimized  Outcome: Progressing Towards Goal     Problem: Isolation Precautions - Risk of Spread of Infection  Goal: Prevent transmission of infectious organism to others  Outcome: Progressing Towards Goal     Problem: Nutrition Deficits  Goal: Optimize nutrtional status  Outcome: Progressing Towards Goal     Problem: Risk for Fluid Volume Deficit  Goal: Maintain normal heart rhythm  Outcome: Progressing Towards Goal  Goal: Maintain absence of muscle cramping  Outcome: Progressing Towards Goal  Goal: Maintain normal serum potassium, sodium, calcium, phosphorus, and pH  Outcome: Progressing Towards Goal     Problem: Loneliness or Risk for Loneliness  Goal: Demonstrate positive use of time alone when socialization is not possible  Outcome: Progressing Towards Goal     Problem: Fatigue  Goal: Verbalize increase energy and improved vitality  Outcome: Progressing Towards Goal     Problem: Patient Education: Go to Patient Education Activity  Goal: Patient/Family Education  Outcome: Progressing Towards Goal     Problem: Pressure Injury - Risk of  Goal: *Prevention of pressure injury  Description: Document Ed Scale and appropriate interventions in the flowsheet. Outcome: Progressing Towards Goal  Note: Pressure Injury Interventions:  Sensory Interventions: Assess changes in LOC, Assess need for specialty bed, Avoid rigorous massage over bony prominences, Check visual cues for pain, Float heels, Keep linens dry and wrinkle-free, Minimize linen layers, Monitor skin under medical devices, Pressure redistribution bed/mattress (bed type), Turn and reposition approx. every two hours (pillows and wedges if needed)    Moisture Interventions: Absorbent underpads, Apply protective barrier, creams and emollients, Check for incontinence Q2 hours and as needed, Internal/External urinary devices    Activity Interventions: Pressure redistribution bed/mattress(bed type), PT/OT evaluation    Mobility Interventions: Pressure redistribution bed/mattress (bed type), Turn and reposition approx. every two hours(pillow and wedges), PT/OT evaluation    Nutrition Interventions: Document food/fluid/supplement intake, Discuss nutritional consult with provider, Offer support with meals,snacks and hydration    Friction and Shear Interventions: Apply protective barrier, creams and emollients, Foam dressings/transparent film/skin sealants, HOB 30 degrees or less, Lift sheet, Minimize layers, Transferring/repositioning devices                Problem: Patient Education: Go to Patient Education Activity  Goal: Patient/Family Education  Outcome: Progressing Towards Goal     Problem: Falls - Risk of  Goal: *Absence of Falls  Description: Document Miguel Angel Fall Risk and appropriate interventions in the flowsheet. Outcome: Progressing Towards Goal     Problem: Patient Education: Go to Patient Education Activity  Goal: Patient/Family Education  Outcome: Progressing Towards Goal     Problem: Risk for Spread of Infection  Goal: Prevent transmission of infectious organism to others  Description: Prevent the transmission of infectious organisms to other patients, staff members, and visitors.   Outcome: Progressing Towards Goal     Problem: Patient Education:  Go to Education Activity  Goal: Patient/Family Education  Outcome: Progressing Towards Goal

## 2022-08-01 NOTE — PROGRESS NOTES
Progress Note    Patient: Micheal Montanez MRN: [id number]  SSN: [id number]    YOB: 1929  Age: 80 y.o. Sex: male      Admit Date: 7/27/2022    LOS: 5 days     Subjective:   Patient followed for sepsis with recent Covid-19 infection, respiratory failure with possible pneumonia and possible UTI. No urine culture was sent. He is on high flow nasal cannula. He remains afebrile with elevated WBC on steroids but decreasing CRP. CXR today is unchanged. Patient is awake and responsive with SOB and mild cough. Wife is concerned that he has no appetite. Apparently NG tube placement was attempted without success. Objective:     Vitals:    08/01/22 0700 08/01/22 0800 08/01/22 0837 08/01/22 0903   BP: (!) 142/92      Pulse: (!) 108 96     Resp: 22      Temp: 97.5 °F (36.4 °C)      SpO2: 95%  99% 98%   Weight:       Height:            Intake and Output:  Current Shift: No intake/output data recorded. Last three shifts: 07/30 1901 - 08/01 0700  In: 1670 [P.O.:720; I.V.:950]  Out: 1400 [Urine:1400]    Physical Exam:   Vitals and nursing note reviewed. Constitutional:       General: He is in acute distress. Appearance: He is ill-appearing. HENT:     Head: Normocephalic and atraumatic. Nose:     Comments: High flow nasal cannula 60%  Eyes:     Pupils: Pupils are equal, round, and reactive to light. Cardiovascular:     Rate and Rhythm: Regular rhythm. Heart sounds: No murmur heard. Pulmonary: clear bilaterally  Abdominal:     General: Bowel sounds are normal.     Palpations: Abdomen is soft. Tenderness: There is no abdominal tenderness. There is no right CVA tenderness or left CVA tenderness. Genitourinary:     Comments: External urinary catheter  Musculoskeletal:     Right lower leg: No edema. Left lower leg: No edema. Skin:     Findings: No rash. Neurological:     General: No focal deficit present.      Mental Status: He is alert and oriented to person, place, and time.  Psychiatric:         Behavior: Behavior normal.    Lab/Data Review:     WBC 11,500     <498 <606  Ferritin <231    CRP 3.26 < 6.18 <9.24 <16.10  Procal 0.15    Blood cultures (7/27) No growth 5 days  Urine culture ordered  Heavy normal respiratory shirley (7/29) FINAL    CXR (8/1)  No significant change in interstitial and airspace opacities  possibly pulmonary edema    Assessment:     Active Problems:    COVID (7/18/2022)      Sepsis ([address] (7/27/2022)  Sepsis with fever, tachycardia, tachypnea, leukocytosis, elevated CRP, Day #3 Vancomycin, Zosyn, Zithromax  Persumptive with pyuria and bacteria, urine culture not sent  Covid-19 infection  ? Pneumonia with stable bilateral infiltrates, ?aspiration     Acute hypoxic respiratory failure  6. MRSA nasal colonization, Day #6 Mupirocin ointment       Comment:  Sepsis may be due to UTI, but unfortunately urine culture was not sent and would likely be false negative at this point. CRP decreasing.        Plan:      Continue Zosyn, Zithromax  Continue Baricitinib and Dexamethasone per Pulmonary  Follow-up blood cultures  Spoke with Staff about sending urine culture but it was not sent  Continue Mupirocin nasal ointment  In am, repeat CBC, CRP, procal, LDH       Signed By: Marlene Barriga MD     August 1, 2022

## 2022-08-01 NOTE — PROGRESS NOTES
Currently on heated hi flow 60L. Recent clinicals sent to [facility]) via [facility].           [facility], MSW

## 2022-08-02 NOTE — PROGRESS NOTES
PROGRESS NOTE    Patient: Юлия Hogan MRN: [id number]  SSN: [id number]    YOB: 1929  Age: 80 y.o. Sex: male      Admit Date: 7/27/2022    LOS: 6 days       Subjective     Chief Complaint   Patient presents with    Shortness of Breath       HPI: Patient is a 80y.o. year old male with a significant PMH of Afib, hypercholesterolemia, HTN, neuropathy, and hx of stroke presented to the ED today due to hypoxia and worsening confusion. The patient was discharged from the hospital yesterday after being hospitalized d/t COVID-19. Per nursing facility his O2 was in the 80s and it was 89% upon admission. The patient was stable at that time of discharge yesterday and not using any oxygen. The patient is now on BiPAP with an O2 sat of 100%. Febrile at 101.5  WBC 25.1  Hypertensive on admission  Respiratory rate at 31  CXR: Stable bilateral pulmonary infiltrates. Patient was given:  10mg Decadron  2g Magnesium  Duoneb treatment  Terbutaline treatment     7/28  Pt sitting in bed, responds to voice  P02 53 on ABG this morning, pt placed on BiPap  SpO2 stable  CRP=16.1    XR CHEST PORT  Diffuse interstitial airspace opacities are not  significantly changed. Trace left effusion. Biapical pleural thickening. Heartsize is enlarged      7/29    Patient on BiPAP now on 70% O2  Patient pulled out BiPAP last night put on high flow    7/30    On high flow 90% O2      Left Ventricle: Normal left ventricular systolic function with a visually estimated EF of 55 - 60%. Left ventricle size is normal. Normal wall thickness. Normal wall motion. Grade I diastolic dysfunction with normal LAP. Aortic Valve: Moderate regurgitation. Mitral Valve: Mildly thickened leaflet. 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 Moderate to severe regurgitation with an eccentrically directed jet and may underestimate severity. Tricuspid Valve: Moderate regurgitation.       7/31    On high flow 80% O2    8/1    On high flow 70% O2  Poor appetite poor appetite    8/2  Patient still on high flow 80% O2 very poor appetite  Refuse NG tube      Review of Systems   Unable to perform ROS: Acuity of condition       Objective     Visit Vitals  /84   Pulse (!) 117   Temp (!) 96.5 °F (35.8 °C)   Resp 23   Ht 5' 5.98\" (1.676 m)   Wt 56.7 kg (125 lb)   SpO2 96%   BMI 20.19 kg/m²    O2 Flow Rate (L/min): 60 l/min O2 Device: Heated, Hi flow nasal cannula    Physical Exam:   Physical Exam  Constitutional:       Appearance: He is normal weight. HENT:      Head: Normocephalic and atraumatic. Cardiovascular:      Rate and Rhythm: Normal rate and regular rhythm. Pulses: Normal pulses. Heart sounds: Normal heart sounds. Pulmonary:      Effort: Pulmonary effort is normal.      Breath sounds: Normal breath sounds. Musculoskeletal:         General: Normal range of motion. Cervical back: Normal range of motion and neck supple. Neurological:      General: No focal deficit present. Mental Status: He is alert. He is disoriented. Intake & Output:  Current Shift: 08/02 0701 - 08/02 1900  In: 382 [I.V.:100]  Out: 300 [Urine:300]  Last three shifts: 07/31 1901 - 08/02 0700  In: 1225 [I.V.:1225]  Out: 900 [Urine:900]    Lab/Data Review: All lab results for the last 24 hours reviewed.        24 Hour Results:    Recent Results (from the past 24 hour(s))   C REACTIVE PROTEIN, QT    Collection Time: 08/02/22  3:18 AM   Result Value Ref Range    C-Reactive protein 2.02 (H) 0.00 - 0.60 mg/dL   LD    Collection Time: 08/02/22  3:18 AM   Result Value Ref Range     (H) 85 - 241 U/L   PROCALCITONIN    Collection Time: 08/02/22  3:18 AM   Result Value Ref Range    Procalcitonin <0.05 (H) 0 ng/mL   METABOLIC PANEL, COMPREHENSIVE    Collection Time: 08/02/22  3:18 AM   Result Value Ref Range    Sodium 139 136 - 145 mmol/L    Potassium 3.4 (L) 3.5 - 5.1 mmol/L    Chloride 105 97 - 108 mmol/L    CO2 30 21 - 32 mmol/L    Anion gap 4 (L) 5 - 15 mmol/L    Glucose 108 (H) 65 - 100 mg/dL    BUN 30 (H) 6 - 20 mg/dL    Creatinine 0.72 0.70 - 1.30 mg/dL    BUN/Creatinine ratio 42 (H) 12 - 20      GFR est AA >60 >60 ml/min/1.73m2    GFR est non-AA >60 >60 ml/min/1.73m2    Calcium 7.2 (L) 8.5 - 10.1 mg/dL    Bilirubin, total 0.8 0.2 - 1.0 mg/dL    AST (SGOT) 46 (H) 15 - 37 U/L    ALT (SGPT) 37 12 - 78 U/L    Alk. phosphatase 72 45 - 117 U/L    Protein, total 4.9 (L) 6.4 - 8.2 g/dL    Albumin 2.2 (L) 3.5 - 5.0 g/dL    Globulin 2.7 2.0 - 4.0 g/dL    A-G Ratio 0.8 (L) 1.1 - 2.2     RENAL FUNCTION PANEL    Collection Time: 08/02/22  3:18 AM   Result Value Ref Range    Sodium 140 136 - 145 mmol/L    Potassium 3.4 (L) 3.5 - 5.1 mmol/L    Chloride 106 97 - 108 mmol/L    CO2 29 21 - 32 mmol/L    Anion gap 5 5 - 15 mmol/L    Glucose 108 (H) 65 - 100 mg/dL    BUN 31 (H) 6 - 20 mg/dL    Creatinine 0.74 0.70 - 1.30 mg/dL    BUN/Creatinine ratio 42 (H) 12 - 20      GFR est AA >60 >60 ml/min/1.73m2    GFR est non-AA >60 >60 ml/min/1.73m2    Calcium 7.3 (L) 8.5 - 10.1 mg/dL    Phosphorus 3.2 2.6 - 4.7 mg/dL    Albumin 2.2 (L) 3.5 - 5.0 g/dL   MAGNESIUM    Collection Time: 08/02/22  3:18 AM   Result Value Ref Range    Magnesium 2.0 1.6 - 2.4 mg/dL   CBC WITH AUTOMATED DIFF    Collection Time: 08/02/22  3:18 AM   Result Value Ref Range    WBC 12.1 (H) 4.1 - 11.1 K/uL    RBC 2.11 (L) 4.10 - 5.70 M/uL    HGB 6.6 (L) 12.1 - 17.0 g/dL    HCT 20.3 (L) 36.6 - 50.3 %    MCV 96.2 80.0 - 99.0 FL    MCH 31.3 26.0 - 34.0 PG    MCHC 32.5 30.0 - 36.5 g/dL    RDW 14.4 11.5 - 14.5 %    PLATELET 004 953 - 596 K/uL    MPV 10.4 8.9 - 12.9 FL    NRBC 0.2 (H) 0.0  WBC    ABSOLUTE NRBC 0.02 (H) 0.00 - 0.01 K/uL    NEUTROPHILS 95 (H) 32 - 75 %    LYMPHOCYTES 2 (L) 12 - 49 %    MONOCYTES 2 (L) 5 - 13 %    EOSINOPHILS 0 0 - 7 %    BASOPHILS 0 0 - 1 %    IMMATURE GRANULOCYTES 1 (H) 0 - 0.5 %    ABS. NEUTROPHILS 11.6 (H) 1.8 - 8.0 K/UL    ABS.  LYMPHOCYTES 0.2 (L) 0.8 - 3.5 K/UL ABS. MONOCYTES 0.2 0.0 - 1.0 K/UL    ABS. EOSINOPHILS 0.0 0.0 - 0.4 K/UL    ABS. BASOPHILS 0.0 0.0 - 0.1 K/UL    ABS. IMM. GRANS. 0.1 (H) 0.00 - 0.04 K/UL    DF AUTOMATED     TYPE & SCREEN    Collection Time: 08/02/22  5:30 AM   Result Value Ref Range    Crossmatch Expiration 08/05/2022,2359     ABO/Rh(D) [facility] Positive     Antibody screen Negative     Unit number [id number]     Blood component type RC LR,2     Unit division 00     Status of unit [address] to transfuse     Crossmatch result Compatible          Imaging:    XR CHEST PORT   Final Result   No significant change in interstitial and airspace opacities   possibly pulmonary edema      XR CHEST PORT   Final Result   No significant change      XR CHEST PORT   Final Result      Little interval change. XR CHEST PORT   Final Result   Slightly increased diffuse bilateral interstitial and alveolar   opacities, compatible with COVID pneumonia. XR CHEST PORT   Final Result   No significant change       XR CHEST SNGL V   Final Result   Stable bilateral pulmonary infiltrates.          XR CHEST PORT    (Results Pending)          Assessment     Acute hypoxic respiratory failure on high flow 80 %  Respiratory alkalosis  COVID-19 Pneumonia  Sepsis  Hypertension  Stroke  Afib  MRSA nares  Elevated BNP  Hypokalemia    Plan   Continue meds:  Eliquis 2.5mg PO BID  ASA 81 mg po daily  Lipitor 20mg PO every day  Zithromax 500 IV daily  Olumiant 4mg PO QD  Decadron 4mg IV Q6h  Doxazosin 4mg PO QHS  Cymbalta 30mg POQD  Remeron 15mg PO every day  Mupirocin 2% ointment both nostrils BID  Zosyn 3.375g IV q8hrs  Ranolazine 500mg PO every day  Replace potassium  Lasix 40 IV once                Current Facility-Administered Medications:     0.9% sodium chloride infusion 250 mL, 250 mL, IntraVENous, PRN, Garrett Browning MD    pantoprazole (PROTONIX) granules for oral suspension 40 mg, 40 mg, Per Star Moiz, Q12H, Marvin Brar MD    potassium chloride (KLOR-CON) packet for solution 40 mEq, 40 mEq, Per G Tube, Q6H, April Shepherd MD    nystatin (MYCOSTATIN) 100,000 unit/mL oral suspension 500,000 Units, 500,000 Units, Oral, QID, Bharath Johnson MD, 500,000 Units at 08/02/22 5387    hydrOXYzine (VISTARIL) injection 25 mg, 25 mg, IntraMUSCular, Q6H PRN, Mahogany Chapa MD, 25 mg at 08/01/22 1307    hydrOXYzine pamoate (VISTARIL) capsule 25 mg, 25 mg, Oral, Q8H, April Shepherd MD, 25 mg at 08/02/22 0539    piperacillin-tazobactam (ZOSYN) 3.375 g in 0.9% sodium chloride (MBP/ADV) 100 mL MBP, 3.375 g, IntraVENous, Q8H, Mahogany Chapa MD, Last Rate: 25 mL/hr at 08/02/22 1026, 3.375 g at 08/02/22 1026    apixaban (ELIQUIS) tablet 2.5 mg, 2.5 mg, Oral, BID, Mahogany Chapa MD, 2.5 mg at 08/02/22 3200    aspirin delayed-release tablet 81 mg, 81 mg, Oral, DAILY, Mahogany Chapa MD, 81 mg at 08/02/22 0596    atorvastatin (LIPITOR) tablet 20 mg, 20 mg, Oral, DAILY, Mahogany Chapa MD, 20 mg at 08/02/22 9158    doxazosin (CARDURA) tablet 4 mg, 4 mg, Oral, QHS, Mahogany Chapa MD, 4 mg at 08/01/22 2140    DULoxetine (CYMBALTA) capsule 30 mg, 30 mg, Oral, DAILY, Mahogany Chapa MD, 30 mg at 08/02/22 1037    mirtazapine (REMERON) tablet 15 mg, 15 mg, Oral, QHS, Mahogany Chapa MD, 15 mg at 08/01/22 2141    ranolazine ER (RANEXA) tablet 500 mg, 500 mg, Oral, BID, Mahogany Chapa MD, 500 mg at 08/02/22 0839    0.9% sodium chloride infusion, 25 mL/hr, IntraVENous, CONTINUOUS, April Shepherd MD, Last Rate: 25 mL/hr at 08/01/22 2039, 25 mL/hr at 08/01/22 2039    acetaminophen (TYLENOL) tablet 650 mg, 650 mg, Oral, Q6H PRN, 650 mg at 07/30/22 2150 **OR** acetaminophen (TYLENOL) suppository 650 mg, 650 mg, Rectal, Q6H PRN, Mahogany Chapa MD    polyethylene glycol (MIRALAX) packet 17 g, 17 g, Oral, DAILY PRN, Mahogany Chapa MD    ondansetron (ZOFRAN ODT) tablet 4 mg, 4 mg, Oral, Q8H PRN **OR** ondansetron (ZOFRAN) injection 4 mg, 4 mg, IntraVENous, Q6H PRN, Vicente Joshua MD    dexamethasone (DECADRON) 4 mg/mL injection 4 mg, 4 mg, IntraVENous, Q6H, Mahogany Chapa MD, 4 mg at 08/02/22 0539    mupirocin (BACTROBAN) 2 % ointment, , Both Nostrils, BID, Andrez Romano MD, Given at 08/02/22 8158    Current Outpatient Medications   Medication Instructions    amLODIPine (NORVASC) 5 mg, Oral, DAILY    apixaban (ELIQUIS) 2.5 mg, Oral, 2 TIMES DAILY    aspirin delayed-release 81 mg, Oral, DAILY    atorvastatin (LIPITOR) 20 mg, Oral, DAILY    cholecalciferol (vitamin D3) 2,000 Units, Oral, EVERY BEDTIME    dexAMETHasone (DECADRON) 4 mg, Oral, 2 TIMES DAILY    doxazosin (CARDURA) 4 mg, Oral, DAILY    DULoxetine (CYMBALTA) 30 mg, Oral, DAILY    mirtazapine (REMERON) 15 mg, Oral, EVERY BEDTIME    ranolazine ER (RANEXA) 500 mg, Oral, 2 TIMES DAILY         Signed By: Marisol Marie MD     August 2, 2022

## 2022-08-02 NOTE — PROGRESS NOTES
Pt noted with Hgb of 6.6. Dr Mark Kirkland notified. Order for 1 unite of PRBC placed. Pt remain anxious. CHG bath done. Pt remain Aox3.

## 2022-08-02 NOTE — PROGRESS NOTES
OT evaluation order received and acknowledged. OT evaluation attempted at [phone] however per RN pt inappropriate for skilled OT at this time; pt currently on high flow 80% and pending blood transfusion. Will continue to follow and re-attempt OT eval at a later time as medically appropriate. Thank you.

## 2022-08-02 NOTE — PROGRESS NOTES
Nutrition Assessment     Type and Reason for Visit: Reassess (interim)    Nutrition Recommendations/Plan:   Continue w/ current diet   Continue w/ Ensure compact x3/day   Add soup for L/D   Add ensure pudding x3/day   Rec' Vitamin C & Zinc Supplement   Continue w/ Supplement Bolus TF via NGT of Jevity 1.5, 300ml bolus after meals if <50% of meal consumed    Monitor and Record Wts, PO/Supplement Intakes, Bolus Feeds, Ops & Bms in I/Os        Nutrition Assessment:  Readmitted for respiratory failure, +COVID-19 and UTI. At last admit (7/18-7/26), pt with poor intakes pta and consumed <50% of meals inpatient. (7/29): Today, RD spoke to pt wife via phonE, wife endorsed appetite same vs worse than last admit, eating mostly pdg, applesauce, and Ensure however minimally. Discussed NGT and TF with wife, agreeable at this time. Spoke to RN and Pulomologist, agree as well, RD to place order. (8/2) Reported refusing/pulling out NGT. Appetite remains poor 2/2 loss of appetite most likely due to COVID 19. Noted good acceptances w/ ensure compact, consuming all of it at meals. It is approved that patients with covid have better meal acceptances w/ hot and cold food items. Will add soups for L/D. Will Add ensure pudding  as addtl ONs for better meal acceptance to meet atleast 50-75% of needs. Labs: protein (5.6), alb (2.1). Malnutrition Assessment:  Malnutrition Status: Mild malnutrition     Estimated Daily Nutrient Needs:  Energy (kcal):  1531-1814kcals (27-33kcals/kg)  Protein (g):  62-73g (1.1-1.3g/kg)       Fluid (ml/day):  1531-1814ml    Nutrition Related Findings:  Unable to complete NFPE d/t COVID-19 precautions. Denies issues with c/s. No N/V/D/C nor edema. Last BM (7/29)    Current Nutrition Therapies:  ADULT DIET Regular; Low Fat/Low Chol/High Fiber/2 gm Na  ADULT ORAL NUTRITION SUPPLEMENT Breakfast, Lunch, Dinner; Standard 4 oz  ADULT TUBE FEEDING Nasogastric; Standard with Fiber; Delivery Method: Bolus;  Bolus Frequency: 4 Times Daily; Bolus Volume (mL): 300; Bolus Method of Infusion: Syringe Push; Water Flush Volume (mL): 50; Water Flush Frequency: After bolus    Anthropometric Measures:  Height:  5' 5.98\" (167.6 cm)  Current Body Wt:  56.7 kg (125 lb) (7/27)  BMI: 20.2    Nutrition Diagnosis:   Inadequate oral intake related to impaired respiratory function (& poor appetite 2/2 COVID-19) as evidenced by intake 0-25%, poor intake prior to admission    Nutrition Interventions:   Food and/or Nutrient Delivery: Continue current diet, Modify oral nutrition supplement  Nutrition Education/Counseling: Education not indicated  Coordination of Nutrition Care: Continue to monitor while inpatient, Feeding assistance/environmental change  Plan of Care discussed with: RN    Goals:     Goals: PO intake 50% or greater, Initiate nutrition support, Tolerate nutrition support at goal rate, by next RD assessment  Specify Other Goals: Wt maintenance within +/-0.5kg in 7 days    Nutrition Monitoring and Evaluation:   Behavioral-Environmental Outcomes: None identified  Food/Nutrient Intake Outcomes: Diet advancement/tolerance, Food and nutrient intake, Supplement intake, Enteral nutrition intake/tolerance  Physical Signs/Symptoms Outcomes: Weight, Biochemical data, Hemodynamic status, Meal time behavior    Discharge Planning:     Too soon to determine    Kansjose manuelinrinne 45: [id number]

## 2022-08-02 NOTE — PROGRESS NOTES
Progress Note    Patient: Yandy Summers MRN: [id number]  SSN: [id number]    YOB: 1929  Age: 80 y.o. Sex: male      Admit Date: 7/27/2022    LOS: 6 days     Subjective:   Patient followed for sepsis with recent Covid-19 infection, respiratory failure with presumptive pneumonia and presumptive UTI. No urine culture was sent. He is on high flow nasal cannula. He remains afebrile with elevated WBC on steroids but decreasing CRP and normal procal.  No CXR today. Patient is awake and responsive with SOB and mild cough. Son at the bedside, also concerned about his nutritional status and inquiring about TPN and PEG tube. It appears that patient has reconsidered NGT. Objective:     Vitals:    08/02/22 0745 08/02/22 0800 08/02/22 0807 08/02/22 0815   BP: 127/71 122/73  133/64   Pulse: (!) 104 98  92   Resp: 18 (!) 36  22   Temp:       SpO2: 97% 98% 96% 98%   Weight:       Height:            Intake and Output:  Current Shift: 08/02 0701 - 08/02 1900  In: -   Out: 300 [Urine:300]  Last three shifts: 07/31 1901 - 08/02 0700  In: 1225 [I.V.:1225]  Out: 900 [Urine:900]    Physical Exam:   Vitals and nursing note reviewed. Constitutional:       General: He is in acute distress. Appearance: He is ill-appearing. HENT:     Head: Normocephalic and atraumatic. Nose:     Comments: High flow nasal cannula 80%   Eyes:     Pupils: Pupils are equal, round, and reactive to light. Cardiovascular:     Rate and Rhythm: Regular rhythm. Heart sounds: No murmur heard. Pulmonary: clear bilaterally  Abdominal:     General: Bowel sounds are normal.     Palpations: Abdomen is soft. Tenderness: There is no abdominal tenderness. There is no right CVA tenderness or left CVA tenderness. Genitourinary:     Comments: External urinary catheter  Musculoskeletal:     Right lower leg: No edema. Left lower leg: No edema. Skin:     Findings: No rash.   Neurological:     General: No focal deficit present. Mental Status: He is alert and oriented to person, place, and time. Psychiatric:         Behavior: Behavior normal.    Lab/Data Review:     WBC 12,100     <545 <498 <606  Ferritin <231    CRP 2.02 <3.26 < 6.18 <9.24 <16.10  Procal <0.05 <0.15    Blood cultures (7/27) No growth FINAL  Urine culture ordered  Heavy normal respiratory shirley (7/29) FINAL    CXR (8/1)  No significant change in interstitial and airspace opacities  possibly pulmonary edema    Assessment:     Active Problems:    COVID (7/18/2022)      Sepsis ([address] (7/27/2022)  Sepsis with fever, tachycardia, tachypnea, leukocytosis, elevated CRP, Day #4  Zosyn, Zithromax  Presumptive UTI with pyuria and bacteria, urine culture not sent  Covid-19 infection  ? Pneumonia with stable bilateral infiltrates, ?aspiration     Acute hypoxic respiratory failure  6. MRSA nasal colonization, Day #7 Mupirocin ointment       Comment:  Sepsis presumed secondary to UTI, but unfortunately urine culture was not sent and would likely be false negative at this point. CRP still decreasing. Leukocytosis attributed to steroid. Increasing LDH may be poor prognostic indicator reflecting Covid-19 activity.     Plan:      Continue Zosyn, Zithromax  Continue Baricitinib and Dexamethasone per Pulmonary  Spoke with Staff about sending urine culture but it was not sent  Discontinue Mupirocin nasal ointment after today  In am, repeat CBC, CRP, LDH       Signed By: Marlene Barriga MD     August 2, 2022

## 2022-08-02 NOTE — PROGRESS NOTES
Consult  Pulmonary, Critical Care    Name: Roberto Ramos MRN: [id number]   : 1929 Hospital: [facility]   Date: 2022  Admission date: 2022 Hospital Day: 7       Subjective/Interval History:   Recent COVID pneumonitis improved with Decadron and baricitinib wean to room air was discharged to rehab facility yesterday at the rehab facility he had development of hypoxia than fever was transferred back to our emergency room T-max 101.5. Wife states that yesterday he did have some green-tinged sputum. Chest x-ray shows diffuse bilateral infiltrates unchanged. There has been no nausea vomiting choking while eating   remains on BiPAP. Blood gas this morning on nasal oxygen showed hypoxia but currently he is running 94% on 5 L awake alert no specific complaints   more anxiety last evening could not tolerate BiPAP and was placed on high flow. Still somewhat anxious today IM Vistaril seem to help   awake alert tolerated nasal high flow all night but FiO2 is 90%. Not able to eat due to shortness of breath have talked with him about NG tube for tube feedings and he agrees   awake alert confused FiO2 down to 50%. Nurse attempted NG insertion  and .   As soon as the tube was placed in his nose he pulled it and refused to have any further attempt he remains malnourished   awake alert confused refused NG insertion yesterday currently he states he will allow it when his wife is here    Patient Active Problem List   Diagnosis Code    Carotid stenosis I65.29    Hypoxia R09.02    COVID U07.1    Sepsis ([address] A41.9       IMPRESSION:   Acute hypoxic respiratory failure currently on nasal high flow FiO2 80%  Healthcare associated pneumonia Serratia on culture  Diffuse pulmonary infiltrates questionable pneumonia versus inflammatory response from COVID versus pulmonary edema from diastolic dysfunction and AR  Oral thrush resolved  Anemia hemoglobin dropping for transfusion  Hypokalemia to be repleted  Hypophosphatemia repleted  Hypocalcemia repleted  Recent COVID pneumonitis  Atrial fibrillation  Moderate aortic regurg  Moderate to severe mitral regurg  Diastolic left ventricular dysfunction  Pyuria rule out UTI no culture results  Elevated BNP  Anxiety  Body mass index is 20.19 kg/m². RECOMMENDATIONS/PLAN:   nasal high flow increased FiO2 80%  Anxiety may be improved on 3 times daily dosing of Vistaril  Continue IV Zosyn and Zithromax nasal MRSA smear positive blood culture no growth sputum culture occasional polys with Serratia does not appear as if urine was ever sent  BNP is elevated creatinine is normal repeat Lasix today  Will reattempt NG tube placement  patient on nystatin          Subjective/Initial History:   I have reviewed the flowsheet and previous  notes. I was asked by Horace Spatz, MD to see Eduardo Gomez a 80 y.o.  male  in consultation for a chief complaint of acute hypoxic respiratory failure fever recent COVID pneumonitis        Patient PCP: Lieutenant Yari MD  PMH:  has a past medical history of Atrial fibrillation ([facility] [address] COVID-19 vaccine series completed (03/2021), Hypercholesteremia, Hypertension, Neuropathy, and Stroke ([facility] [address] (08/2021). PSH:   has a past surgical history that includes pr abdomen surgery proc unlisted (Right) and hx cataract removal (Left, 2018). FHX: family history is not on file. SHX:  reports that he has quit smoking. He has never used smokeless tobacco. He reports that he does not use drugs.   Systemic review  General weight has been stable the last several days he has not noted fever chills or sweats until this morning  Eyes no double vision or momentary blindness  ENT no facial pain over the sinuses  Musculoskeletal no swollen tender joints no myalgias  Endocrinologic no polyuria polydipsia  Neurologic no seizures or syncope awake and alert oriented  Gastrointestinal no choking or gagging when eating no nausea vomiting no indigestion  Genitourinary no pain or discomfort no burning  Cardiovascular has not noted ankle edema chest pain diaphoresis or nocturia  Respiratory abrupt worsening shortness of breath last night associated with fever    No Known Allergies   MEDS:   Current Facility-Administered Medications   Medication    0.9% sodium chloride infusion 250 mL    potassium chloride (KLOR-CON) packet for solution 40 mEq    pantoprazole (PROTONIX) granules for oral suspension 40 mg    nystatin (MYCOSTATIN) 100,000 unit/mL oral suspension 500,000 Units    hydrOXYzine (VISTARIL) injection 25 mg    hydrOXYzine pamoate (VISTARIL) capsule 25 mg    piperacillin-tazobactam (ZOSYN) 3.375 g in 0.9% sodium chloride (MBP/ADV) 100 mL MBP    apixaban (ELIQUIS) tablet 2.5 mg    aspirin delayed-release tablet 81 mg    atorvastatin (LIPITOR) tablet 20 mg    doxazosin (CARDURA) tablet 4 mg    DULoxetine (CYMBALTA) capsule 30 mg    mirtazapine (REMERON) tablet 15 mg    ranolazine ER (RANEXA) tablet 500 mg    0.9% sodium chloride infusion    acetaminophen (TYLENOL) tablet 650 mg    Or    acetaminophen (TYLENOL) suppository 650 mg    polyethylene glycol (MIRALAX) packet 17 g    ondansetron (ZOFRAN ODT) tablet 4 mg    Or    ondansetron (ZOFRAN) injection 4 mg    dexamethasone (DECADRON) 4 mg/mL injection 4 mg    mupirocin (BACTROBAN) 2 % ointment        Current Facility-Administered Medications:     0.9% sodium chloride infusion 250 mL, 250 mL, IntraVENous, PRN, TegGarrett mahoney MD    potassium chloride (KLOR-CON) packet for solution 40 mEq, 40 mEq, Per G Tube, ONCE, Shamar Velarde MD    pantoprazole (PROTONIX) granules for oral suspension 40 mg, 40 mg, Oral, ACB, Shamar Velarde MD, 40 mg at 08/02/22 1735    nystatin (MYCOSTATIN) 100,000 unit/mL oral suspension 500,000 Units, 500,000 Units, Oral, QID, Bharath Johnson MD, 500,000 Units at 08/02/22 0838    hydrOXYzine (VISTARIL) injection 25 mg, 25 mg, IntraMUSCular, Q6H PRN, Monique Dobbs MD, 25 mg at 08/01/22 1307    hydrOXYzine pamoate (VISTARIL) capsule 25 mg, 25 mg, Oral, Q8H, Joshua Merino MD, 25 mg at 08/02/22 0539    piperacillin-tazobactam (ZOSYN) 3.375 g in 0.9% sodium chloride (MBP/ADV) 100 mL MBP, 3.375 g, IntraVENous, Q8H, Mahogany Chapa MD, Last Rate: 25 mL/hr at 08/02/22 0352, 3.375 g at 08/02/22 0352    apixaban (ELIQUIS) tablet 2.5 mg, 2.5 mg, Oral, BID, Mahogany Chapa MD, 2.5 mg at 08/02/22 8757    aspirin delayed-release tablet 81 mg, 81 mg, Oral, DAILY, Mahogany Chapa MD, 81 mg at 08/02/22 1248    atorvastatin (LIPITOR) tablet 20 mg, 20 mg, Oral, DAILY, Mahogany Chapa MD, 20 mg at 08/02/22 9502    doxazosin (CARDURA) tablet 4 mg, 4 mg, Oral, QHS, Mahogany Chapa MD, 4 mg at 08/01/22 2140    DULoxetine (CYMBALTA) capsule 30 mg, 30 mg, Oral, DAILY, Mahogany Chapa MD, 30 mg at 08/02/22 3903    mirtazapine (REMERON) tablet 15 mg, 15 mg, Oral, QHS, Mahogany Chapa MD, 15 mg at 08/01/22 2141    ranolazine ER (RANEXA) tablet 500 mg, 500 mg, Oral, BID, Mahogany Chapa MD, 500 mg at 08/02/22 0839    0.9% sodium chloride infusion, 25 mL/hr, IntraVENous, CONTINUOUS, Joshua Merino MD, Last Rate: 25 mL/hr at 08/01/22 2039, 25 mL/hr at 08/01/22 2039    acetaminophen (TYLENOL) tablet 650 mg, 650 mg, Oral, Q6H PRN, 650 mg at 07/30/22 2150 **OR** acetaminophen (TYLENOL) suppository 650 mg, 650 mg, Rectal, Q6H PRN, Mahogany Chapa MD    polyethylene glycol (MIRALAX) packet 17 g, 17 g, Oral, DAILY PRN, Mahogany Chapa MD    ondansetron (ZOFRAN ODT) tablet 4 mg, 4 mg, Oral, Q8H PRN **OR** ondansetron (ZOFRAN) injection 4 mg, 4 mg, IntraVENous, Q6H PRN, Mahogany Chapa MD    dexamethasone (DECADRON) 4 mg/mL injection 4 mg, 4 mg, IntraVENous, Q6H, Mahogany Chapa MD, 4 mg at 08/02/22 3891    mupirocin (BACTROBAN) 2 % ointment, , Both Nostrils, BID, Brittany Brunson MD, Given at 08/02/22 5180      Objective:     Vital Signs: Telemetry:    AFIB Intake/Output:   Visit Vitals  /64   Pulse 92   Temp 97 °F (36.1 °C)   Resp 22   Ht 5' 5.98\" (1.676 m)   Wt 56.7 kg (125 lb)   SpO2 98%   BMI 20.19 kg/m²       Temp (24hrs), Av.4 °F (36.3 °C), Min:97 °F (36.1 °C), Max:98 °F (36.7 °C)        O2 Device: Heated, Hi flow nasal cannula O2 Flow Rate (L/min): 60 l/min         Body mass index is 20.19 kg/m². Wt Readings from Last 4 Encounters:   22 56.7 kg (125 lb)   22 54.4 kg (120 lb)   02/10/22 54.4 kg (120 lb)   21 54.4 kg (120 lb)          Intake/Output Summary (Last 24 hours) at 2022 0917  Last data filed at 2022 0844  Gross per 24 hour   Intake 675 ml   Output 950 ml   Net -275 ml         Last shift:      701 -  190  In: -   Out: 300 [Urine:300]  Last 3 shifts: 1901 -  0700  In: 1225 [I.V.:1225]  Out: 900 [Urine:900]       Hemodynamics:    CO:    CI:    CVP:    SVR:   PAP Systolic:    PAP Diastolic:    PVR:    UY87:       Ventilator Settings:      Mode Rate TV Press PEEP FiO2 PIP Min. Vent               80 %     30.4 l/min      Physical Exam:    General:  male; in bed on nasal high flow oxygen less anxious no accessory muscle use  HEENT: NCAT,   Eyes: anicteric; conjunctiva clear extraocular movements intact  Neck: no nodes, no accessory MM use. No definite JVD  Chest: no deformity,   Cardiac: Irregular rate and rhythm occasional PVCs has somewhat harsh systolic murmur  Lungs: Clear anteriorly with rales posteriorly no wheezing  Abd: Thin soft positive bowel sounds no tenderness  Ext: Trace edema; no joint swelling;  No clubbing  : clear urine  Neuro: Awake alert seems calm  hard of hearing follow simple commands moves all 4 extremities  Psych- no agitation, mildly confused;   Skin: warm, dry, no cyanosis;   Pulses: Brachial and radial pulses intact  Capillary: Normal capillary refill      Labs:    Recent Labs     22  0318 22  0400 22  0600   WBC 12.1* 11.5* 12.8*   HGB 6.6* 7.4* 8.5*    173 217       Recent Labs     08/02/22  0318 08/01/22  0400 07/31/22  0600     139 144 136   K 3.4*  3.4* 3.8 4.2     105 112* 104   CO2 29  30 24 26   *  108* 99 125*   BUN 31*  30* 27* 22*   CREA 0.74  0.72 0.50* 0.61*   CA 7.3*  7.2* 6.5* 7.5*   MG 2.0  --   --    PHOS 3.2  --  2.6   ALB 2.2*  2.2*  --  2.2*   ALT 37  --   --        Recent Labs     08/01/22  0206   PH 7.50*   PCO2 36   PO2 65*   HCO3 27*   FIO2 80     8/1 nasal high flow 60 L FiO2 50% with PO2 65 PCO2 36 pH 7.50 7/30 on nasal high flow 40 L FiO2 80%  COVID-19 antigen remains positive  BNP 2592, 2705 3310  CRP 2.02 3.26, 6.18, 9.2 16.1  Procalcitonin less than 0.05 0.15  Urinalysis with 20-50 WBCs  Blood culture no growth  Sputum occasional polys with Serratia  Echo ejection fraction 57% diastolic dysfunction moderate aortic regurg moderate to severe mitral regurg left atrium 2.6 cm RVSP 25  Imaging:  I have personally reviewed the patients radiographs and have reviewed the reports:    CXR Results  (Last 48 hours)                 08/01/22 0326  XR CHEST PORT Final result    Impression:  No significant change in interstitial and airspace opacities   possibly pulmonary edema       Narrative:  EXAM: XR CHEST PORT       INDICATION: chf       COMPARISON: 7/31/2022       FINDINGS: A portable AP radiograph of the chest was obtained at 325 hours. Cardiac monitoring leads. . Bilateral interstitial and patchy airspace opacities   unchanged. [place] Heart size is borderline. Small left pleural effusion. .  The bones   and soft tissues are grossly within normal limits. Results from [place] encounter on 07/27/22    XR CHEST PORT    Narrative  EXAM: XR CHEST PORT    INDICATION: chf    COMPARISON: 7/31/2022    FINDINGS: A portable AP radiograph of the chest was obtained at 325 hours. Cardiac monitoring leads. . Bilateral interstitial and patchy airspace opacities  unchanged. [place]  Heart size is borderline. Small left pleural effusion. .  The bones  and soft tissues are grossly within normal limits. Impression  No significant change in interstitial and airspace opacities  possibly pulmonary edema      XR CHEST PORT    Narrative  EXAM: XR CHEST PORT    INDICATION: Respiratory failure    COMPARISON: 7/30/2022    FINDINGS: A portable AP radiograph of the chest was obtained at 311 hours. Cardiac monitoring leads. . Bilateral interstitial and airspace opacities without  significant change. Trace left pleural effusion. The cardiac and mediastinal  contours and pulmonary vascularity are normal.  The bones and soft tissues are  grossly within normal limits. Impression  No significant change      XR CHEST PORT    Narrative  EXAM:  XR CHEST PORT    INDICATION: COVID pneumonitis    COMPARISON: Chest radiograph 7/29/2022    TECHNIQUE: Semiupright portable chest AP view    FINDINGS:    Cardiac loop recorder. Cardiomediastinal silhouette is stably enlarged. Grossly  unchanged widespread patchy interstitial and airspace opacities. Stable small  left and trace right pleural effusions. No definite pneumothorax. Several  chronic right-sided rib deformities. Impression  Little interval change. Results from [place] encounter on 02/10/22    CT HEAD WO CONT    Narrative  Technique: Multiple continuous axial images were obtained from the skull base to  the vertex without administration of intravenous contrast.    Comment on dose reduction: All CT scans at this facility are performed using  dose reduction optimization technique as appropriate to perform the exam  including the following; automated exposure control, adjustments of the mA  and/or kV according to patient size, or use of iterative reconstructed  technique. Comparison examination: None    Findings:  The ventricles and sulci are prominent consistent with age-related changes of  atrophy.  Periventricular hypodensity is identified consistent with changes of  chronic small vessel disease. No evidence of midline shift, mass lesion, or  extra-axial fluid collection. No evidence of parenchymal hemorrhage or  infarction in a major vascular territory. The osseous structures are intact, the paranasal sinuses are clear. Extracalvarial soft tissue hematoma posterior vertex . Impression  No acute intracranial process. Extracalvarial soft tissue hematoma posterior  vertex . Session fever with recent hospitalization possible healthcare associated pneumonia agree with Zosyn vancomycin and Zithromax. We will attempt to collect sputum for culture. Have requested urine culture blood cultures have already been obtained. He has a significant systolic murmur with elevated BNP he may have mitral regurg giving him some degree of fluid overload he only has trace edema. We will check an echocardiogram.  Creatinine is normal will decrease IV fluids for the time being. Chest x-ray with diffuse infiltrates thought to be residual inflammatory change from COVID pneumonitis but may represent some degree of fluid overload. Oxygenation is well-maintained now on noninvasive ventilator have decreased FiO2 to 45% will attempt conversion to nasal oxygen in a.m.  728 awake alert currently on noninvasive ventilator have placed on 5 L oxygen saturation 94%. If he maintains that we will leave him on nasal oxygen today and use noninvasive ventilator at night. Nursing noted that when he drifts off to sleep he will have desaturation. BNP remains elevated neck veins are chest visible chest x-ray still diffuse infiltrates we will give 1 dose Lasix today  7/29 anxious intolerant of BiPAP last evening switch to nasal high flow.   Chest x-ray is unchanged with diffuse infiltrates questionable healthcare associated pneumonia COVID inflammatory reaction or some degree pulmonary edema from diastolic dysfunction aortic regurg and mitral regurg repeat Lasix today replenish potassium and phosphorus nasal MRSA smear is positive we will continue vancomycin Zosyn and Zithromax remains on baricitinib and Decadron  7/30 much calmer today questionably due to Vistaril. Potassium remains low will supplement. Still has trace edema with normal creatinine we will repeat Lasix today. Have talked with him about placing NG tube for tube feedings and he agrees  7/31 alert awake oral thrush on 90% FiO2 intermittent agitation as per nurse  8/1 awake alert confused refusing NG tube once wife has a chance to talk with him we will attempt reinsertion. Serratia in sputum currently on Zosyn. Diffuse accentuated interstitial markings persist we will repeat Lasix today  8/2 still refusing NG tube eating very little. Creatinine and BUN up slightly with Lasix dosing yesterday. He is to receive a unit of blood replace potassium we will repeat Lasix today after blood we will try once again to place NG tube  Time of care 30 minutes           Thank you for allowing us to participate in the care of this patient.   We will follow along with you     Yessy Stacy MD

## 2022-08-02 NOTE — PROGRESS NOTES
Patient wife bedside. Patient refused NGT attempt. Wife states he would not keep it in place anyway, and would like to proceed with PPN. MD notified and discussed with wife.

## 2022-08-03 NOTE — PROGRESS NOTES
Progress Note    Patient: Micheal Montanez MRN: [id number]  SSN: [id number]    YOB: 1929  Age: 80 y.o. Sex: male      Admit Date: 7/27/2022    LOS: 7 days     Subjective:   Patient followed for sepsis with recent Covid-19 infection, respiratory failure with presumptive pneumonia and presumptive UTI. No urine culture was sent. He is on high flow nasal cannula. He remains afebrile with elevated WBC on steroids but decreasing CRP and normal procal.  CXR today showing improvement. Patient is awake and responsive with SOB and mild cough. Objective:     Vitals:    08/03/22 0900 08/03/22 1000 08/03/22 1030 08/03/22 1151   BP: 129/68 (!) 145/68     Pulse: (!) 105 (!) 111     Resp: 23 (!) 31     Temp:   (!) 96.5 °F (35.8 °C)    SpO2: 94%   95%   Weight:       Height:            Intake and Output:  Current Shift: No intake/output data recorded. Last three shifts: 08/01 1901 - 08/03 0700  In: 857 [I.V.:575]  Out: 2250 [Urine:2250]    Physical Exam:   Vitals and nursing note reviewed. Constitutional:       General: He is in acute distress. Appearance: He is ill-appearing. HENT:     Head: Normocephalic and atraumatic. Nose:     Comments: High flow nasal cannula 80%   Eyes:     Pupils: Pupils are equal, round, and reactive to light. Cardiovascular:     Rate and Rhythm: Regular rhythm. Heart sounds: No murmur heard. Pulmonary: clear bilaterally except few crackles  Abdominal:     General: Bowel sounds are normal.     Palpations: Abdomen is soft. Tenderness: There is no abdominal tenderness. There is no right CVA tenderness or left CVA tenderness. Genitourinary:     Comments: External urinary catheter  Musculoskeletal:     Right lower leg: No edema. Left lower leg: No edema. Skin:     Findings: No rash. Neurological:     General: No focal deficit present. Mental Status: He is alert and oriented to person, place, and time.   Psychiatric:         Behavior: Behavior normal.    Lab/Data Review:     WBC 17,000     <708 <545 <498 <606  Ferritin <231    CRP 1.64 <2.02 <3.26 < 6.18 <9.24 <16.10  Procal <0.05 <0.15    Blood cultures (7/27) No growth FINAL  Urine culture ordered  Heavy normal respiratory shirley (7/29) FINAL    CXR (8/3)   Interstitial and airspace opacities with some improvement in the interval.        Assessment:     Active Problems:    COVID (7/18/2022)      Sepsis ([address] (7/27/2022)  Sepsis with fever, tachycardia, tachypnea, leukocytosis, elevated CRP, Day #5  Zosyn, Zithromax  Presumptive UTI with pyuria and bacteria, urine culture not sent  Covid-19 infection  ? Pneumonia with stable bilateral infiltrates, ?aspiration     Acute hypoxic respiratory failure  6. MRSA nasal colonization, Day #8 Mupirocin ointment       Comment:  Sepsis presumed secondary to UTI, but unfortunately urine culture was not sent and would likely be false negative at this point. CRP still decreasing. Leukocytosis attributed to steroid. Increasing LDH may be poor prognostic indicator reflecting Covid-19 activity, however, CXR improving.     Plan:      Continue Zosyn, Zithromax  Continue Baricitinib and Dexamethasone per Pulmonary  Spoke with Staff about sending urine culture but it was not sent  Discontinue Mupirocin nasal ointment   In am, repeat CBC, CRP, LDH       Signed By: Daniela Fernandes MD     August 3, 2022

## 2022-08-03 NOTE — PROGRESS NOTES
PHYSICAL THERAPY EVALUATION  Patient: Adrien Holman (86 y.o. male)  Date: 8/3/2022  Primary Diagnosis: COVID [U07.1]  Sepsis ([facility] [A41.9]       Precautions: droplet plus, falls     ASSESSMENT  Pt is a 80 y.o. male admitted on 7/27/2022 for hypoxia with worsening confusion from SNF; pt currently being treated for COVID 19. Pt was DC to SNF on 7/26/22 following admission for COVID 19 on 7/18/22. Pt currently in ICU on 100%FIO2/60L/min via HFNC. Past Medical History:   Diagnosis Date    Atrial fibrillation ([facility]     COVID-19 vaccine series completed 03/2021    Hypercholesteremia     Hypertension     Neuropathy     Stroke ([facility] [facility] 08/2021    TIA       Pt received semi-supine in bed upon arrival, AXO x4 and agreeable to OT/PT evaluations at this time. Spouse also present at bedside with pt permission. Per pt report, pt admitted from SNF, however at baseline resides with spouse in a two-story home (1st floor setup/stays on main level) with ramped entrance, was IND with ADLs and Mod I using a rollator for mobility at PLOF. Pt also with hx of recent falls. Based on the objective data described below, the patient presents with generalized weakness, impaired functional mobility, impaired amb, impaired balance, and decreased activity tolerance with increased need for supplemental oxygen. Pt performed Rolling: Moderate assistance;Assist x2 to allow for bowel and bladder hygiene, see OT note for self care and hygiene details. Pt then performed ankle pumps and heelslides while semi-supine in bed, further mobility deferred a this time due to fatigue, decreased SPO2 with bed mobility, increased RR. Pt did poor with session today currently on 100 FiO2/60 L/min via HFNC, SPO2 decreased to 90% with mobility in bed, requiring verbal cues for PLB, improved to 95% at rest. RR 30s throughout session. Pt will benefit from continued skilled PT to address above deficits and return to PLOF. Current PT DC recommendation SNF. Current Level of Function Impacting Discharge (mobility/balance): mod Ax2    Other factors to consider for discharge: severity of deficits, readmission, LOS, acute medical state      PLAN :  Recommendations and Planned Interventions: bed mobility training, transfer training, gait training, therapeutic exercises, patient and family training/education, and therapeutic activities      Frequency/Duration: Patient will be followed by physical therapy:  2-3x/week to address goals. Recommendation for discharge: (in order for the patient to meet his/her long term goals)  Madan Kimball    This discharge recommendation:  Has been made in collaboration with the attending provider and/or case management         SUBJECTIVE:   Patient stated I want to get to a chair but I don't know if I can today.     OBJECTIVE DATA SUMMARY:   HISTORY:    Past Medical History:   Diagnosis Date    Atrial fibrillation ([facility] [facility]     COVID-19 vaccine series completed 03/2021    Hypercholesteremia     Hypertension     Neuropathy     Stroke ([facility] [address] 08/2021    TIA     Past Surgical History:   Procedure Laterality Date    HX CATARACT REMOVAL Left 2018    IOL    DE ABDOMEN SURGERY PROC UNLISTED Right     I[facility]       Home Situation  Home Environment: Private residence  Wheelchair Ramp: Yes  One/Two Story Residence: Two story, live on 1st floor  Living Alone: No  Support Systems: Spouse/Significant Other  Patient Expects to be Discharged to[de-identified] Skilled nursing facility  Current DME Used/Available at Home: megan Hawkins    EXAMINATION/PRESENTATION/DECISION MAKING:   Critical Behavior:  Neurologic State: Alert  Orientation Level: Oriented X4  Cognition: Follows commands     Hearing:   Auditory  Auditory Impairment: None  Skin:  intact where visible  Edema: none noted  Range Of Motion:  AROM: Generally decreased, functional                       Strength:    Strength: Generally decreased, functional Coordination:  Coordination: Generally decreased, functional  Vision:      Functional Mobility:  Bed Mobility:  Rolling: Moderate assistance;Assist x2           Transfers:   NA    Therapeutic Exercises:   Pt would benefit from LE HEP to improve overall LE AROM/strength and can be further educated in next treatment session. Functional Measure:  [employer]PAC[employer] 6 Clicks         Basic Mobility Inpatient Short Form  How much difficulty does the patient currently have. .. Unable A Lot A Little None   1. Turning over in bed (including adjusting bedclothes, sheets and blankets)? [] 1   [x] 2   [] 3   [] 4   2. Sitting down on and standing up from a chair with arms ( e.g., wheelchair, bedside commode, etc.)   [] 1   [x] 2   [] 3   [] 4   3. Moving from lying on back to sitting on the side of the bed? [] 1   [x] 2   [] 3   [] 4          How much help from another person does the patient currently need. .. Total A Lot A Little None   4. Moving to and from a bed to a chair (including a wheelchair)? [x] 1   [] 2   [] 3   [] 4   5. Need to walk in hospital room? [x] 1   [] 2   [] 3   [] 4   6. Climbing 3-5 steps with a railing? [x] 1   [] 2   [] 3   [] 4   © 2007, Trustees of [employer], under license to [employer]. All rights reserved     Score:  Initial: 9/24 Most Recent: X (Date: 8/3/22 )   Interpretation of Tool:  Represents activities that are increasingly more difficult (i.e. Bed mobility, Transfers, Gait).   Score 24 23 22-20 19-15 14-10 9-7 6   Modifier CH CI CJ CK CL CM CN         Physical Therapy Evaluation Charge Determination   History Examination Presentation Decision-Making   HIGH Complexity :3+ comorbidities / personal factors will impact the outcome/ POC  HIGH Complexity : 4+ Standardized tests and measures addressing body structure, function, activity limitation and / or participation in recreation  MEDIUM Complexity : Evolving with changing characteristics  Other outcome measures ampa 6  mod      Based on the above components, the patient evaluation is determined to be of the following complexity level: MEDIUM    Pain Ratin/10 reported    Activity Tolerance:   Poor, desaturates with exertion and requires oxygen, requires frequent rest breaks, and observed SOB with activity    After treatment patient left in no apparent distress:   Supine in bed, Call bell within reach, Bed / chair alarm activated, Caregiver / family present, and Side rails x 3 and nsg updated. GOALS:    Problem: Mobility Impaired (Adult and Pediatric)  Goal: *Acute Goals and Plan of Care (Insert Text)  Description: Pt stated goal: to get better and go home  Pt will be I with LE HEP in 7 days. Pt will perform bed mobility with mod I in 7 days. Pt will perform transfers with mod I in 7 days. Outcome: Not Met    COMMUNICATION/EDUCATION:   The patients plan of care was discussed with: Occupational therapist, Registered nurse, and Case management. Fall prevention education was provided and the patient/caregiver indicated understanding., Patient/family have participated as able in goal setting and plan of care. , and Patient/family agree to work toward stated goals and plan of care. PT/OT sessions occurred together for increased safety of pt and clinician.        Thank you for this referral.  Roderick Mccann, PT, DPT   Time Calculation: 33 mins

## 2022-08-03 NOTE — PROGRESS NOTES
PROGRESS NOTE    Patient: Grant Sky MRN: [id number]  SSN: [id number]    YOB: 1929  Age: 80 y.o. Sex: male      Admit Date: 7/27/2022    LOS: 7 days       Subjective     Chief Complaint   Patient presents with    Shortness of Breath       HPI: Patient is a 80y.o. year old male with a significant PMH of Afib, hypercholesterolemia, HTN, neuropathy, and hx of stroke presented to the ED today due to hypoxia and worsening confusion. The patient was discharged from the hospital yesterday after being hospitalized d/t COVID-19. Per nursing facility his O2 was in the 80s and it was 89% upon admission. The patient was stable at that time of discharge yesterday and not using any oxygen. The patient is now on BiPAP with an O2 sat of 100%. Febrile at 101.5  WBC 25.1  Hypertensive on admission  Respiratory rate at 31  CXR: Stable bilateral pulmonary infiltrates. Patient was given:  10mg Decadron  2g Magnesium  Duoneb treatment  Terbutaline treatment     7/28  Pt sitting in bed, responds to voice  P02 53 on ABG this morning, pt placed on BiPap  SpO2 stable  CRP=16.1    XR CHEST PORT  Diffuse interstitial airspace opacities are not  significantly changed. Trace left effusion. Biapical pleural thickening. Heartsize is enlarged      7/29    Patient on BiPAP now on 70% O2  Patient pulled out BiPAP last night put on high flow    7/30    On high flow 90% O2      Left Ventricle: Normal left ventricular systolic function with a visually estimated EF of 55 - 60%. Left ventricle size is normal. Normal wall thickness. Normal wall motion. Grade I diastolic dysfunction with normal LAP. Aortic Valve: Moderate regurgitation. Mitral Valve: Mildly thickened leaflet. 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 Moderate to severe regurgitation with an eccentrically directed jet and may underestimate severity. Tricuspid Valve: Moderate regurgitation.       7/31    On high flow 80% O2    8/1    On high flow 70% O2  Poor appetite poor appetite    8/2  Patient still on high flow 80% O2 very poor appetite  Refuse NG tube    8/2  Patient alert awake on high flow 80% O2  Unable to get NG tube placed  Very poor appetite    Discussed with the patient wife yesterday  She wants PPN        Review of Systems   Unable to perform ROS: Acuity of condition       Objective     Visit Vitals  BP (!) 145/68 (BP 1 Location: Right upper arm, BP Patient Position: At rest)   Pulse (!) 111   Temp (!) 96.5 °F (35.8 °C)   Resp (!) 31   Ht 5' 5.98\" (1.676 m)   Wt 56.7 kg (125 lb)   SpO2 94%   BMI 20.19 kg/m²    O2 Flow Rate (L/min): 60 l/min O2 Device: Hi flow nasal cannula    Physical Exam:   Physical Exam  Constitutional:       Appearance: He is normal weight. HENT:      Head: Normocephalic and atraumatic. Cardiovascular:      Rate and Rhythm: Normal rate and regular rhythm. Pulses: Normal pulses. Heart sounds: Normal heart sounds. Pulmonary:      Effort: Pulmonary effort is normal.      Breath sounds: Normal breath sounds. Musculoskeletal:         General: Normal range of motion. Cervical back: Normal range of motion and neck supple. Neurological:      General: No focal deficit present. Mental Status: He is alert. He is disoriented. Intake & Output:  Current Shift: No intake/output data recorded. Last three shifts: 08/01 1901 - 08/03 0700  In: 857 [I.V.:575]  Out: 2250 [Urine:2250]    Lab/Data Review: All lab results for the last 24 hours reviewed.        24 Hour Results:    Recent Results (from the past 24 hour(s))   C REACTIVE PROTEIN, QT    Collection Time: 08/03/22  4:00 AM   Result Value Ref Range    C-Reactive protein 1.64 (H) 0.00 - 0.60 mg/dL   LD    Collection Time: 08/03/22  4:00 AM   Result Value Ref Range     (H) 85 - 241 U/L   RENAL FUNCTION PANEL    Collection Time: 08/03/22  4:00 AM   Result Value Ref Range    Sodium 141 136 - 145 mmol/L    Potassium 3.1 (L) 3.5 - 5.1 mmol/L Chloride 104 97 - 108 mmol/L    CO2 30 21 - 32 mmol/L    Anion gap 7 5 - 15 mmol/L    Glucose 152 (H) 65 - 100 mg/dL    BUN 34 (H) 6 - 20 mg/dL    Creatinine 0.81 0.70 - 1.30 mg/dL    BUN/Creatinine ratio 42 (H) 12 - 20      GFR est AA >60 >60 ml/min/1.73m2    GFR est non-AA >60 >60 ml/min/1.73m2    Calcium 7.7 (L) 8.5 - 10.1 mg/dL    Phosphorus 4.2 2.6 - 4.7 mg/dL    Albumin 2.5 (L) 3.5 - 5.0 g/dL   CBC WITH AUTOMATED DIFF    Collection Time: 08/03/22  4:00 AM   Result Value Ref Range    WBC 17.0 (H) 4.1 - 11.1 K/uL    RBC 3.13 (L) 4.10 - 5.70 M/uL    HGB 9.7 (L) 12.1 - 17.0 g/dL    HCT 29.5 (L) 36.6 - 50.3 %    MCV 94.2 80.0 - 99.0 FL    MCH 31.0 26.0 - 34.0 PG    MCHC 32.9 30.0 - 36.5 g/dL    RDW 15.4 (H) 11.5 - 14.5 %    PLATELET 609 558 - 175 K/uL    MPV 10.1 8.9 - 12.9 FL    NRBC 0.2 (H) 0.0  WBC    ABSOLUTE NRBC 0.03 (H) 0.00 - 0.01 K/uL    NEUTROPHILS 96 (H) 32 - 75 %    LYMPHOCYTES 1 (L) 12 - 49 %    MONOCYTES 2 (L) 5 - 13 %    EOSINOPHILS 0 0 - 7 %    BASOPHILS 0 0 - 1 %    IMMATURE GRANULOCYTES 1 (H) 0 - 0.5 %    ABS. NEUTROPHILS 16.3 (H) 1.8 - 8.0 K/UL    ABS. LYMPHOCYTES 0.2 (L) 0.8 - 3.5 K/UL    ABS. MONOCYTES 0.3 0.0 - 1.0 K/UL    ABS. EOSINOPHILS 0.0 0.0 - 0.4 K/UL    ABS. BASOPHILS 0.0 0.0 - 0.1 K/UL    ABS. IMM. GRANS. 0.2 (H) 0.00 - 0.04 K/UL    DF AUTOMATED     METABOLIC PANEL, COMPREHENSIVE    Collection Time: 08/03/22  4:00 AM   Result Value Ref Range    Sodium 142 136 - 145 mmol/L    Potassium 3.1 (L) 3.5 - 5.1 mmol/L    Chloride 105 97 - 108 mmol/L    CO2 30 21 - 32 mmol/L    Anion gap 7 5 - 15 mmol/L    Glucose 152 (H) 65 - 100 mg/dL    BUN 33 (H) 6 - 20 mg/dL    Creatinine 0.77 0.70 - 1.30 mg/dL    BUN/Creatinine ratio 43 (H) 12 - 20      GFR est AA >60 >60 ml/min/1.73m2    GFR est non-AA >60 >60 ml/min/1.73m2    Calcium 7.6 (L) 8.5 - 10.1 mg/dL    Bilirubin, total 0.8 0.2 - 1.0 mg/dL    AST (SGOT) 70 (H) 15 - 37 U/L    ALT (SGPT) 52 12 - 78 U/L    Alk.  phosphatase 94 45 - 117 U/L Protein, total 5.1 (L) 6.4 - 8.2 g/dL    Albumin 2.5 (L) 3.5 - 5.0 g/dL    Globulin 2.6 2.0 - 4.0 g/dL    A-G Ratio 1.0 (L) 1.1 - 2.2           Imaging:    XR CHEST PORT   Final Result   Interstitial and airspace opacities with some improvement in the   interval.      XR CHEST PORT   Final Result   No significant change in interstitial and airspace opacities   possibly pulmonary edema      XR CHEST PORT   Final Result   No significant change      XR CHEST PORT   Final Result      Little interval change. XR CHEST PORT   Final Result   Slightly increased diffuse bilateral interstitial and alveolar   opacities, compatible with COVID pneumonia. XR CHEST PORT   Final Result   No significant change       XR CHEST SNGL V   Final Result   Stable bilateral pulmonary infiltrates.                 Assessment     Acute hypoxic respiratory failure on high flow 80 %  Respiratory alkalosis  COVID-19 Pneumonia  Sepsis  Hypertension  Stroke  Afib  MRSA nares  Elevated BNP  Hypokalemia    Plan   Continue meds:  Eliquis 2.5mg PO BID  ASA 81 mg po daily  Lipitor 20mg PO every day  Zithromax 500 IV daily  Olumiant 4mg PO QD  Decadron 4mg IV Q6h  Doxazosin 4mg PO QHS  Cymbalta 30mg POQD  Remeron 15mg PO every day  Mupirocin 2% ointment both nostrils BID  Zosyn 3.375g IV q8hrs  Ranolazine 500mg PO every day  Replace potassium  Lasix 40 IV once    Start PPN with lipids                Current Facility-Administered Medications:     pantoprazole (PROTONIX) tablet 40 mg, 40 mg, Oral, BID, Mahogany Chapa MD    TPN ADULT-PERIPHERAL AA 4.25% D5% + ELECTROLYTES, , IntraVENous, CONTINUOUS, Mahogany Chapa MD    fat emulsion 20% (LIPOSYN, INTRAlipid) infusion 250 mL, 250 mL, IntraVENous, CONTINUOUS, Mahogany Chapa MD    0.9% sodium chloride infusion 250 mL, 250 mL, IntraVENous, PRN, Garrett Browning MD    nystatin (MYCOSTATIN) 100,000 unit/mL oral suspension 500,000 Units, 500,000 Units, Oral, QID, Ino Johnson MD, 500,000 Units at 08/03/22 0902    hydrOXYzine (VISTARIL) injection 25 mg, 25 mg, IntraMUSCular, Q6H PRN, Mahogany Chapa MD, 25 mg at 08/01/22 1307    hydrOXYzine pamoate (VISTARIL) capsule 25 mg, 25 mg, Oral, Q8H, April Shepherd MD, 25 mg at 08/03/22 0511    piperacillin-tazobactam (ZOSYN) 3.375 g in 0.9% sodium chloride (MBP/ADV) 100 mL MBP, 3.375 g, IntraVENous, Q8H, Mahogany Chapa MD, Last Rate: 25 mL/hr at 08/03/22 0508, 3.375 g at 08/03/22 7114    apixaban (ELIQUIS) tablet 2.5 mg, 2.5 mg, Oral, BID, Mahogany Chapa MD, 2.5 mg at 08/03/22 8258    aspirin delayed-release tablet 81 mg, 81 mg, Oral, DAILY, Mahogany Chapa MD, 81 mg at 08/03/22 0902    atorvastatin (LIPITOR) tablet 20 mg, 20 mg, Oral, DAILY, Mahogany Chapa MD, 20 mg at 08/03/22 4435    doxazosin (CARDURA) tablet 4 mg, 4 mg, Oral, QHS, Mahogany Chapa MD, 4 mg at 08/02/22 2108    DULoxetine (CYMBALTA) capsule 30 mg, 30 mg, Oral, DAILY, Mahogany Chapa MD, 30 mg at 08/03/22 0902    mirtazapine (REMERON) tablet 15 mg, 15 mg, Oral, QHS, Mahogany Chapa MD, 15 mg at 08/02/22 2107    ranolazine ER (RANEXA) tablet 500 mg, 500 mg, Oral, BID, Mahogany Chapa MD, 500 mg at 08/03/22 0902    0.9% sodium chloride infusion, 25 mL/hr, IntraVENous, CONTINUOUS, April Shepherd MD, Last Rate: 25 mL/hr at 08/02/22 1957, 25 mL/hr at 08/02/22 1957    acetaminophen (TYLENOL) tablet 650 mg, 650 mg, Oral, Q6H PRN, 650 mg at 07/30/22 2150 **OR** acetaminophen (TYLENOL) suppository 650 mg, 650 mg, Rectal, Q6H PRN, Mahogany Chapa MD    polyethylene glycol (MIRALAX) packet 17 g, 17 g, Oral, DAILY PRN, Mahogany Chapa MD    ondansetron (ZOFRAN ODT) tablet 4 mg, 4 mg, Oral, Q8H PRN **OR** ondansetron (ZOFRAN) injection 4 mg, 4 mg, IntraVENous, Q6H PRN, Mahogany Chapa MD    dexamethasone (DECADRON) 4 mg/mL injection 4 mg, 4 mg, IntraVENous, Q6H, Mahogany Chapa MD, 4 mg at 08/03/22 0511    mupirocin (BACTROBAN) 2 % ointment, , Both Nostrils, BID, Jennifer Mccarthy MD, Given at 08/03/22 0536    Current Outpatient Medications   Medication Instructions    amLODIPine (NORVASC) 5 mg, Oral, DAILY    apixaban (ELIQUIS) 2.5 mg, Oral, 2 TIMES DAILY    aspirin delayed-release 81 mg, Oral, DAILY    atorvastatin (LIPITOR) 20 mg, Oral, DAILY    cholecalciferol (vitamin D3) 2,000 Units, Oral, EVERY BEDTIME    dexAMETHasone (DECADRON) 4 mg, Oral, 2 TIMES DAILY    doxazosin (CARDURA) 4 mg, Oral, DAILY    DULoxetine (CYMBALTA) 30 mg, Oral, DAILY    mirtazapine (REMERON) 15 mg, Oral, EVERY BEDTIME    ranolazine ER (RANEXA) 500 mg, Oral, 2 TIMES DAILY         Signed By: Pat Gil MD     August 3, 2022

## 2022-08-03 NOTE — PROGRESS NOTES
Comprehensive Nutrition Assessment    Type and Reason for Visit: Reassess (interim)    RD notified of PN order. Pt is currently clinically and metabolically stable. Pt currently with a functional gut as evidenced by pt having BMs, bowel sounds, no N/V, etc. As pt gut is functional, best practice is to initiate PO diet vs EN as medically able to promote best nutrition and continued gut function. Recs below. Noted difficulty maintaining enteral access with pt fluctuating AMS. PEG is not ideal d/t pt advanced age, however remains an option if within goals of care. As pt has failed to met >60% of nutritional requirements via PO and/or EN for the past 7-10 days, pt is now appropriate for initiation of TPN. Noted concern for increased intravascular volume with TPN, plan to continue attempts to replace enteral access.       Nutrition Recommendations/Plan:   Continue current cardiac diet  Ensure Compact 3x/d (660kcals, 27g pro)  Ensure pg 3x/d (510kcals, 12g pro)    Re-initiate supplemental bolus feeds if Jevity 1.5, 300ml bolus after meals (if <50% of meal consumed) as medically appropriate and able    PPN of Clinimix 4.25%/5% at 42ml/hr  Provides 880kcals and 50g protein         Estimated Daily Nutrient Needs:  Energy Requirements Based On: Kcal/kg  Weight Used for Energy Requirements: Current  Energy (kcal/day): 1531-1814kcals (27-33kcals/kg)  Weight Used for Protein Requirements: Current  Protein (g/day): 62-73g (1.1-1.3g/kg)  Method Used for Fluid Requirements: 1 ml/kcal  Fluid (ml/day): 1531-1814ml      [facility]  Contact: Ext [phone], or via [employer]

## 2022-08-03 NOTE — PROGRESS NOTES
OCCUPATIONAL THERAPY EVALUATION  Patient: Yandy Summers (26 y.o. male)  Date: 8/3/2022  Primary Diagnosis: COVID [U07.1]  Sepsis ([facility] [A41.9]       Precautions: fall risk, COVID (+), MRSA, droplet plus/contact      ASSESSMENT  Pt is a 80 y.o. male presenting to [facility] from SNF for worsening hypoxia and confusion, admitted 7/27/22 and currently being treated in ICU for acute hypoxic respiratory failure (currently on HFNC 100% FIO2, 60L/min), respiratory alkalosis, COVID-19 PNA, sepsis, HTN. Of note, pt was recently admitted to [facility] 7/18/22-7/26/22, treated for COVID-19 pneumonitis and d/c to [facility], however returned following day for above symptoms. Past Medical History:   Diagnosis Date    [facility]     COVID-19 vaccine series completed 03/2021    Hypercholesteremia     Hypertension     Neuropathy     Stroke ([facility] 08/2021    TIA       Pt received semi-supine in bed upon arrival, AXO x4 and agreeable to OT/PT evaluations at this time. Spouse also present at bedside with pt permission. Per pt report, pt admitted from SNF, however at baseline resides with spouse in a two-story home (1st floor setup/stays on main level) with ramped entrance, was IND with ADLs and Mod I using a rollator for mobility at [facility]. Pt also with hx of recent falls. Pt performed Rolling: Moderate assistance;Assist x2, toileting total A at bed level in side lying s/t noted BM (clean chux and linens provided), total A to don socks in long sitting, and CGA drink to mouth s/t generalized UE weakness. Based on current observations, pt presents with deficits in generalized strength/AROM, bed mobility, and functional activity tolerance currently impacting overall performance of ADLs and functional transfers/mobility. Overall, pt tolerates session fair; SPO2 noted to drop to 90% with bed level rolling, RR 30s.  Pt limited by generalized weakness, poor activity tolerance and reported feelings of anxiousness with mobility at this time. Pt would benefit from continued skilled OT services to address current impairments and improve IND and safety with self cares and functional transfers/mobility. Current OT d/c recommendation SNF once medically appropriate. Other factors to consider for discharge: family/social support, DME, time since onset, severity of deficits, functional baseline     Patient will benefit from skilled therapy intervention to address the above noted impairments. PLAN :  Recommendations and Planned Interventions: self care training, functional mobility training, therapeutic exercise, balance training, therapeutic activities, endurance activities, neuromuscular re-education, patient education, and family training/education    Frequency/Duration: Patient will be followed by occupational therapy:  2-3x/week to address goals. Recommendation for discharge: (in order for the patient to meet his/her long term goals)  Madan Kimball    This discharge recommendation:  Has been made in collaboration with the attending provider and/or case management       SUBJECTIVE:   Patient stated I need to relax after that.     OBJECTIVE DATA SUMMARY:   HISTORY:   Past Surgical History:   Procedure Laterality Date    HX CATARACT REMOVAL Left 2018    IOL    MT ABDOMEN SURGERY PROC UNLISTED Right     IHR       Expanded or extensive additional review of patient history:     Home Situation  Home Environment: Private residence  Wheelchair Ramp: Yes  One/Two Story Residence: Two story, live on 1st floor  Living Alone: No  Support Systems: Spouse/Significant Other  Patient Expects to be Discharged to[de-identified] Skilled nursing facility  Current DME Used/Available at Home: megan Wu      EXAMINATION OF PERFORMANCE DEFICITS:  Cognitive/Behavioral Status:  Neurologic State: Alert  Orientation Level: Oriented X4  Cognition: Follows commands             Skin: intact where visible, small amount of blood noted to bandage of L forearm       Hearing: Auditory  Auditory Impairment: None      Range of Motion:  AROM: Generally decreased, functional                         Strength:  Strength: Generally decreased, functional                Coordination:  Coordination: Generally decreased, functional  Fine Motor Skills-Upper: Left Intact; Right Intact (grossly decreased, functional)    Gross Motor Skills-Upper: Left Intact; Right Intact      Functional Mobility and Transfers for ADLs:  Bed Mobility:  Rolling: Moderate assistance;Assist x2      ADL Intervention and task modifications:  Feeding  Drink to Mouth: Contact guard assistance                        Lower Body Dressing Assistance  Socks: Total assistance (dependent)  Position Performed: Long sitting on bed    Toileting  Toileting Assistance: Total assistance(dependent)  Bowel Hygiene: Total assistance (dependent)         Therapeutic Exercise:  Pt educated on UE HEP to complete throughout the day to improve ROM and strength with good understanding verbalized and demonstrated. Functional Measure:    Sylvie Huston AM-PACTM \"6 Clicks\"                                                       Daily Activity Inpatient Short Form  How much help from another person does the patient currently need. .. Total; A Lot A Little None   1. Putting on and taking off regular lower body clothing? [x]  1 []  2 []  3 []  4   2. Bathing (including washing, rinsing, drying)? []  1 [x]  2 []  3 []  4   3. Toileting, which includes using toilet, bedpan or urinal? [x] 1 []  2 []  3 []  4   4. Putting on and taking off regular upper body clothing? []  1 [x]  2 []  3 []  4   5. Taking care of personal grooming such as brushing teeth? []  1 []  2 [x]  3 []  4   6. Eating meals? []  1 []  2 [x]  3 []  4   © 2007, Trustees of [employer], under license to [employer].  All rights reserved     Score: 12/24     Interpretation of Tool:  Represents clinically-significant functional categories (i.e. Activities of daily living). Percentage of Impairment CH    0%   CI    1-19% CJ    20-39% CK    40-59% CL    60-79% CM    80-99% CN     100%   [facility]  Score 6-24 24 23 20-22 15-19 10-14 7-9 6         Occupational Therapy Evaluation Charge Determination   History Examination Decision-Making   MEDIUM Complexity : Expanded review of history including physical, cognitive and psychosocial  history  MEDIUM Complexity : 3-5 performance deficits relating to physical, cognitive , or psychosocial skils that result in activity limitations and / or participation restrictions MEDIUM Complexity : Patient may present with comorbidities that affect occupational performnce. Miniml to moderate modification of tasks or assistance (eg, physical or verbal ) with assesment(s) is necessary to enable patient to complete evaluation       Based on the above components, the patient evaluation is determined to be of the following complexity level: MEDIUM  Pain Ratin//10    Activity Tolerance:   Fair, desaturates with exertion and requires oxygen, and requires frequent rest breaks    After treatment patient left in no apparent distress:    Supine in bed, Heels elevated for pressure relief, Call bell within reach, Caregiver / family present, and Side rails x 3    COMMUNICATION/EDUCATION:   The patients plan of care was discussed with: Physical therapist and Registered nurse. Patient/family have participated as able in goal setting and plan of care. and Patient/family agree to work toward stated goals and plan of care. This patients plan of care is appropriate for delegation to [facility]. OT/PT sessions occurred together for increased patient and clinician safety as pt with decreased activity tolerance and requires A of 2 for mobility at this time.      Thank you for this referral.  Ned Phipps  Time Calculation: 34 mins   Problem: Self Care Deficits Care Plan (Adult)  Goal: *Acute Goals and Plan of Care (Insert Text)  Description: Pt stated goal \"to breathe better\"  Pt will be Mod I sup <> sit in prep for EOB ADLs  Pt will be Mod I grooming sitting EOB/long sit  Pt will be Mod I UB dressing sitting EOB/long sit   Pt will be Min A LE dressing sitting EOB/long sit  Pt will be Min A sit <>  prep for toileting LRAD  Pt will be Min A toileting/toilet transfer/cloth mgmt LRAD  Pt will be IND following UE HEP in prep for self care tasks      Outcome: Not Met

## 2022-08-03 NOTE — PROGRESS NOTES
Consult  Pulmonary, Critical Care    Name: Marvin Gonzáles MRN: [id number]   : 1929 Hospital: [facility]   Date: 8/3/2022  Admission date: 2022 Hospital Day: 8       Subjective/Interval History:   Recent COVID pneumonitis improved with Decadron and baricitinib wean to room air was discharged to rehab facility yesterday at the rehab facility he had development of hypoxia than fever was transferred back to our emergency room T-max 101.5. Wife states that yesterday he did have some green-tinged sputum. Chest x-ray shows diffuse bilateral infiltrates unchanged. There has been no nausea vomiting choking while eating   remains on BiPAP. Blood gas this morning on nasal oxygen showed hypoxia but currently he is running 94% on 5 L awake alert no specific complaints   more anxiety last evening could not tolerate BiPAP and was placed on high flow. Still somewhat anxious today IM Vistaril seem to help   awake alert tolerated nasal high flow all night but FiO2 is 90%. Not able to eat due to shortness of breath have talked with him about NG tube for tube feedings and he agrees   awake alert confused FiO2 down to 50%. Nurse attempted NG insertion  and . As soon as the tube was placed in his nose he pulled it and refused to have any further attempt he remains malnourished   awake alert confused refused NG insertion yesterday currently he states he will allow it when his wife is here  8/3 remains confused awake alert again refused NG insertion yesterday. Wife wanted to proceed with peripheral nutrition.   With his valvular heart disease he would likely not tolerate the volumes required likewise with central hyperalimentation he likely would not tolerate the volumes and it also raises the risk of infection    Patient Active Problem List   Diagnosis Code    Carotid stenosis I65.29    Hypoxia R09.02    COVID U07.1    Sepsis ([facility] [address] A41.9       IMPRESSION:   Acute hypoxic respiratory failure currently on nasal high flow FiO2 80%  Healthcare associated pneumonia Serratia on culture  Diffuse pulmonary infiltrates questionable pneumonia versus inflammatory response from COVID versus pulmonary edema from diastolic dysfunction and AR  Oral thrush resolved  Anemia improved after transfusion 8/2  Hypokalemia to be repleted  Hypophosphatemia repleted  Hypocalcemia repleted  Recent COVID pneumonitis  Atrial fibrillation  Moderate aortic regurg  Moderate to severe mitral regurg  Diastolic left ventricular dysfunction  Pyuria rule out UTI no culture results  Elevated BNP  Anxiety  Body mass index is 20.19 kg/m². RECOMMENDATIONS/PLAN:   nasal high flow increased FiO2 80%  Anxiety may be improved on 3 times daily dosing of Vistaril  Continue IV Zosyn and Zithromax nasal MRSA smear positive blood culture no growth sputum culture occasional polys with Serratia does not appear as if urine was ever sent  BNP is elevated creatinine is normal repeat Lasix today  Will reattempt NG tube placement           Subjective/Initial History:   I have reviewed the flowsheet and previous  notes. I was asked by Aakash Benitez MD to see Skip Contreras a 80 y.o.  male  in consultation for a chief complaint of acute hypoxic respiratory failure fever recent COVID pneumonitis        Patient PCP: Rashad Valderrama MD  PMH:  has a past medical history of Atrial fibrillation ([facility] [address] COVID-19 vaccine series completed (03/2021), Hypercholesteremia, Hypertension, Neuropathy, and Stroke ([facility] [address] (08/2021). PSH:   has a past surgical history that includes pr abdomen surgery proc unlisted (Right) and hx cataract removal (Left, 2018). FHX: family history is not on file. SHX:  reports that he has quit smoking. He has never used smokeless tobacco. He reports that he does not use drugs.   Systemic review  General weight has been stable the last several days he has not noted fever chills or sweats until this morning  Eyes no double vision or momentary blindness  ENT no facial pain over the sinuses  Musculoskeletal no swollen tender joints no myalgias  Endocrinologic no polyuria polydipsia  Neurologic no seizures or syncope awake and alert oriented  Gastrointestinal no choking or gagging when eating no nausea vomiting no indigestion  Genitourinary no pain or discomfort no burning  Cardiovascular has not noted ankle edema chest pain diaphoresis or nocturia  Respiratory abrupt worsening shortness of breath last night associated with fever    No Known Allergies   MEDS:   Current Facility-Administered Medications   Medication    pantoprazole (PROTONIX) tablet 40 mg    0.9% sodium chloride infusion 250 mL    nystatin (MYCOSTATIN) 100,000 unit/mL oral suspension 500,000 Units    hydrOXYzine (VISTARIL) injection 25 mg    hydrOXYzine pamoate (VISTARIL) capsule 25 mg    piperacillin-tazobactam (ZOSYN) 3.375 g in 0.9% sodium chloride (MBP/ADV) 100 mL MBP    apixaban (ELIQUIS) tablet 2.5 mg    aspirin delayed-release tablet 81 mg    atorvastatin (LIPITOR) tablet 20 mg    doxazosin (CARDURA) tablet 4 mg    DULoxetine (CYMBALTA) capsule 30 mg    mirtazapine (REMERON) tablet 15 mg    ranolazine ER (RANEXA) tablet 500 mg    0.9% sodium chloride infusion    acetaminophen (TYLENOL) tablet 650 mg    Or    acetaminophen (TYLENOL) suppository 650 mg    polyethylene glycol (MIRALAX) packet 17 g    ondansetron (ZOFRAN ODT) tablet 4 mg    Or    ondansetron (ZOFRAN) injection 4 mg    dexamethasone (DECADRON) 4 mg/mL injection 4 mg    mupirocin (BACTROBAN) 2 % ointment        Current Facility-Administered Medications:     pantoprazole (PROTONIX) tablet 40 mg, 40 mg, Oral, BID, Mahogany Chapa MD    0.9% sodium chloride infusion 250 mL, 250 mL, IntraVENous, PRN, Garrett Browning MD    nystatin (MYCOSTATIN) 100,000 unit/mL oral suspension 500,000 Units, 500,000 Units, Oral, QID, Bharath Johnson MD, 500,000 Units at 08/03/22 0902    hydrOXYzine (VISTARIL) injection 25 mg, 25 mg, IntraMUSCular, Q6H PRN, Mahogany Chapa MD, 25 mg at 08/01/22 1307    hydrOXYzine pamoate (VISTARIL) capsule 25 mg, 25 mg, Oral, Q8H, Azalea Ovalle MD, 25 mg at 08/03/22 0511    piperacillin-tazobactam (ZOSYN) 3.375 g in 0.9% sodium chloride (MBP/ADV) 100 mL MBP, 3.375 g, IntraVENous, Q8H, Mahogany Chapa MD, Last Rate: 25 mL/hr at 08/03/22 0508, 3.375 g at 08/03/22 7735    apixaban (ELIQUIS) tablet 2.5 mg, 2.5 mg, Oral, BID, Mahogany Chapa MD, 2.5 mg at 08/03/22 4223    aspirin delayed-release tablet 81 mg, 81 mg, Oral, DAILY, Mahogany Chapa MD, 81 mg at 08/03/22 0902    atorvastatin (LIPITOR) tablet 20 mg, 20 mg, Oral, DAILY, Mahogany Chapa MD, 20 mg at 08/03/22 0394    doxazosin (CARDURA) tablet 4 mg, 4 mg, Oral, QHS, Mahogany Chapa MD, 4 mg at 08/02/22 2108    DULoxetine (CYMBALTA) capsule 30 mg, 30 mg, Oral, DAILY, Mahogany Chapa MD, 30 mg at 08/03/22 0902    mirtazapine (REMERON) tablet 15 mg, 15 mg, Oral, QHS, Mahogany Chapa MD, 15 mg at 08/02/22 2107    ranolazine ER (RANEXA) tablet 500 mg, 500 mg, Oral, BID, Mahogany Chapa MD, 500 mg at 08/03/22 0902    0.9% sodium chloride infusion, 25 mL/hr, IntraVENous, CONTINUOUS, Azalea Ovalle MD, Last Rate: 25 mL/hr at 08/02/22 1957, 25 mL/hr at 08/02/22 1957    acetaminophen (TYLENOL) tablet 650 mg, 650 mg, Oral, Q6H PRN, 650 mg at 07/30/22 2150 **OR** acetaminophen (TYLENOL) suppository 650 mg, 650 mg, Rectal, Q6H PRN, Mahogany Chapa MD    polyethylene glycol (MIRALAX) packet 17 g, 17 g, Oral, DAILY PRN, Mahogany Chapa MD    ondansetron (ZOFRAN ODT) tablet 4 mg, 4 mg, Oral, Q8H PRN **OR** ondansetron (ZOFRAN) injection 4 mg, 4 mg, IntraVENous, Q6H PRN, Mahogany Chapa MD    dexamethasone (DECADRON) 4 mg/mL injection 4 mg, 4 mg, IntraVENous, Q6H, Mahogany Chapa MD, 4 mg at 08/03/22 0511    mupirocin (BACTROBAN) 2 % ointment, , Both Nostrils, BID, Taylor Reyes MD, Given at 08/03/22 0902      Objective:     Vital Signs: Telemetry:    AFIB Intake/Output:   Visit Vitals  BP (!) 145/68 (BP 1 Location: Right upper arm, BP Patient Position: At rest)   Pulse (!) 111   Temp (!) 96.5 °F (35.8 °C)   Resp (!) 31   Ht 5' 5.98\" (1.676 m)   Wt 56.7 kg (125 lb)   SpO2 94%   BMI 20.19 kg/m²       Temp (24hrs), Av.3 °F (36.3 °C), Min:96.5 °F (35.8 °C), Max:98 °F (36.7 °C)        O2 Device: Hi flow nasal cannula O2 Flow Rate (L/min): 60 l/min         Body mass index is 20.19 kg/m². Wt Readings from Last 4 Encounters:   22 56.7 kg (125 lb)   22 54.4 kg (120 lb)   02/10/22 54.4 kg (120 lb)   21 54.4 kg (120 lb)          Intake/Output Summary (Last 24 hours) at 8/3/2022 1106  Last data filed at 8/3/2022 0600  Gross per 24 hour   Intake 100 ml   Output 1950 ml   Net -1850 ml         Last shift:      No intake/output data recorded. Last 3 shifts:  1901 -  0700  In: 857 [I.V.:575]  Out: 2250 [Urine:2250]       Hemodynamics:    CO:    CI:    CVP:    SVR:   PAP Systolic:    PAP Diastolic:    PVR:    LE67:       Ventilator Settings:      Mode Rate TV Press PEEP FiO2 PIP Min. Vent               80 %     30.4 l/min      Physical Exam:    General:  male; in bed on nasal high flow oxygen less anxious no accessory muscle use  HEENT: NCAT,   Eyes: anicteric; conjunctiva clear extraocular movements intact  Neck: no nodes, no accessory MM use. No definite JVD  Chest: no deformity,   Cardiac: Irregular rate and rhythm occasional PVCs has somewhat harsh systolic murmur  Lungs: Clear anteriorly with rales posteriorly no wheezing  Abd: Thin soft positive bowel sounds no tenderness  Ext: Trace edema; no joint swelling;  No clubbing  : clear urine  Neuro: Awake alert seems calm  hard of hearing follow simple commands moves all 4 extremities  Psych- no agitation, mildly confused;   Skin: warm, dry, no cyanosis;   Pulses: Brachial and radial pulses intact  Capillary: Normal capillary refill      Labs:    Recent Labs     08/03/22  0400 08/02/22  0318 08/01/22  0400   WBC 17.0* 12.1* 11.5*   HGB 9.7* 6.6* 7.4*    160 173       Recent Labs     08/03/22  0400 08/02/22  0318 08/01/22  0400     141 140  139 144   K 3.1*  3.1* 3.4*  3.4* 3.8     104 106  105 112*   CO2 30  30 29  30 24   *  152* 108*  108* 99   BUN 33*  34* 31*  30* 27*   CREA 0.77  0.81 0.74  0.72 0.50*   CA 7.6*  7.7* 7.3*  7.2* 6.5*   MG  --  2.0  --    PHOS 4.2 3.2  --    ALB 2.5*  2.5* 2.2*  2.2*  --    ALT 52 37  --        Recent Labs     08/01/22  0206   PH 7.50*   PCO2 36   PO2 65*   HCO3 27*   FIO2 80     8/3 nasal high flow 60 L 80% with saturation 95%   8/1 nasal high flow 60 L FiO2 80%   COVID-19 antigen remains positive  BNP 2592, 2705 3310  CRP 0.64, 2.02 3.26, 6.18, 9.2 16.1  Procalcitonin less than 0.05 0.15  Urinalysis with 20-50 WBCs  Blood culture no growth  Sputum occasional polys with Serratia  Echo ejection fraction 82% diastolic dysfunction moderate aortic regurg moderate to severe mitral regurg left atrium 2.6 cm RVSP 25  Imaging:  I have personally reviewed the patients radiographs and have reviewed the reports:    CXR Results  (Last 48 hours)                 08/03/22 0400  XR CHEST PORT Final result    Impression:  Interstitial and airspace opacities with some improvement in the   interval.       Narrative:  EXAM: XR CHEST PORT       INDICATION: Respiratory failure       COMPARISON: 8/1/2022       FINDINGS: A portable AP radiograph of the chest was obtained at 4:00 hours. The   patient is on a cardiac monitor. Interstitial and airspace opacities are noted   bilaterally with some improvement. Yanni Tioga Heart size is enlarged. .  The bones and soft   tissues are grossly within normal limits.                   Results from Hospital Encounter encounter on 07/27/22    XR CHEST PORT    Narrative  EXAM: XR CHEST PORT    INDICATION: Respiratory failure    COMPARISON: 8/1/2022    FINDINGS: A portable AP radiograph of the chest was obtained at 4:00 hours. The  patient is on a cardiac monitor. Interstitial and airspace opacities are noted  bilaterally with some improvement. Emy Graven Heart size is enlarged. .  The bones and soft  tissues are grossly within normal limits. Impression  Interstitial and airspace opacities with some improvement in the  interval.      XR CHEST PORT    Narrative  EXAM: XR CHEST PORT    INDICATION: chf    COMPARISON: 7/31/2022    FINDINGS: A portable AP radiograph of the chest was obtained at 325 hours. Cardiac monitoring leads. . Bilateral interstitial and patchy airspace opacities  unchanged. Emy Graven Heart size is borderline. Small left pleural effusion. .  The bones  and soft tissues are grossly within normal limits. Impression  No significant change in interstitial and airspace opacities  possibly pulmonary edema      XR CHEST PORT    Narrative  EXAM: XR CHEST PORT    INDICATION: Respiratory failure    COMPARISON: 7/30/2022    FINDINGS: A portable AP radiograph of the chest was obtained at 311 hours. Cardiac monitoring leads. . Bilateral interstitial and airspace opacities without  significant change. Trace left pleural effusion. The cardiac and mediastinal  contours and pulmonary vascularity are normal.  The bones and soft tissues are  grossly within normal limits. Impression  No significant change    Results from Hospital Encounter encounter on 02/10/22    CT HEAD WO CONT    Narrative  Technique: Multiple continuous axial images were obtained from the skull base to  the vertex without administration of intravenous contrast.    Comment on dose reduction: All CT scans at this facility are performed using  dose reduction optimization technique as appropriate to perform the exam  including the following; automated exposure control, adjustments of the mA  and/or kV according to patient size, or use of iterative reconstructed  technique.     Comparison examination: None    Findings:  The ventricles and sulci are prominent consistent with age-related changes of  atrophy. Periventricular hypodensity is identified consistent with changes of  chronic small vessel disease. No evidence of midline shift, mass lesion, or  extra-axial fluid collection. No evidence of parenchymal hemorrhage or  infarction in a major vascular territory. The osseous structures are intact, the paranasal sinuses are clear. Extracalvarial soft tissue hematoma posterior vertex . Impression  No acute intracranial process. Extracalvarial soft tissue hematoma posterior  vertex . Session fever with recent hospitalization possible healthcare associated pneumonia agree with Zosyn vancomycin and Zithromax. We will attempt to collect sputum for culture. Have requested urine culture blood cultures have already been obtained. He has a significant systolic murmur with elevated BNP he may have mitral regurg giving him some degree of fluid overload he only has trace edema. We will check an echocardiogram.  Creatinine is normal will decrease IV fluids for the time being. Chest x-ray with diffuse infiltrates thought to be residual inflammatory change from COVID pneumonitis but may represent some degree of fluid overload. Oxygenation is well-maintained now on noninvasive ventilator have decreased FiO2 to 45% will attempt conversion to nasal oxygen in a.m.  728 awake alert currently on noninvasive ventilator have placed on 5 L oxygen saturation 94%. If he maintains that we will leave him on nasal oxygen today and use noninvasive ventilator at night. Nursing noted that when he drifts off to sleep he will have desaturation. BNP remains elevated neck veins are chest visible chest x-ray still diffuse infiltrates we will give 1 dose Lasix today  7/29 anxious intolerant of BiPAP last evening switch to nasal high flow.   Chest x-ray is unchanged with diffuse infiltrates questionable healthcare associated pneumonia COVID inflammatory reaction or some degree pulmonary edema from diastolic dysfunction aortic regurg and mitral regurg repeat Lasix today replenish potassium and phosphorus nasal MRSA smear is positive we will continue vancomycin Zosyn and Zithromax remains on baricitinib and Decadron  7/30 much calmer today questionably due to Vistaril. Potassium remains low will supplement. Still has trace edema with normal creatinine we will repeat Lasix today. Have talked with him about placing NG tube for tube feedings and he agrees  7/31 alert awake oral thrush on 90% FiO2 intermittent agitation as per nurse  8/1 awake alert confused refusing NG tube once wife has a chance to talk with him we will attempt reinsertion. Serratia in sputum currently on Zosyn. Diffuse accentuated interstitial markings persist we will repeat Lasix today  8/2 still refusing NG tube eating very little. Creatinine and BUN up slightly with Lasix dosing yesterday. He is to receive a unit of blood replace potassium we will repeat Lasix today after blood we will try once again to place NG tube  8/3 again refused NG insertion yesterday. Wife is requesting IV. With his valvular heart disease he very likely would not tolerate the volumes of fluid needed for adequate nutrition. Will attempt to place NG tube once the wife is here  Time of care 30 minutes  Addendum attempted NG tube placement chest x-ray shows its in the long no cough reflex with that. Wife does not want him to have that placed again but is agreeable to a PEG tube. We will consult GI for consideration         Thank you for allowing us to participate in the care of this patient.   We will follow along with you     Nathan Heck MD

## 2022-08-04 NOTE — PROGRESS NOTES
PROGRESS NOTE    Patient: Shashi Gonzalez MRN: [id number]  SSN: [id number]    YOB: 1929  Age: 80 y.o. Sex: male      Admit Date: 7/27/2022    LOS: 8 days       Subjective     Chief Complaint   Patient presents with    Shortness of Breath       HPI: Patient is a 80y.o. year old male with a significant PMH of Afib, hypercholesterolemia, HTN, neuropathy, and hx of stroke presented to the ED today due to hypoxia and worsening confusion. The patient was discharged from the hospital yesterday after being hospitalized d/t COVID-19. Per nursing facility his O2 was in the 80s and it was 89% upon admission. The patient was stable at that time of discharge yesterday and not using any oxygen. The patient is now on BiPAP with an O2 sat of 100%. Febrile at 101.5  WBC 25.1  Hypertensive on admission  Respiratory rate at 31  CXR: Stable bilateral pulmonary infiltrates. Patient was given:  10mg Decadron  2g Magnesium  Duoneb treatment  Terbutaline treatment     7/28  Pt sitting in bed, responds to voice  P02 53 on ABG this morning, pt placed on BiPap  SpO2 stable  CRP=16.1    XR CHEST PORT  Diffuse interstitial airspace opacities are not  significantly changed. Trace left effusion. Biapical pleural thickening. Heartsize is enlarged      7/29    Patient on BiPAP now on 70% O2  Patient pulled out BiPAP last night put on high flow    7/30    On high flow 90% O2      Left Ventricle: Normal left ventricular systolic function with a visually estimated EF of 55 - 60%. Left ventricle size is normal. Normal wall thickness. Normal wall motion. Grade I diastolic dysfunction with normal LAP. Aortic Valve: Moderate regurgitation. Mitral Valve: Mildly thickened leaflet. 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 Moderate to severe regurgitation with an eccentrically directed jet and may underestimate severity. Tricuspid Valve: Moderate regurgitation.       7/31    On high flow 80% O2    8/1    On high flow 70% O2  Poor appetite poor appetite    8/2  Patient still on high flow 80% O2 very poor appetite  Refuse NG tube    8/3  Patient alert awake on high flow 80% O2  Unable to get NG tube placed  Very poor appetite    Discussed with the patient wife yesterday  She wants PPN      8/4  Patient continues on high flow oxygen. PEG tube placement was moved to tomorrow. Procedure was discussed with wife. Patient was seen by occupational therapist and PT yesterday and tolerated both session fairly well. Both noted weakness and increased need for oxygen when exerting himself. Review of Systems   Unable to perform ROS: Acuity of condition       Objective     Visit Vitals  /63 (BP 1 Location: Left upper arm, BP Patient Position: At rest)   Pulse (!) 115   Temp 98.2 °F (36.8 °C)   Resp 23   Ht 5' 5.98\" (1.676 m)   Wt 50.8 kg (111 lb 15.9 oz)   SpO2 99%   BMI 18.09 kg/m²    O2 Flow Rate (L/min): 60 l/min O2 Device: Heated, Hi flow nasal cannula    Physical Exam:   Physical Exam  Constitutional:       Appearance: He is normal weight. HENT:      Head: Normocephalic and atraumatic. Cardiovascular:      Rate and Rhythm: Normal rate and regular rhythm. Pulses: Normal pulses. Heart sounds: Normal heart sounds. Pulmonary:      Effort: Pulmonary effort is normal.      Breath sounds: Normal breath sounds. Musculoskeletal:         General: Normal range of motion. Cervical back: Normal range of motion and neck supple. Neurological:      General: No focal deficit present. Mental Status: He is alert. He is disoriented. Intake & Output:  Current Shift: No intake/output data recorded. Last three shifts: 08/02 1901 - 08/04 0700  In: 2416.1 [P.O.:500; I.V.:1916.1]  Out: 1900 [Urine:1900]    Lab/Data Review: All lab results for the last 24 hours reviewed.        24 Hour Results:    Recent Results (from the past 24 hour(s))   METABOLIC PANEL, COMPREHENSIVE    Collection Time: 08/04/22 2:55 AM   Result Value Ref Range    Sodium 139 136 - 145 mmol/L    Potassium 3.8 3.5 - 5.1 mmol/L    Chloride 106 97 - 108 mmol/L    CO2 30 21 - 32 mmol/L    Anion gap 3 (L) 5 - 15 mmol/L    Glucose 124 (H) 65 - 100 mg/dL    BUN 30 (H) 6 - 20 mg/dL    Creatinine 0.66 (L) 0.70 - 1.30 mg/dL    BUN/Creatinine ratio 45 (H) 12 - 20      GFR est AA >60 >60 ml/min/1.73m2    GFR est non-AA >60 >60 ml/min/1.73m2    Calcium 7.4 (L) 8.5 - 10.1 mg/dL    Bilirubin, total 0.6 0.2 - 1.0 mg/dL    AST (SGOT) 49 (H) 15 - 37 U/L    ALT (SGPT) 45 12 - 78 U/L    Alk.  phosphatase 79 45 - 117 U/L    Protein, total 4.8 (L) 6.4 - 8.2 g/dL    Albumin 2.2 (L) 3.5 - 5.0 g/dL    Globulin 2.6 2.0 - 4.0 g/dL    A-G Ratio 0.8 (L) 1.1 - 2.2     RENAL FUNCTION PANEL    Collection Time: 08/04/22  2:55 AM   Result Value Ref Range    Sodium 140 136 - 145 mmol/L    Potassium 3.8 3.5 - 5.1 mmol/L    Chloride 105 97 - 108 mmol/L    CO2 31 21 - 32 mmol/L    Anion gap 4 (L) 5 - 15 mmol/L    Glucose 124 (H) 65 - 100 mg/dL    BUN 29 (H) 6 - 20 mg/dL    Creatinine 0.64 (L) 0.70 - 1.30 mg/dL    BUN/Creatinine ratio 45 (H) 12 - 20      GFR est AA >60 >60 ml/min/1.73m2    GFR est non-AA >60 >60 ml/min/1.73m2    Calcium 7.4 (L) 8.5 - 10.1 mg/dL    Phosphorus 2.8 2.6 - 4.7 mg/dL    Albumin 2.1 (L) 3.5 - 5.0 g/dL   MAGNESIUM    Collection Time: 08/04/22  2:55 AM   Result Value Ref Range    Magnesium 2.1 1.6 - 2.4 mg/dL   NT-PRO BNP    Collection Time: 08/04/22  2:55 AM   Result Value Ref Range    NT pro-BNP 2,758 (H) <450 pg/mL   CBC WITH AUTOMATED DIFF    Collection Time: 08/04/22  2:55 AM   Result Value Ref Range    WBC 16.2 (H) 4.1 - 11.1 K/uL    RBC 2.57 (L) 4.10 - 5.70 M/uL    HGB 8.1 (L) 12.1 - 17.0 g/dL    HCT 24.4 (L) 36.6 - 50.3 %    MCV 94.9 80.0 - 99.0 FL    MCH 31.5 26.0 - 34.0 PG    MCHC 33.2 30.0 - 36.5 g/dL    RDW 15.3 (H) 11.5 - 14.5 %    PLATELET 251 (L) 672 - 400 K/uL    MPV 10.6 8.9 - 12.9 FL    NRBC 0.2 (H) 0.0  WBC    ABSOLUTE NRBC 0.03 (H) 0.00 - 0.01 K/uL    NEUTROPHILS 96 (H) 32 - 75 %    LYMPHOCYTES 1 (L) 12 - 49 %    MONOCYTES 2 (L) 5 - 13 %    EOSINOPHILS 0 0 - 7 %    BASOPHILS 0 0 - 1 %    IMMATURE GRANULOCYTES 1 (H) 0 - 0.5 %    ABS. NEUTROPHILS 15.6 (H) 1.8 - 8.0 K/UL    ABS. LYMPHOCYTES 0.1 (L) 0.8 - 3.5 K/UL    ABS. MONOCYTES 0.3 0.0 - 1.0 K/UL    ABS. EOSINOPHILS 0.0 0.0 - 0.4 K/UL    ABS. BASOPHILS 0.0 0.0 - 0.1 K/UL    ABS. IMM. GRANS. 0.2 (H) 0.00 - 0.04 K/UL    DF AUTOMATED     C REACTIVE PROTEIN, QT    Collection Time: 08/04/22  2:55 AM   Result Value Ref Range    C-Reactive protein 1.73 (H) 0.00 - 0.60 mg/dL   PHOSPHORUS    Collection Time: 08/04/22  2:55 AM   Result Value Ref Range    Phosphorus 2.9 2.6 - 4.7 mg/dL         Imaging:    XR CHEST PORT   Final Result   Decreased lung volumes with increased interstitial and airspace   opacities bilaterally      XR CHEST PORT   Final Result      Feeding tube terminates in the region of the distal esophagus and should be   advanced at least 10 to 15 cm          XR CHEST PORT   Final Result   Interstitial and airspace opacities with some improvement in the   interval.      XR CHEST PORT   Final Result   No significant change in interstitial and airspace opacities   possibly pulmonary edema      XR CHEST PORT   Final Result   No significant change      XR CHEST PORT   Final Result      Little interval change. XR CHEST PORT   Final Result   Slightly increased diffuse bilateral interstitial and alveolar   opacities, compatible with COVID pneumonia. XR CHEST PORT   Final Result   No significant change       XR CHEST SNGL V   Final Result   Stable bilateral pulmonary infiltrates.          XR CHEST PORT    (Results Pending)          Assessment     Acute hypoxic respiratory failure on high flow 80 %  Respiratory alkalosis  COVID-19 Pneumonia  Sepsis  Hypertension  Hyperlipidemia  Stroke  Afib  MRSA nares  Elevated BNP  Hypokalemia    Plan   Continue meds:    Eliquis 2.5mg PO BID was held today and tomorrow      ASA 81 mg po daily was held today, will continue after procedure      Lipitor 20mg PO every day  Zithromax 500 IV daily  Olumiant 4mg PO QD  Decadron 4mg IV Q6h  Doxazosin 4mg PO QHS  Cymbalta 30mg POQD  Remeron 15mg PO every day  Vistaril 25mg Q8H daily  Mupirocin 2% ointment both nostrils BID  Zosyn 3.375g IV q8hrs  Ranolazine 500mg PO every day    Replace potassium as needed  Lasix 40 IV once as needed    Start PPN with lipids  Follow up with GI consult  Follow up with PEG placement   Monitor blood counts, BNP level              Current Facility-Administered Medications:     pantoprazole (PROTONIX) tablet 40 mg, 40 mg, Oral, BID, Mahogany Chapa MD, 40 mg at 08/04/22 0856    TPN ADULT-PERIPHERAL AA 4.25% D5% + ELECTROLYTES, , IntraVENous, CONTINUOUS, Mahogany Chapa MD, Last Rate: 42 mL/hr at 08/03/22 2217, New Bag at 08/03/22 2217    lidocaine (XYLOCAINE) 2 % jelly, , Mucous Membrane, PRN, Mich Fournier MD    0.9% sodium chloride infusion 250 mL, 250 mL, IntraVENous, PRN, Garrett Browning MD    nystatin (MYCOSTATIN) 100,000 unit/mL oral suspension 500,000 Units, 500,000 Units, Oral, QID, Bharath Johnson MD, 500,000 Units at 08/04/22 0856    hydrOXYzine (VISTARIL) injection 25 mg, 25 mg, IntraMUSCular, Q6H PRN, Mahogany Chapa MD, 25 mg at 08/01/22 1307    hydrOXYzine pamoate (VISTARIL) capsule 25 mg, 25 mg, Oral, Q8H, Mich Fournier MD, 25 mg at 08/04/22 0516    piperacillin-tazobactam (ZOSYN) 3.375 g in 0.9% sodium chloride (MBP/ADV) 100 mL MBP, 3.375 g, IntraVENous, Q8H, Mahogany Chapa MD, Last Rate: 25 mL/hr at 08/04/22 0304, 3.375 g at 08/04/22 0304    [Held by provider] apixaban (ELIQUIS) tablet 2.5 mg, 2.5 mg, Oral, BID, Mahogany Chapa MD, 2.5 mg at 08/03/22 2010    aspirin delayed-release tablet 81 mg, 81 mg, Oral, DAILY, Mahogany Chapa MD, 81 mg at 08/03/22 0902    atorvastatin (LIPITOR) tablet 20 mg, 20 mg, Oral, DAILY, Mahogany Chapa MD, 20 mg at 08/04/22 0856    doxazosin (CARDURA) tablet 4 mg, 4 mg, Oral, QHS, Mahogany Chapa MD, 4 mg at 08/03/22 2204    DULoxetine (CYMBALTA) capsule 30 mg, 30 mg, Oral, DAILY, Mahogany Chapa MD, 30 mg at 08/04/22 0856    mirtazapine (REMERON) tablet 15 mg, 15 mg, Oral, QHS, Mahogany Chapa MD, 15 mg at 08/03/22 2204    ranolazine ER (RANEXA) tablet 500 mg, 500 mg, Oral, BID, Catie Chapa MD, 500 mg at 08/04/22 1955    acetaminophen (TYLENOL) tablet 650 mg, 650 mg, Oral, Q6H PRN, 650 mg at 07/30/22 2150 **OR** acetaminophen (TYLENOL) suppository 650 mg, 650 mg, Rectal, Q6H PRN, Mahogany Chapa MD    polyethylene glycol (MIRALAX) packet 17 g, 17 g, Oral, DAILY PRN, Mahogany Chapa MD    ondansetron (ZOFRAN ODT) tablet 4 mg, 4 mg, Oral, Q8H PRN **OR** ondansetron (ZOFRAN) injection 4 mg, 4 mg, IntraVENous, Q6H PRN, Mahogany Chapa MD    dexamethasone (DECADRON) 4 mg/mL injection 4 mg, 4 mg, IntraVENous, Q6H, Mahogany Chapa MD, 4 mg at 08/04/22 6350    Current Outpatient Medications   Medication Instructions    amLODIPine (NORVASC) 5 mg, Oral, DAILY    apixaban (ELIQUIS) 2.5 mg, Oral, 2 TIMES DAILY    aspirin delayed-release 81 mg, Oral, DAILY    atorvastatin (LIPITOR) 20 mg, Oral, DAILY    cholecalciferol (vitamin D3) 2,000 Units, Oral, EVERY BEDTIME    dexAMETHasone (DECADRON) 4 mg, Oral, 2 TIMES DAILY    doxazosin (CARDURA) 4 mg, Oral, DAILY    DULoxetine (CYMBALTA) 30 mg, Oral, DAILY    mirtazapine (REMERON) 15 mg, Oral, EVERY BEDTIME    ranolazine ER (RANEXA) 500 mg, Oral, 2 TIMES DAILY         Signed By: Horace Adams     August 4, 2022

## 2022-08-04 NOTE — CONSULTS
Consult    Patient: Adrien Holman MRN: [id number]  SSN: [id number]    YOB: 1929  Age: 80 y.o. Sex: male      Subjective:      Adrien Holman is a 80 y.o. male who is being seen for dysphagia. Hx from his wife  neuropathy, and hx of stroke presented to the ED today due to hypoxia and worsening confusion. The patient was discharged from the hospital yesterday after being hospitalized d/t COVID-19  Had difficulty to get nutrition, again refused NG insertion yesterday. Wife wanted to proceed with peripheral nutrition. With his valvular heart disease he would likely not tolerate the volumes, today she agree with PEG placement but he is on high flow  Past Medical History:   Diagnosis Date    Atrial fibrillation ([facility] [address]     COVID-19 vaccine series completed 03/2021    Hypercholesteremia     Hypertension     Neuropathy     Stroke ([facility] [address] 08/2021    TIA     Past Surgical History:   Procedure Laterality Date    HX CATARACT REMOVAL Left 2018    IOL    OH ABDOMEN SURGERY PROC UNLISTED Right     IHR      No family history on file.   Social History     Tobacco Use    Smoking status: Former    Smokeless tobacco: Never    Tobacco comments:     quit 30 years ago , smoked  pipe and cigar   Substance Use Topics    Alcohol use: Not on file      Current Facility-Administered Medications   Medication Dose Route Frequency Provider Last Rate Last Admin    pantoprazole (PROTONIX) tablet 40 mg  40 mg Oral BID Mahogany Chapa MD   40 mg at 08/03/22 2010    TPN ADULT-PERIPHERAL AA 4.25% D5% + ELECTROLYTES   IntraVENous CONTINUOUS Marina Chapa MD        lidocaine (XYLOCAINE) 2 % jelly   Mucous Membrane PRN Alpesh Le MD        0.9% sodium chloride infusion 250 mL  250 mL IntraVENous PRN Garrett Browning MD        nystatin (MYCOSTATIN) 100,000 unit/mL oral suspension 500,000 Units  500,000 Units Oral QID Burke Hameed MD   500,000 Units at 08/03/22 1744    hydrOXYzine (VISTARIL) injection 25 mg  25 mg IntraMUSCular Q6H PRN Karolina Arora MD   25 mg at 08/01/22 1307    hydrOXYzine pamoate (VISTARIL) capsule 25 mg  25 mg Oral Q8H Lisa Mckeon MD   25 mg at 08/03/22 1411    piperacillin-tazobactam (ZOSYN) 3.375 g in 0.9% sodium chloride (MBP/ADV) 100 mL MBP  3.375 g IntraVENous Q8H Mahogany Chapa MD 25 mL/hr at 08/03/22 2009 3.375 g at 08/03/22 2009    apixaban (ELIQUIS) tablet 2.5 mg  2.5 mg Oral BID Elaine Chapa MD   2.5 mg at 08/03/22 2010    aspirin delayed-release tablet 81 mg  81 mg Oral DAILY Mahogany Chapa MD   81 mg at 08/03/22 0902    atorvastatin (LIPITOR) tablet 20 mg  20 mg Oral DAILY Mahogany Chapa MD   20 mg at 08/03/22 3258    doxazosin (CARDURA) tablet 4 mg  4 mg Oral QHS Mahogany Chapa MD   4 mg at 08/02/22 2108    DULoxetine (CYMBALTA) capsule 30 mg  30 mg Oral DAILY Mahogany Chapa MD   30 mg at 08/03/22 0902    mirtazapine (REMERON) tablet 15 mg  15 mg Oral QHS Mahogany Chapa MD   15 mg at 08/02/22 2107    ranolazine ER (RANEXA) tablet 500 mg  500 mg Oral BID Mahogany Chapa MD   500 mg at 08/03/22 2011    0.9% sodium chloride infusion  25 mL/hr IntraVENous CONTINUOUS Lisa Mckeon MD 25 mL/hr at 08/03/22 2014 25 mL/hr at 08/03/22 2014    acetaminophen (TYLENOL) tablet 650 mg  650 mg Oral Q6H PRN Elaine Chapa MD   650 mg at 07/30/22 2150    Or    acetaminophen (TYLENOL) suppository 650 mg  650 mg Rectal Q6H PRN Elaine Chapa MD        polyethylene glycol (MIRALAX) packet 17 g  17 g Oral DAILY PRN Elaine Chapa MD        ondansetron (ZOFRAN ODT) tablet 4 mg  4 mg Oral Q8H PRN Mahogany Chapa MD        Or    ondansetron (ZOFRAN) injection 4 mg  4 mg IntraVENous Q6H PRN Mahogany Chapa MD        dexamethasone (DECADRON) 4 mg/mL injection 4 mg  4 mg IntraVENous Q6H Mahogany Chapa MD   4 mg at 08/03/22 1664        No Known Allergies    Review of Systems:  Review of Systems   Unable to perform ROS: Medical condition   Endo/Heme/Allergies: Negative. Objective:     Vitals:    08/03/22 1600 08/03/22 1634 08/03/22 1700 08/03/22 2127   BP: (!) 129/93  100/81    Pulse: 90  94    Resp: 20  16    Temp:       SpO2: 99% 98% 99% 98%   Weight:       Height:            Physical Exam:  Physical Exam  Constitutional:       Appearance: He is ill-appearing. HENT:      Head: Atraumatic. Mouth/Throat:      Mouth: Mucous membranes are dry. Eyes:      General: No scleral icterus. Cardiovascular:      Rate and Rhythm: Rhythm irregular. Pulmonary:      Breath sounds: No rhonchi. Abdominal:      General: Abdomen is flat. Tenderness: There is guarding. Musculoskeletal:      Cervical back: Neck supple. Skin:     Findings: Bruising present. Neurological:      Mental Status: Mental status is at baseline.         Recent Results (from the past 24 hour(s))   C REACTIVE PROTEIN, QT    Collection Time: 08/03/22  4:00 AM   Result Value Ref Range    C-Reactive protein 1.64 (H) 0.00 - 0.60 mg/dL   LD    Collection Time: 08/03/22  4:00 AM   Result Value Ref Range     (H) 85 - 241 U/L   RENAL FUNCTION PANEL    Collection Time: 08/03/22  4:00 AM   Result Value Ref Range    Sodium 141 136 - 145 mmol/L    Potassium 3.1 (L) 3.5 - 5.1 mmol/L    Chloride 104 97 - 108 mmol/L    CO2 30 21 - 32 mmol/L    Anion gap 7 5 - 15 mmol/L    Glucose 152 (H) 65 - 100 mg/dL    BUN 34 (H) 6 - 20 mg/dL    Creatinine 0.81 0.70 - 1.30 mg/dL    BUN/Creatinine ratio 42 (H) 12 - 20      GFR est AA >60 >60 ml/min/1.73m2    GFR est non-AA >60 >60 ml/min/1.73m2    Calcium 7.7 (L) 8.5 - 10.1 mg/dL    Phosphorus 4.2 2.6 - 4.7 mg/dL    Albumin 2.5 (L) 3.5 - 5.0 g/dL   CBC WITH AUTOMATED DIFF    Collection Time: 08/03/22  4:00 AM   Result Value Ref Range    WBC 17.0 (H) 4.1 - 11.1 K/uL    RBC 3.13 (L) 4.10 - 5.70 M/uL    HGB 9.7 (L) 12.1 - 17.0 g/dL    HCT 29.5 (L) 36.6 - 50.3 %    MCV 94.2 80.0 - 99.0 FL    MCH 31.0 26.0 - 34.0 PG    MCHC 32.9 30.0 - 36.5 g/dL    RDW 15.4 (H) 11.5 - 14.5 % PLATELET 979 149 - 997 K/uL    MPV 10.1 8.9 - 12.9 FL    NRBC 0.2 (H) 0.0  WBC    ABSOLUTE NRBC 0.03 (H) 0.00 - 0.01 K/uL    NEUTROPHILS 96 (H) 32 - 75 %    LYMPHOCYTES 1 (L) 12 - 49 %    MONOCYTES 2 (L) 5 - 13 %    EOSINOPHILS 0 0 - 7 %    BASOPHILS 0 0 - 1 %    IMMATURE GRANULOCYTES 1 (H) 0 - 0.5 %    ABS. NEUTROPHILS 16.3 (H) 1.8 - 8.0 K/UL    ABS. LYMPHOCYTES 0.2 (L) 0.8 - 3.5 K/UL    ABS. MONOCYTES 0.3 0.0 - 1.0 K/UL    ABS. EOSINOPHILS 0.0 0.0 - 0.4 K/UL    ABS. BASOPHILS 0.0 0.0 - 0.1 K/UL    ABS. IMM. GRANS. 0.2 (H) 0.00 - 0.04 K/UL    DF AUTOMATED     METABOLIC PANEL, COMPREHENSIVE    Collection Time: 08/03/22  4:00 AM   Result Value Ref Range    Sodium 142 136 - 145 mmol/L    Potassium 3.1 (L) 3.5 - 5.1 mmol/L    Chloride 105 97 - 108 mmol/L    CO2 30 21 - 32 mmol/L    Anion gap 7 5 - 15 mmol/L    Glucose 152 (H) 65 - 100 mg/dL    BUN 33 (H) 6 - 20 mg/dL    Creatinine 0.77 0.70 - 1.30 mg/dL    BUN/Creatinine ratio 43 (H) 12 - 20      GFR est AA >60 >60 ml/min/1.73m2    GFR est non-AA >60 >60 ml/min/1.73m2    Calcium 7.6 (L) 8.5 - 10.1 mg/dL    Bilirubin, total 0.8 0.2 - 1.0 mg/dL    AST (SGOT) 70 (H) 15 - 37 U/L    ALT (SGPT) 52 12 - 78 U/L    Alk. phosphatase 94 45 - 117 U/L    Protein, total 5.1 (L) 6.4 - 8.2 g/dL    Albumin 2.5 (L) 3.5 - 5.0 g/dL    Globulin 2.6 2.0 - 4.0 g/dL    A-G Ratio 1.0 (L) 1.1 - 2.2          XR CHEST PORT   Final Result      Feeding tube terminates in the region of the distal esophagus and should be   advanced at least 10 to 15 cm          XR CHEST PORT   Final Result   Interstitial and airspace opacities with some improvement in the   interval.      XR CHEST PORT   Final Result   No significant change in interstitial and airspace opacities   possibly pulmonary edema      XR CHEST PORT   Final Result   No significant change      XR CHEST PORT   Final Result      Little interval change.          XR CHEST PORT   Final Result   Slightly increased diffuse bilateral interstitial and alveolar   opacities, compatible with COVID pneumonia. XR CHEST PORT   Final Result   No significant change       XR CHEST SNGL V   Final Result   Stable bilateral pulmonary infiltrates.          XR CHEST PORT    (Results Pending)      Assessment:     Hospital Problems  Never Reviewed            Codes Class Noted POA    Sepsis ([facility] [address] ICD-10-CM: A41.9  ICD-9-CM: 038.9, 995.91  7/27/2022 Unknown        COVID ICD-10-CM: U07.1  ICD-9-CM: 079.89  7/18/2022 Unknown         Acute hypoxic respiratory failure currently  from COVID versus pulmonary edema from diastolic dysfunction and AR  Severe malnutrition  Fall NG placement    Anemia    Hypocalcemia, lower KCL    O 2 PER NC  Continue IV Zosyn and Zithromax   Iv fluids  4  Indication and risk of EGD guided peg placement discussed with patient's wife, she agreed with test,    Will schedule tentatively for PEG tube placement in the morning if patient stable  Signed By: Pasquale Vann MD     August 3, 2022         Thank you for allowing me to participate in this patients care  Cc Referring Physician   Rich Ashraf MD

## 2022-08-04 NOTE — PERIOP NOTES
Dr. Jarad Ramirez made aware that pt was given eliquis last night. Peg tube placement for today rescheduled for tomorrow and orders given to hold eliquis today and tomorrow from Dr. Jarad Ramirez. Primary nurse made aware.

## 2022-08-04 NOTE — PROGRESS NOTES
Progress Note    Patient: Diana Montes MRN: [id number]  SSN: [id number]    YOB: 1929  Age: 80 y.o. Sex: male      Admit Date: 7/27/2022    LOS: 8 days     Subjective:   Patient followed for sepsis with recent Covid-19 infection, respiratory failure with presumptive pneumonia and presumptive UTI. No urine culture was sent. He is on high flow nasal cannula. He remains afebrile with elevated WBC on steroids but decreasing CRP and normal procal.  CXR today showing improvement. Patient is awake and responsive with SOB and mild cough. He is scheduled for PEG tube today. Family member at bedside with concerns about his tremulousness. Objective:     Vitals:    08/04/22 0630 08/04/22 0700 08/04/22 0810 08/04/22 0813   BP: 123/63   129/65   Pulse: 93 93  94   Resp: 22 23  23   Temp:    98.2 °F (36.8 °C)   SpO2: 97% 96% 96% 98%   Weight:       Height:            Intake and Output:  Current Shift: No intake/output data recorded. Last three shifts: 08/02 1901 - 08/04 0700  In: 2332.1 [P.O.:500; I.V.:1832.1]  Out: 1900 [Urine:1900]    Physical Exam:   Vitals and nursing note reviewed. Constitutional:       General: He is in acute distress. Appearance: He is ill-appearing. HENT:     Head: Normocephalic and atraumatic. Nose:     Comments: High flow nasal cannula 75%   Eyes:     Pupils: Pupils are equal, round, and reactive to light. Cardiovascular:     Rate and Rhythm: Regular rhythm. Heart sounds: No murmur heard. Pulmonary: coarse rhonchi right lung fied, no rales or wheezes  Abdominal:     General: Bowel sounds are normal.     Palpations: Abdomen is soft. Tenderness: There is no abdominal tenderness. There is no right CVA tenderness or left CVA tenderness. Genitourinary:     Comments: External urinary catheter  Musculoskeletal:     Right lower leg: No edema. Left lower leg: No edema. Skin:     Findings: No rash.   Neurological:     General: No focal deficit present. Mental Status: He is alert and oriented to person, place, and time. Psychiatric:         Behavior: Behavior normal.    Lab/Data Review:     WBC 16,200     <708 <545 <498 <606  Ferritin <231    CRP 1.73 <1.64 <2.02 <3.26 < 6.18 <9.24 <16.10  Procal <0.05 <0.15    Blood cultures (7/27) No growth FINAL  Urine culture ordered  Heavy normal respiratory shirley (7/29) FINAL    CXR (8/4)   Decreased lung volumes with increased interstitial and airspace opacities bilaterally         Assessment:     Active Problems:    COVID (7/18/2022)      Sepsis ([address] (7/27/2022)  Sepsis with fever, tachycardia, tachypnea, leukocytosis, elevated CRP, Day #6  Zosyn, status post Zithromax  Presumptive UTI with pyuria and bacteria, urine culture not sent, on IV Zosyn  Covid-19 infection  ? Pneumonia with stable bilateral infiltrates, ?aspiration, on IV Zosyn    Acute hypoxic respiratory failure  6. MRSA nasal colonization, status post Mupirocin ointment       Comment:  CRP increasing today and CXR worsening. Leukocytosis attributed to steroid. Increasing LDH may be poor prognostic indicator reflecting Covid-19 activity.     Plan:      Continue Zosyn  Continue Dexamethasone per Pulmonary  Spoke with Staff about sending urine culture but it was not sent  In am, repeat CBC, CRP, LDH  Pending PEG tube placement       Signed By: Pamela Herring MD     August 4, 2022

## 2022-08-04 NOTE — PROGRESS NOTES
Progress Note    Patient: Cullen Du MRN: [id number]  SSN: [id number]    YOB: 1929  Age: 80 y.o.   Sex: male      Admit Date: 7/27/2022    LOS: 8 days     Subjective:     Patient vital signs or mental status remains the same statues, had anticoagulation medicine last night,  Past Medical History:   Diagnosis Date    Atrial fibrillation ([facility]     COVID-19 vaccine series completed 03/2021    Hypercholesteremia     Hypertension     Neuropathy     Stroke ([facility] 08/2021    TIA        Current Facility-Administered Medications:     pantoprazole (PROTONIX) tablet 40 mg, 40 mg, Oral, BID, Mahogany Chapa MD, 40 mg at 08/04/22 0856    TPN ADULT-PERIPHERAL AA 4.25% D5% + ELECTROLYTES, , IntraVENous, CONTINUOUS, Mahogany Chapa MD, Last Rate: 42 mL/hr at 08/03/22 2217, New Bag at 08/03/22 2217    lidocaine (XYLOCAINE) 2 % jelly, , Mucous Membrane, PRN, Shayy Daniels MD    0.9% sodium chloride infusion 250 mL, 250 mL, IntraVENous, PRN, Garrett Browning MD    nystatin (MYCOSTATIN) 100,000 unit/mL oral suspension 500,000 Units, 500,000 Units, Oral, QID, Bharath Johnson MD, 500,000 Units at 08/04/22 0856    hydrOXYzine (VISTARIL) injection 25 mg, 25 mg, IntraMUSCular, Q6H PRN, Rich Ashraf MD, 25 mg at 08/01/22 1307    hydrOXYzine pamoate (VISTARIL) capsule 25 mg, 25 mg, Oral, Q8H, Shayy Daniels MD, 25 mg at 08/04/22 0516    piperacillin-tazobactam (ZOSYN) 3.375 g in 0.9% sodium chloride (MBP/ADV) 100 mL MBP, 3.375 g, IntraVENous, Q8H, Mahogany Chapa MD, Last Rate: 25 mL/hr at 08/04/22 0304, 3.375 g at 08/04/22 0304    [Held by provider] apixaban (ELIQUIS) tablet 2.5 mg, 2.5 mg, Oral, BID, Mahogany Chapa MD, 2.5 mg at 08/03/22 2010    aspirin delayed-release tablet 81 mg, 81 mg, Oral, DAILY, Mahogany Chapa MD, 81 mg at 08/03/22 0902    atorvastatin (LIPITOR) tablet 20 mg, 20 mg, Oral, DAILY, Mahogany Chapa MD, 20 mg at 08/04/22 0856    doxazosin (CARDURA) tablet 4 mg, 4 mg, Oral, QSAM, Mahogany Chapa MD, 4 mg at 08/03/22 2204    DULoxetine (CYMBALTA) capsule 30 mg, 30 mg, Oral, DAILY, Mahogany Chapa MD, 30 mg at 08/04/22 0856    mirtazapine (REMERON) tablet 15 mg, 15 mg, Oral, QHS, Mahogany Chapa MD, 15 mg at 08/03/22 2204    ranolazine ER (RANEXA) tablet 500 mg, 500 mg, Oral, BID, Krish Chapa MD, 500 mg at 08/04/22 0856    acetaminophen (TYLENOL) tablet 650 mg, 650 mg, Oral, Q6H PRN, 650 mg at 07/30/22 2150 **OR** acetaminophen (TYLENOL) suppository 650 mg, 650 mg, Rectal, Q6H PRN, Mahogany Chapa MD    polyethylene glycol (MIRALAX) packet 17 g, 17 g, Oral, DAILY PRN, Mahogany Chapa MD    ondansetron (ZOFRAN ODT) tablet 4 mg, 4 mg, Oral, Q8H PRN **OR** ondansetron (ZOFRAN) injection 4 mg, 4 mg, IntraVENous, Q6H PRN, Mahogany Chapa MD    dexamethasone (DECADRON) 4 mg/mL injection 4 mg, 4 mg, IntraVENous, Q6H, Mahogany Chapa MD, 4 mg at 08/04/22 0516    Objective:     Vitals:    08/04/22 0810 08/04/22 0813 08/04/22 0900 08/04/22 1000   BP:  129/65 (!) 153/106 114/63   Pulse:  94 (!) 105 (!) 115   Resp:  23 30 23   Temp:  98.2 °F (36.8 °C)     SpO2: 96% 98% 99%    Weight:       Height:            Intake and Output:  Current Shift: No intake/output data recorded. Last three shifts: 08/02 1901 - 08/04 0700  In: 2416.1 [P.O.:500; I.V.:1916.1]  Out: 1900 [Urine:1900]    Physical Exam:   Physical Exam  HENT:      Head: Atraumatic. Mouth/Throat:      Mouth: Mucous membranes are dry. Cardiovascular:      Rate and Rhythm: Regular rhythm. Pulmonary:      Breath sounds: Normal breath sounds. Abdominal:      Palpations: Abdomen is soft. Hernia: No hernia is present. Neurological:      General: No focal deficit present.         Lab/Data Review:  Recent Results (from the past 24 hour(s))   METABOLIC PANEL, COMPREHENSIVE    Collection Time: 08/04/22  2:55 AM   Result Value Ref Range    Sodium 139 136 - 145 mmol/L    Potassium 3.8 3.5 - 5.1 mmol/L    Chloride 106 97 - 108 mmol/L    CO2 30 21 - 32 mmol/L    Anion gap 3 (L) 5 - 15 mmol/L    Glucose 124 (H) 65 - 100 mg/dL    BUN 30 (H) 6 - 20 mg/dL    Creatinine 0.66 (L) 0.70 - 1.30 mg/dL    BUN/Creatinine ratio 45 (H) 12 - 20      GFR est AA >60 >60 ml/min/1.73m2    GFR est non-AA >60 >60 ml/min/1.73m2    Calcium 7.4 (L) 8.5 - 10.1 mg/dL    Bilirubin, total 0.6 0.2 - 1.0 mg/dL    AST (SGOT) 49 (H) 15 - 37 U/L    ALT (SGPT) 45 12 - 78 U/L    Alk.  phosphatase 79 45 - 117 U/L    Protein, total 4.8 (L) 6.4 - 8.2 g/dL    Albumin 2.2 (L) 3.5 - 5.0 g/dL    Globulin 2.6 2.0 - 4.0 g/dL    A-G Ratio 0.8 (L) 1.1 - 2.2     RENAL FUNCTION PANEL    Collection Time: 08/04/22  2:55 AM   Result Value Ref Range    Sodium 140 136 - 145 mmol/L    Potassium 3.8 3.5 - 5.1 mmol/L    Chloride 105 97 - 108 mmol/L    CO2 31 21 - 32 mmol/L    Anion gap 4 (L) 5 - 15 mmol/L    Glucose 124 (H) 65 - 100 mg/dL    BUN 29 (H) 6 - 20 mg/dL    Creatinine 0.64 (L) 0.70 - 1.30 mg/dL    BUN/Creatinine ratio 45 (H) 12 - 20      GFR est AA >60 >60 ml/min/1.73m2    GFR est non-AA >60 >60 ml/min/1.73m2    Calcium 7.4 (L) 8.5 - 10.1 mg/dL    Phosphorus 2.8 2.6 - 4.7 mg/dL    Albumin 2.1 (L) 3.5 - 5.0 g/dL   MAGNESIUM    Collection Time: 08/04/22  2:55 AM   Result Value Ref Range    Magnesium 2.1 1.6 - 2.4 mg/dL   NT-PRO BNP    Collection Time: 08/04/22  2:55 AM   Result Value Ref Range    NT pro-BNP 2,758 (H) <450 pg/mL   CBC WITH AUTOMATED DIFF    Collection Time: 08/04/22  2:55 AM   Result Value Ref Range    WBC 16.2 (H) 4.1 - 11.1 K/uL    RBC 2.57 (L) 4.10 - 5.70 M/uL    HGB 8.1 (L) 12.1 - 17.0 g/dL    HCT 24.4 (L) 36.6 - 50.3 %    MCV 94.9 80.0 - 99.0 FL    MCH 31.5 26.0 - 34.0 PG    MCHC 33.2 30.0 - 36.5 g/dL    RDW 15.3 (H) 11.5 - 14.5 %    PLATELET 674 (L) 196 - 400 K/uL    MPV 10.6 8.9 - 12.9 FL    NRBC 0.2 (H) 0.0  WBC    ABSOLUTE NRBC 0.03 (H) 0.00 - 0.01 K/uL    NEUTROPHILS 96 (H) 32 - 75 %    LYMPHOCYTES 1 (L) 12 - 49 %    MONOCYTES 2 (L) 5 - 13 % EOSINOPHILS 0 0 - 7 %    BASOPHILS 0 0 - 1 %    IMMATURE GRANULOCYTES 1 (H) 0 - 0.5 %    ABS. NEUTROPHILS 15.6 (H) 1.8 - 8.0 K/UL    ABS. LYMPHOCYTES 0.1 (L) 0.8 - 3.5 K/UL    ABS. MONOCYTES 0.3 0.0 - 1.0 K/UL    ABS. EOSINOPHILS 0.0 0.0 - 0.4 K/UL    ABS. BASOPHILS 0.0 0.0 - 0.1 K/UL    ABS. IMM. GRANS. 0.2 (H) 0.00 - 0.04 K/UL    DF AUTOMATED     C REACTIVE PROTEIN, QT    Collection Time: 08/04/22  2:55 AM   Result Value Ref Range    C-Reactive protein 1.73 (H) 0.00 - 0.60 mg/dL   PHOSPHORUS    Collection Time: 08/04/22  2:55 AM   Result Value Ref Range    Phosphorus 2.9 2.6 - 4.7 mg/dL        XR CHEST PORT   Final Result   Decreased lung volumes with increased interstitial and airspace   opacities bilaterally      XR CHEST PORT   Final Result      Feeding tube terminates in the region of the distal esophagus and should be   advanced at least 10 to 15 cm          XR CHEST PORT   Final Result   Interstitial and airspace opacities with some improvement in the   interval.      XR CHEST PORT   Final Result   No significant change in interstitial and airspace opacities   possibly pulmonary edema      XR CHEST PORT   Final Result   No significant change      XR CHEST PORT   Final Result      Little interval change. XR CHEST PORT   Final Result   Slightly increased diffuse bilateral interstitial and alveolar   opacities, compatible with COVID pneumonia. XR CHEST PORT   Final Result   No significant change       XR CHEST SNGL V   Final Result   Stable bilateral pulmonary infiltrates.          XR CHEST PORT    (Results Pending)        Assessment:     Active Problems:    COVID (7/18/2022)      Sepsis ([address] (7/27/2022)    Acute hypoxic respiratory failure currently  from COVID versus pulmonary edema from diastolic dysfunction and AR  Severe malnutrition  nG placement refused by the patient     Anemia     Hypocalcemia, lower KCL to correct     O 2 PER NC  Continue IV Zosyn and Zithromax   Iv fluids  Hold anticoagulations    Rescheduled to EGD PEG tube placement morning    Indication and risk of EGD guided peg placement discussed with patient's wife, she agreed with test,       Plan:       Signed By: Kajal Mccord MD     August 4, 2022        Thank you for allowing me to participate in this patients care  [facility] Referring Physician   Josué Biggs MD

## 2022-08-04 NOTE — PROGRESS NOTES
Consult  Pulmonary, Critical Care    Name: Cullen Du MRN: [id number]   : 1929 Hospital: [facility]   Date: 2022  Admission date: 2022 Hospital Day: 9       Subjective/Interval History:   Recent COVID pneumonitis improved with Decadron and baricitinib wean to room air was discharged to rehab facility yesterday at the rehab facility he had development of hypoxia than fever was transferred back to our emergency room T-max 101.5. Wife states that yesterday he did have some green-tinged sputum. Chest x-ray shows diffuse bilateral infiltrates unchanged. There has been no nausea vomiting choking while eating   remains on BiPAP. Blood gas this morning on nasal oxygen showed hypoxia but currently he is running 94% on 5 L awake alert no specific complaints   more anxiety last evening could not tolerate BiPAP and was placed on high flow. Still somewhat anxious today IM Vistaril seem to help   awake alert tolerated nasal high flow all night but FiO2 is 90%. Not able to eat due to shortness of breath have talked with him about NG tube for tube feedings and he agrees   awake alert confused FiO2 down to 50%. Nurse attempted NG insertion  and . As soon as the tube was placed in his nose he pulled it and refused to have any further attempt he remains malnourished  8 awake alert confused refused NG insertion yesterday currently he states he will allow it when his wife is here  8/3 remains confused awake alert again refused NG insertion yesterday. Wife wanted to proceed with peripheral nutrition.   With his valvular heart disease he would likely not tolerate the volumes required likewise with central hyperalimentation he likely would not tolerate the volumes and it also raises the risk of infection  / awake confused for possible PEG tube placement today    Patient Active Problem List   Diagnosis Code    Carotid stenosis I65.29    Hypoxia R09.02    COVID U07.1 Sepsis ([facility] [address] A41.9       IMPRESSION:   Acute hypoxic respiratory failure currently on nasal high flow FiO2 75%  Healthcare associated pneumonia Serratia on culture on Zosyn day 9  Diffuse pulmonary infiltrates questionable pneumonia versus inflammatory response from COVID versus pulmonary edema from diastolic dysfunction and AR  Oral thrush resolved  Anemia improved after transfusion 8/2 hemoglobin down to 8.1 today on Protonix twice daily  Hypokalemia repleted  Hypophosphatemia repleted  Hypocalcemia repleted  Recent COVID pneumonitis  Atrial fibrillation  Moderate aortic regurg  Moderate to severe mitral regurg  Diastolic left ventricular dysfunction  Pyuria rule out UTI no culture results  Elevated BNP  Anxiety  Body mass index is 18.09 kg/m². RECOMMENDATIONS/PLAN:   nasal high flow FiO2 75%  Anxiety may be improved on 3 times daily dosing of Vistaril  Continue IV Zosyn nasal MRSA smear positive blood culture no growth sputum culture occasional polys with Serratia does not appear as if urine was ever sent  BNP remains elevated chest x-ray with diffuse congestion creatinine is normal repeat Lasix today  For PEG tube placement today           Subjective/Initial History:   I have reviewed the flowsheet and previous  notes. I was asked by Maxwell Resendez MD to see Tracee Lovell a 80 y.o.  male  in consultation for a chief complaint of acute hypoxic respiratory failure fever recent COVID pneumonitis        Patient PCP: Izabella Canela MD  PMH:  has a past medical history of Atrial fibrillation ([facility] [address] COVID-19 vaccine series completed (03/2021), Hypercholesteremia, Hypertension, Neuropathy, and Stroke ([facility] [address] (08/2021). PSH:   has a past surgical history that includes pr abdomen surgery proc unlisted (Right) and hx cataract removal (Left, 2018). FHX: family history is not on file. SHX:  reports that he has quit smoking.  He has never used smokeless tobacco. He reports that he does not use drugs.  Systemic review  General weight has been stable the last several days he has not noted fever chills or sweats until this morning  Eyes no double vision or momentary blindness  ENT no facial pain over the sinuses  Musculoskeletal no swollen tender joints no myalgias  Endocrinologic no polyuria polydipsia  Neurologic no seizures or syncope awake and alert oriented  Gastrointestinal no choking or gagging when eating no nausea vomiting no indigestion  Genitourinary no pain or discomfort no burning  Cardiovascular has not noted ankle edema chest pain diaphoresis or nocturia  Respiratory abrupt worsening shortness of breath last night associated with fever    No Known Allergies   MEDS:   Current Facility-Administered Medications   Medication    pantoprazole (PROTONIX) tablet 40 mg    TPN ADULT-PERIPHERAL AA 4.25% D5% + ELECTROLYTES    lidocaine (XYLOCAINE) 2 % jelly    0.9% sodium chloride infusion 250 mL    nystatin (MYCOSTATIN) 100,000 unit/mL oral suspension 500,000 Units    hydrOXYzine (VISTARIL) injection 25 mg    hydrOXYzine pamoate (VISTARIL) capsule 25 mg    piperacillin-tazobactam (ZOSYN) 3.375 g in 0.9% sodium chloride (MBP/ADV) 100 mL MBP    [Held by provider] apixaban (ELIQUIS) tablet 2.5 mg    aspirin delayed-release tablet 81 mg    atorvastatin (LIPITOR) tablet 20 mg    doxazosin (CARDURA) tablet 4 mg    DULoxetine (CYMBALTA) capsule 30 mg    mirtazapine (REMERON) tablet 15 mg    ranolazine ER (RANEXA) tablet 500 mg    acetaminophen (TYLENOL) tablet 650 mg    Or    acetaminophen (TYLENOL) suppository 650 mg    polyethylene glycol (MIRALAX) packet 17 g    ondansetron (ZOFRAN ODT) tablet 4 mg    Or    ondansetron (ZOFRAN) injection 4 mg    dexamethasone (DECADRON) 4 mg/mL injection 4 mg        Current Facility-Administered Medications:     pantoprazole (PROTONIX) tablet 40 mg, 40 mg, Oral, BID, Mahogany Chapa MD, 40 mg at 08/03/22 2010    TPN ADULT-PERIPHERAL AA 4.25% D5% + ELECTROLYTES, , IntraVENous, CONTINUOUS, Mahogany Chapa MD, Last Rate: 42 mL/hr at 08/03/22 2217, New Bag at 08/03/22 2217    lidocaine (XYLOCAINE) 2 % jelly, , Mucous Membrane, PRN, Ezequiel Villareal MD    0.9% sodium chloride infusion 250 mL, 250 mL, IntraVENous, PRN, Garrett Browning MD    nystatin (MYCOSTATIN) 100,000 unit/mL oral suspension 500,000 Units, 500,000 Units, Oral, QID, Bharath Johnson MD, 500,000 Units at 08/03/22 2204    hydrOXYzine (VISTARIL) injection 25 mg, 25 mg, IntraMUSCular, Q6H PRN, Mahogany Chapa MD, 25 mg at 08/01/22 1307    hydrOXYzine pamoate (VISTARIL) capsule 25 mg, 25 mg, Oral, Q8H, Ezequiel Villraeal MD, 25 mg at 08/04/22 0516    piperacillin-tazobactam (ZOSYN) 3.375 g in 0.9% sodium chloride (MBP/ADV) 100 mL MBP, 3.375 g, IntraVENous, Q8H, Mahogany Chapa MD, Last Rate: 25 mL/hr at 08/04/22 0304, 3.375 g at 08/04/22 0304    [Held by provider] apixaban (ELIQUIS) tablet 2.5 mg, 2.5 mg, Oral, BID, Mahogany Chapa MD, 2.5 mg at 08/03/22 2010    aspirin delayed-release tablet 81 mg, 81 mg, Oral, DAILY, Mahogany Chapa MD, 81 mg at 08/03/22 7106    atorvastatin (LIPITOR) tablet 20 mg, 20 mg, Oral, DAILY, Mahogany Chapa MD, 20 mg at 08/03/22 4926    doxazosin (CARDURA) tablet 4 mg, 4 mg, Oral, QHS, Mahogany Chapa MD, 4 mg at 08/03/22 2204    DULoxetine (CYMBALTA) capsule 30 mg, 30 mg, Oral, DAILY, Mahogany Chapa MD, 30 mg at 08/03/22 0902    mirtazapine (REMERON) tablet 15 mg, 15 mg, Oral, QHS, Mahogany Chapa MD, 15 mg at 08/03/22 2204    ranolazine ER (RANEXA) tablet 500 mg, 500 mg, Oral, BID, Mahogany Chapa MD, 500 mg at 08/03/22 2011    acetaminophen (TYLENOL) tablet 650 mg, 650 mg, Oral, Q6H PRN, 650 mg at 07/30/22 2150 **OR** acetaminophen (TYLENOL) suppository 650 mg, 650 mg, Rectal, Q6H PRN, Mahogany Chapa MD    polyethylene glycol (MIRALAX) packet 17 g, 17 g, Oral, DAILY PRN, Mahogany Chapa MD    ondansetron (ZOFRAN ODT) tablet 4 mg, 4 mg, Oral, Q8H PRN **OR** ondansetron (ZOFRAN) injection 4 mg, 4 mg, IntraVENous, Q6H PRN, Mahogany Chapa MD    dexamethasone (DECADRON) 4 mg/mL injection 4 mg, 4 mg, IntraVENous, Q6H, Mahogany Chapa MD, 4 mg at 22 0516      Objective:     Vital Signs: Telemetry:    AFIB Intake/Output:   Visit Vitals  /65 (BP 1 Location: Left upper arm, BP Patient Position: At rest)   Pulse 94   Temp 98.2 °F (36.8 °C)   Resp 23   Ht 5' 5.98\" (1.676 m)   Wt 50.8 kg (111 lb 15.9 oz)   SpO2 98%   BMI 18.09 kg/m²       Temp (24hrs), Av °F (36.1 °C), Min:96.5 °F (35.8 °C), Max:98.2 °F (36.8 °C)        O2 Device: Heated, Hi flow nasal cannula O2 Flow Rate (L/min): 60 l/min         Body mass index is 18.09 kg/m². Wt Readings from Last 4 Encounters:   22 50.8 kg (111 lb 15.9 oz)   22 54.4 kg (120 lb)   02/10/22 54.4 kg (120 lb)   21 54.4 kg (120 lb)          Intake/Output Summary (Last 24 hours) at 2022 0833  Last data filed at 2022 0500  Gross per 24 hour   Intake 2332.1 ml   Output 1400 ml   Net 932.1 ml         Last shift:      No intake/output data recorded. Last 3 shifts:  1901 -  0700  In: 2332.1 [P.O.:500; I.V.:1832.1]  Out: 200 [Urine:1900]       Hemodynamics:    CO:    CI:    CVP:    SVR:   PAP Systolic:    PAP Diastolic:    PVR:    WU26:       Ventilator Settings:      Mode Rate TV Press PEEP FiO2 PIP Min. Vent               75 %     6.8 l/min      Physical Exam:    General:  male; in bed on nasal high flow oxygen less anxious no accessory muscle use  HEENT: NCAT,   Eyes: anicteric; conjunctiva clear extraocular movements intact  Neck: no nodes, no accessory MM use. No definite JVD  Chest: no deformity,   Cardiac: Irregular rate and rhythm occasional PVCs has somewhat harsh systolic murmur  Lungs: Clear anteriorly with rales posteriorly no wheezing  Abd: Thin soft positive bowel sounds no tenderness  Ext: Trace edema; no joint swelling;  No clubbing  : clear urine  Neuro: Awake alert seems calm  hard of hearing follow simple commands moves all 4 extremities  Psych- no agitation, mildly confused;   Skin: warm, dry, no cyanosis;   Pulses: Brachial and radial pulses intact  Capillary: Normal capillary refill      Labs:    Recent Labs     08/04/22 0255 08/03/22  0400 08/02/22 0318   WBC 16.2* 17.0* 12.1*   HGB 8.1* 9.7* 6.6*   * 161 160       Recent Labs     08/04/22 0255 08/03/22  0400 08/02/22 0318     140 142  141 140  139   K 3.8  3.8 3.1*  3.1* 3.4*  3.4*     105 105  104 106  105   CO2 30  31 30  30 29  30   *  124* 152*  152* 108*  108*   BUN 30*  29* 33*  34* 31*  30*   CREA 0.66*  0.64* 0.77  0.81 0.74  0.72   CA 7.4*  7.4* 7.6*  7.7* 7.3*  7.2*   MG 2.1  --  2.0   PHOS 2.9  2.8 4.2 3.2   ALB 2.2*  2.1* 2.5*  2.5* 2.2*  2.2*   ALT 45 52 37         8/4 nasal high flow 60 L 75% with saturation 98%   8/1 nasal high flow 60 L FiO2 80%   COVID-19 antigen remains positive  BNP 2758 2592, 2705 3310  CRP 1.73 0.64, 2.02 3.26, 6.18, 9.2 16.1  Procalcitonin less than 0.05 0.15  Urinalysis with 20-50 WBCs  Blood culture no growth  Sputum occasional polys with Serratia  Echo ejection fraction 55% diastolic dysfunction moderate aortic regurg moderate to severe mitral regurg left atrium 2.6 cm RVSP 25  Imaging:  I have personally reviewed the patients radiographs and have reviewed the reports:    CXR Results  (Last 48 hours)                 08/03/22 1453  XR CHEST PORT Final result    Impression:      Feeding tube terminates in the region of the distal esophagus and should be   advanced at least 10 to 15 cm            Narrative:  history: Feeding tube placement       COMPARISON: 8/3/2022       FINDINGS:       Frontal chest radiograph submitted for review. Feeding tube terminates in the region of the distal esophagus and should be   advanced at least 10 to 15 cm. Diffuse bilateral interstitial lung opacities are   again seen.  Small bilateral pleural effusions. No evidence for pneumothorax. 08/03/22 0400  XR CHEST PORT Final result    Impression:  Interstitial and airspace opacities with some improvement in the   interval.       Narrative:  EXAM: XR CHEST PORT       INDICATION: Respiratory failure       COMPARISON: 8/1/2022       FINDINGS: A portable AP radiograph of the chest was obtained at 4:00 hours. The   patient is on a cardiac monitor. Interstitial and airspace opacities are noted   bilaterally with some improvement. [place] Heart size is enlarged. .  The bones and soft   tissues are grossly within normal limits. Results from Hospital Encounter encounter on 07/27/22    XR CHEST PORT    Narrative  history: Feeding tube placement    COMPARISON: 8/3/2022    FINDINGS:    Frontal chest radiograph submitted for review. Feeding tube terminates in the region of the distal esophagus and should be  advanced at least 10 to 15 cm. Diffuse bilateral interstitial lung opacities are  again seen. Small bilateral pleural effusions. No evidence for pneumothorax. Impression  Feeding tube terminates in the region of the distal esophagus and should be  advanced at least 10 to 15 cm      XR CHEST PORT    Narrative  EXAM: XR CHEST PORT    INDICATION: Respiratory failure    COMPARISON: 8/1/2022    FINDINGS: A portable AP radiograph of the chest was obtained at 4:00 hours. The  patient is on a cardiac monitor. Interstitial and airspace opacities are noted  bilaterally with some improvement. [place] Heart size is enlarged. .  The bones and soft  tissues are grossly within normal limits. Impression  Interstitial and airspace opacities with some improvement in the  interval.      XR CHEST PORT    Narrative  EXAM: XR CHEST PORT    INDICATION: chf    COMPARISON: 7/31/2022    FINDINGS: A portable AP radiograph of the chest was obtained at 325 hours. Cardiac monitoring leads. . Bilateral interstitial and patchy airspace opacities  unchanged. [place]  Heart size is borderline. Small left pleural effusion. .  The bones  and soft tissues are grossly within normal limits. Impression  No significant change in interstitial and airspace opacities  possibly pulmonary edema    Results from [facility] encounter on 02/10/22    CT HEAD WO CONT    Narrative  Technique: Multiple continuous axial images were obtained from the skull base to  the vertex without administration of intravenous contrast.    Comment on dose reduction: All CT scans at this facility are performed using  dose reduction optimization technique as appropriate to perform the exam  including the following; automated exposure control, adjustments of the mA  and/or kV according to patient size, or use of iterative reconstructed  technique. Comparison examination: None    Findings:  The ventricles and sulci are prominent consistent with age-related changes of  atrophy. Periventricular hypodensity is identified consistent with changes of  chronic small vessel disease. No evidence of midline shift, mass lesion, or  extra-axial fluid collection. No evidence of parenchymal hemorrhage or  infarction in a major vascular territory. The osseous structures are intact, the paranasal sinuses are clear. Extracalvarial soft tissue hematoma posterior vertex . Impression  No acute intracranial process. Extracalvarial soft tissue hematoma posterior  vertex . Session fever with recent hospitalization possible healthcare associated pneumonia agree with Zosyn vancomycin and Zithromax. We will attempt to collect sputum for culture. Have requested urine culture blood cultures have already been obtained. He has a significant systolic murmur with elevated BNP he may have mitral regurg giving him some degree of fluid overload he only has trace edema. We will check an echocardiogram.  Creatinine is normal will decrease IV fluids for the time being.   Chest x-ray with diffuse infiltrates thought to be residual inflammatory change from COVID pneumonitis but may represent some degree of fluid overload. Oxygenation is well-maintained now on noninvasive ventilator have decreased FiO2 to 45% will attempt conversion to nasal oxygen in a.m.  728 awake alert currently on noninvasive ventilator have placed on 5 L oxygen saturation 94%. If he maintains that we will leave him on nasal oxygen today and use noninvasive ventilator at night. Nursing noted that when he drifts off to sleep he will have desaturation. BNP remains elevated neck veins are chest visible chest x-ray still diffuse infiltrates we will give 1 dose Lasix today  7/29 anxious intolerant of BiPAP last evening switch to nasal high flow. Chest x-ray is unchanged with diffuse infiltrates questionable healthcare associated pneumonia COVID inflammatory reaction or some degree pulmonary edema from diastolic dysfunction aortic regurg and mitral regurg repeat Lasix today replenish potassium and phosphorus nasal MRSA smear is positive we will continue vancomycin Zosyn and Zithromax remains on baricitinib and Decadron  7/30 much calmer today questionably due to Vistaril. Potassium remains low will supplement. Still has trace edema with normal creatinine we will repeat Lasix today. Have talked with him about placing NG tube for tube feedings and he agrees  7/31 alert awake oral thrush on 90% FiO2 intermittent agitation as per nurse  8/1 awake alert confused refusing NG tube once wife has a chance to talk with him we will attempt reinsertion. Serratia in sputum currently on Zosyn. Diffuse accentuated interstitial markings persist we will repeat Lasix today  8/2 still refusing NG tube eating very little. Creatinine and BUN up slightly with Lasix dosing yesterday. He is to receive a unit of blood replace potassium we will repeat Lasix today after blood we will try once again to place NG tube  8/3 again refused NG insertion yesterday. Wife is requesting IV.   With his valvular heart disease he very likely would not tolerate the volumes of fluid needed for adequate nutrition. Will attempt to place NG tube once the wife is here  Heat/4 NG tube yesterday went into lung with no cough reflex. For possible PEG tube placement today. Chest x-ray still shows diffuse congestion BNP remains elevated creatinine stable we will repeat Lasix today. Time of care 30 minutes           Thank you for allowing us to participate in the care of this patient.   We will follow along with you     Peter Mckenzie MD

## 2022-08-04 NOTE — PROGRESS NOTES
PROGRESS NOTE    Patient: Grant Sky MRN: [id number]  SSN: [id number]    YOB: 1929  Age: 80 y.o. Sex: male      Admit Date: 7/27/2022    LOS: 8 days       Subjective     Chief Complaint   Patient presents with    Shortness of Breath       HPI: Patient is a 80y.o. year old male with a significant PMH of Afib, hypercholesterolemia, HTN, neuropathy, and hx of stroke presented to the ED today due to hypoxia and worsening confusion. The patient was discharged from the hospital yesterday after being hospitalized d/t COVID-19. Per nursing facility his O2 was in the 80s and it was 89% upon admission. The patient was stable at that time of discharge yesterday and not using any oxygen. The patient is now on BiPAP with an O2 sat of 100%. Febrile at 101.5  WBC 25.1  Hypertensive on admission  Respiratory rate at 31  CXR: Stable bilateral pulmonary infiltrates. Patient was given:  10mg Decadron  2g Magnesium  Duoneb treatment  Terbutaline treatment     7/28  Pt sitting in bed, responds to voice  P02 53 on ABG this morning, pt placed on BiPap  SpO2 stable  CRP=16.1    XR CHEST PORT  Diffuse interstitial airspace opacities are not  significantly changed. Trace left effusion. Biapical pleural thickening. Heartsize is enlarged      7/29    Patient on BiPAP now on 70% O2  Patient pulled out BiPAP last night put on high flow    7/30    On high flow 90% O2      Left Ventricle: Normal left ventricular systolic function with a visually estimated EF of 55 - 60%. Left ventricle size is normal. Normal wall thickness. Normal wall motion. Grade I diastolic dysfunction with normal LAP. Aortic Valve: Moderate regurgitation. Mitral Valve: Mildly thickened leaflet. 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 Moderate to severe regurgitation with an eccentrically directed jet and may underestimate severity. Tricuspid Valve: Moderate regurgitation.       7/31    On high flow 80% O2    8/1    On high flow 70% O2  Poor appetite poor appetite    8/2  Patient still on high flow 80% O2 very poor appetite  Refuse NG tube    8/3  Patient alert awake on high flow 80% O2  Unable to get NG tube placed  Very poor appetite    Discussed with the patient wife yesterday  She wants PPN      8/4  Patient continues on high flow oxygen. PEG tube placement was moved to tomorrow. Procedure was discussed with wife. Patient was seen by occupational therapist and PT yesterday and tolerated both session fairly well. Both noted weakness and increased need for oxygen when exerting himself. Review of Systems   Unable to perform ROS: Acuity of condition       Objective     Visit Vitals  BP (!) 110/98 (BP 1 Location: Left upper arm, BP Patient Position: At rest)   Pulse (!) 104   Temp 97.9 °F (36.6 °C)   Resp 18   Ht 5' 5.98\" (1.676 m)   Wt 50.8 kg (111 lb 15.9 oz)   SpO2 99%   BMI 18.09 kg/m²    O2 Flow Rate (L/min): 60 l/min O2 Device: Heated, Hi flow nasal cannula    Physical Exam:   Physical Exam  Constitutional:       Appearance: He is normal weight. HENT:      Head: Normocephalic and atraumatic. Cardiovascular:      Rate and Rhythm: Normal rate and regular rhythm. Pulses: Normal pulses. Heart sounds: Normal heart sounds. Pulmonary:      Effort: Pulmonary effort is normal.      Breath sounds: Normal breath sounds. Musculoskeletal:         General: Normal range of motion. Cervical back: Normal range of motion and neck supple. Neurological:      General: No focal deficit present. Mental Status: He is alert. He is disoriented. Intake & Output:  Current Shift: No intake/output data recorded. Last three shifts: 08/02 1901 - 08/04 0700  In: 2416.1 [P.O.:500; I.V.:1916.1]  Out: 1900 [Urine:1900]    Lab/Data Review: All lab results for the last 24 hours reviewed.        24 Hour Results:    Recent Results (from the past 24 hour(s))   METABOLIC PANEL, COMPREHENSIVE    Collection Time: 08/04/22 2:55 AM   Result Value Ref Range    Sodium 139 136 - 145 mmol/L    Potassium 3.8 3.5 - 5.1 mmol/L    Chloride 106 97 - 108 mmol/L    CO2 30 21 - 32 mmol/L    Anion gap 3 (L) 5 - 15 mmol/L    Glucose 124 (H) 65 - 100 mg/dL    BUN 30 (H) 6 - 20 mg/dL    Creatinine 0.66 (L) 0.70 - 1.30 mg/dL    BUN/Creatinine ratio 45 (H) 12 - 20      GFR est AA >60 >60 ml/min/1.73m2    GFR est non-AA >60 >60 ml/min/1.73m2    Calcium 7.4 (L) 8.5 - 10.1 mg/dL    Bilirubin, total 0.6 0.2 - 1.0 mg/dL    AST (SGOT) 49 (H) 15 - 37 U/L    ALT (SGPT) 45 12 - 78 U/L    Alk.  phosphatase 79 45 - 117 U/L    Protein, total 4.8 (L) 6.4 - 8.2 g/dL    Albumin 2.2 (L) 3.5 - 5.0 g/dL    Globulin 2.6 2.0 - 4.0 g/dL    A-G Ratio 0.8 (L) 1.1 - 2.2     RENAL FUNCTION PANEL    Collection Time: 08/04/22  2:55 AM   Result Value Ref Range    Sodium 140 136 - 145 mmol/L    Potassium 3.8 3.5 - 5.1 mmol/L    Chloride 105 97 - 108 mmol/L    CO2 31 21 - 32 mmol/L    Anion gap 4 (L) 5 - 15 mmol/L    Glucose 124 (H) 65 - 100 mg/dL    BUN 29 (H) 6 - 20 mg/dL    Creatinine 0.64 (L) 0.70 - 1.30 mg/dL    BUN/Creatinine ratio 45 (H) 12 - 20      GFR est AA >60 >60 ml/min/1.73m2    GFR est non-AA >60 >60 ml/min/1.73m2    Calcium 7.4 (L) 8.5 - 10.1 mg/dL    Phosphorus 2.8 2.6 - 4.7 mg/dL    Albumin 2.1 (L) 3.5 - 5.0 g/dL   MAGNESIUM    Collection Time: 08/04/22  2:55 AM   Result Value Ref Range    Magnesium 2.1 1.6 - 2.4 mg/dL   NT-PRO BNP    Collection Time: 08/04/22  2:55 AM   Result Value Ref Range    NT pro-BNP 2,758 (H) <450 pg/mL   CBC WITH AUTOMATED DIFF    Collection Time: 08/04/22  2:55 AM   Result Value Ref Range    WBC 16.2 (H) 4.1 - 11.1 K/uL    RBC 2.57 (L) 4.10 - 5.70 M/uL    HGB 8.1 (L) 12.1 - 17.0 g/dL    HCT 24.4 (L) 36.6 - 50.3 %    MCV 94.9 80.0 - 99.0 FL    MCH 31.5 26.0 - 34.0 PG    MCHC 33.2 30.0 - 36.5 g/dL    RDW 15.3 (H) 11.5 - 14.5 %    PLATELET 401 (L) 705 - 400 K/uL    MPV 10.6 8.9 - 12.9 FL    NRBC 0.2 (H) 0.0  WBC    ABSOLUTE NRBC 0.03 (H) 0.00 - 0.01 K/uL    NEUTROPHILS 96 (H) 32 - 75 %    LYMPHOCYTES 1 (L) 12 - 49 %    MONOCYTES 2 (L) 5 - 13 %    EOSINOPHILS 0 0 - 7 %    BASOPHILS 0 0 - 1 %    IMMATURE GRANULOCYTES 1 (H) 0 - 0.5 %    ABS. NEUTROPHILS 15.6 (H) 1.8 - 8.0 K/UL    ABS. LYMPHOCYTES 0.1 (L) 0.8 - 3.5 K/UL    ABS. MONOCYTES 0.3 0.0 - 1.0 K/UL    ABS. EOSINOPHILS 0.0 0.0 - 0.4 K/UL    ABS. BASOPHILS 0.0 0.0 - 0.1 K/UL    ABS. IMM. GRANS. 0.2 (H) 0.00 - 0.04 K/UL    DF AUTOMATED     C REACTIVE PROTEIN, QT    Collection Time: 08/04/22  2:55 AM   Result Value Ref Range    C-Reactive protein 1.73 (H) 0.00 - 0.60 mg/dL   PHOSPHORUS    Collection Time: 08/04/22  2:55 AM   Result Value Ref Range    Phosphorus 2.9 2.6 - 4.7 mg/dL   TRIGLYCERIDE    Collection Time: 08/04/22  2:55 AM   Result Value Ref Range    Triglyceride 118 <150 mg/dL   GLUCOSE, POC    Collection Time: 08/04/22 12:28 PM   Result Value Ref Range    Glucose (POC) 143 (H) 65 - 117 mg/dL    Performed by Glo Booker          Imaging:    XR CHEST PORT   Final Result   Decreased lung volumes with increased interstitial and airspace   opacities bilaterally      XR CHEST PORT   Final Result      Feeding tube terminates in the region of the distal esophagus and should be   advanced at least 10 to 15 cm          XR CHEST PORT   Final Result   Interstitial and airspace opacities with some improvement in the   interval.      XR CHEST PORT   Final Result   No significant change in interstitial and airspace opacities   possibly pulmonary edema      XR CHEST PORT   Final Result   No significant change      XR CHEST PORT   Final Result      Little interval change. XR CHEST PORT   Final Result   Slightly increased diffuse bilateral interstitial and alveolar   opacities, compatible with COVID pneumonia. XR CHEST PORT   Final Result   No significant change       XR CHEST SNGL V   Final Result   Stable bilateral pulmonary infiltrates.          XR CHEST PORT    (Results Pending) Assessment     Acute hypoxic respiratory failure on high flow 80 %  Respiratory alkalosis  COVID-19 Pneumonia  Sepsis  Hypertension  Hyperlipidemia  Stroke  Afib  MRSA nares  Elevated BNP  Hypokalemia    Plan   Continue meds:    Eliquis 2.5mg PO BID was held today and tomorrow      ASA 81 mg po daily was held today, will continue after procedure      Lipitor 20mg PO every day  Zithromax 500 IV daily  Olumiant 4mg PO QD  Decadron 4mg IV Q6h  Doxazosin 4mg PO QHS  Cymbalta 30mg POQD  Remeron 15mg PO every day  Vistaril 25mg Q8H daily  Mupirocin 2% ointment both nostrils BID  Zosyn 3.375g IV q8hrs  Ranolazine 500mg PO every day    Replace potassium as needed  Lasix 40 IV once as needed    Start PPN Follow up with GI consult  Follow up with PEG placement   Monitor blood counts, BNP level              Current Facility-Administered Medications:     pantoprazole (PROTONIX) tablet 40 mg, 40 mg, Oral, BID, Mahogany Chapa MD, 40 mg at 08/04/22 0856    TPN ADULT-PERIPHERAL AA 4.25% D5% + ELECTROLYTES, , IntraVENous, CONTINUOUS, Mahogany Chapa MD, Last Rate: 42 mL/hr at 08/03/22 2217, New Bag at 08/03/22 2217    lidocaine (XYLOCAINE) 2 % jelly, , Mucous Membrane, PRN, Silvia Mcmahan MD    0.9% sodium chloride infusion 250 mL, 250 mL, IntraVENous, PRN, Garrett Browning MD    nystatin (MYCOSTATIN) 100,000 unit/mL oral suspension 500,000 Units, 500,000 Units, Oral, QID, Bharath Johnson MD, 500,000 Units at 08/04/22 0856    hydrOXYzine (VISTARIL) injection 25 mg, 25 mg, IntraMUSCular, Q6H PRN, Mahogany Chapa MD, 25 mg at 08/01/22 1307    hydrOXYzine pamoate (VISTARIL) capsule 25 mg, 25 mg, Oral, Q8H, Silvia Mcmahan MD, 25 mg at 08/04/22 0516    piperacillin-tazobactam (ZOSYN) 3.375 g in 0.9% sodium chloride (MBP/ADV) 100 mL MBP, 3.375 g, IntraVENous, Q8H, Mahogany Chapa MD, Last Rate: 25 mL/hr at 08/04/22 1143, 3.375 g at 08/04/22 1143    [Held by provider] apixaban (ELIQUIS) tablet 2.5 mg, 2.5 mg, Oral, BID, Hugo Phoenix, MD, 2.5 mg at 08/03/22 2010    aspirin delayed-release tablet 81 mg, 81 mg, Oral, DAILY, Mahogany Chapa MD, 81 mg at 08/03/22 0902    atorvastatin (LIPITOR) tablet 20 mg, 20 mg, Oral, DAILY, Mahogany Chapa MD, 20 mg at 08/04/22 0856    doxazosin (CARDURA) tablet 4 mg, 4 mg, Oral, QHS, Mahogany Chapa MD, 4 mg at 08/03/22 2204    DULoxetine (CYMBALTA) capsule 30 mg, 30 mg, Oral, DAILY, Mahogany Chapa MD, 30 mg at 08/04/22 0856    mirtazapine (REMERON) tablet 15 mg, 15 mg, Oral, QHS, Mahogany Chapa MD, 15 mg at 08/03/22 2204    ranolazine ER (RANEXA) tablet 500 mg, 500 mg, Oral, BID, Harshad Chapa MD, 500 mg at 08/04/22 0519    acetaminophen (TYLENOL) tablet 650 mg, 650 mg, Oral, Q6H PRN, 650 mg at 07/30/22 2150 **OR** acetaminophen (TYLENOL) suppository 650 mg, 650 mg, Rectal, Q6H PRN, Mahogany Chapa MD    polyethylene glycol (MIRALAX) packet 17 g, 17 g, Oral, DAILY PRN, Harshad Chapa MD    ondansetron (ZOFRAN ODT) tablet 4 mg, 4 mg, Oral, Q8H PRN **OR** ondansetron (ZOFRAN) injection 4 mg, 4 mg, IntraVENous, Q6H PRN, Mahogany Chapa MD    dexamethasone (DECADRON) 4 mg/mL injection 4 mg, 4 mg, IntraVENous, Q6H, Mahogany Chapa MD, 4 mg at 08/04/22 1143    Current Outpatient Medications   Medication Instructions    amLODIPine (NORVASC) 5 mg, Oral, DAILY    apixaban (ELIQUIS) 2.5 mg, Oral, 2 TIMES DAILY    aspirin delayed-release 81 mg, Oral, DAILY    atorvastatin (LIPITOR) 20 mg, Oral, DAILY    cholecalciferol (vitamin D3) 2,000 Units, Oral, EVERY BEDTIME    dexAMETHasone (DECADRON) 4 mg, Oral, 2 TIMES DAILY    doxazosin (CARDURA) 4 mg, Oral, DAILY    DULoxetine (CYMBALTA) 30 mg, Oral, DAILY    mirtazapine (REMERON) 15 mg, Oral, EVERY BEDTIME    ranolazine ER (RANEXA) 500 mg, Oral, 2 TIMES DAILY         Signed By: Deonna Ac MD     August 4, 2022

## 2022-08-04 NOTE — PROGRESS NOTES
1900 - Report received from Wendall Koyanagi, [facility]. Wife visiting at bedside. 2000 - Pt assessed. See flowsheets. [address] in place, 60L/80%. NS gtt infusing per orders. Will continue to monitor. Clarified with MD Chapa that pt is to receive TPN through [facility].    2200 - CHG bath, gown, linen, and MW change completed. Skin tears re-dressed. Pt repositioned. 2245 - Anesthesiologist at bedside, aware pt received eliquis prior to it being provider held. Was told in report plan was for PEG Friday. Plan has changed to 1400 OR time tomorrow for PEG placement per anesthesia. Pt reassessed. No acute changes. Warm blankets provided for low temp. Will continue to monitor. 0300 - Pt reassessed. No acute changes. Will continue to monitor. 0600 - Pt resting in bed. Plan for EGD/PEG today per notes. Will continue to monitor. Allegra - MD Chapa notified of black, mucoid stool and Hgb drop this AM. Requests notify Moon [facility] [place] called to notify of above. Voicemail left requesting call back to unit only. 6004 - Wife updated over phone on [address]. Plans to come this afternoon. 0013 - Report given to Wendall Koyanagi, RN.

## 2022-08-04 NOTE — ANESTHESIA PREPROCEDURE EVALUATION
Relevant Problems   No relevant active problems       Anesthetic History   No history of anesthetic complications            Review of Systems / Medical History  Patient summary reviewed, nursing notes reviewed and pertinent labs reviewed    Pulmonary  Within defined limits                 Neuro/Psych       CVA  TIA    Comments: NEUROPATHY. Cardiovascular    Hypertension        Dysrhythmias : atrial fibrillation  PAD and hyperlipidemia      Comments: Has LINQ recorder for a fib monitoring    ECHO (7-28-22)   Interpretation Summary         Left Ventricle: Normal left ventricular systolic function with a visually estimated EF of 55 - 60%. Left ventricle size is normal. Normal wall thickness. Normal wall motion. Grade I diastolic dysfunction with normal LAP.   Aortic Valve: Moderate regurgitation.   Mitral Valve: Mildly thickened leaflet. 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 Moderate to severe regurgitation with an eccentrically directed jet and may underestimate severity.   Tricuspid Valve: Moderate regurgitation.       EKG (7-27-22) Atrial fibrillation with rapid ventricular response with premature   ventricular or aberrantly conducted complexes   Anteroseptal infarct , old   T wave abnormality, consider lateral ischemia   Abnormal ECG   When compared with ECG of 25-JUL-2022 06:18,   Nonspecific T wave abnormality no longer evident in Inferior leads      GI/Hepatic/Renal               Comments: DYSPHAGIA. ORAL THRUSH. Endo/Other  Within defined limits           Other Findings   Comments: ELIQUIS: LAST DOSE ON 08-3-22.           Physical Exam    Airway  Mallampati: II  TM Distance: 4 - 6 cm  Neck ROM: normal range of motion   Mouth opening: Normal     Cardiovascular    Rhythm: regular  Rate: normal         Dental    Dentition: Lower dentition intact and Upper dentition intact     Pulmonary  Breath sounds clear to auscultation               Abdominal         Other Findings Anesthetic Plan    ASA: 3  Anesthesia type: MAC    Monitoring Plan: Arterial line      Induction: Intravenous  Anesthetic plan and risks discussed with: Patient

## 2022-08-04 NOTE — PROGRESS NOTES
Remains acute in ICU and currently on 60L heated hi flow. Updated clinicals sent to [facility]. Patient's insurance will require auth.            Kim Patterson, MSW

## 2022-08-05 PROBLEM — E43 SEVERE PROTEIN-CALORIE MALNUTRITION (HCC): Status: ACTIVE | Noted: 2022-01-01

## 2022-08-05 NOTE — PROGRESS NOTES
Consult  Pulmonary, Critical Care    Name: Grant Sky MRN: [id number]   : 1929 Hospital: [facility]   Date: 2022  Admission date: 2022 Hospital Day: 10       Subjective/Interval History:   Recent COVID pneumonitis improved with Decadron and baricitinib wean to room air was discharged to rehab facility yesterday at the rehab facility he had development of hypoxia than fever was transferred back to our emergency room T-max 101.5. Wife states that yesterday he did have some green-tinged sputum. Chest x-ray shows diffuse bilateral infiltrates unchanged. There has been no nausea vomiting choking while eating   remains on BiPAP. Blood gas this morning on nasal oxygen showed hypoxia but currently he is running 94% on 5 L awake alert no specific complaints   more anxiety last evening could not tolerate BiPAP and was placed on high flow. Still somewhat anxious today IM Vistaril seem to help   awake alert tolerated nasal high flow all night but FiO2 is 90%. Not able to eat due to shortness of breath have talked with him about NG tube for tube feedings and he agrees   awake alert confused FiO2 down to 50%. Nurse attempted NG insertion  and . As soon as the tube was placed in his nose he pulled it and refused to have any further attempt he remains malnourished   awake alert confused refused NG insertion yesterday currently he states he will allow it when his wife is here  8/3 remains confused awake alert again refused NG insertion yesterday. Wife wanted to proceed with peripheral nutrition.   With his valvular heart disease he would likely not tolerate the volumes required likewise with central hyperalimentation he likely would not tolerate the volumes and it also raises the risk of infection  8/ awake confused for possible PEG tube placement today  8 awake confused PEG tube rescheduled for today    Patient Active Problem List   Diagnosis Code    Carotid stenosis I65.29    Hypoxia R09.02    COVID U07.1    Sepsis (HCC) A41.9    Severe protein-calorie malnutrition ([facility] [address] E43       IMPRESSION:   Acute hypoxic respiratory failure currently on nasal high flow FiO2 65% have decreased to 60%  Healthcare associated pneumonia Serratia on culture on Zosyn day 10  Diffuse pulmonary infiltrates questionable pneumonia versus inflammatory response from COVID versus pulmonary edema from diastolic dysfunction and AR  Oral thrush resolved  Anemia improved after transfusion 8/2 hemoglobin up to 8.2 today on Protonix twice daily  Hypokalemia repleted  Hypophosphatemia repleted  Hypocalcemia repleted  Recent COVID pneumonitis  Atrial fibrillation  Moderate aortic regurg  Moderate to severe mitral regurg  Diastolic left ventricular dysfunction  Pyuria rule out UTI no culture results  Elevated BNP  Anxiety  Body mass index is 18.09 kg/m². RECOMMENDATIONS/PLAN:   nasal high flow FiO2 65% will decrease to 60%  Anxiety may be improved on 3 times daily dosing of Vistaril  Continue IV Zosyn nasal MRSA smear positive blood culture no growth sputum culture occasional polys with Serratia does not appear as if urine was ever sent  BNP remains elevated chest x-ray with diffuse congestion creatinine is normal repeat Lasix today  For PEG tube placement today           Subjective/Initial History:   I have reviewed the flowsheet and previous  notes. I was asked by Blu Starks MD to see Micheal Montanez a 80 y.o.  male  in consultation for a chief complaint of acute hypoxic respiratory failure fever recent COVID pneumonitis        Patient PCP: Zander Talley MD  PMH:  has a past medical history of Atrial fibrillation ([facility] [address] COVID-19 vaccine series completed (03/2021), Hypercholesteremia, Hypertension, Neuropathy, and Stroke ([facility] [address] (08/2021). PSH:   has a past surgical history that includes pr abdomen surgery proc unlisted (Right) and hx cataract removal (Left, 2018).    FHX: family history is not on file. SHX:  reports that he has quit smoking. He has never used smokeless tobacco. He reports that he does not use drugs.   Systemic review  General weight has been stable the last several days he has not noted fever chills or sweats until this morning  Eyes no double vision or momentary blindness  ENT no facial pain over the sinuses  Musculoskeletal no swollen tender joints no myalgias  Endocrinologic no polyuria polydipsia  Neurologic no seizures or syncope awake and alert oriented  Gastrointestinal no choking or gagging when eating no nausea vomiting no indigestion  Genitourinary no pain or discomfort no burning  Cardiovascular has not noted ankle edema chest pain diaphoresis or nocturia  Respiratory abrupt worsening shortness of breath last night associated with fever    No Known Allergies   MEDS:   Current Facility-Administered Medications   Medication    TPN ADULT-PERIPHERAL AA 4.25% D5% + ELECTROLYTES    pantoprazole (PROTONIX) tablet 40 mg    lidocaine (XYLOCAINE) 2 % jelly    0.9% sodium chloride infusion 250 mL    nystatin (MYCOSTATIN) 100,000 unit/mL oral suspension 500,000 Units    hydrOXYzine (VISTARIL) injection 25 mg    hydrOXYzine pamoate (VISTARIL) capsule 25 mg    piperacillin-tazobactam (ZOSYN) 3.375 g in 0.9% sodium chloride (MBP/ADV) 100 mL MBP    [Held by provider] apixaban (ELIQUIS) tablet 2.5 mg    aspirin delayed-release tablet 81 mg    atorvastatin (LIPITOR) tablet 20 mg    doxazosin (CARDURA) tablet 4 mg    DULoxetine (CYMBALTA) capsule 30 mg    mirtazapine (REMERON) tablet 15 mg    ranolazine ER (RANEXA) tablet 500 mg    acetaminophen (TYLENOL) tablet 650 mg    Or    acetaminophen (TYLENOL) suppository 650 mg    polyethylene glycol (MIRALAX) packet 17 g    ondansetron (ZOFRAN ODT) tablet 4 mg    Or    ondansetron (ZOFRAN) injection 4 mg    dexamethasone (DECADRON) 4 mg/mL injection 4 mg        Current Facility-Administered Medications:     TPN ADULT-PERIPHERAL AA 4.25% D5% + ELECTROLYTES, , IntraVENous, CONTINUOUS, Mahogany Chapa MD, Last Rate: 42 mL/hr at 08/04/22 2218, New Bag at 08/04/22 2218    pantoprazole (PROTONIX) tablet 40 mg, 40 mg, Oral, BID, Mahogany Chapa MD, 40 mg at 08/04/22 2214    lidocaine (XYLOCAINE) 2 % jelly, , Mucous Membrane, PRN, Kay Barbosa MD    0.9% sodium chloride infusion 250 mL, 250 mL, IntraVENous, PRN, Garrett Browning MD    nystatin (MYCOSTATIN) 100,000 unit/mL oral suspension 500,000 Units, 500,000 Units, Oral, QID, Bharath Johnson MD, 500,000 Units at 08/04/22 2214    hydrOXYzine (VISTARIL) injection 25 mg, 25 mg, IntraMUSCular, Q6H PRN, Mahogany Chapa MD, 25 mg at 08/01/22 1307    hydrOXYzine pamoate (VISTARIL) capsule 25 mg, 25 mg, Oral, Q8H, Kay Barbosa MD, 25 mg at 08/04/22 2214    piperacillin-tazobactam (ZOSYN) 3.375 g in 0.9% sodium chloride (MBP/ADV) 100 mL MBP, 3.375 g, IntraVENous, Q8H, Mahogany Chapa MD, Last Rate: 25 mL/hr at 08/05/22 0338, 3.375 g at 08/05/22 9252    [Held by provider] apixaban (ELIQUIS) tablet 2.5 mg, 2.5 mg, Oral, BID, Mahogany Chapa MD, 2.5 mg at 08/03/22 2010    aspirin delayed-release tablet 81 mg, 81 mg, Oral, DAILY, Mahogany Chapa MD, 81 mg at 08/03/22 5731    atorvastatin (LIPITOR) tablet 20 mg, 20 mg, Oral, DAILY, Mahogany Chapa MD, 20 mg at 08/04/22 0856    doxazosin (CARDURA) tablet 4 mg, 4 mg, Oral, QHS, Mahogany Chapa MD, 4 mg at 08/04/22 2214    DULoxetine (CYMBALTA) capsule 30 mg, 30 mg, Oral, DAILY, Mahogany Chapa MD, 30 mg at 08/04/22 0856    mirtazapine (REMERON) tablet 15 mg, 15 mg, Oral, QHS, Mahogany Chapa MD, 15 mg at 08/04/22 2214    ranolazine ER (RANEXA) tablet 500 mg, 500 mg, Oral, BID, Mahogany Chapa MD, 500 mg at 08/04/22 2215    acetaminophen (TYLENOL) tablet 650 mg, 650 mg, Oral, Q6H PRN, 650 mg at 07/30/22 2150 **OR** acetaminophen (TYLENOL) suppository 650 mg, 650 mg, Rectal, Q6H PRN, Denilson Chapa MD    polyethylene glycol ([employer]) packet 17 g, 17 g, Oral, DAILY PRN, Mahogany Chapa MD    ondansetron (ZOFRAN ODT) tablet 4 mg, 4 mg, Oral, Q8H PRN **OR** ondansetron (ZOFRAN) injection 4 mg, 4 mg, IntraVENous, Q6H PRN, Mahogany Chapa MD    dexamethasone (DECADRON) 4 mg/mL injection 4 mg, 4 mg, IntraVENous, Q6H, Mahogany Chapa MD, 4 mg at 22 0541      Objective:     Vital Signs: Telemetry:    AFIB Intake/Output:   Visit Vitals  BP (!) 143/86 (BP 1 Location: Left upper arm, BP Patient Position: At rest)   Pulse (!) 102   Temp 97.4 °F (36.3 °C)   Resp 22   Ht 5' 5.98\" (1.676 m)   Wt 50.8 kg (111 lb 15.9 oz)   SpO2 96%   BMI 18.09 kg/m²       Temp (24hrs), Av.9 °F (36.6 °C), Min:97.4 °F (36.3 °C), Max:98.4 °F (36.9 °C)        O2 Device: Heated, Hi flow nasal cannula O2 Flow Rate (L/min): 60 l/min         Body mass index is 18.09 kg/m². Wt Readings from Last 4 Encounters:   22 50.8 kg (111 lb 15.9 oz)   22 54.4 kg (120 lb)   02/10/22 54.4 kg (120 lb)   21 54.4 kg (120 lb)          Intake/Output Summary (Last 24 hours) at 2022 1026  Last data filed at 2022 0600  Gross per 24 hour   Intake 623.4 ml   Output 1700 ml   Net -1076.6 ml         Last shift:      No intake/output data recorded. Last 3 shifts:  1901 -  0700  In: 3039.5 [P.O.:500; I.V.:2539.5]  Out: 2300 [Urine:2300]       Hemodynamics:    CO:    CI:    CVP:    SVR:   PAP Systolic:    PAP Diastolic:    PVR:    ZZ13:       Ventilator Settings:      Mode Rate TV Press PEEP FiO2 PIP Min. Vent               65 %     6.8 l/min      Physical Exam:    General:  male; in bed on nasal high flow oxygen less anxious no accessory muscle use  HEENT: NCAT,   Eyes: anicteric; conjunctiva clear extraocular movements intact  Neck: no nodes, no accessory MM use.   No definite JVD  Chest: no deformity,   Cardiac: Irregular rate and rhythm occasional PVCs has somewhat harsh systolic murmur  Lungs: Clear anteriorly with rales posteriorly no wheezing  Abd: Thin soft positive bowel sounds no tenderness  Ext: Trace edema; no joint swelling; No clubbing  : clear urine  Neuro: Awake alert seems calm  hard of hearing follow simple commands moves all 4 extremities  Psych- no agitation, mildly confused;   Skin: warm, dry, no cyanosis;   Pulses: Brachial and radial pulses intact  Capillary: Normal capillary refill      Labs:    Recent Labs     08/05/22  0345 08/04/22  0255 08/03/22  0400   WBC 15.1* 16.2* 17.0*   HGB 8.2* 8.1* 9.7*   * 126* 161       Recent Labs     08/05/22  0345 08/04/22  0255 08/03/22  0400     137 139  140 142  141   K 3.6  3.6 3.8  3.8 3.1*  3.1*     101 106  105 105  104   CO2 30  30 30  31 30  30   *  123* 124*  124* 152*  152*   BUN 33*  33* 30*  29* 33*  34*   CREA 0.58*  0.56* 0.66*  0.64* 0.77  0.81   CA 7.6*  7.6* 7.4*  7.4* 7.6*  7.7*   MG  --  2.1  --    PHOS 3.2 2.9  2.8 4.2   ALB 2.1*  2.1* 2.2*  2.1* 2.5*  2.5*   ALT 41 45 52       8/4 nasal high flow 60 L 75% with saturation 98%   8/1 nasal high flow 60 L FiO2 80%   COVID-19 antigen remains positive  BNP 2758 2592, 2705 3310  CRP 5.15 1.73 0.64, 2.02 3.26, 6.18, 9.2 16.1  Procalcitonin less than 0.05 0.15  Urinalysis with 20-50 WBCs  Blood culture no growth  Sputum occasional polys with Serratia  Echo ejection fraction 94% diastolic dysfunction moderate aortic regurg moderate to severe mitral regurg left atrium 2.6 cm RVSP 25  Imaging:  I have personally reviewed the patients radiographs and have reviewed the reports:    CXR Results  (Last 48 hours)                 08/05/22 0335  XR CHEST PORT Final result    Impression:  Stable bilateral airspace disease. Narrative:  EXAM: Portable CXR. 0333 hours. COMPARISON: 8/4/2022. INDICATION: Respiratory failure       FINDINGS:   The lungs show unchanged low volumes and bilateral airspace disease/edema. Heart   is normal in size. There is no pneumothorax. There is no sizable pleural   effusion. Rib deformities and subcutaneous cardiac loop recorder are again   noted. 08/04/22 0311  XR CHEST PORT Final result    Impression:  Decreased lung volumes with increased interstitial and airspace   opacities bilaterally       Narrative:  EXAM: XR CHEST PORT       INDICATION: Respiratory failure       COMPARISON: 8/3/2022       FINDINGS: A portable AP radiograph of the chest was obtained at 311 hours. Cardiac monitoring leads. . Decreased lung volumes. Increased interstitial and   airspace opacity bilaterally. Probable small left pleural effusion. Darlynn Magic Heart size   is enlarged. .  The bones and soft tissues are grossly within normal limits. 08/03/22 1453  XR CHEST PORT Final result    Impression:      Feeding tube terminates in the region of the distal esophagus and should be   advanced at least 10 to 15 cm            Narrative:  history: Feeding tube placement       COMPARISON: 8/3/2022       FINDINGS:       Frontal chest radiograph submitted for review. Feeding tube terminates in the region of the distal esophagus and should be   advanced at least 10 to 15 cm. Diffuse bilateral interstitial lung opacities are   again seen. Small bilateral pleural effusions. No evidence for pneumothorax. Results from [place] encounter on 07/27/22    XR CHEST PORT    Narrative  EXAM: Portable CXR. 0333 hours. COMPARISON: 8/4/2022. INDICATION: Respiratory failure    FINDINGS:  The lungs show unchanged low volumes and bilateral airspace disease/edema. Heart  is normal in size. There is no pneumothorax. There is no sizable pleural  effusion. Rib deformities and subcutaneous cardiac loop recorder are again  noted. Impression  Stable bilateral airspace disease. XR CHEST PORT    Narrative  history: Feeding tube placement    COMPARISON: 8/3/2022    FINDINGS:    Frontal chest radiograph submitted for review.     Feeding tube terminates in the region of the distal esophagus and should be  advanced at least 10 to 15 cm. Diffuse bilateral interstitial lung opacities are  again seen. Small bilateral pleural effusions. No evidence for pneumothorax. Impression  Feeding tube terminates in the region of the distal esophagus and should be  advanced at least 10 to 15 cm      XR CHEST PORT    Narrative  EXAM: XR CHEST PORT    INDICATION: Respiratory failure    COMPARISON: 8/3/2022    FINDINGS: A portable AP radiograph of the chest was obtained at 311 hours. Cardiac monitoring leads. . Decreased lung volumes. Increased interstitial and  airspace opacity bilaterally. Probable small left pleural effusion. [facility] Heart size  is enlarged. .  The bones and soft tissues are grossly within normal limits. Impression  Decreased lung volumes with increased interstitial and airspace  opacities bilaterally    Results from Hospital Encounter encounter on 02/10/22    CT HEAD WO CONT    Narrative  Technique: Multiple continuous axial images were obtained from the skull base to  the vertex without administration of intravenous contrast.    Comment on dose reduction: All CT scans at this facility are performed using  dose reduction optimization technique as appropriate to perform the exam  including the following; automated exposure control, adjustments of the mA  and/or kV according to patient size, or use of iterative reconstructed  technique. Comparison examination: None    Findings:  The ventricles and sulci are prominent consistent with age-related changes of  atrophy. Periventricular hypodensity is identified consistent with changes of  chronic small vessel disease. No evidence of midline shift, mass lesion, or  extra-axial fluid collection. No evidence of parenchymal hemorrhage or  infarction in a major vascular territory. The osseous structures are intact, the paranasal sinuses are clear. Extracalvarial soft tissue hematoma posterior vertex .     Impression  No acute intracranial process. Extracalvarial soft tissue hematoma posterior  vertex . Session fever with recent hospitalization possible healthcare associated pneumonia agree with Zosyn vancomycin and Zithromax. We will attempt to collect sputum for culture. Have requested urine culture blood cultures have already been obtained. He has a significant systolic murmur with elevated BNP he may have mitral regurg giving him some degree of fluid overload he only has trace edema. We will check an echocardiogram.  Creatinine is normal will decrease IV fluids for the time being. Chest x-ray with diffuse infiltrates thought to be residual inflammatory change from COVID pneumonitis but may represent some degree of fluid overload. Oxygenation is well-maintained now on noninvasive ventilator have decreased FiO2 to 45% will attempt conversion to nasal oxygen in a.m.  728 awake alert currently on noninvasive ventilator have placed on 5 L oxygen saturation 94%. If he maintains that we will leave him on nasal oxygen today and use noninvasive ventilator at night. Nursing noted that when he drifts off to sleep he will have desaturation. BNP remains elevated neck veins are chest visible chest x-ray still diffuse infiltrates we will give 1 dose Lasix today  7/29 anxious intolerant of BiPAP last evening switch to nasal high flow. Chest x-ray is unchanged with diffuse infiltrates questionable healthcare associated pneumonia COVID inflammatory reaction or some degree pulmonary edema from diastolic dysfunction aortic regurg and mitral regurg repeat Lasix today replenish potassium and phosphorus nasal MRSA smear is positive we will continue vancomycin Zosyn and Zithromax remains on baricitinib and Decadron  7/30 much calmer today questionably due to Vistaril. Potassium remains low will supplement. Still has trace edema with normal creatinine we will repeat Lasix today.   Have talked with him about placing NG tube for tube feedings and he agrees  7/31 alert awake oral thrush on 90% FiO2 intermittent agitation as per nurse  8/1 awake alert confused refusing NG tube once wife has a chance to talk with him we will attempt reinsertion. Serratia in sputum currently on Zosyn. Diffuse accentuated interstitial markings persist we will repeat Lasix today  8/2 still refusing NG tube eating very little. Creatinine and BUN up slightly with Lasix dosing yesterday. He is to receive a unit of blood replace potassium we will repeat Lasix today after blood we will try once again to place NG tube  8/3 again refused NG insertion yesterday. Wife is requesting IV. With his valvular heart disease he very likely would not tolerate the volumes of fluid needed for adequate nutrition. Will attempt to place NG tube once the wife is here  8/4 NG tube yesterday went into lung with no cough reflex. For possible PEG tube placement today. Chest x-ray still shows diffuse congestion BNP remains elevated creatinine stable we will repeat Lasix today. 8/5 PEG tube not placed yesterday rescheduled for today. Have decreased FiO2 to 60% we will try to maintain that today. Creatinine remains normal we will repeat Lasix  Time of care 30 minutes           Thank you for allowing us to participate in the care of this patient.   We will follow along with you     Chandler Nur MD

## 2022-08-05 NOTE — BRIEF OP NOTE
Brief Postoperative Note    Patient: Yue Novoa  YOB: 1929  MRN: [id number]    Date of Procedure: 8/5/2022, see full report at the chart review-procedure-scanned documentation    Pre-Op Diagnosis: Dysphagia, unspecified type [R13.10]    Post-Op Diagnosis: Large hiatal hernia, not able to place a PEG tube, mild to moderate gastritis,    Procedure(s):  PERCUTANEOUS ENDOSCOPIC GASTROSTOMY TUBE INSERTION  ESOPHAGOGASTRODUODENOSCOPY (EGD)    Surgeon(s):  Aquilino Lira MD    Surgical Assistant: None    Anesthesia: MAC     Estimated Blood Loss (mL): ml    Complications: non    Specimens: no    Implants: n  Drains:   External Urinary Catheter 07/27/22 (Active)   Site Assessment Clean, dry, & intact 08/05/22 1100   Repositioned Yes 08/05/22 1100   Perineal Care Yes 08/05/22 1100   Wick Changed Yes 08/05/22 1100   Suction Canister/Tubing Changed Yes 08/05/22 1100   Urine Output (mL) 200 ml 08/05/22 1100       Findings: Large hiatal hernia noted, please see the full report at the chart reviewed-procedure-scanned documentation.     Not able to place PEG tube,  Recommended consultation for PEG tube placement if  necessary    Electronically Signed by Shabnam Sinclair MD on 8/5/2022 at 5:01 PM

## 2022-08-05 NOTE — ANESTHESIA POSTPROCEDURE EVALUATION
Procedure(s):  PERCUTANEOUS ENDOSCOPIC GASTROSTOMY TUBE INSERTION  ESOPHAGOGASTRODUODENOSCOPY (EGD). MAC    Anesthesia Post Evaluation        Patient location during evaluation: PACU  Patient participation: complete - patient participated  Level of consciousness: awake and alert  Pain score: 0  Pain management: adequate  Airway patency: patent  Anesthetic complications: no  Cardiovascular status: hemodynamically stable  Respiratory status: acceptable  Hydration status: acceptable  Post anesthesia nausea and vomiting:  controlled  Final Post Anesthesia Temperature Assessment:  Normothermia (36.0-37.5 degrees C)      INITIAL Post-op Vital signs:   Vitals Value Taken Time   /78 08/05/22 1720   Temp     Pulse 95 08/05/22 1727   Resp 21 08/05/22 1727   SpO2 97 % 08/05/22 1727   Vitals shown include unvalidated device data.

## 2022-08-05 NOTE — PROGRESS NOTES
Comprehensive Nutrition Assessment    Type and Reason for Visit: Reassess (goal)    Nutrition Recommendations/Plan:   Continue current cardiac diet  Ensure Compact 3x/d (660kcals, 27g pro)  Ensure pg 3x/d (510kcals, 12g pro)        If 100% of ONS consumed, provides 1170kcals and 39g pro    Re-initiate supplemental bolus feeds via PEG  Jevity 1.5, 300ml bolus after meals if <50% of meal consumed; provide additional 300ml bolus at HS  Flush with 225ml water after each bolus  Providing 1800kcals, 77g pro, and 1812ml water (meets>/=100% needs)    D/c PPN of Clinimix 4.25%/5% at 42ml/hr  Provides 880kcals and 50g protein     Malnutrition Assessment:  Malnutrition Status:  Severe malnutrition (08/05/22 9920)    Context:  Acute illness     Findings of the 6 clinical characteristics of malnutrition:   Energy Intake:  50% or less of est energy requirements for 5 or more days  Weight Loss:  Greater than 2% over 1 week     Body Fat Loss:  Unable to assess,     Muscle Mass Loss:  Unable to assess,    Fluid Accumulation:  No significant fluid accumulation,        Nutrition Assessment:    Readmitted for respiratory failure, +COVID-19 and UTI. At last admit (7/18-7/26), pt with poor intakes pta and intakes <50% inpatient-- wife endorsed same/worse since d/c. Wife agreeable to NGT, however (8/4) difficulty maintaining enteral access with pt fluctuating AMS. Appetite remains poor, bite of food occasionally, ~25-50% of ONS. Received 1d of PPN per Attending. Noted PEG is not ideal d/t pt advanced age, however remains an option if within goals of care- GI consulted, plans for PEG placement today. RD continues to rec supplemental bolus feeds as pt able to consume PO, however minimal.   Labs: H/H 8.2/24.8, BUN 33, Cr 0.56, -143, AST 43, Alb 2.1. Meds: Dexamethasone, doxazosin, duloxetine, vistaril, mirtazapine, oral nystatin, PPI, zosyn, ranolazine.       Nutrition Related Findings:    Unable to complete NFPE d/t COVID-19 precautions. Denies issues with c/s. No N/V/D/C nor edema. Last BM 8/4, mucus. +BS. Wound Type: None      Current Nutrition Intake & Therapies:  Average Meal Intake: 1-25%  Average Supplement Intake: 26-50%  ADULT ORAL NUTRITION SUPPLEMENT Breakfast, Lunch, Dinner; Standard 4 oz  ADULT DIET Regular; Low Fat/Low Chol/High Fiber/2 gm Na  ADULT ORAL NUTRITION SUPPLEMENT Breakfast, Lunch, Dinner; Fortified Pudding  TPN ADULT-PERIPHERAL AA 4.25% D5% + ELECTROLYTES  ADULT TUBE FEEDING PEG; Standard with Fiber; Delivery Method: Bolus; Bolus Frequency: 4 Times Daily; Bolus Volume (mL): 300; Bolus Method of Infusion: Syringe Push; Water Flush Volume (mL): 50; Water Flush Frequency: After bolus    Anthropometric Measures:  Height: 5' 5.98\" (167.6 cm)  Ideal Body Weight (IBW): 142 lbs (65 kg)     Current Body Wt:  50.8 kg (111 lb 15.9 oz), 78.9 % IBW.  Bed scale  Current BMI (kg/m2): 18.1                          BMI Category: Underweight (BMI less than 22) age over 72    Wt loss (7/27) 56.7kg to (8/4) 50.8kg -- 5.9kg, 10%, severe    Estimated Daily Nutrient Needs:  Energy Requirements Based On: Kcal/kg  Weight Used for Energy Requirements: Current  Energy (kcal/day): 1531-1814kcals (27-33kcals/kg)  Weight Used for Protein Requirements: Current  Protein (g/day): 62-73g (1.1-1.3g/kg)  Method Used for Fluid Requirements: 1 ml/kcal  Fluid (ml/day): 1531-1814ml    Nutrition Diagnosis:   Severe malnutrition, In context of acute illness or injury related to impaired respiratory function as evidenced by intake 0-25%, poor intake prior to admission, weight loss greater than or equal to 2% in 1 week    Nutrition Interventions:   Food and/or Nutrient Delivery: Continue current diet, Continue oral nutrition supplement, Start tube feeding, Discontinue parenteral nutrition  Nutrition Education/Counseling: No recommendations at this time  Coordination of Nutrition Care: Continue to monitor while inpatient, Feeding assistance/environmental change  Plan of Care discussed with: RN    Goals:  Previous Goal Met: Progressing toward goal(s)  Goals: Initiate nutrition support, Tolerate nutrition support at goal rate, Meet at least 75% of estimated needs, by next RD assessment, other (specify)  Specify Other Goals: Wt maintenance within +/-0.5kg in 7 days    Nutrition Monitoring and Evaluation:   Behavioral-Environmental Outcomes: None identified  Food/Nutrient Intake Outcomes: Diet advancement/tolerance, Food and nutrient intake, Supplement intake, Enteral nutrition intake/tolerance  Physical Signs/Symptoms Outcomes: Weight, GI status, Biochemical data, Meal time behavior    Discharge Planning:     Too soon to determine    [employer]: Ext [phone], or via [address] [place] [address]

## 2022-08-05 NOTE — PROGRESS NOTES
Progress Note    Patient: Haydee Wiggins MRN: [id number]  SSN: [id number]    YOB: 1929  Age: 80 y.o. Sex: male      Admit Date: 7/27/2022    LOS: 9 days     Subjective:   Patient followed for sepsis with recent Covid-19 infection, respiratory failure with presumptive pneumonia and presumptive UTI. He is on high flow nasal cannula. He remains afebrile with elevated WBC on steroids. CRP increasing again today. CXR today unchanged. Patient is awake and responsive with SOB and mild cough. He is scheduled for PEG tube today. Family member at bedside with concerns about his tremulousness. Objective:     Vitals:    08/05/22 0600 08/05/22 0700 08/05/22 0800 08/05/22 0803   BP: 132/89 137/75 125/63    Pulse: (!) 106 90 85    Resp: 20 14 16    Temp:  97.4 °F (36.3 °C)     SpO2: 93% 97% 94%    Weight:       Height:    5' 5.98\" (1.676 m)        Intake and Output:  Current Shift: No intake/output data recorded. Last three shifts: 08/03 1901 - 08/05 0700  In: 3039.5 [P.O.:500; I.V.:2539.5]  Out: 2300 [Urine:2300]    Physical Exam:   Vitals and nursing note reviewed. Constitutional:       General: He is in acute distress. Appearance: He is ill-appearing. HENT:     Head: Normocephalic and atraumatic. Nose:     Comments: High flow nasal cannula 65%   Eyes:     Pupils: Pupils are equal, round, and reactive to light. Cardiovascular:     Rate and Rhythm: Regular rhythm. Heart sounds: No murmur heard. Pulmonary: coarse rhonchi right lung field, no rales or wheezes  Abdominal:     General: Bowel sounds are normal.     Palpations: Abdomen is soft. Tenderness: There is no abdominal tenderness. There is no right CVA tenderness or left CVA tenderness. Genitourinary:     Comments: External urinary catheter  Musculoskeletal:     Right lower leg: No edema. Left lower leg: No edema. Skin:     Findings: No rash. Neurological:     General: No focal deficit present.      Mental Status: He is alert and oriented to person, place, and time. Psychiatric:         Behavior: Behavior normal.    Lab/Data Review:     WBC 15,100     <752 <708 <545 <498 <606  Ferritin <231    CRP 5.15 <1.73 <1.64 <2.02 <3.26 < 6.18 <9.24 <16.10  Procal <0.05 <0.15    Blood cultures (7/27) No growth FINAL  Urine culture ordered  Heavy normal respiratory shirley (7/29) FINAL    CXR (8/5)   Stable bilateral airspace disease. Assessment:     Active Problems:    COVID (7/18/2022)      Sepsis ([facility] [address] (7/27/2022)      Severe protein-calorie malnutrition ([facility] [address] (8/5/2022)  Sepsis with fever, tachycardia, tachypnea, leukocytosis, elevated CRP, Day #7  Zosyn, status post Zithromax  Presumptive UTI with pyuria and bacteria, urine culture not sent, on IV Zosyn  Covid-19 infection  ? Pneumonia with stable bilateral infiltrates, ?aspiration, on IV Zosyn    Acute hypoxic respiratory failure, on HFNC  6. MRSA nasal colonization, status post Mupirocin ointment       Comment:  CRP again increasing today but LDH decreased. Leukocytosis attributed to steroid.       Plan:      Continue Zosyn  Continue Dexamethasone per Pulmonary  In am, repeat CBC, CRP, LDH  Pending PEG tube placement  Repeat urinalysis    Signed By: Carmel Medina MD     August 5, 2022

## 2022-08-05 NOTE — PROGRESS NOTES
PROGRESS NOTE    Patient: Haydee Wiggins MRN: [id number]  SSN: [id number]    YOB: 1929  Age: 80 y.o. Sex: male      Admit Date: 7/27/2022    LOS: 9 days       Subjective     Chief Complaint   Patient presents with    Shortness of Breath       HPI: Patient is a 80y.o. year old male with a significant PMH of Afib, hypercholesterolemia, HTN, neuropathy, and hx of stroke presented to the ED today due to hypoxia and worsening confusion. The patient was discharged from the hospital yesterday after being hospitalized d/t COVID-19. Per nursing facility his O2 was in the 80s and it was 89% upon admission. The patient was stable at that time of discharge yesterday and not using any oxygen. The patient is now on BiPAP with an O2 sat of 100%. Febrile at 101.5  WBC 25.1  Hypertensive on admission  Respiratory rate at 31  CXR: Stable bilateral pulmonary infiltrates. Patient was given:  10mg Decadron  2g Magnesium  Duoneb treatment  Terbutaline treatment     7/28  Pt sitting in bed, responds to voice  P02 53 on ABG this morning, pt placed on BiPap  SpO2 stable  CRP=16.1    XR CHEST PORT  Diffuse interstitial airspace opacities are not  significantly changed. Trace left effusion. Biapical pleural thickening. Heartsize is enlarged      7/29    Patient on BiPAP now on 70% O2  Patient pulled out BiPAP last night put on high flow    7/30    On high flow 90% O2      Left Ventricle: Normal left ventricular systolic function with a visually estimated EF of 55 - 60%. Left ventricle size is normal. Normal wall thickness. Normal wall motion. Grade I diastolic dysfunction with normal LAP. Aortic Valve: Moderate regurgitation. Mitral Valve: Mildly thickened leaflet. 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 Moderate to severe regurgitation with an eccentrically directed jet and may underestimate severity. Tricuspid Valve: Moderate regurgitation.       7/31    On high flow 80% O2    8/1    On high flow 70% O2  Poor appetite poor appetite    8/2  Patient still on high flow 80% O2 very poor appetite  Refuse NG tube    8/3  Patient alert awake on high flow 80% O2  Unable to get NG tube placed  Very poor appetite    Discussed with the patient wife yesterday  She wants PPN      8/4  Patient continues on high flow oxygen. 60 %    PEG tube placement was moved to tomorrow. Procedure was discussed with wife. Patient was seen by occupational therapist and PT yesterday and tolerated both session fairly well. Both noted weakness and increased need for oxygen when exerting himself. Review of Systems   Unable to perform ROS: Acuity of condition       Objective     Visit Vitals  /77 (BP 1 Location: Left upper arm, BP Patient Position: At rest)   Pulse 99   Temp 97.7 °F (36.5 °C)   Resp 23   Ht 5' 5.98\" (1.676 m)   Wt 50.8 kg (111 lb 15.9 oz)   SpO2 97%   BMI 18.09 kg/m²    O2 Flow Rate (L/min): 60 l/min O2 Device: Heated, Hi flow nasal cannula    Physical Exam:   Physical Exam  Constitutional:       Appearance: He is normal weight. HENT:      Head: Normocephalic and atraumatic. Cardiovascular:      Rate and Rhythm: Normal rate and regular rhythm. Pulses: Normal pulses. Heart sounds: Normal heart sounds. Pulmonary:      Effort: Pulmonary effort is normal.      Breath sounds: Normal breath sounds. Musculoskeletal:         General: Normal range of motion. Cervical back: Normal range of motion and neck supple. Neurological:      General: No focal deficit present. Mental Status: He is alert. He is disoriented. Intake & Output:  Current Shift: No intake/output data recorded. Last three shifts: 08/03 1901 - 08/05 0700  In: 3039.5 [P.O.:500; I.V.:2539.5]  Out: 2300 [Urine:2300]    Lab/Data Review: All lab results for the last 24 hours reviewed.        24 Hour Results:    Recent Results (from the past 24 hour(s))   RENAL FUNCTION PANEL    Collection Time: 08/05/22  3:45 AM Result Value Ref Range    Sodium 137 136 - 145 mmol/L    Potassium 3.6 3.5 - 5.1 mmol/L    Chloride 101 97 - 108 mmol/L    CO2 30 21 - 32 mmol/L    Anion gap 6 5 - 15 mmol/L    Glucose 123 (H) 65 - 100 mg/dL    BUN 33 (H) 6 - 20 mg/dL    Creatinine 0.56 (L) 0.70 - 1.30 mg/dL    BUN/Creatinine ratio 59 (H) 12 - 20      GFR est AA >60 >60 ml/min/1.73m2    GFR est non-AA >60 >60 ml/min/1.73m2    Calcium 7.6 (L) 8.5 - 10.1 mg/dL    Phosphorus 3.2 2.6 - 4.7 mg/dL    Albumin 2.1 (L) 3.5 - 5.0 g/dL   C REACTIVE PROTEIN, QT    Collection Time: 08/05/22  3:45 AM   Result Value Ref Range    C-Reactive protein 5.15 (H) 0.00 - 0.60 mg/dL   CBC WITH AUTOMATED DIFF    Collection Time: 08/05/22  3:45 AM   Result Value Ref Range    WBC 15.1 (H) 4.1 - 11.1 K/uL    RBC 2.62 (L) 4.10 - 5.70 M/uL    HGB 8.2 (L) 12.1 - 17.0 g/dL    HCT 24.8 (L) 36.6 - 50.3 %    MCV 94.7 80.0 - 99.0 FL    MCH 31.3 26.0 - 34.0 PG    MCHC 33.1 30.0 - 36.5 g/dL    RDW 15.2 (H) 11.5 - 14.5 %    PLATELET 167 (L) 257 - 400 K/uL    MPV 10.8 8.9 - 12.9 FL    NRBC 0.4 (H) 0.0  WBC    ABSOLUTE NRBC 0.06 (H) 0.00 - 0.01 K/uL    NEUTROPHILS 97 (H) 32 - 75 %    LYMPHOCYTES 1 (L) 12 - 49 %    MONOCYTES 1 (L) 5 - 13 %    EOSINOPHILS 0 0 - 7 %    BASOPHILS 0 0 - 1 %    IMMATURE GRANULOCYTES 1 (H) 0 - 0.5 %    ABS. NEUTROPHILS 14.6 (H) 1.8 - 8.0 K/UL    ABS. LYMPHOCYTES 0.1 (L) 0.8 - 3.5 K/UL    ABS. MONOCYTES 0.2 0.0 - 1.0 K/UL    ABS. EOSINOPHILS 0.0 0.0 - 0.4 K/UL    ABS. BASOPHILS 0.0 0.0 - 0.1 K/UL    ABS. IMM.  GRANS. 0.2 (H) 0.00 - 0.04 K/UL    DF AUTOMATED     LD    Collection Time: 08/05/22  3:45 AM   Result Value Ref Range     (H) 85 - 198 U/L   METABOLIC PANEL, COMPREHENSIVE    Collection Time: 08/05/22  3:45 AM   Result Value Ref Range    Sodium 137 136 - 145 mmol/L    Potassium 3.6 3.5 - 5.1 mmol/L    Chloride 103 97 - 108 mmol/L    CO2 30 21 - 32 mmol/L    Anion gap 4 (L) 5 - 15 mmol/L    Glucose 123 (H) 65 - 100 mg/dL    BUN 33 (H) 6 - 20 mg/dL    Creatinine 0.58 (L) 0.70 - 1.30 mg/dL    BUN/Creatinine ratio 57 (H) 12 - 20      GFR est AA >60 >60 ml/min/1.73m2    GFR est non-AA >60 >60 ml/min/1.73m2    Calcium 7.6 (L) 8.5 - 10.1 mg/dL    Bilirubin, total 0.7 0.2 - 1.0 mg/dL    AST (SGOT) 43 (H) 15 - 37 U/L    ALT (SGPT) 41 12 - 78 U/L    Alk. phosphatase 75 45 - 117 U/L    Protein, total 5.0 (L) 6.4 - 8.2 g/dL    Albumin 2.1 (L) 3.5 - 5.0 g/dL    Globulin 2.9 2.0 - 4.0 g/dL    A-G Ratio 0.7 (L) 1.1 - 2.2           Imaging:    XR CHEST PORT   Final Result   Stable bilateral airspace disease. XR CHEST PORT   Final Result   Decreased lung volumes with increased interstitial and airspace   opacities bilaterally      XR CHEST PORT   Final Result      Feeding tube terminates in the region of the distal esophagus and should be   advanced at least 10 to 15 cm          XR CHEST PORT   Final Result   Interstitial and airspace opacities with some improvement in the   interval.      XR CHEST PORT   Final Result   No significant change in interstitial and airspace opacities   possibly pulmonary edema      XR CHEST PORT   Final Result   No significant change      XR CHEST PORT   Final Result      Little interval change. XR CHEST PORT   Final Result   Slightly increased diffuse bilateral interstitial and alveolar   opacities, compatible with COVID pneumonia. XR CHEST PORT   Final Result   No significant change       XR CHEST SNGL V   Final Result   Stable bilateral pulmonary infiltrates.          XR CHEST PORT    (Results Pending)          Assessment     Acute hypoxic respiratory failure on high flow 80 %  Respiratory alkalosis  COVID-19 Pneumonia  Sepsis  Hypertension  Hyperlipidemia  Stroke  Afib  MRSA nares  Elevated BNP  Hypokalemia    Plan   Continue meds:    Eliquis 2.5mg PO BID was held today and tomorrow      ASA 81 mg po daily was held today, will continue after procedure      Lipitor 20mg PO every day  Zithromax 500 IV daily  Olumiant 4mg PO QD  Decadron 4mg IV Q6h  Doxazosin 4mg PO QHS  Cymbalta 30mg POQD  Remeron 15mg PO every day  Vistaril 25mg Q8H daily  Mupirocin 2% ointment both nostrils BID  Zosyn 3.375g IV q8hrs  Ranolazine 500mg PO every day    Replace potassium as needed  Lasix 40 IV once as needed    Follow up with PEG placement   Monitor blood counts, BNP level    PEG tube placement today and start feeding this evening              Current Facility-Administered Medications:     TPN ADULT-PERIPHERAL AA 4.25% D5% + ELECTROLYTES, , IntraVENous, CONTINUOUS, Mahogany Chapa MD, Last Rate: 42 mL/hr at 08/04/22 2218, New Bag at 08/04/22 2218    pantoprazole (PROTONIX) tablet 40 mg, 40 mg, Oral, BID, Mahogany Chapa MD, 40 mg at 08/04/22 2214    lidocaine (XYLOCAINE) 2 % jelly, , Mucous Membrane, PRN, Azalea Ovalle MD    0.9% sodium chloride infusion 250 mL, 250 mL, IntraVENous, PRN, Garrett Browning MD    nystatin (MYCOSTATIN) 100,000 unit/mL oral suspension 500,000 Units, 500,000 Units, Oral, QID, Bharath Johnson MD, 500,000 Units at 08/04/22 2214    hydrOXYzine (VISTARIL) injection 25 mg, 25 mg, IntraMUSCular, Q6H PRN, Mahogany Chapa MD, 25 mg at 08/01/22 1307    hydrOXYzine pamoate (VISTARIL) capsule 25 mg, 25 mg, Oral, Q8H, Azalea Ovalle MD, 25 mg at 08/04/22 2214    piperacillin-tazobactam (ZOSYN) 3.375 g in 0.9% sodium chloride (MBP/ADV) 100 mL MBP, 3.375 g, IntraVENous, Q8H, Mahogany Chapa MD, Last Rate: 25 mL/hr at 08/05/22 1127, 3.375 g at 08/05/22 1127    [Held by provider] apixaban (ELIQUIS) tablet 2.5 mg, 2.5 mg, Oral, BID, Mahogany Chapa MD, 2.5 mg at 08/03/22 2010    aspirin delayed-release tablet 81 mg, 81 mg, Oral, DAILY, Mahogany Chapa MD, 81 mg at 08/03/22 0902    atorvastatin (LIPITOR) tablet 20 mg, 20 mg, Oral, DAILY, Mahogany Chapa MD, 20 mg at 08/04/22 0856    doxazosin (CARDURA) tablet 4 mg, 4 mg, Oral, QHS, Mahogany Chapa MD, 4 mg at 08/04/22 2214    DULoxetine (CYMBALTA) capsule 30 mg, 30 mg, Oral, DAILY, Mahogany Chapa MD, 30 mg at 08/04/22 0856    mirtazapine (REMERON) tablet 15 mg, 15 mg, Oral, QHS, Mahogany Chapa MD, 15 mg at 08/04/22 2214    ranolazine ER (RANEXA) tablet 500 mg, 500 mg, Oral, BID, Angel Chapa MD, 500 mg at 08/04/22 2215    acetaminophen (TYLENOL) tablet 650 mg, 650 mg, Oral, Q6H PRN, 650 mg at 07/30/22 2150 **OR** acetaminophen (TYLENOL) suppository 650 mg, 650 mg, Rectal, Q6H PRN, Mahogany Chapa MD    polyethylene glycol (MIRALAX) packet 17 g, 17 g, Oral, DAILY PRN, Mahogany Chapa MD    ondansetron (ZOFRAN ODT) tablet 4 mg, 4 mg, Oral, Q8H PRN **OR** ondansetron (ZOFRAN) injection 4 mg, 4 mg, IntraVENous, Q6H PRN, Mahogany Chapa MD    dexamethasone (DECADRON) 4 mg/mL injection 4 mg, 4 mg, IntraVENous, Q6H, Mahogany Chapa MD, 4 mg at 08/05/22 1128    Current Outpatient Medications   Medication Instructions    amLODIPine (NORVASC) 5 mg, Oral, DAILY    apixaban (ELIQUIS) 2.5 mg, Oral, 2 TIMES DAILY    aspirin delayed-release 81 mg, Oral, DAILY    atorvastatin (LIPITOR) 20 mg, Oral, DAILY    cholecalciferol (vitamin D3) 2,000 Units, Oral, EVERY BEDTIME    dexAMETHasone (DECADRON) 4 mg, Oral, 2 TIMES DAILY    doxazosin (CARDURA) 4 mg, Oral, DAILY    DULoxetine (CYMBALTA) 30 mg, Oral, DAILY    mirtazapine (REMERON) 15 mg, Oral, EVERY BEDTIME    ranolazine ER (RANEXA) 500 mg, Oral, 2 TIMES DAILY         Signed By: Amaya So MD     August 5, 2022

## 2022-08-06 NOTE — PROGRESS NOTES
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 Now improving in SICU.  Permacath installed 8/31 for possible dialysis needs.     .Neuro:  - AOx4  - PCA dicontinued; IV dilaudid PRN  - Clonidine patch for anxiety     Resp:  - Intubated 8/29, extubated 8/30  - Q4H breathing treatments     CV:  - MAP goal >65  - Metoprolol 15mg Q6  - Labetalol PRN  - Hydralazine PRN  - Permacath  Placed 8/31  - Cardene gtt initiated; systolic goal <160     Heme:  - Hgb/Hct 7.9/25.5  - S/p Abdominal washout on 8/21  - S/p Abdominal exploration w/ woundvac exhange 8/24; exchange 8/31     ID:  - WBC continues to uptrend; 28.7 today; pt afebrile  - Cont Zosyn, Fluconazole     Renal:  - Oliguric on arrival; Cr 1.0 at that time   - BUN/Cr cont to increase - 59/3.1 today; nephrology following  - Trend BUN/Cr daily   - Monitor I/Os     FEN/GI:  - NPO  - TPN  - NGT to LIWS   - Maintain drains to bulb suction;  - GI ppx; famotidine  - Replace electrolytes as needed to keep K>4, Mg>2     Endo:  - Monitor BG     Dispo:  - Continue SICU care      Chief Complaint:None      Subjective:  Mr. Юлия Hogan is a 80y.o. year old * male admitted with recurrent Covid pneumonia. On high flow nasal O2. No new complaints. Unable to do PEG due to large hiatal hernia. Review of Systems:   Constitutional:  no fever, no chills,  no sweats, No weakness, No fatigue, No decreased activity. Respiratory: shortness of breath, No cough, No sputum production, No hemoptysis, No wheezing, No cyanosis. Cardiovascular: No chest pain, No palpitations, No bradycardia, No tachycardia, No peripheral edema, No syncope. Gastrointestinal: No nausea, No vomiting, No diarrhea, No constipation, No heartburn, No abdominal pain. Genitourinary: No dysuria, No hematuria, No change in urine stream, No urethral discharge, No lesions. Hematology/Lymphatics: No bruising tendency, No bleeding tendency, No petechiae, No swollen lymph glands. Endocrine: No excessive thirst, No polyuria, No cold intolerance, No heat intolerance, No excessive hunger. Musculoskeletal: No back pain, No neck pain, No joint pain, No muscle pain, No claudication, No decreased range of motion, No trauma. Integumentary: No rash, No pruritus, No abrasions. Neurologic: Alert and oriented X4, No abnormal balance, No headache, No confusion, No numbness, No tingling. Psychiatric: No anxiety, No depression, No leandra. Physical Exam:     Vitals & Measurements:     Wt Readings from Last 3 Encounters:   08/04/22 50.8 kg (111 lb 15.9 oz)   07/18/22 54.4 kg (120 lb)   02/10/22 54.4 kg (120 lb)     Temp Readings from Last 3 Encounters:   08/06/22 (!) 96.3 °F (35.7 °C)   07/26/22 99.6 °F (37.6 °C)   02/10/22 98 °F (36.7 °C)     BP Readings from Last 3 Encounters:   08/06/22 (!) 141/71   07/26/22 106/69   02/10/22 (!) 160/100     Pulse Readings from Last 3 Encounters:   08/06/22 92   07/26/22 91   02/10/22 92      Ht Readings from Last 3 Encounters:   08/05/22 5' 5.98\" (1.676 m)   07/20/22 5' 7\" (1.702 m)   02/10/22 5' 5\" (1.651 m)      Date 08/05/22 0700 - 08/06/22 0659 08/06/22 0700 - 08/07/22 0659   Shift [id number] 1900-0659 24 Hour Total 7602-7343 1506-6788 24 Hour Total   INTAKE   I.V.(mL/kg/hr) 125(0.2) 802.6(1.3) 927.6(0.8)        Volume (0.9% sodium chloride infusion) 125  125        Volume (piperacillin-tazobactam (ZOSYN) 3.375 g in 0.9% sodium chloride (MBP/ADV) 100 mL MBP)  300 300        Volume (TPN ADULT-PERIPHERAL AA 4.25% D5% + ELECTROLYTES)  336 336        Volume (fat emulsion 20% (LIPOSYN, INTRAlipid) infusion 250 mL)  166.6 166.6      Shift Total(mL/kg) 125(2.5) 802. 6(15.8) 927.6(18.3)      OUTPUT   Urine(mL/kg/hr) 900(1.5) 650(1.1) 1550(1.3)        Urine Output (mL) (External Urinary Catheter 07/27/22)       Shift Total(mL/kg) 900(17.7) 650(12.8) 1550(30.5)      NET -775 152.6 -622.4      Weight (kg) 50.8 50.8 50.8 50.8 50.8 50.8       General: ill appearing, mild respiratory distress  Head: Normal  Face: Nornal  HEENT: atraumatic, PERRLA, moist mucosa, normal pharynx, normal tonsils and adenoids, normal tongue, no fluid in sinuses  Neck: Trachea midline, no carotid bruit, no masses  Chest: Normal.  Respiratory: normal chest wall expansion, CTA B, no r/r/w, no rubs, high flow nasal O2. Cardiovascular: RRR, no m/r/g, Normal S1 and S2  Abdomen: Soft, non tender, non-distended, normal bowel sounds in all quadrants, no hepatosplenomegaly, no tympany. Incision scar: none  Genitourinary: No inguinal hernia, normal external gentalia, Testis & scrotum normal, no renal angle tenderness  Rectal: deferred  Musculoskeletal: Normal ROM in upper and lower extremities, No joint swelling. Integumentary: Warm, dry, and pink, with no rash, purpura, or petechia  Heme/Lymph: No lymphadenopathy, no bruises  Neurological: Cranial Nerves II-XII grossly intact, No gross sensory or motor deficit.   Psychiatric: Cooperative with normal mood, affect, and cognition    Laboratory Values:   Recent Results (from the past 24 hour(s))   GLUCOSE, POC    Collection Time: 08/05/22  6:44 PM   Result Value Ref Range    Glucose (POC) 140 (H) 65 - 117 mg/dL    Performed by Valdemar Mendez    CBC WITH AUTOMATED DIFF    Collection Time: 08/06/22  4:00 AM   Result Value Ref Range    WBC 13.0 (H) 4.1 - 11.1 K/uL    RBC 2.40 (L) 4.10 - 5.70 M/uL    HGB 7.8 (L) 12.1 - 17.0 g/dL    HCT 23.0 (L) 36.6 - 50.3 %    MCV 95.8 80.0 - 99.0 FL    MCH 32.5 26.0 - 34.0 PG    MCHC 33.9 30.0 - 36.5 g/dL    RDW 15.5 (H) 11.5 - 14.5 %    PLATELET 88 (L) 881 - 400 K/uL    MPV 11.3 8.9 - 12.9 FL    NRBC 0.8 (H) 0.0  WBC    ABSOLUTE NRBC 0.10 (H) 0.00 - 0.01 K/uL    NEUTROPHILS 96 (H) 32 - 75 %    LYMPHOCYTES 1 (L) 12 - 49 %    MONOCYTES 2 (L) 5 - 13 %    EOSINOPHILS 0 0 - 7 %    BASOPHILS 0 0 - 1 %    IMMATURE GRANULOCYTES 1 (H) 0 - 0.5 %    ABS. NEUTROPHILS 12.6 (H) 1.8 - 8.0 K/UL    ABS. LYMPHOCYTES 0.1 (L) 0.8 - 3.5 K/UL    ABS. MONOCYTES 0.2 0.0 - 1.0 K/UL    ABS. EOSINOPHILS 0.0 0.0 - 0.4 K/UL    ABS. BASOPHILS 0.0 0.0 - 0.1 K/UL    ABS. IMM.  GRANS. 0.2 (H) 0.00 - 0.04 K/UL    DF AUTOMATED     C REACTIVE PROTEIN, QT    Collection Time: 08/06/22  4:00 AM   Result Value Ref Range    C-Reactive protein 4.79 (H) 0.00 - 0.60 mg/dL   LD    Collection Time: 08/06/22  4:00 AM   Result Value Ref Range     (H) 85 - 241 U/L   RENAL FUNCTION PANEL    Collection Time: 08/06/22  4:00 AM   Result Value Ref Range    Sodium 137 136 - 145 mmol/L    Potassium 3.3 (L) 3.5 - 5.1 mmol/L    Chloride 103 97 - 108 mmol/L    CO2 30 21 - 32 mmol/L    Anion gap 4 (L) 5 - 15 mmol/L    Glucose 154 (H) 65 - 100 mg/dL    BUN 33 (H) 6 - 20 mg/dL    Creatinine 0.52 (L) 0.70 - 1.30 mg/dL    BUN/Creatinine ratio 63 (H) 12 - 20      GFR est AA >60 >60 ml/min/1.73m2    GFR est non-AA >60 >60 ml/min/1.73m2    Calcium 7.0 (L) 8.5 - 10.1 mg/dL    Phosphorus 2.8 2.6 - 4.7 mg/dL    Albumin 2.0 (L) 3.5 - 5.0 g/dL   NT-PRO BNP    Collection Time: 08/06/22  4:00 AM   Result Value Ref Range    NT pro- (H) <204 pg/mL   METABOLIC PANEL, COMPREHENSIVE    Collection Time: 08/06/22  4:00 AM   Result Value Ref Range    Sodium 137 136 - 145 mmol/L    Potassium 3.3 (L) 3.5 - 5.1 mmol/L    Chloride 102 97 - 108 mmol/L    CO2 30 21 - 32 mmol/L    Anion gap 5 5 - 15 mmol/L    Glucose 153 (H) 65 - 100 mg/dL    BUN 33 (H) 6 - 20 mg/dL    Creatinine 0.52 (L) 0.70 - 1.30 mg/dL    BUN/Creatinine ratio 63 (H) 12 - 20      GFR est AA >60 >60 ml/min/1.73m2    GFR est non-AA >60 >60 ml/min/1.73m2    Calcium 7.1 (L) 8.5 - 10.1 mg/dL    Bilirubin, total 0.6 0.2 - 1.0 mg/dL    AST (SGOT) 40 (H) 15 - 37 U/L    ALT (SGPT) 36 12 - 78 U/L    Alk. phosphatase 66 45 - 117 U/L    Protein, total 4.5 (L) 6.4 - 8.2 g/dL    Albumin 2.0 (L) 3.5 - 5.0 g/dL    Globulin 2.5 2.0 - 4.0 g/dL    A-G Ratio 0.8 (L) 1.1 - 2.2           XR CHEST PORT   Final Result      No change in mild bilateral airspace disease. Chronic interstitial changes. XR CHEST PORT   Final Result   Stable bilateral airspace disease. XR CHEST PORT   Final Result   Decreased lung volumes with increased interstitial and airspace   opacities bilaterally      XR CHEST PORT   Final Result      Feeding tube terminates in the region of the distal esophagus and should be   advanced at least 10 to 15 cm          XR CHEST PORT   Final Result   Interstitial and airspace opacities with some improvement in the   interval.      XR CHEST PORT   Final Result   No significant change in interstitial and airspace opacities   possibly pulmonary edema      XR CHEST PORT   Final Result   No significant change      XR CHEST PORT   Final Result      Little interval change. XR CHEST PORT   Final Result   Slightly increased diffuse bilateral interstitial and alveolar   opacities, compatible with COVID pneumonia. XR CHEST PORT   Final Result   No significant change       XR CHEST SNGL V   Final Result   Stable bilateral pulmonary infiltrates. ECG    Assessment:  Problem List Items Addressed This Visit          Other    COVID     Other Visit Diagnoses       Acute respiratory failure with hypoxia ([address]    -  Primary             Plan:    Admission  Diet: clears  IV fluids  SCD  IS  TPN  Antibiotics  Nausea medication  Labs & Radiology  10. CT scan of abdomen and pelvis in am.  11. I discussed with patient and his wife that if the CT scan shows a large intrathoracic hiatal hernia then an open G tube will not be an option. Alternatively a J tube can be placed but will need the patient to be intubated for general anesthesia. Discussed the pro and cons of a feeding J tube and general naesthesia while he is still in high flow nasal oxygen. Also discussed the option of NG tube feeding or dobhoff tube feeding placed by IR under fluoroscopy atleast he is off high flow nasal O2.   12.. Plan discussed with patient and family and answered all their questions. Thank you for allowing me to participate in the care of this patient.

## 2022-08-06 NOTE — PROGRESS NOTES
PROGRESS NOTE    Patient: Diana Montes MRN: [id number]  SSN: [id number]    YOB: 1929  Age: 80 y.o. Sex: male      Admit Date: 7/27/2022    LOS: 10 days       Subjective     Chief Complaint   Patient presents with    Shortness of Breath       HPI: Patient is a 80y.o. year old male with a significant PMH of Afib, hypercholesterolemia, HTN, neuropathy, and hx of stroke presented to the ED today due to hypoxia and worsening confusion. The patient was discharged from the hospital yesterday after being hospitalized d/t COVID-19. Per nursing facility his O2 was in the 80s and it was 89% upon admission. The patient was stable at that time of discharge yesterday and not using any oxygen. The patient is now on BiPAP with an O2 sat of 100%. Febrile at 101.5  WBC 25.1  Hypertensive on admission  Respiratory rate at 31  CXR: Stable bilateral pulmonary infiltrates. Patient was given:  10mg Decadron  2g Magnesium  Duoneb treatment  Terbutaline treatment     7/28  Pt sitting in bed, responds to voice  P02 53 on ABG this morning, pt placed on BiPap  SpO2 stable  CRP=16.1    XR CHEST PORT  Diffuse interstitial airspace opacities are not  significantly changed. Trace left effusion. Biapical pleural thickening. Heartsize is enlarged      7/29    Patient on BiPAP now on 70% O2  Patient pulled out BiPAP last night put on high flow    7/30    On high flow 90% O2      Left Ventricle: Normal left ventricular systolic function with a visually estimated EF of 55 - 60%. Left ventricle size is normal. Normal wall thickness. Normal wall motion. Grade I diastolic dysfunction with normal LAP. Aortic Valve: Moderate regurgitation. Mitral Valve: Mildly thickened leaflet. 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 Moderate to severe regurgitation with an eccentrically directed jet and may underestimate severity. Tricuspid Valve: Moderate regurgitation.       7/31    On high flow 80% O2    8/1    On high flow 70% O2  Poor appetite poor appetite    8/2  Patient still on high flow 80% O2 very poor appetite  Refuse NG tube    8/3  Patient alert awake on high flow 80% O2  Unable to get NG tube placed  Very poor appetite    Discussed with the patient wife yesterday  She wants PPN      8/4  Patient continues on high flow oxygen. 60 %    PEG tube placement was moved to tomorrow. Procedure was discussed with wife. Patient was seen by occupational therapist and PT yesterday and tolerated both session fairly well. Both noted weakness and increased need for oxygen when exerting himself. 8/6  Pt on high flow O2, 50%  Pt not responding to me. H/H trending downward  Potassium 3.3  CRP 4.79, down from prior  CXR shows no acute change    Review of Systems   Unable to perform ROS: Acuity of condition       Objective     Visit Vitals  /64 (BP 1 Location: Left upper arm, BP Patient Position: At rest)   Pulse 90   Temp (!) 96.3 °F (35.7 °C)   Resp 19   Ht 5' 5.98\" (1.676 m)   Wt 50.8 kg (111 lb 15.9 oz)   SpO2 91%   BMI 18.09 kg/m²    O2 Flow Rate (L/min): 50 l/min O2 Device: Heated, Hi flow nasal cannula, Humidifier    Physical Exam:   Physical Exam  Constitutional:       Appearance: He is normal weight. HENT:      Head: Normocephalic and atraumatic. Cardiovascular:      Rate and Rhythm: Normal rate and regular rhythm. Pulses: Normal pulses. Heart sounds: Normal heart sounds. Pulmonary:      Effort: Pulmonary effort is normal.      Breath sounds: Normal breath sounds. Musculoskeletal:         General: Normal range of motion. Cervical back: Normal range of motion and neck supple. Neurological:      General: No focal deficit present. Mental Status: He is alert. He is disoriented. Intake & Output:  Current Shift: No intake/output data recorded. Last three shifts: 08/04 1901 - 08/06 0700  In: 1551 [I.V.:1551]  Out: 2050 [Urine:2050]    Lab/Data Review:   All lab results for the last 24 hours reviewed. 24 Hour Results:    Recent Results (from the past 24 hour(s))   URINALYSIS W/MICROSCOPIC    Collection Time: 08/05/22 12:39 PM   Result Value Ref Range    Color Yellow      Appearance Clear Clear      Specific gravity 1.010 1.003 - 1.030      pH (UA) 6.5 5.0 - 8.0      Protein Negative Negative mg/dL    Glucose Negative Negative mg/dL    Ketone Negative Negative mg/dL    Bilirubin Negative Negative      Blood Large (A) Negative      Urobilinogen 0.1 0.1 - 1.0 EU/dL    Nitrites Negative Negative      Leukocyte Esterase Negative Negative      WBC 0-4 0 - 4 /hpf    RBC 0-5 0 - 5 /hpf    Bacteria Negative Negative /hpf   GLUCOSE, POC    Collection Time: 08/05/22  6:44 PM   Result Value Ref Range    Glucose (POC) 140 (H) 65 - 117 mg/dL    Performed by PETEY SOLORZANO    CBC WITH AUTOMATED DIFF    Collection Time: 08/06/22  4:00 AM   Result Value Ref Range    WBC 13.0 (H) 4.1 - 11.1 K/uL    RBC 2.40 (L) 4.10 - 5.70 M/uL    HGB 7.8 (L) 12.1 - 17.0 g/dL    HCT 23.0 (L) 36.6 - 50.3 %    MCV 95.8 80.0 - 99.0 FL    MCH 32.5 26.0 - 34.0 PG    MCHC 33.9 30.0 - 36.5 g/dL    RDW 15.5 (H) 11.5 - 14.5 %    PLATELET 88 (L) 833 - 400 K/uL    MPV 11.3 8.9 - 12.9 FL    NRBC 0.8 (H) 0.0  WBC    ABSOLUTE NRBC 0.10 (H) 0.00 - 0.01 K/uL    NEUTROPHILS 96 (H) 32 - 75 %    LYMPHOCYTES 1 (L) 12 - 49 %    MONOCYTES 2 (L) 5 - 13 %    EOSINOPHILS 0 0 - 7 %    BASOPHILS 0 0 - 1 %    IMMATURE GRANULOCYTES 1 (H) 0 - 0.5 %    ABS. NEUTROPHILS 12.6 (H) 1.8 - 8.0 K/UL    ABS. LYMPHOCYTES 0.1 (L) 0.8 - 3.5 K/UL    ABS. MONOCYTES 0.2 0.0 - 1.0 K/UL    ABS. EOSINOPHILS 0.0 0.0 - 0.4 K/UL    ABS. BASOPHILS 0.0 0.0 - 0.1 K/UL    ABS. IMM.  GRANS. 0.2 (H) 0.00 - 0.04 K/UL    DF AUTOMATED     C REACTIVE PROTEIN, QT    Collection Time: 08/06/22  4:00 AM   Result Value Ref Range    C-Reactive protein 4.79 (H) 0.00 - 0.60 mg/dL   LD    Collection Time: 08/06/22  4:00 AM   Result Value Ref Range     (H) 85 - 241 U/L   RENAL FUNCTION PANEL    Collection Time: 08/06/22  4:00 AM   Result Value Ref Range    Sodium 137 136 - 145 mmol/L    Potassium 3.3 (L) 3.5 - 5.1 mmol/L    Chloride 103 97 - 108 mmol/L    CO2 30 21 - 32 mmol/L    Anion gap 4 (L) 5 - 15 mmol/L    Glucose 154 (H) 65 - 100 mg/dL    BUN 33 (H) 6 - 20 mg/dL    Creatinine 0.52 (L) 0.70 - 1.30 mg/dL    BUN/Creatinine ratio 63 (H) 12 - 20      GFR est AA >60 >60 ml/min/1.73m2    GFR est non-AA >60 >60 ml/min/1.73m2    Calcium 7.0 (L) 8.5 - 10.1 mg/dL    Phosphorus 2.8 2.6 - 4.7 mg/dL    Albumin 2.0 (L) 3.5 - 5.0 g/dL   NT-PRO BNP    Collection Time: 08/06/22  4:00 AM   Result Value Ref Range    NT pro- (H) <209 pg/mL   METABOLIC PANEL, COMPREHENSIVE    Collection Time: 08/06/22  4:00 AM   Result Value Ref Range    Sodium 137 136 - 145 mmol/L    Potassium 3.3 (L) 3.5 - 5.1 mmol/L    Chloride 102 97 - 108 mmol/L    CO2 30 21 - 32 mmol/L    Anion gap 5 5 - 15 mmol/L    Glucose 153 (H) 65 - 100 mg/dL    BUN 33 (H) 6 - 20 mg/dL    Creatinine 0.52 (L) 0.70 - 1.30 mg/dL    BUN/Creatinine ratio 63 (H) 12 - 20      GFR est AA >60 >60 ml/min/1.73m2    GFR est non-AA >60 >60 ml/min/1.73m2    Calcium 7.1 (L) 8.5 - 10.1 mg/dL    Bilirubin, total 0.6 0.2 - 1.0 mg/dL    AST (SGOT) 40 (H) 15 - 37 U/L    ALT (SGPT) 36 12 - 78 U/L    Alk. phosphatase 66 45 - 117 U/L    Protein, total 4.5 (L) 6.4 - 8.2 g/dL    Albumin 2.0 (L) 3.5 - 5.0 g/dL    Globulin 2.5 2.0 - 4.0 g/dL    A-G Ratio 0.8 (L) 1.1 - 2.2           Imaging:    XR CHEST PORT   Final Result      No change in mild bilateral airspace disease. Chronic interstitial changes. XR CHEST PORT   Final Result   Stable bilateral airspace disease.          XR CHEST PORT   Final Result   Decreased lung volumes with increased interstitial and airspace   opacities bilaterally      XR CHEST PORT   Final Result      Feeding tube terminates in the region of the distal esophagus and should be   advanced at least 10 to 15 cm          XR CHEST PORT Final Result   Interstitial and airspace opacities with some improvement in the   interval.      XR CHEST PORT   Final Result   No significant change in interstitial and airspace opacities   possibly pulmonary edema      XR CHEST PORT   Final Result   No significant change      XR CHEST PORT   Final Result      Little interval change. XR CHEST PORT   Final Result   Slightly increased diffuse bilateral interstitial and alveolar   opacities, compatible with COVID pneumonia. XR CHEST PORT   Final Result   No significant change       XR CHEST SNGL V   Final Result   Stable bilateral pulmonary infiltrates. Assessment     Acute hypoxic respiratory failure on high flow 80 %  Respiratory alkalosis  COVID-19 Pneumonia  Sepsis  Hypertension  Hyperlipidemia  Stroke  Afib  MRSA nares  Elevated BNP  Hypokalemia    Plan   Continue meds:    Eliquis 2.5mg PO BID was held today and tomorrow      ASA 81 mg po daily was held today, will continue after procedure      Lipitor 20mg PO every day  Zithromax 500 IV daily  Olumiant 4mg PO QD  Decadron 4mg IV Q6h  Doxazosin 4mg PO QHS  Cymbalta 30mg POQD  Remeron 15mg PO every day  Vistaril 25mg Q8H daily  Mupirocin 2% ointment both nostrils BID  Zosyn 3.375g IV q8hrs  Ranolazine 500mg PO every day    Replace potassium today given level of 3.3  Lasix 40 IV once as needed    Monitor blood counts, BNP level    PEG tube could not be placed 2/2 large hiatal hernia. Surgery will make another attempt sometime this week.               Current Facility-Administered Medications:     metoclopramide HCl (REGLAN) injection 5 mg, 5 mg, IntraVENous, Q8H, Shamar Velarde MD    potassium chloride (KLOR-CON) packet for solution 40 mEq, 40 mEq, Per Emily Daily, Q4H, Shamar Velarde MD    furosemide (LASIX) injection 20 mg, 20 mg, IntraVENous, ONCE, Shamar Velarde MD    TPN ADULT-PERIPHERAL AA 4.25% D5% + ELECTROLYTES, , IntraVENous, CONTINUOUS, Hiro Chapa MD, Last Rate: 42 mL/hr at 08/05/22 2200, New Bag at 08/05/22 2200    fat emulsion 20% (LIPOSYN, INTRAlipid) infusion 250 mL, 250 mL, IntraVENous, CONTINUOUS, Mahogany Chapa MD, Last Rate: 20.83 mL/hr at 08/05/22 2200, 250 mL at 08/05/22 2200    pantoprazole (PROTONIX) tablet 40 mg, 40 mg, Oral, BID, Mahogany Chapa MD, 40 mg at 08/06/22 0930    lidocaine (XYLOCAINE) 2 % jelly, , Mucous Membrane, PRN, Joshua Merino MD    0.9% sodium chloride infusion 250 mL, 250 mL, IntraVENous, PRN, Garrett Browning MD    nystatin (MYCOSTATIN) 100,000 unit/mL oral suspension 500,000 Units, 500,000 Units, Oral, QID, Bharath Johnson MD, 500,000 Units at 08/06/22 0931    hydrOXYzine (VISTARIL) injection 25 mg, 25 mg, IntraMUSCular, Q6H PRN, Monique Dobbs MD, 25 mg at 08/01/22 1307    hydrOXYzine pamoate (VISTARIL) capsule 25 mg, 25 mg, Oral, Q8H, Joshua Merino MD, 25 mg at 08/05/22 2121    piperacillin-tazobactam (ZOSYN) 3.375 g in 0.9% sodium chloride (MBP/ADV) 100 mL MBP, 3.375 g, IntraVENous, Q8H, Mahogany Chapa MD, Last Rate: 25 mL/hr at 08/06/22 0325, 3.375 g at 08/06/22 0325    [Held by provider] apixaban (ELIQUIS) tablet 2.5 mg, 2.5 mg, Oral, BID, Mahogany Chapa MD, 2.5 mg at 08/03/22 2010    aspirin delayed-release tablet 81 mg, 81 mg, Oral, DAILY, Mahogany Chapa MD, 81 mg at 08/06/22 0930    atorvastatin (LIPITOR) tablet 20 mg, 20 mg, Oral, DAILY, Mahogany Chapa MD, 20 mg at 08/06/22 0930    doxazosin (CARDURA) tablet 4 mg, 4 mg, Oral, QHS, Mahogany Chapa MD, 4 mg at 08/05/22 2121    DULoxetine (CYMBALTA) capsule 30 mg, 30 mg, Oral, DAILY, Mahogany Chapa MD, 30 mg at 08/06/22 0930    mirtazapine (REMERON) tablet 15 mg, 15 mg, Oral, QHS, Mahogany Chapa MD, 15 mg at 08/05/22 2121    ranolazine ER (RANEXA) tablet 500 mg, 500 mg, Oral, BID, Mahogany Chapa MD, 500 mg at 08/06/22 0930    acetaminophen (TYLENOL) tablet 650 mg, 650 mg, Oral, Q6H PRN, 650 mg at 07/30/22 2150 **OR** acetaminophen (TYLENOL) suppository 650 mg, 650 mg, Rectal, Q6H PRN, Mahogany Chapa MD    polyethylene glycol (MIRALAX) packet 17 g, 17 g, Oral, DAILY PRN, Catie Chapa MD    ondansetron (ZOFRAN ODT) tablet 4 mg, 4 mg, Oral, Q8H PRN **OR** ondansetron (ZOFRAN) injection 4 mg, 4 mg, IntraVENous, Q6H PRN, Mahogany Chapa MD    dexamethasone (DECADRON) 4 mg/mL injection 4 mg, 4 mg, IntraVENous, Q6H, Mahogany Chapa MD, 4 mg at 08/06/22 0610    Current Outpatient Medications   Medication Instructions    amLODIPine (NORVASC) 5 mg, Oral, DAILY    apixaban (ELIQUIS) 2.5 mg, Oral, 2 TIMES DAILY    aspirin delayed-release 81 mg, Oral, DAILY    atorvastatin (LIPITOR) 20 mg, Oral, DAILY    cholecalciferol (vitamin D3) 2,000 Units, Oral, EVERY BEDTIME    dexAMETHasone (DECADRON) 4 mg, Oral, 2 TIMES DAILY    doxazosin (CARDURA) 4 mg, Oral, DAILY    DULoxetine (CYMBALTA) 30 mg, Oral, DAILY    mirtazapine (REMERON) 15 mg, Oral, EVERY BEDTIME    ranolazine ER (RANEXA) 500 mg, Oral, 2 TIMES DAILY         Signed By: Horacio Cobb     August 6, 2022

## 2022-08-06 NOTE — PROGRESS NOTES
Consult  Pulmonary, Critical Care    Name: Sylwia Wilder MRN: [id number]   : 1929 Hospital: [facility]   Date: 2022  Admission date: 2022 Hospital Day: 11       Subjective/Interval History:   Recent COVID pneumonitis improved with Decadron and baricitinib wean to room air was discharged to rehab facility yesterday at the rehab facility he had development of hypoxia than fever was transferred back to our emergency room T-max 101.5. Wife states that yesterday he did have some green-tinged sputum. Chest x-ray shows diffuse bilateral infiltrates unchanged. There has been no nausea vomiting choking while eating   remains on BiPAP. Blood gas this morning on nasal oxygen showed hypoxia but currently he is running 94% on 5 L awake alert no specific complaints   more anxiety last evening could not tolerate BiPAP and was placed on high flow. Still somewhat anxious today IM Vistaril seem to help   awake alert tolerated nasal high flow all night but FiO2 is 90%. Not able to eat due to shortness of breath have talked with him about NG tube for tube feedings and he agrees   awake alert confused FiO2 down to 50%. Nurse attempted NG insertion  and . As soon as the tube was placed in his nose he pulled it and refused to have any further attempt he remains malnourished   awake alert confused refused NG insertion yesterday currently he states he will allow it when his wife is here  8/3 remains confused awake alert again refused NG insertion yesterday. Wife wanted to proceed with peripheral nutrition.   With his valvular heart disease he would likely not tolerate the volumes required likewise with central hyperalimentation he likely would not tolerate the volumes and it also raises the risk of infection   awake confused for possible PEG tube placement today  8 awake confused PEG tube rescheduled for today   large hiatal hernia with stomach mostly in the chest PEG tube could not be placed. Surgical consult yesterday for consideration of surgical feeding tube placement for next week. FiO2 down to 50% but he is more confused seems more short of breath    Patient Active Problem List   Diagnosis Code    Carotid stenosis I65.29    Hypoxia R09.02    COVID U07.1    Sepsis ([address] A41.9    Severe protein-calorie malnutrition ([facility] [address] E43       IMPRESSION:   Acute hypoxic respiratory failure currently on nasal high flow FiO2 50%   Healthcare associated pneumonia Serratia on culture on Zosyn day 11  Diffuse pulmonary infiltrates questionable pneumonia versus inflammatory response from COVID versus pulmonary edema from diastolic dysfunction and AR  Large hiatal hernia may be having reflux  Oral thrush resolved  Anemia improved after transfusion 8/2 hemoglobin 7.8 today on Protonix twice daily  Hypokalemia to be repleted  Hypophosphatemia repleted  Hypocalcemia repleted  Recent COVID pneumonitis  Atrial fibrillation  Moderate aortic regurg  Moderate to severe mitral regurg  Diastolic left ventricular dysfunction  Pyuria rule out UTI no culture results  Elevated BNP  Anxiety  Body mass index is 18.09 kg/m². RECOMMENDATIONS/PLAN:   nasal high flow FiO2 50%   Anxiety continue 3 times daily dosing of Vistaril  Continue IV Zosyn nasal MRSA smear positive blood culture no growth sputum culture occasional polys with Serratia does not appear as if urine was ever sent  BNP remains elevated chest x-ray with diffuse congestion creatinine is normal repeat Lasix today  For PEG tube placement today  Will add Reglan for possible reflux from large hiatal hernia. If any worsening confusion would have to discontinue it         Subjective/Initial History:   I have reviewed the flowsheet and previous  notes. I was asked by Raffi Montiel MD to see Diana Spencerks a 80 y.o.    male  in consultation for a chief complaint of acute hypoxic respiratory failure fever recent COVID pneumonitis        Patient PCP: Cornelio Roche MD  PMH:  has a past medical history of Atrial fibrillation ([facility] [address] COVID-19 vaccine series completed (03/2021), Hypercholesteremia, Hypertension, Neuropathy, and Stroke ([facility] [address] (08/2021). PSH:   has a past surgical history that includes pr abdomen surgery proc unlisted (Right) and hx cataract removal (Left, 2018). FHX: family history is not on file. SHX:  reports that he has quit smoking. He has never used smokeless tobacco. He reports that he does not use drugs.   Systemic review  General weight has been stable the last several days he has not noted fever chills or sweats until this morning  Eyes no double vision or momentary blindness  ENT no facial pain over the sinuses  Musculoskeletal no swollen tender joints no myalgias  Endocrinologic no polyuria polydipsia  Neurologic no seizures or syncope awake and alert oriented  Gastrointestinal no choking or gagging when eating no nausea vomiting no indigestion  Genitourinary no pain or discomfort no burning  Cardiovascular has not noted ankle edema chest pain diaphoresis or nocturia  Respiratory abrupt worsening shortness of breath last night associated with fever    No Known Allergies   MEDS:   Current Facility-Administered Medications   Medication    TPN ADULT-PERIPHERAL AA 4.25% D5% + ELECTROLYTES    fat emulsion 20% (LIPOSYN, INTRAlipid) infusion 250 mL    pantoprazole (PROTONIX) tablet 40 mg    lidocaine (XYLOCAINE) 2 % jelly    0.9% sodium chloride infusion 250 mL    nystatin (MYCOSTATIN) 100,000 unit/mL oral suspension 500,000 Units    hydrOXYzine (VISTARIL) injection 25 mg    hydrOXYzine pamoate (VISTARIL) capsule 25 mg    piperacillin-tazobactam (ZOSYN) 3.375 g in 0.9% sodium chloride (MBP/ADV) 100 mL MBP    [Held by provider] apixaban (ELIQUIS) tablet 2.5 mg    aspirin delayed-release tablet 81 mg    atorvastatin (LIPITOR) tablet 20 mg    doxazosin (CARDURA) tablet 4 mg    DULoxetine (CYMBALTA) capsule 30 mg mirtazapine (REMERON) tablet 15 mg    ranolazine ER (RANEXA) tablet 500 mg    acetaminophen (TYLENOL) tablet 650 mg    Or    acetaminophen (TYLENOL) suppository 650 mg    polyethylene glycol (MIRALAX) packet 17 g    ondansetron (ZOFRAN ODT) tablet 4 mg    Or    ondansetron (ZOFRAN) injection 4 mg    dexamethasone (DECADRON) 4 mg/mL injection 4 mg        Current Facility-Administered Medications:     TPN ADULT-PERIPHERAL AA 4.25% D5% + ELECTROLYTES, , IntraVENous, CONTINUOUS, Mahogany Chapa MD, Last Rate: 42 mL/hr at 08/05/22 2200, New Bag at 08/05/22 2200    fat emulsion 20% (LIPOSYN, INTRAlipid) infusion 250 mL, 250 mL, IntraVENous, CONTINUOUS, Mahogany Chapa MD, Last Rate: 20.83 mL/hr at 08/05/22 2200, 250 mL at 08/05/22 2200    pantoprazole (PROTONIX) tablet 40 mg, 40 mg, Oral, BID, Mahogany Chapa MD, 40 mg at 08/05/22 2121    lidocaine (XYLOCAINE) 2 % jelly, , Mucous Membrane, PRN, Paul Carr MD    0.9% sodium chloride infusion 250 mL, 250 mL, IntraVENous, PRN, Garrett Browning MD    nystatin (MYCOSTATIN) 100,000 unit/mL oral suspension 500,000 Units, 500,000 Units, Oral, QID, Bharath Johnson MD, 500,000 Units at 08/05/22 2121    hydrOXYzine (VISTARIL) injection 25 mg, 25 mg, IntraMUSCular, Q6H PRN, Mahogany Chapa MD, 25 mg at 08/01/22 1307    hydrOXYzine pamoate (VISTARIL) capsule 25 mg, 25 mg, Oral, Q8H, Paul Carr MD, 25 mg at 08/05/22 2121    piperacillin-tazobactam (ZOSYN) 3.375 g in 0.9% sodium chloride (MBP/ADV) 100 mL MBP, 3.375 g, IntraVENous, Q8H, Mahogany Chapa MD, Last Rate: 25 mL/hr at 08/06/22 0325, 3.375 g at 08/06/22 0325    [Held by provider] apixaban (ELIQUIS) tablet 2.5 mg, 2.5 mg, Oral, BID, Mahogany Chapa MD, 2.5 mg at 08/03/22 2010    aspirin delayed-release tablet 81 mg, 81 mg, Oral, DAILY, Mahogany Chapa MD, 81 mg at 08/03/22 0902    atorvastatin (LIPITOR) tablet 20 mg, 20 mg, Oral, DAILY, Mahogany Chapa MD, 20 mg at 08/04/22 0856    doxazosin (CARDURA) tablet 4 mg, 4 mg, Oral, QHS, Mahogany Chapa MD, 4 mg at 22    DULoxetine (CYMBALTA) capsule 30 mg, 30 mg, Oral, DAILY, Mahogany Chapa MD, 30 mg at 22 08    mirtazapine (REMERON) tablet 15 mg, 15 mg, Oral, QHS, Mahogany Chapa MD, 15 mg at 22    ranolazine ER (RANEXA) tablet 500 mg, 500 mg, Oral, BID, Gautam Chapa MD, 500 mg at 22    acetaminophen (TYLENOL) tablet 650 mg, 650 mg, Oral, Q6H PRN, 650 mg at 22 **OR** acetaminophen (TYLENOL) suppository 650 mg, 650 mg, Rectal, Q6H PRN, Mahogany Chapa MD    polyethylene glycol (MIRALAX) packet 17 g, 17 g, Oral, DAILY PRN, Mahogany Chapa MD    ondansetron (ZOFRAN ODT) tablet 4 mg, 4 mg, Oral, Q8H PRN **OR** ondansetron (ZOFRAN) injection 4 mg, 4 mg, IntraVENous, Q6H PRN, Mahogany Chapa MD    dexamethasone (DECADRON) 4 mg/mL injection 4 mg, 4 mg, IntraVENous, Q6H, Mahogany Chapa MD, 4 mg at 22 0610      Objective:     Vital Signs: Telemetry:    AFIB Intake/Output:   Visit Vitals  /64 (BP 1 Location: Left upper arm, BP Patient Position: At rest)   Pulse 90   Temp (!) 96.3 °F (35.7 °C)   Resp 19   Ht 5' 5.98\" (1.676 m)   Wt 50.8 kg (111 lb 15.9 oz)   SpO2 91%   BMI 18.09 kg/m²       Temp (24hrs), Av.5 °F (36.4 °C), Min:96.3 °F (35.7 °C), Max:98.2 °F (36.8 °C)        O2 Device: Heated, Hi flow nasal cannula, Humidifier O2 Flow Rate (L/min): 50 l/min         Body mass index is 18.09 kg/m². Wt Readings from Last 4 Encounters:   22 50.8 kg (111 lb 15.9 oz)   22 54.4 kg (120 lb)   02/10/22 54.4 kg (120 lb)   21 54.4 kg (120 lb)          Intake/Output Summary (Last 24 hours) at 2022 0833  Last data filed at 2022 0600  Gross per 24 hour   Intake 927.64 ml   Output 1550 ml   Net -622.36 ml         Last shift:      No intake/output data recorded.   Last 3 shifts:  1901 -  0700  In: 1551 [I.V.:1551]  Out:  [Urine:]       Hemodynamics: CO:    CI:    CVP:    SVR:   PAP Systolic:    PAP Diastolic:    PVR:    LM20:       Ventilator Settings:      Mode Rate TV Press PEEP FiO2 PIP Min. Vent               50 %     6.8 l/min      Physical Exam:    General:  male; in bed on nasal high flow oxygen seems more anxious and confused today  HEENT: NCAT,   Eyes: anicteric; conjunctiva clear extraocular movements intact  Neck: no nodes, no accessory MM use. No definite JVD  Chest: no deformity,   Cardiac: Irregular rate and rhythm occasional PVCs has somewhat harsh systolic murmur  Lungs: Clear anteriorly with rales posteriorly no wheezing  Abd: Thin soft positive bowel sounds no tenderness  Ext: Trace edema; no joint swelling;  No clubbing  : clear urine  Neuro: Awake alert anxious more confused hard of hearing follow simple commands moves all 4 extremities  Psych-anxious, mildly confused;   Skin: warm, dry, no cyanosis;   Pulses: Brachial and radial pulses intact  Capillary: Normal capillary refill      Labs:    Recent Labs     08/06/22  0400 08/05/22  0345 08/04/22  0255   WBC 13.0* 15.1* 16.2*   HGB 7.8* 8.2* 8.1*   PLT 88* 114* 126*       Recent Labs     08/06/22  0400 08/05/22  0345 08/04/22  0255     137 137  137 139  140   K 3.3*  3.3* 3.6  3.6 3.8  3.8     102 103  101 106  105   CO2 30  30 30  30 30  31   *  153* 123*  123* 124*  124*   BUN 33*  33* 33*  33* 30*  29*   CREA 0.52*  0.52* 0.58*  0.56* 0.66*  0.64*   CA 7.0*  7.1* 7.6*  7.6* 7.4*  7.4*   MG  --   --  2.1   PHOS 2.8 3.2 2.9  2.8   ALB 2.0*  2.0* 2.1*  2.1* 2.2*  2.1*   ALT 36 41 45       8/4 nasal high flow 60 L 75% with saturation 98%   8/1 nasal high flow 60 L FiO2 80%   COVID-19 antigen remains positive   2758 2592, 2705 3310  CRP 4.79, 5.15 1.73 0.64, 2.02 3.26, 6.18, 9.2 16.1  Procalcitonin less than 0.05 0.15  Urinalysis with 20-50 WBCs  Blood culture no growth  Sputum occasional polys with Serratia  Echo ejection fraction 03% diastolic dysfunction moderate aortic regurg moderate to severe mitral regurg left atrium 2.6 cm RVSP 25  Imaging:  I have personally reviewed the patients radiographs and have reviewed the reports:    CXR Results  (Last 48 hours)                 08/06/22 0232  XR CHEST PORT Final result    Impression:      No change in mild bilateral airspace disease. Chronic interstitial changes. Narrative:  EXAM: XR CHEST PORT       DATE: 8/6/2022 2:32 AM       INDICATION: Respiratory failure       COMPARISON: Chest radiograph August 5, 2022       FINDINGS: AP portable chest radiograph. There is an implanted loop recorder. Heart size is normal. Bilateral airspace disease is noted. No new lung   infiltrate is seen. Background chronic interstitial changes are again noted. No   definite effusion or pneumothorax is seen. Chronic right-sided rib deformities   are noted. 08/05/22 0335  XR CHEST PORT Final result    Impression:  Stable bilateral airspace disease. Narrative:  EXAM: Portable CXR. 0333 hours. COMPARISON: 8/4/2022. INDICATION: Respiratory failure       FINDINGS:   The lungs show unchanged low volumes and bilateral airspace disease/edema. Heart   is normal in size. There is no pneumothorax. There is no sizable pleural   effusion. Rib deformities and subcutaneous cardiac loop recorder are again   noted. Results from Hospital Encounter encounter on 07/27/22    XR CHEST PORT    Narrative  EXAM: XR CHEST PORT    DATE: 8/6/2022 2:32 AM    INDICATION: Respiratory failure    COMPARISON: Chest radiograph August 5, 2022    FINDINGS: AP portable chest radiograph. There is an implanted loop recorder. Heart size is normal. Bilateral airspace disease is noted. No new lung  infiltrate is seen. Background chronic interstitial changes are again noted. No  definite effusion or pneumothorax is seen. Chronic right-sided rib deformities  are noted.     Impression  No change in mild bilateral airspace disease. Chronic interstitial changes. XR CHEST PORT    Narrative  EXAM: Portable CXR. 0333 hours. COMPARISON: 8/4/2022. INDICATION: Respiratory failure    FINDINGS:  The lungs show unchanged low volumes and bilateral airspace disease/edema. Heart  is normal in size. There is no pneumothorax. There is no sizable pleural  effusion. Rib deformities and subcutaneous cardiac loop recorder are again  noted. Impression  Stable bilateral airspace disease. XR CHEST PORT    Narrative  history: Feeding tube placement    COMPARISON: 8/3/2022    FINDINGS:    Frontal chest radiograph submitted for review. Feeding tube terminates in the region of the distal esophagus and should be  advanced at least 10 to 15 cm. Diffuse bilateral interstitial lung opacities are  again seen. Small bilateral pleural effusions. No evidence for pneumothorax. Impression  Feeding tube terminates in the region of the distal esophagus and should be  advanced at least 10 to 15 cm    Results from Hospital Encounter encounter on 02/10/22    CT HEAD WO CONT    Narrative  Technique: Multiple continuous axial images were obtained from the skull base to  the vertex without administration of intravenous contrast.    Comment on dose reduction: All CT scans at this facility are performed using  dose reduction optimization technique as appropriate to perform the exam  including the following; automated exposure control, adjustments of the mA  and/or kV according to patient size, or use of iterative reconstructed  technique. Comparison examination: None    Findings:  The ventricles and sulci are prominent consistent with age-related changes of  atrophy. Periventricular hypodensity is identified consistent with changes of  chronic small vessel disease. No evidence of midline shift, mass lesion, or  extra-axial fluid collection.  No evidence of parenchymal hemorrhage or  infarction in a major vascular territory. The osseous structures are intact, the paranasal sinuses are clear. Extracalvarial soft tissue hematoma posterior vertex . Impression  No acute intracranial process. Extracalvarial soft tissue hematoma posterior  vertex . Session fever with recent hospitalization possible healthcare associated pneumonia agree with Zosyn vancomycin and Zithromax. We will attempt to collect sputum for culture. Have requested urine culture blood cultures have already been obtained. He has a significant systolic murmur with elevated BNP he may have mitral regurg giving him some degree of fluid overload he only has trace edema. We will check an echocardiogram.  Creatinine is normal will decrease IV fluids for the time being. Chest x-ray with diffuse infiltrates thought to be residual inflammatory change from COVID pneumonitis but may represent some degree of fluid overload. Oxygenation is well-maintained now on noninvasive ventilator have decreased FiO2 to 45% will attempt conversion to nasal oxygen in a.m.  728 awake alert currently on noninvasive ventilator have placed on 5 L oxygen saturation 94%. If he maintains that we will leave him on nasal oxygen today and use noninvasive ventilator at night. Nursing noted that when he drifts off to sleep he will have desaturation. BNP remains elevated neck veins are chest visible chest x-ray still diffuse infiltrates we will give 1 dose Lasix today  7/29 anxious intolerant of BiPAP last evening switch to nasal high flow. Chest x-ray is unchanged with diffuse infiltrates questionable healthcare associated pneumonia COVID inflammatory reaction or some degree pulmonary edema from diastolic dysfunction aortic regurg and mitral regurg repeat Lasix today replenish potassium and phosphorus nasal MRSA smear is positive we will continue vancomycin Zosyn and Zithromax remains on baricitinib and Decadron  7/30 much calmer today questionably due to Vistaril.   Potassium remains low will supplement. Still has trace edema with normal creatinine we will repeat Lasix today. Have talked with him about placing NG tube for tube feedings and he agrees  7/31 alert awake oral thrush on 90% FiO2 intermittent agitation as per nurse  8/1 awake alert confused refusing NG tube once wife has a chance to talk with him we will attempt reinsertion. Serratia in sputum currently on Zosyn. Diffuse accentuated interstitial markings persist we will repeat Lasix today  8/2 still refusing NG tube eating very little. Creatinine and BUN up slightly with Lasix dosing yesterday. He is to receive a unit of blood replace potassium we will repeat Lasix today after blood we will try once again to place NG tube  8/3 again refused NG insertion yesterday. Wife is requesting IV. With his valvular heart disease he very likely would not tolerate the volumes of fluid needed for adequate nutrition. Will attempt to place NG tube once the wife is here  8/4 NG tube yesterday went into lung with no cough reflex. For possible PEG tube placement today. Chest x-ray still shows diffuse congestion BNP remains elevated creatinine stable we will repeat Lasix today. 8/5 PEG tube not placed yesterday rescheduled for today. Have decreased FiO2 to 60% we will try to maintain that today. Creatinine remains normal we will repeat Lasix  8/6 unable to place PEG tube due to large hiatal hernia. Surgical consultation to consider surgical feeding tube placement. FiO2 down to 50% but he is more confused and anxious today. Chest x-ray to me looks slightly less dense infiltrates although read as unchanged by radiology. Creatinine remains stable we will repeat Lasix and replace potassium  Time of care 30 minutes           Thank you for allowing us to participate in the care of this patient.   We will follow along with you     Shanthi Joel MD

## 2022-08-06 NOTE — PROGRESS NOTES
SPEECH LANGUAGE PATHOLOGY BEDSIDE SWALLOW EVALUATION  Patient: Kristopher Aguirre (30 y.o. male)  Date: 8/6/2022  Primary Diagnosis: COVID [U07.1]  Sepsis ([address] [A41.9]  Procedure(s) (LRB):  PERCUTANEOUS ENDOSCOPIC GASTROSTOMY TUBE INSERTION (N/A)  ESOPHAGOGASTRODUODENOSCOPY (EGD) (N/A) 1 Day Post-Op   Precautions: aspiration/GERD       ASSESSMENT :  Based on the objective data described below, the patient presents with moderate pharyngeal dysphagia. Per MD note, PEG tube placement was attempted previous date, \"unable to be placed due to large hiatal hernia with almost entire stomach in chest.\" PEG pending at time of evaluation. GI MD provided consent via phone for bedside swallow evaluation. Nsg reports patient exhibited s/sx aspiration/penetration with thin liquids earlier this date, accepting minimal po. RD following. Entered room as MD exited. Patient asleep in bed, awakened with verbal cues. Oriented to self, confusion noted. Patient's wife present in room, reports having to really encourage him to \"eat/drink anything. \" Repositioned HOB upright as much as tolerated, slightly reclined. HFNC 50%, O2 sats 92% prior to po. Patient agreeable to eval, accepts thin liquid, mildly thick liquid, and pudding. Oral phase c/b prolonged but adequate bolus manipulation with pudding, bolus control appears wfl, a-p transit delayed intermittently. Pharyngeal phase c/b swallow initiation with mild delay, and hyolaryngeal excursion and protraction mildly reduced to palpation. Double swallow observed. Clinical indicators asp/pen c/b delayed cough and throat clear with thin. After some trials, patient immediately began talking loudly, saying \"please please. \" When asked what he wanted, patient stated \"more\" or \"I need to breathe. \" Verbal cues and education provided regarding need for slow rate of intake. O2 sats 90-92% throughout.  Patient with episodes of apparent confusion and feeling anxious, which negatively impacted overall swallow function and respiratory/swallowing coordination. Patient will benefit from skilled intervention to address the above impairments. Patients rehabilitation potential is considered to be Fair. PLAN :  Recommendations and Planned Interventions:  Recommend downgrade diet to puree with mildly thick liquids if/as cleared by MD. Geo Rivera aspiration and GERD precautions. 1:1 feeding assistance with all po, FEED ONLY WHEN AWAKE AND ALERT, SLOW rate of intake, allow extra time for double swallow, alternate solids/liquids, small sips, small bites, small more frequent meals vs 3 large meals, maintain HOB elevation 90 degrees for at least 90 minutes after meals. ST to cont to follow for diet tolerance, po trials, and patient/caregiver edu. MBS if/as indicated. Frequency/Duration: Patient will be followed by speech-language pathology 5 times a week to address goals. Discharge Recommendations: To Be Determined     SUBJECTIVE:   Patient's wife reports patient with decreased appetite recently and states this occurred \"a few years ago too, right before he had pna.\" Patient and wife deny hx dysphagia. Patient indicates difficulty swallowing currently but unable to elaborate. OBJECTIVE:     CXR Results  (Last 48 hours)                 08/06/22 0232  XR CHEST PORT Final result    Impression:      No change in mild bilateral airspace disease. Chronic interstitial changes. Narrative:  EXAM: XR CHEST PORT       DATE: 8/6/2022 2:32 AM       INDICATION: Respiratory failure       COMPARISON: Chest radiograph August 5, 2022       FINDINGS: AP portable chest radiograph. There is an implanted loop recorder. Heart size is normal. Bilateral airspace disease is noted. No new lung   infiltrate is seen. Background chronic interstitial changes are again noted. No   definite effusion or pneumothorax is seen. Chronic right-sided rib deformities   are noted.            08/05/22 0205  XR CHEST PORT Final result Impression:  Stable bilateral airspace disease. Narrative:  EXAM: Portable CXR. 0333 hours. COMPARISON: 8/4/2022. INDICATION: Respiratory failure       FINDINGS:   The lungs show unchanged low volumes and bilateral airspace disease/edema. Heart   is normal in size. There is no pneumothorax. There is no sizable pleural   effusion. Rib deformities and subcutaneous cardiac loop recorder are again   noted. CT Results  (Last 48 hours)      None             Past Medical History:   Diagnosis Date    Atrial fibrillation ([facility] [address]     COVID-19 vaccine series completed 03/2021    Hypercholesteremia     Hypertension     Neuropathy     Stroke ([facility] [address] 08/2021    TIA     Past Surgical History:   Procedure Laterality Date    HX CATARACT REMOVAL Left 2018    IOL    KY ABDOMEN SURGERY PROC UNLISTED Right     IHR     Prior Level of Function/Home Situation:   Home Situation  Home Environment: Private residence  Wheelchair Ramp: Yes  One/Two Story Residence: Two story, live on 1st floor  Living Alone: No  Support Systems: Spouse/Significant Other  Patient Expects to be Discharged to[de-identified] Skilled nursing facility  Current DME Used/Available at Home: [employer] rollator  Diet prior to admission: regular/thin per patient's wife  Current Diet:  regular/thin; PEG TF orders also entered although PEG not yet placed. Cognitive and Communication Status:  Neurologic State: Drowsy, Confused, Eyes open to voice  Orientation Level: Oriented to person, Disoriented to place, Disoriented to situation, Disoriented to time  Cognition: Decreased attention/concentration, Decreased command following           Swallowing Evaluation:   Oral Assessment:  Oral Assessment  Labial: No impairment  Dentition: Limited  Oral Hygiene: fair  Lingual: No impairment  Velum: No impairment  Mandible: No impairment    P.O. Trials:  Patient Position: upright as much as tolerated, slightly reclined  Vocal quality prior to P.O.: Hoarse; Low volume  Consistency Presented: Ice chips; Thin liquid; Nectar thick liquid;Pudding  How Presented: SLP-fed/presented;Cup/sip;Spoon;Straw                                                 Voice:                 Vocal Quality: Hoarse;Low volume                               Pain:  Pain Scale 1: Adult Nonverbal Pain Scale  Pain Intensity 1: 0       After treatment:   Patient left in no apparent distress in bed, Call bell within reach, Nursing notified, and Caregiver / family present    COMMUNICATION/EDUCATION:   Patient was educated regarding purpose of SLP assessment, POC, diet recs and sw safety precautions. Patient demonstrated Guarded understanding as evidenced by drowsiness, decreased attention/concentration. Patient's wife expressed understanding of education and recs. The patient's plan of care including recommendations, planned interventions, and recommended diet changes were discussed with: Registered nurse. Patient is unable to participate in goal setting and plan of care. Thank you for this referral.  Dean James M.S. [facility]                  Problem: Dysphagia (Adult)  Goal: *Acute Goals and Plan of Care (Insert Text)  Description: Speech Therapy Swallow Goals  Initiated 8/6/2022  -patient stated goal: unable to state s/t confusion    -Patient will tolerate puree diet with mildly thick liquids without clinical indicators of aspiration given min-mod cues within 7 day(s). -Patient will tolerate PO trials without clinical indicators of aspiration given min-mod cues within 7 day(s). -Patient will participate in modified barium swallow study within 7 day(s). -Patient will demonstrate understanding of swallow safety precautions and aspiration precautions, diet recs with min-mod cues within 7 day(s).           Outcome: Not Met

## 2022-08-06 NOTE — CONSULTS
[id number] [facility] SURGERY CONSULT          Chief Complaint: None    History of Present Illness:    Mr. Tracee Lovell is a 80y.o. year old * male admitted to ICU with recurrent Covid pneumonia currently on high flow nasal oxygen. Due to poor PO intake PEG tube was attempted today and was unable to be placed due to large hiatal hernia with almost entire stomach in chest. Hence surgical consult requested for placement of feeding tube. Past Medical History:   Past Medical History:   Diagnosis Date    Atrial fibrillation ([facility] [address]     COVID-19 vaccine series completed 03/2021    Hypercholesteremia     Hypertension     Neuropathy     Stroke ([facility] [address] 08/2021    TIA       Past Surgical History:   Past Surgical History:   Procedure Laterality Date    HX CATARACT REMOVAL Left 2018    IOL    KY ABDOMEN SURGERY PROC UNLISTED Right     IHR        Allergy:No Known Allergies    Social History:  reports that he has quit smoking. He has never used smokeless tobacco. He reports that he does not use drugs. Family History:No family history on file.      Current Medications:  Current Facility-Administered Medications:     ePHEDrine sulfate (AKOVAZ) 50 mg/mL injection, , , ,     TPN ADULT-PERIPHERAL AA 4.25% D5% + ELECTROLYTES, , IntraVENous, CONTINUOUS, Mahogany Chapa MD, Last Rate: 42 mL/hr at 08/05/22 2200, New Bag at 08/05/22 2200    fat emulsion 20% (LIPOSYN, INTRAlipid) infusion 250 mL, 250 mL, IntraVENous, CONTINUOUS, Mahogany Chapa MD, Last Rate: 20.83 mL/hr at 08/05/22 2200, 250 mL at 08/05/22 2200    pantoprazole (PROTONIX) tablet 40 mg, 40 mg, Oral, BID, Mahogany Chapa MD, 40 mg at 08/05/22 2121    lidocaine (XYLOCAINE) 2 % jelly, , Mucous Membrane, PRN, Renetta Piña MD    0.9% sodium chloride infusion 250 mL, 250 mL, IntraVENous, PRN, Garrett Browning MD    nystatin (MYCOSTATIN) 100,000 unit/mL oral suspension 500,000 Units, 500,000 Units, Oral, QID, Bharath Johnson MD, 500,000 Units at 08/05/22 2121    hydrOXYzine (VISTARIL) injection 25 mg, 25 mg, IntraMUSCular, Q6H PRN, Mahogany Chapa MD, 25 mg at 08/01/22 1307    hydrOXYzine pamoate (VISTARIL) capsule 25 mg, 25 mg, Oral, Q8H, Jeri Baer MD, 25 mg at 08/05/22 2121    piperacillin-tazobactam (ZOSYN) 3.375 g in 0.9% sodium chloride (MBP/ADV) 100 mL MBP, 3.375 g, IntraVENous, Q8H, Mahogany Chapa MD, Last Rate: 25 mL/hr at 08/05/22 1933, 3.375 g at 08/05/22 1933    [Held by provider] apixaban (ELIQUIS) tablet 2.5 mg, 2.5 mg, Oral, BID, Mahogany Chapa MD, 2.5 mg at 08/03/22 2010    aspirin delayed-release tablet 81 mg, 81 mg, Oral, DAILY, Mahogany Chapa MD, 81 mg at 08/03/22 5115    atorvastatin (LIPITOR) tablet 20 mg, 20 mg, Oral, DAILY, Mahogany Chapa MD, 20 mg at 08/04/22 0856    doxazosin (CARDURA) tablet 4 mg, 4 mg, Oral, QHS, Mahogany Chapa MD, 4 mg at 08/05/22 2121    DULoxetine (CYMBALTA) capsule 30 mg, 30 mg, Oral, DAILY, Mahogany Chapa MD, 30 mg at 08/04/22 0856    mirtazapine (REMERON) tablet 15 mg, 15 mg, Oral, QHS, Mahogany Chapa MD, 15 mg at 08/05/22 2121    ranolazine ER (RANEXA) tablet 500 mg, 500 mg, Oral, BID, Gianna Chapa MD, 500 mg at 08/05/22 2121    acetaminophen (TYLENOL) tablet 650 mg, 650 mg, Oral, Q6H PRN, 650 mg at 07/30/22 2150 **OR** acetaminophen (TYLENOL) suppository 650 mg, 650 mg, Rectal, Q6H PRN, Mahogany Chapa MD    polyethylene glycol (MIRALAX) packet 17 g, 17 g, Oral, DAILY PRN, Mahogany Chapa MD    ondansetron (ZOFRAN ODT) tablet 4 mg, 4 mg, Oral, Q8H PRN **OR** ondansetron (ZOFRAN) injection 4 mg, 4 mg, IntraVENous, Q6H PRN, Mahogany Chapa MD    dexamethasone (DECADRON) 4 mg/mL injection 4 mg, 4 mg, IntraVENous, Q6H, Mahogany Chapa MD, 4 mg at 08/05/22 1828     Immunization History:   Most Recent Immunizations   Administered Date(s) Administered    COVID-19, PFIZER PURPLE top, DILUTE for use, (age 15 y+), IM, 30mcg/0.3mL 03/03/2021    Tdap 02/10/2022      Complete    Review of Systems: Constitutional:  no fever,  no chills,  no sweats, No weakness, fatigue, No decreased activity. Eye: No recent visual problem, No icterus, No discharge, No double vision. Ear/Nose/Mouth/Throat: No decreased hearing, No ear pain, No nasal congestion, No sore throat. Respiratory:  shortness of breath, No cough, No sputum production, No hemoptysis, No wheezing, No cyanosis. Cardiovascular: No chest pain, No palpitations, No bradycardia, No tachycardia, No peripheral edema, No syncope. Gastrointestinal: No nausea,  No vomiting, No diarrhea, No constipation, No heartburn,  No abdominal pain. Genitourinary: No dysuria, No hematuria, No change in urine stream, No urethral discharge, No lesions. Hematology/Lymphatics: No bruising tendency, No bleeding tendency, No petechiae, No swollen lymph glands. Endocrine: No excessive thirst, No polyuria, No cold intolerance, No heat intolerance, No excessive hunger. Immunologic: Not immunocompromised, No recurrent fevers, No recurrent infections. Musculoskeletal: No back pain, No neck pain, No joint pain, No muscle pain, No claudication, No decreased range of motion, No trauma. Integumentary: No rash, No pruritus, No abrasions. Neurologic: Alert and oriented X4, No abnormal balance, No headache, No confusion, No numbness, No tingling. Psychiatric: No anxiety, No depression, No leandra. Physical Exam:     Vitals & Measurements:     Wt Readings from Last 3 Encounters:   08/04/22 50.8 kg (111 lb 15.9 oz)   07/18/22 54.4 kg (120 lb)   02/10/22 54.4 kg (120 lb)     Temp Readings from Last 3 Encounters:   08/05/22 98 °F (36.7 °C)   07/26/22 99.6 °F (37.6 °C)   02/10/22 98 °F (36.7 °C)     BP Readings from Last 3 Encounters:   08/05/22 123/76   07/26/22 106/69   02/10/22 (!) 160/100     Pulse Readings from Last 3 Encounters:   08/05/22 95   07/26/22 91   02/10/22 92      Ht Readings from Last 3 Encounters:   08/05/22 5' 5.98\" (1.676 m)   07/20/22 5' 7\" (1.702 m)   02/10/22 5' 5\" (1.651 m)          General: ill appearing, mild distress  Head: Normal  Face: Nornal  HEENT: atraumatic, PERRLA, moist mucosa, normal pharynx, normal tonsils and adenoids, normal tongue, no fluid in sinuses  Neck: Trachea midline, no carotid bruit, no masses  Chest: Normal.  Respiratory: Normal chest wall expansion, CTA B, no r/r/w, no rubs, on high flow nasal O2. Cardiovascular: RRR, no m/r/g, Normal S1 and S2  Abdomen: Soft, non tender, non-distended, normal bowel sounds in all quadrants, no hepatosplenomegaly, no tympany. Incision scar: none  Genitourinary: No inguinal hernia, normal external gentalia, Testis & scrotum normal, no renal angle tenderness  Rectal: deferred  Musculoskeletal: normal ROM in upper and lower extremities, No joint swelling.   Integumentary: Warm, dry, and pink, with no rash, purpura, or petechia  Heme/Lymph: No lymphadenopathy, no bruises  Neurological:Cranial Nerves II-XII grossly intact, no gross motor or sensory deficit  Psychiatric: Cooperative with normal mood, affect, and cognition      Laboratory Values:   Recent Results (from the past 24 hour(s))   RENAL FUNCTION PANEL    Collection Time: 08/05/22  3:45 AM   Result Value Ref Range    Sodium 137 136 - 145 mmol/L    Potassium 3.6 3.5 - 5.1 mmol/L    Chloride 101 97 - 108 mmol/L    CO2 30 21 - 32 mmol/L    Anion gap 6 5 - 15 mmol/L    Glucose 123 (H) 65 - 100 mg/dL    BUN 33 (H) 6 - 20 mg/dL    Creatinine 0.56 (L) 0.70 - 1.30 mg/dL    BUN/Creatinine ratio 59 (H) 12 - 20      GFR est AA >60 >60 ml/min/1.73m2    GFR est non-AA >60 >60 ml/min/1.73m2    Calcium 7.6 (L) 8.5 - 10.1 mg/dL    Phosphorus 3.2 2.6 - 4.7 mg/dL    Albumin 2.1 (L) 3.5 - 5.0 g/dL   C REACTIVE PROTEIN, QT    Collection Time: 08/05/22  3:45 AM   Result Value Ref Range    C-Reactive protein 5.15 (H) 0.00 - 0.60 mg/dL   CBC WITH AUTOMATED DIFF    Collection Time: 08/05/22  3:45 AM   Result Value Ref Range    WBC 15.1 (H) 4.1 - 11.1 K/uL    RBC 2.62 (L) 4.10 - 5.70 M/uL    HGB 8.2 (L) 12.1 - 17.0 g/dL    HCT 24.8 (L) 36.6 - 50.3 %    MCV 94.7 80.0 - 99.0 FL    MCH 31.3 26.0 - 34.0 PG    MCHC 33.1 30.0 - 36.5 g/dL    RDW 15.2 (H) 11.5 - 14.5 %    PLATELET 297 (L) 701 - 400 K/uL    MPV 10.8 8.9 - 12.9 FL    NRBC 0.4 (H) 0.0  WBC    ABSOLUTE NRBC 0.06 (H) 0.00 - 0.01 K/uL    NEUTROPHILS 97 (H) 32 - 75 %    LYMPHOCYTES 1 (L) 12 - 49 %    MONOCYTES 1 (L) 5 - 13 %    EOSINOPHILS 0 0 - 7 %    BASOPHILS 0 0 - 1 %    IMMATURE GRANULOCYTES 1 (H) 0 - 0.5 %    ABS. NEUTROPHILS 14.6 (H) 1.8 - 8.0 K/UL    ABS. LYMPHOCYTES 0.1 (L) 0.8 - 3.5 K/UL    ABS. MONOCYTES 0.2 0.0 - 1.0 K/UL    ABS. EOSINOPHILS 0.0 0.0 - 0.4 K/UL    ABS. BASOPHILS 0.0 0.0 - 0.1 K/UL    ABS. IMM. GRANS. 0.2 (H) 0.00 - 0.04 K/UL    DF AUTOMATED     LD    Collection Time: 08/05/22  3:45 AM   Result Value Ref Range     (H) 85 - 043 U/L   METABOLIC PANEL, COMPREHENSIVE    Collection Time: 08/05/22  3:45 AM   Result Value Ref Range    Sodium 137 136 - 145 mmol/L    Potassium 3.6 3.5 - 5.1 mmol/L    Chloride 103 97 - 108 mmol/L    CO2 30 21 - 32 mmol/L    Anion gap 4 (L) 5 - 15 mmol/L    Glucose 123 (H) 65 - 100 mg/dL    BUN 33 (H) 6 - 20 mg/dL    Creatinine 0.58 (L) 0.70 - 1.30 mg/dL    BUN/Creatinine ratio 57 (H) 12 - 20      GFR est AA >60 >60 ml/min/1.73m2    GFR est non-AA >60 >60 ml/min/1.73m2    Calcium 7.6 (L) 8.5 - 10.1 mg/dL    Bilirubin, total 0.7 0.2 - 1.0 mg/dL    AST (SGOT) 43 (H) 15 - 37 U/L    ALT (SGPT) 41 12 - 78 U/L    Alk.  phosphatase 75 45 - 117 U/L    Protein, total 5.0 (L) 6.4 - 8.2 g/dL    Albumin 2.1 (L) 3.5 - 5.0 g/dL    Globulin 2.9 2.0 - 4.0 g/dL    A-G Ratio 0.7 (L) 1.1 - 2.2     URINALYSIS W/MICROSCOPIC    Collection Time: 08/05/22 12:39 PM   Result Value Ref Range    Color Yellow      Appearance Clear Clear      Specific gravity 1.010 1.003 - 1.030      pH (UA) 6.5 5.0 - 8.0      Protein Negative Negative mg/dL    Glucose Negative Negative mg/dL    Ketone Negative Negative mg/dL    Bilirubin Negative Negative      Blood Large (A) Negative      Urobilinogen 0.1 0.1 - 1.0 EU/dL    Nitrites Negative Negative      Leukocyte Esterase Negative Negative      WBC 0-4 0 - 4 /hpf    RBC 0-5 0 - 5 /hpf    Bacteria Negative Negative /hpf   GLUCOSE, POC    Collection Time: 08/05/22  6:44 PM   Result Value Ref Range    Glucose (POC) 140 (H) 65 - 117 mg/dL    Performed by Luis Manuel SOLORZANO            XR CHEST PORT   Final Result   Stable bilateral airspace disease. XR CHEST PORT   Final Result   Decreased lung volumes with increased interstitial and airspace   opacities bilaterally      XR CHEST PORT   Final Result      Feeding tube terminates in the region of the distal esophagus and should be   advanced at least 10 to 15 cm          XR CHEST PORT   Final Result   Interstitial and airspace opacities with some improvement in the   interval.      XR CHEST PORT   Final Result   No significant change in interstitial and airspace opacities   possibly pulmonary edema      XR CHEST PORT   Final Result   No significant change      XR CHEST PORT   Final Result      Little interval change. XR CHEST PORT   Final Result   Slightly increased diffuse bilateral interstitial and alveolar   opacities, compatible with COVID pneumonia. XR CHEST PORT   Final Result   No significant change       XR CHEST SNGL V   Final Result   Stable bilateral pulmonary infiltrates. XR CHEST PORT    (Results Pending)       Assessment:  Problem List Items Addressed This Visit          Other    COVID     Other Visit Diagnoses       Acute respiratory failure with hypoxia ([address]    -  Primary             Plan:    Admission  Diet:NPO  TPN  SCD  IS  Pain medications  Antibiotics  Nausea medication  Labs in am.  CT scan of abdomen and pelvis in am.  Will review CT scan and plan on feeding tube. Thank you for the consultation & allowing me to participate in the care of this patient.

## 2022-08-06 NOTE — PROGRESS NOTES
Progress Note    Patient: Radha Dowell MRN: [id number]  SSN: [id number]    YOB: 1929  Age: 80 y.o. Sex: male      Admit Date: 7/27/2022    LOS: 10 days     Subjective:   Patient followed for sepsis with recent Covid-19 infection, respiratory failure with presumptive pneumonia and presumptive UTI. He is on high flow nasal cannula with decreasing oxygen requirement. He remains afebrile with elevated WBC on steroids. CRP and LDH decreasing. CXR again today unchanged. Patient is somnolent today and not interactive. Wife at bedside. Objective:     Vitals:    08/06/22 0700 08/06/22 0737 08/06/22 0800 08/06/22 0801   BP: (!) 141/73   131/64   Pulse: (!) 105  92 90   Resp: 30  18 19   Temp: (!) 96.3 °F (35.7 °C) (!) 96.3 °F (35.7 °C)     SpO2: 93%  91% 91%   Weight:       Height:            Intake and Output:  Current Shift: No intake/output data recorded. Last three shifts: 08/04 1901 - 08/06 0700  In: 1551 [I.V.:1551]  Out: 2050 [Urine:2050]    Physical Exam:   Vitals and nursing note reviewed. Constitutional:       General: He is in acute distress. Appearance: He is ill-appearing. HENT:     Head: Normocephalic and atraumatic. Nose:     Comments: High flow nasal cannula 50%   Eyes:     Pupils: Pupils are equal, round, and reactive to light. Cardiovascular:     Rate and Rhythm: Regular rhythm. Heart sounds: No murmur heard. Pulmonary: clear bilaterally  Abdominal:     General: Bowel sounds are normal.     Palpations: Abdomen is soft. Tenderness: There is no abdominal tenderness. There is no right CVA tenderness or left CVA tenderness. Genitourinary:     Comments: External urinary catheter  Musculoskeletal:     Right lower leg: No edema. Left lower leg: No edema. Skin:     Findings: No rash.   Neurological: nonfocal, somnolent  Psychiatric:         Behavior: Behavior normal.    Lab/Data Review:     WBC 13,000     <622 <752 <708 <545 <498 <606  Ferritin <231    CRP 4.79 <5.15 <1.73 <1.64 <2.02 <3.26 < 6.18 <9.24 <16.10  Procal <0.05 <0.15    Blood cultures (7/27) No growth FINAL  Urine culture ordered  Heavy normal respiratory shirley (7/29) FINAL    CXR (8/6)  No change in mild bilateral airspace disease. Chronic interstitial changes. Assessment:     Active Problems:    COVID (7/18/2022)      Sepsis ([facility] [address] (7/27/2022)      Severe protein-calorie malnutrition ([facility] [address] (8/5/2022)  Sepsis with fever, tachycardia, tachypnea, leukocytosis, elevated CRP, Day #8  Zosyn, status post Zithromax  Presumptive UTI with pyuria and bacteria, urine culture not sent, on IV Zosyn, resolved  Covid-19 infection  ? Pneumonia with stable bilateral infiltrates, ?aspiration, on IV Zosyn    Acute hypoxic respiratory failure, on HFNC  6. MRSA nasal colonization, status post Mupirocin ointment       Comment:  CRP still decreasing along with LDH and oxygen requirement. Urinalysis with no pyuria or bacteria. Leukocytosis attributed to steroid.       Plan:      Continue Zosyn  Continue Dexamethasone per Pulmonary  In am, repeat CBC, CRP, LDH  Pending PEG tube placement    Signed By: Nury Gentile MD     August 6, 2022

## 2022-08-07 NOTE — PROGRESS NOTES
Progress Note    Patient: Tracey Tolliver MRN: [id number]  SSN: [id number]    YOB: 1929  Age: 80 y.o. Sex: male      Admit Date: 7/27/2022    LOS: 11 days     Subjective:     1years old patient with  COVID-19 positive test (U07.1, COVID-19) with Acute Pneumonia (J12.89, Other viral pneumonia)  (If respiratory failure or sepsis present, add as separate assessment)    , Hospital-acquired pneumonia  Sepsis,  Presently having worsening shortness of breath. Chest x-ray shows mild pneumonia. Has a history of a large hiatal hernia unable to get PEG tube placed  TPN    Objective:     Vitals:    08/07/22 1000 08/07/22 1100 08/07/22 1120 08/07/22 1200   BP: 112/73 (!) 140/80  131/76   Pulse: (!) 104 (!) 101  (!) 103   Resp: 27 27  (!) 33   Temp:  97.7 °F (36.5 °C)     SpO2: 93% 92% 92% 92%   Weight:       Height:            Intake and Output:  Current Shift: No intake/output data recorded. Last three shifts: 08/05 1901 - 08/07 0700  In: 802.6 [I.V.:802.6]  Out: 1738 [Urine:1470]    Physical Exam:   General:  Alert, cooperative, no distress, appears stated age. Eyes:  Conjunctivae/corneas clear. PERRL, EOMs intact. Fundi benign   Ears:  Normal TMs and external ear canals both ears. Nose: Nares normal. Septum midline. Mucosa normal. No drainage or sinus tenderness. Mouth/Throat: Lips, mucosa, and tongue normal. Teeth and gums normal.   Neck: Supple, symmetrical, trachea midline, no adenopathy, thyroid: no enlargment/tenderness/nodules, no carotid bruit and no JVD. Back:   Symmetric, no curvature. ROM normal. No CVA tenderness. Lungs:   Clear to auscultation bilaterally. Heart:  Regular rate and rhythm, S1, S2 normal, no murmur, click, rub or gallop. Abdomen:   Soft, non-tender. Bowel sounds normal. No masses,  No organomegaly. Extremities: Extremities normal, atraumatic, no cyanosis or edema. Pulses: 2+ and symmetric all extremities.    Skin: Skin color, texture, turgor normal. No rashes or lesions   Lymph nodes: Cervical, supraclavicular, and axillary nodes normal.   Neurologic: CNII-XII intact. Normal strength, sensation and reflexes throughout. Lab/Data Review: All lab results for the last 24 hours reviewed. Recent Results (from the past 24 hour(s))   GLUCOSE, POC    Collection Time: 08/06/22 11:35 PM   Result Value Ref Range    Glucose (POC) 134 (H) 65 - 117 mg/dL    Performed by Jason Rios    RENAL FUNCTION PANEL    Collection Time: 08/07/22  3:30 AM   Result Value Ref Range    Sodium 136 136 - 145 mmol/L    Potassium 5.1 3.5 - 5.1 mmol/L    Chloride 104 97 - 108 mmol/L    CO2 30 21 - 32 mmol/L    Anion gap 2 (L) 5 - 15 mmol/L    Glucose 156 (H) 65 - 100 mg/dL    BUN 37 (H) 6 - 20 mg/dL    Creatinine 0.67 (L) 0.70 - 1.30 mg/dL    BUN/Creatinine ratio 55 (H) 12 - 20      GFR est AA >60 >60 ml/min/1.73m2    GFR est non-AA >60 >60 ml/min/1.73m2    Calcium 7.4 (L) 8.5 - 10.1 mg/dL    Phosphorus 2.7 2.6 - 4.7 mg/dL    Albumin 2.2 (L) 3.5 - 5.0 g/dL   MAGNESIUM    Collection Time: 08/07/22  3:30 AM   Result Value Ref Range    Magnesium 2.2 1.6 - 2.4 mg/dL   CBC WITH AUTOMATED DIFF    Collection Time: 08/07/22  3:30 AM   Result Value Ref Range    WBC 14.6 (H) 4.1 - 11.1 K/uL    RBC 2.49 (L) 4.10 - 5.70 M/uL    HGB 8.3 (L) 12.1 - 17.0 g/dL    HCT 24.3 (L) 36.6 - 50.3 %    MCV 97.6 80.0 - 99.0 FL    MCH 33.3 26.0 - 34.0 PG    MCHC 34.2 30.0 - 36.5 g/dL    RDW 16.4 (H) 11.5 - 14.5 %    PLATELET 94 (L) 894 - 400 K/uL    MPV 11.5 8.9 - 12.9 FL    NRBC 1.2 (H) 0.0  WBC    ABSOLUTE NRBC 0.18 (H) 0.00 - 0.01 K/uL    NEUTROPHILS 96 (H) 32 - 75 %    LYMPHOCYTES 1 (L) 12 - 49 %    MONOCYTES 2 (L) 5 - 13 %    EOSINOPHILS 0 0 - 7 %    BASOPHILS 0 0 - 1 %    IMMATURE GRANULOCYTES 1 (H) 0 - 0.5 %    ABS. NEUTROPHILS 13.9 (H) 1.8 - 8.0 K/UL    ABS. LYMPHOCYTES 0.2 (L) 0.8 - 3.5 K/UL    ABS. MONOCYTES 0.3 0.0 - 1.0 K/UL    ABS. EOSINOPHILS 0.0 0.0 - 0.4 K/UL    ABS.  BASOPHILS 0.0 0.0 - 0.1 K/UL    ABS. IMM.  GRANS. 0.2 (H) 0.00 - 0.04 K/UL    DF AUTOMATED     C REACTIVE PROTEIN, QT    Collection Time: 08/07/22  3:30 AM   Result Value Ref Range    C-Reactive protein 5.75 (H) 0.00 - 0.60 mg/dL   LD    Collection Time: 08/07/22  3:30 AM   Result Value Ref Range     (H) 85 - 241 U/L   BLOOD GAS, ARTERIAL    Collection Time: 08/07/22  5:05 AM   Result Value Ref Range    pH 7.44 7.35 - 7.45      PCO2 40 35 - 45 mmHg    PO2 57 (L) 80 - 100 mmHg    O2 SATURATION 88 (L) 95 - 99 %    BICARBONATE 26 22 - 26 mmol/L    BASE EXCESS 2.1 0 - 3 mmol/L    O2 METHOD BiPAP      FIO2 60 %    MODE BiPAP      SET RATE 16      IPAP/PIP 16      PEEP/CPAP 5.0      Sample source Arterial      SITE Right Radial      MANUELA'S TEST YES      Carboxy-Hgb 0.3 (L) 1 - 2 %    Methemoglobin 0.5 0 - 1.4 %    Oxyhemoglobin 87.5 (L) 95 - 99 %    Performed by Heron Krueger     TEMPERATURE 98.6     GLUCOSE, POC    Collection Time: 08/07/22  6:36 AM   Result Value Ref Range    Glucose (POC) 156 (H) 65 - 117 mg/dL    Performed by Alisson Cheng          Assessment:     Active Problems:    COVID (7/18/2022)      Sepsis ([facility] (7/27/2022)      Severe protein-calorie malnutrition ([facility] (8/5/2022)    Above    Plan:     Is on Zosyn      Signed By: Heike Ragland MD     August 7, 2022

## 2022-08-07 NOTE — PROGRESS NOTES
Consult  Pulmonary, Critical Care    Name: Sylwia Wilder MRN: [id number]   : 1929 Hospital: [facility]   Date: 2022  Admission date: 2022 Hospital Day: 12       Subjective/Interval History:   Recent COVID pneumonitis improved with Decadron and baricitinib wean to room air was discharged to rehab facility yesterday at the rehab facility he had development of hypoxia than fever was transferred back to our emergency room T-max 101.5. Wife states that yesterday he did have some green-tinged sputum. Chest x-ray shows diffuse bilateral infiltrates unchanged. There has been no nausea vomiting choking while eating   remains on BiPAP. Blood gas this morning on nasal oxygen showed hypoxia but currently he is running 94% on 5 L awake alert no specific complaints   more anxiety last evening could not tolerate BiPAP and was placed on high flow. Still somewhat anxious today IM Vistaril seem to help   awake alert tolerated nasal high flow all night but FiO2 is 90%. Not able to eat due to shortness of breath have talked with him about NG tube for tube feedings and he agrees   awake alert confused FiO2 down to 50%. Nurse attempted NG insertion  and . As soon as the tube was placed in his nose he pulled it and refused to have any further attempt he remains malnourished   awake alert confused refused NG insertion yesterday currently he states he will allow it when his wife is here  8/3 remains confused awake alert again refused NG insertion yesterday. Wife wanted to proceed with peripheral nutrition.   With his valvular heart disease he would likely not tolerate the volumes required likewise with central hyperalimentation he likely would not tolerate the volumes and it also raises the risk of infection   awake confused for possible PEG tube placement today  8 awake confused PEG tube rescheduled for today   large hiatal hernia with stomach mostly in the chest PEG tube could not be placed. Surgical consult yesterday for consideration of surgical feeding tube placement for next week. FiO2 down to 50% but he is more confused seems more short of breath  8/7 worsening hypoxia now on noninvasive ventilator. Worsening confusion. Patient Active Problem List   Diagnosis Code    Carotid stenosis I65.29    Hypoxia R09.02    COVID U07.1    Sepsis ([address] A41.9    Severe protein-calorie malnutrition ([facility] [address] E43       IMPRESSION:   Acute hypoxic respiratory failure currently on noninvasive ventilator FiO2 70%  Healthcare associated pneumonia Serratia on culture on Zosyn day 12  Diffuse pulmonary infiltrates questionable pneumonia versus inflammatory response from COVID versus pulmonary edema from diastolic dysfunction and AR and/or recurrent reflux and aspiration  Large hiatal hernia may be having reflux  Worsening confusion we will stop Reglan  Oral thrush resolved  Anemia improved after transfusion 8/2 hemoglobin 8.3 today   Hypokalemia repleted  Hypophosphatemia repleted  Hypocalcemia repleted  Recent COVID pneumonitis  Atrial fibrillation  Moderate aortic regurg  Moderate to severe mitral regurg  Diastolic left ventricular dysfunction  Pyuria rule out UTI no culture results  Elevated BNP  Anxiety  Body mass index is 18.09 kg/m². RECOMMENDATIONS/PLAN:   Worsening hypoxia now on noninvasive ventilator he may very well need intubation  Worsening confusion we will hold Vistaril discontinue Reglan and decrease dose Decadron  N.p.o. now we will change Protonix to IV  Continue IV Zosyn nasal MRSA smear positive blood culture no growth sputum culture occasional polys with Serratia does not appear as if urine was ever sent  BNP remains elevated chest x-ray with diffuse congestion creatinine is normal repeat Lasix today  Unable to place PEG tube due to hiatal hernia.   Surgical tube placement would require general anesthetic which would be very high risk in him Subjective/Initial History:   I have reviewed the flowsheet and previous  notes. I was asked by Jacinta Marques MD to see Darling Rogers a 80 y.o.  male  in consultation for a chief complaint of acute hypoxic respiratory failure fever recent COVID pneumonitis        Patient PCP: Nelly Seay MD  PMH:  has a past medical history of Atrial fibrillation ([facility] [place] [address] COVID-19 vaccine series completed (03/2021), Hypercholesteremia, Hypertension, Neuropathy, and Stroke ([facility] [place] [address] (08/2021). PSH:   has a past surgical history that includes pr abdomen surgery proc unlisted (Right) and hx cataract removal (Left, 2018). FHX: family history is not on file. SHX:  reports that he has quit smoking. He has never used smokeless tobacco. He reports that he does not use drugs.   Systemic review  General weight has been stable the last several days he has not noted fever chills or sweats until this morning  Eyes no double vision or momentary blindness  ENT no facial pain over the sinuses  Musculoskeletal no swollen tender joints no myalgias  Endocrinologic no polyuria polydipsia  Neurologic no seizures or syncope awake and alert oriented  Gastrointestinal no choking or gagging when eating no nausea vomiting no indigestion  Genitourinary no pain or discomfort no burning  Cardiovascular has not noted ankle edema chest pain diaphoresis or nocturia  Respiratory abrupt worsening shortness of breath last night associated with fever    No Known Allergies   MEDS:   Current Facility-Administered Medications   Medication    metoclopramide HCl (REGLAN) injection 5 mg    pantoprazole (PROTONIX) tablet 40 mg    TPN ADULT-PERIPHERAL AA 4.25% D5% + ELECTROLYTES    lidocaine (XYLOCAINE) 2 % jelly    0.9% sodium chloride infusion 250 mL    nystatin (MYCOSTATIN) 100,000 unit/mL oral suspension 500,000 Units    hydrOXYzine (VISTARIL) injection 25 mg    hydrOXYzine pamoate (VISTARIL) capsule 25 mg    piperacillin-tazobactam (ZOSYN) 3.375 g in 0.9% sodium chloride (MBP/ADV) 100 mL MBP    [Held by provider] apixaban (ELIQUIS) tablet 2.5 mg    aspirin delayed-release tablet 81 mg    atorvastatin (LIPITOR) tablet 20 mg    doxazosin (CARDURA) tablet 4 mg    DULoxetine (CYMBALTA) capsule 30 mg    mirtazapine (REMERON) tablet 15 mg    ranolazine ER (RANEXA) tablet 500 mg    acetaminophen (TYLENOL) tablet 650 mg    Or    acetaminophen (TYLENOL) suppository 650 mg    polyethylene glycol (MIRALAX) packet 17 g    ondansetron (ZOFRAN ODT) tablet 4 mg    Or    ondansetron (ZOFRAN) injection 4 mg    dexamethasone (DECADRON) 4 mg/mL injection 4 mg        Current Facility-Administered Medications:     metoclopramide HCl (REGLAN) injection 5 mg, 5 mg, IntraVENous, Q8H, Kelley Mandujano MD, 5 mg at 08/07/22 0616    pantoprazole (PROTONIX) tablet 40 mg, 40 mg, Oral, ACB, Marshall Todd MD    TPN ADULT-PERIPHERAL AA 4.25% D5% + ELECTROLYTES, , IntraVENous, CONTINUOUS, Marshall Todd MD, Last Rate: 63 mL/hr at 08/06/22 2152, New Bag at 08/06/22 2152    lidocaine (XYLOCAINE) 2 % jelly, , Mucous Membrane, PRN, Kelley Mandujano MD    0.9% sodium chloride infusion 250 mL, 250 mL, IntraVENous, PRN, Garrett Browning MD    nystatin (MYCOSTATIN) 100,000 unit/mL oral suspension 500,000 Units, 500,000 Units, Oral, QID, Bharath Johnson MD, 500,000 Units at 08/06/22 2146    hydrOXYzine (VISTARIL) injection 25 mg, 25 mg, IntraMUSCular, Q6H PRN, Mahogany Chapa MD, 25 mg at 08/07/22 1051    hydrOXYzine pamoate (VISTARIL) capsule 25 mg, 25 mg, Oral, Q8H, Kelley Mandujano MD, 25 mg at 08/06/22 2146    piperacillin-tazobactam (ZOSYN) 3.375 g in 0.9% sodium chloride (MBP/ADV) 100 mL MBP, 3.375 g, IntraVENous, Q8H, Mahogany Chapa MD, Last Rate: 25 mL/hr at 08/07/22 1058, 3.375 g at 08/07/22 1058    [Held by provider] apixaban (ELIQUIS) tablet 2.5 mg, 2.5 mg, Oral, BID, Mahogany Chapa MD, 2.5 mg at 08/03/22 2010    aspirin delayed-release tablet 81 mg, 81 mg, Oral, Madonna Ying MD, 81 mg at 22    atorvastatin (LIPITOR) tablet 20 mg, 20 mg, Oral, DAILY, Mahogany Chapa MD, 20 mg at 22    doxazosin (CARDURA) tablet 4 mg, 4 mg, Oral, QHS, Mahogany Chapa MD, 4 mg at 22    DULoxetine (CYMBALTA) capsule 30 mg, 30 mg, Oral, DAILY, Mahogany Chapa MD, 30 mg at 22    mirtazapine (REMERON) tablet 15 mg, 15 mg, Oral, QHS, Mahogany Chapa MD, 15 mg at 22    ranolazine ER (RANEXA) tablet 500 mg, 500 mg, Oral, BID, Mahogany Chapa MD, 500 mg at 22    acetaminophen (TYLENOL) tablet 650 mg, 650 mg, Oral, Q6H PRN, 650 mg at 22 **OR** acetaminophen (TYLENOL) suppository 650 mg, 650 mg, Rectal, Q6H PRN, Mahogany Chapa MD    polyethylene glycol (MIRALAX) packet 17 g, 17 g, Oral, DAILY PRN, Mahogany Chapa MD    ondansetron (ZOFRAN ODT) tablet 4 mg, 4 mg, Oral, Q8H PRN **OR** ondansetron (ZOFRAN) injection 4 mg, 4 mg, IntraVENous, Q6H PRN, Mahogany Chapa MD    dexamethasone (DECADRON) 4 mg/mL injection 4 mg, 4 mg, IntraVENous, Q6H, Mahogany Chapa MD, 4 mg at 22 [id number]      Objective:     Vital Signs: Telemetry:    AFIB Intake/Output:   Visit Vitals  BP (!) 140/80 (BP 1 Location: Left upper arm, BP Patient Position: At rest)   Pulse (!) 101   Temp 97.7 °F (36.5 °C)   Resp 27   Ht 5' 5.98\" (1.676 m)   Wt 50.8 kg (111 lb 15.9 oz)   SpO2 92%   BMI 18.09 kg/m²       Temp (24hrs), Av.1 °F (36.7 °C), Min:97.7 °F (36.5 °C), Max:98.8 °F (37.1 °C)        O2 Device: BIPAP O2 Flow Rate (L/min): 50 l/min         Body mass index is 18.09 kg/m².     Wt Readings from Last 4 Encounters:   22 50.8 kg (111 lb 15.9 oz)   22 54.4 kg (120 lb)   02/10/22 54.4 kg (120 lb)   21 54.4 kg (120 lb)          Intake/Output Summary (Last 24 hours) at 2022 1201  Last data filed at 2022 0200  Gross per 24 hour   Intake --   Output 820 ml   Net -820 ml         Last shift:      No intake/output data recorded. Last 3 shifts: 08/05 1901 - 08/07 0700  In: 802.6 [I.V.:802.6]  Out: 1470 [Urine:1470]       Hemodynamics:    CO:    CI:    CVP:    SVR:   PAP Systolic:    PAP Diastolic:    PVR:    YQ34:       Ventilator Settings:      Mode Rate TV Press PEEP FiO2 PIP Min. Vent               70 %     26 l/min      Physical Exam:    General:  male; in bed on noninvasive ventilator very confused  HEENT: NCAT,   Eyes: anicteric; conjunctiva clear extraocular movements intact  Neck: no nodes, no accessory MM use. No definite JVD  Chest: no deformity,   Cardiac: Irregular rate and rhythm occasional PVCs has somewhat harsh systolic murmur  Lungs: Clear anteriorly with rales posteriorly no wheezing  Abd: Thin soft positive bowel sounds no tenderness  Ext: Trace edema; no joint swelling;  No clubbing  : clear urine  Neuro: Awake alert anxious more confused hard of hearing follow simple commands moves all 4 extremities  Psych-anxious, mildly confused;   Skin: warm, dry, no cyanosis;   Pulses: Brachial and radial pulses intact  Capillary: Normal capillary refill      Labs:    Recent Labs     08/07/22  0330 08/06/22  0400 08/05/22  0345   WBC 14.6* 13.0* 15.1*   HGB 8.3* 7.8* 8.2*   PLT 94* 88* 114*       Recent Labs     08/07/22  0330 08/06/22  0400 08/05/22  0345    137  137 137  137   K 5.1 3.3*  3.3* 3.6  3.6    103  102 103  101   CO2 30 30  30 30  30   * 154*  153* 123*  123*   BUN 37* 33*  33* 33*  33*   CREA 0.67* 0.52*  0.52* 0.58*  0.56*   CA 7.4* 7.0*  7.1* 7.6*  7.6*   MG 2.2  --   --    PHOS 2.7 2.8 3.2   ALB 2.2* 2.0*  2.0* 2.1*  2.1*   ALT  --  36 41     Recent Labs     08/07/22  0505   PH 7.44   PCO2 40   PO2 57*   HCO3 26   FIO2 60     8/4 nasal high flow 60 L 75% with saturation 98%   8/1 nasal high flow 60 L FiO2 80%   COVID-19 antigen remains positive   2758 2592, 2705 3310  CRP 5.75 4.79, 5.15 1.73 0.64, 2.02 3.26, 6.18, 9.2 16.1  Procalcitonin less than 0.05 0.15  Urinalysis with 20-50 WBCs  Blood culture no growth  Sputum occasional polys with Serratia  Echo ejection fraction 93% diastolic dysfunction moderate aortic regurg moderate to severe mitral regurg left atrium 2.6 cm RVSP 25  Imaging:  I have personally reviewed the patients radiographs and have reviewed the reports:    CXR Results  (Last 48 hours)                 08/06/22 0232  XR CHEST PORT Final result    Impression:      No change in mild bilateral airspace disease. Chronic interstitial changes. Narrative:  EXAM: XR CHEST PORT       DATE: 8/6/2022 2:32 AM       INDICATION: Respiratory failure       COMPARISON: Chest radiograph August 5, 2022       FINDINGS: AP portable chest radiograph. There is an implanted loop recorder. Heart size is normal. Bilateral airspace disease is noted. No new lung   infiltrate is seen. Background chronic interstitial changes are again noted. No   definite effusion or pneumothorax is seen. Chronic right-sided rib deformities   are noted. Results from Hospital Encounter encounter on 07/27/22    XR CHEST PORT    Narrative  EXAM: XR CHEST PORT    DATE: 8/6/2022 2:32 AM    INDICATION: Respiratory failure    COMPARISON: Chest radiograph August 5, 2022    FINDINGS: AP portable chest radiograph. There is an implanted loop recorder. Heart size is normal. Bilateral airspace disease is noted. No new lung  infiltrate is seen. Background chronic interstitial changes are again noted. No  definite effusion or pneumothorax is seen. Chronic right-sided rib deformities  are noted. Impression  No change in mild bilateral airspace disease. Chronic interstitial changes. XR CHEST PORT    Narrative  EXAM: Portable CXR. 0333 hours. COMPARISON: 8/4/2022. INDICATION: Respiratory failure    FINDINGS:  The lungs show unchanged low volumes and bilateral airspace disease/edema. Heart  is normal in size. There is no pneumothorax.  There is no sizable pleural  effusion. Rib deformities and subcutaneous cardiac loop recorder are again  noted. Impression  Stable bilateral airspace disease. XR CHEST PORT    Narrative  history: Feeding tube placement    COMPARISON: 8/3/2022    FINDINGS:    Frontal chest radiograph submitted for review. Feeding tube terminates in the region of the distal esophagus and should be  advanced at least 10 to 15 cm. Diffuse bilateral interstitial lung opacities are  again seen. Small bilateral pleural effusions. No evidence for pneumothorax. Impression  Feeding tube terminates in the region of the distal esophagus and should be  advanced at least 10 to 15 cm    Results from Hospital Encounter encounter on 02/10/22    CT HEAD WO CONT    Narrative  Technique: Multiple continuous axial images were obtained from the skull base to  the vertex without administration of intravenous contrast.    Comment on dose reduction: All CT scans at this facility are performed using  dose reduction optimization technique as appropriate to perform the exam  including the following; automated exposure control, adjustments of the mA  and/or kV according to patient size, or use of iterative reconstructed  technique. Comparison examination: None    Findings:  The ventricles and sulci are prominent consistent with age-related changes of  atrophy. Periventricular hypodensity is identified consistent with changes of  chronic small vessel disease. No evidence of midline shift, mass lesion, or  extra-axial fluid collection. No evidence of parenchymal hemorrhage or  infarction in a major vascular territory. The osseous structures are intact, the paranasal sinuses are clear. Extracalvarial soft tissue hematoma posterior vertex . Impression  No acute intracranial process. Extracalvarial soft tissue hematoma posterior  vertex .     Session fever with recent hospitalization possible healthcare associated pneumonia agree with Zosyn vancomycin and Zithromax. We will attempt to collect sputum for culture. Have requested urine culture blood cultures have already been obtained. He has a significant systolic murmur with elevated BNP he may have mitral regurg giving him some degree of fluid overload he only has trace edema. We will check an echocardiogram.  Creatinine is normal will decrease IV fluids for the time being. Chest x-ray with diffuse infiltrates thought to be residual inflammatory change from COVID pneumonitis but may represent some degree of fluid overload. Oxygenation is well-maintained now on noninvasive ventilator have decreased FiO2 to 45% will attempt conversion to nasal oxygen in a.m.  728 awake alert currently on noninvasive ventilator have placed on 5 L oxygen saturation 94%. If he maintains that we will leave him on nasal oxygen today and use noninvasive ventilator at night. Nursing noted that when he drifts off to sleep he will have desaturation. BNP remains elevated neck veins are chest visible chest x-ray still diffuse infiltrates we will give 1 dose Lasix today  7/29 anxious intolerant of BiPAP last evening switch to nasal high flow. Chest x-ray is unchanged with diffuse infiltrates questionable healthcare associated pneumonia COVID inflammatory reaction or some degree pulmonary edema from diastolic dysfunction aortic regurg and mitral regurg repeat Lasix today replenish potassium and phosphorus nasal MRSA smear is positive we will continue vancomycin Zosyn and Zithromax remains on baricitinib and Decadron  7/30 much calmer today questionably due to Vistaril. Potassium remains low will supplement. Still has trace edema with normal creatinine we will repeat Lasix today.   Have talked with him about placing NG tube for tube feedings and he agrees  7/31 alert awake oral thrush on 90% FiO2 intermittent agitation as per nurse  8/1 awake alert confused refusing NG tube once wife has a chance to talk with him we will attempt reinsertion. Serratia in sputum currently on Zosyn. Diffuse accentuated interstitial markings persist we will repeat Lasix today  8/2 still refusing NG tube eating very little. Creatinine and BUN up slightly with Lasix dosing yesterday. He is to receive a unit of blood replace potassium we will repeat Lasix today after blood we will try once again to place NG tube  8/3 again refused NG insertion yesterday. Wife is requesting IV. With his valvular heart disease he very likely would not tolerate the volumes of fluid needed for adequate nutrition. Will attempt to place NG tube once the wife is here  8/4 NG tube yesterday went into lung with no cough reflex. For possible PEG tube placement today. Chest x-ray still shows diffuse congestion BNP remains elevated creatinine stable we will repeat Lasix today. 8/5 PEG tube not placed yesterday rescheduled for today. Have decreased FiO2 to 60% we will try to maintain that today. Creatinine remains normal we will repeat Lasix  8/6 unable to place PEG tube due to large hiatal hernia. Surgical consultation to consider surgical feeding tube placement. FiO2 down to 50% but he is more confused and anxious today. Chest x-ray to me looks slightly less dense infiltrates although read as unchanged by radiology. Creatinine remains stable we will repeat Lasix and replace potassium  8/7 worsening hypoxia now on noninvasive ventilator very likely aspirating oral intake he is n.p.o. at this time and TPN has been increased. Confusion worsening will discontinue Reglan and decrease dose Decadron. Holding Vistaril for now he may very well need require intubation in the near future  Time of care 30 minutes           Thank you for allowing us to participate in the care of this patient.   We will follow along with you     Ken Morin MD

## 2022-08-07 NOTE — PROGRESS NOTES
Patient not appropriate for PO trials at this time. Discussed w/ RN, patient is on continuous BiPAP s/t hypoxia. Concerns for aspiration, patient is at high risk given respiratory status and large intrathoracic hiatal hernia. Rec NPO/hold PO until respiratory status improves. ST to follow.

## 2022-08-07 NOTE — PROGRESS NOTES
Progress Note    Patient: Shashi Gonzalez MRN: [id number]  SSN: [id number]    YOB: 1929  Age: 80 y.o. Sex: male      Admit Date: 7/27/2022    LOS: 11 days     Subjective:   Patient followed for sepsis with recent Covid-19 infection, respiratory failure with presumptive pneumonia and presumptive UTI. He is on BIPAP now and is somnolent. He remains afebrile with elevated WBC on steroids. CRP and LDH increasing today. No CXR today. Objective:     Vitals:    08/07/22 0700 08/07/22 0757 08/07/22 0800 08/07/22 0900   BP: 133/67  (!) 131/59 (!) 150/77   Pulse: 99  100 (!) 104   Resp: 25  30 (!) 33   Temp: 97.7 °F (36.5 °C)      SpO2: 90% 94% 93% 92%   Weight:       Height:            Intake and Output:  Current Shift: No intake/output data recorded. Last three shifts: 08/05 1901 - 08/07 0700  In: 802.6 [I.V.:802.6]  Out: 8904 [Urine:1470]    Physical Exam:   Vitals and nursing note reviewed. Constitutional:       General: He is in acute distress. Appearance: He is ill-appearing. HENT:     Head: Normocephalic and atraumatic. Nose:     Comments: BIPAP   Eyes:     Pupils: Pupils are equal, round, and reactive to light. Cardiovascular:     Rate and Rhythm: Regular rhythm. Heart sounds: No murmur heard. Pulmonary: coarse rhonchi  Abdominal:     General: Bowel sounds are normal.     Palpations: Abdomen is soft. Tenderness: There is no abdominal tenderness. There is no right CVA tenderness or left CVA tenderness. Genitourinary:     Comments: External urinary catheter  Musculoskeletal:     Right lower leg: No edema. Left lower leg: No edema. Skin:     Findings: No rash.   Neurological: nonfocal, somnolent  Psychiatric:         Behavior: Behavior normal.    Lab/Data Review:     WBC 14,600     <603 <622 <752 <708 <545 <498 <606  Ferritin <231    CRP 5.75 <4.79 <5.15 <1.73 <1.64 <2.02 <3.26 < 6.18 <9.24 <16.10  Procal <0.05 <0.15    Blood cultures (7/27) No growth FINAL  Urine culture ordered  Heavy normal respiratory shirley (7/29) FINAL    CXR (8/6)  No change in mild bilateral airspace disease. Chronic interstitial changes. Assessment:     Active Problems:    COVID (7/18/2022)      Sepsis ([facility] [address] (7/27/2022)      Severe protein-calorie malnutrition ([facility] [address] (8/5/2022)  Sepsis with fever, tachycardia, tachypnea, leukocytosis, elevated CRP, Day #9  Zosyn, status post Zithromax  Presumptive UTI with pyuria and bacteria, urine culture not sent, on IV Zosyn, resolved  Covid-19 infection  ? Pneumonia with stable bilateral infiltrates, ?aspiration, on IV Zosyn    Acute hypoxic respiratory failure, on HFNC  6. MRSA nasal colonization, status post Mupirocin ointment       Comment:  CRP still increasing along with LDH and oxygen requirement, poor prognostic signs. Leukocytosis attributed to steroid.       Plan:      Continue Zosyn  Continue Dexamethasone per Pulmonary  In am, repeat CBC, CRP, LDH    Signed By: Elan Lawson MD     August 7, 2022

## 2022-08-07 NOTE — PROGRESS NOTES
PROGRESS NOTE    Patient: Brendon Sellers MRN: [id number]  SSN: [id number]    YOB: 1929  Age: 80 y.o. Sex: male      Admit Date: 7/27/2022    LOS: 11 days       Subjective     Chief Complaint   Patient presents with    Shortness of Breath       HPI: Patient is a 80y.o. year old male with a significant PMH of Afib, hypercholesterolemia, HTN, neuropathy, and hx of stroke presented to the ED today due to hypoxia and worsening confusion. The patient was discharged from the hospital yesterday after being hospitalized d/t COVID-19. Per nursing facility his O2 was in the 80s and it was 89% upon admission. The patient was stable at that time of discharge yesterday and not using any oxygen. The patient is now on BiPAP with an O2 sat of 100%. Febrile at 101.5  WBC 25.1  Hypertensive on admission  Respiratory rate at 31  CXR: Stable bilateral pulmonary infiltrates. Patient was given:  10mg Decadron  2g Magnesium  Duoneb treatment  Terbutaline treatment     7/28  Pt sitting in bed, responds to voice  P02 53 on ABG this morning, pt placed on BiPap  SpO2 stable  CRP=16.1    XR CHEST PORT  Diffuse interstitial airspace opacities are not  significantly changed. Trace left effusion. Biapical pleural thickening. Heartsize is enlarged      7/29    Patient on BiPAP now on 70% O2  Patient pulled out BiPAP last night put on high flow    7/30    On high flow 90% O2      Left Ventricle: Normal left ventricular systolic function with a visually estimated EF of 55 - 60%. Left ventricle size is normal. Normal wall thickness. Normal wall motion. Grade I diastolic dysfunction with normal LAP. Aortic Valve: Moderate regurgitation. Mitral Valve: Mildly thickened leaflet. 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 Moderate to severe regurgitation with an eccentrically directed jet and may underestimate severity. Tricuspid Valve: Moderate regurgitation.       7/31    On high flow 80% O2    8/1    On high flow 70% O2  Poor appetite poor appetite    8/2  Patient still on high flow 80% O2 very poor appetite  Refuse NG tube    8/3  Patient alert awake on high flow 80% O2  Unable to get NG tube placed  Very poor appetite    Discussed with the patient wife yesterday  She wants PPN      8/4  Patient continues on high flow oxygen. 60 %    PEG tube placement was moved to tomorrow. Procedure was discussed with wife. Patient was seen by occupational therapist and PT yesterday and tolerated both session fairly well. Both noted weakness and increased need for oxygen when exerting himself. 8/6  Pt on high flow O2, 50%  Pt not responding to me. H/H trending downward  Potassium 3.3  CRP 4.79, down from prior  CXR shows no acute change    8/7  Pt on BiPAP 65%  Pt is awake, attempting to speak, cannot effectively communicate 2/2 BiPAP  H/H improved today  CRP 5.75  Potassium 5.1      Review of Systems   Unable to perform ROS: Acuity of condition       Objective     Visit Vitals  BP (!) 150/77   Pulse (!) 104   Temp 97.7 °F (36.5 °C)   Resp (!) 33   Ht 5' 5.98\" (1.676 m)   Wt 50.8 kg (111 lb 15.9 oz)   SpO2 92%   BMI 18.09 kg/m²    O2 Flow Rate (L/min): 50 l/min O2 Device: BIPAP    Physical Exam:   Physical Exam  Constitutional:       Appearance: He is normal weight. HENT:      Head: Normocephalic and atraumatic. Cardiovascular:      Rate and Rhythm: Normal rate and regular rhythm. Pulses: Normal pulses. Heart sounds: Normal heart sounds. Pulmonary:      Effort: Pulmonary effort is normal.      Breath sounds: Normal breath sounds. Musculoskeletal:         General: Normal range of motion. Cervical back: Normal range of motion and neck supple. Neurological:      General: No focal deficit present. Mental Status: He is alert. He is disoriented. Intake & Output:  Current Shift: No intake/output data recorded.   Last three shifts: 08/05 1901 - 08/07 0700  In: 802.6 [I.V.:802.6]  Out: 1470 [Banner:[id number]]    Lab/Data Review: All lab results for the last 24 hours reviewed. 24 Hour Results:    Recent Results (from the past 24 hour(s))   GLUCOSE, POC    Collection Time: 08/06/22 11:35 PM   Result Value Ref Range    Glucose (POC) 134 (H) 65 - 117 mg/dL    Performed by Pedrito Briggs    RENAL FUNCTION PANEL    Collection Time: 08/07/22  3:30 AM   Result Value Ref Range    Sodium 136 136 - 145 mmol/L    Potassium 5.1 3.5 - 5.1 mmol/L    Chloride 104 97 - 108 mmol/L    CO2 30 21 - 32 mmol/L    Anion gap 2 (L) 5 - 15 mmol/L    Glucose 156 (H) 65 - 100 mg/dL    BUN 37 (H) 6 - 20 mg/dL    Creatinine 0.67 (L) 0.70 - 1.30 mg/dL    BUN/Creatinine ratio 55 (H) 12 - 20      GFR est AA >60 >60 ml/min/1.73m2    GFR est non-AA >60 >60 ml/min/1.73m2    Calcium 7.4 (L) 8.5 - 10.1 mg/dL    Phosphorus 2.7 2.6 - 4.7 mg/dL    Albumin 2.2 (L) 3.5 - 5.0 g/dL   MAGNESIUM    Collection Time: 08/07/22  3:30 AM   Result Value Ref Range    Magnesium 2.2 1.6 - 2.4 mg/dL   CBC WITH AUTOMATED DIFF    Collection Time: 08/07/22  3:30 AM   Result Value Ref Range    WBC 14.6 (H) 4.1 - 11.1 K/uL    RBC 2.49 (L) 4.10 - 5.70 M/uL    HGB 8.3 (L) 12.1 - 17.0 g/dL    HCT 24.3 (L) 36.6 - 50.3 %    MCV 97.6 80.0 - 99.0 FL    MCH 33.3 26.0 - 34.0 PG    MCHC 34.2 30.0 - 36.5 g/dL    RDW 16.4 (H) 11.5 - 14.5 %    PLATELET 94 (L) 834 - 400 K/uL    MPV 11.5 8.9 - 12.9 FL    NRBC 1.2 (H) 0.0  WBC    ABSOLUTE NRBC 0.18 (H) 0.00 - 0.01 K/uL    NEUTROPHILS 96 (H) 32 - 75 %    LYMPHOCYTES 1 (L) 12 - 49 %    MONOCYTES 2 (L) 5 - 13 %    EOSINOPHILS 0 0 - 7 %    BASOPHILS 0 0 - 1 %    IMMATURE GRANULOCYTES 1 (H) 0 - 0.5 %    ABS. NEUTROPHILS 13.9 (H) 1.8 - 8.0 K/UL    ABS. LYMPHOCYTES 0.2 (L) 0.8 - 3.5 K/UL    ABS. MONOCYTES 0.3 0.0 - 1.0 K/UL    ABS. EOSINOPHILS 0.0 0.0 - 0.4 K/UL    ABS. BASOPHILS 0.0 0.0 - 0.1 K/UL    ABS. IMM.  GRANS. 0.2 (H) 0.00 - 0.04 K/UL    DF AUTOMATED     C REACTIVE PROTEIN, QT    Collection Time: 08/07/22  3:30 AM Result Value Ref Range    C-Reactive protein 5.75 (H) 0.00 - 0.60 mg/dL   LD    Collection Time: 08/07/22  3:30 AM   Result Value Ref Range     (H) 85 - 241 U/L   BLOOD GAS, ARTERIAL    Collection Time: 08/07/22  5:05 AM   Result Value Ref Range    pH 7.44 7.35 - 7.45      PCO2 40 35 - 45 mmHg    PO2 57 (L) 80 - 100 mmHg    O2 SATURATION 88 (L) 95 - 99 %    BICARBONATE 26 22 - 26 mmol/L    BASE EXCESS 2.1 0 - 3 mmol/L    O2 METHOD BiPAP      FIO2 60 %    MODE BiPAP      SET RATE 16      IPAP/PIP 16      PEEP/CPAP 5.0      Sample source Arterial      SITE Right Radial      MANUELA'S TEST YES      Carboxy-Hgb 0.3 (L) 1 - 2 %    Methemoglobin 0.5 0 - 1.4 %    Oxyhemoglobin 87.5 (L) 95 - 99 %    Performed by Radha Nathanael     TEMPERATURE 98.6     GLUCOSE, POC    Collection Time: 08/07/22  6:36 AM   Result Value Ref Range    Glucose (POC) 156 (H) 65 - 117 mg/dL    Performed by Cole Jensen          Imaging:    XR CHEST PORT   Final Result      No change in mild bilateral airspace disease. Chronic interstitial changes. XR CHEST PORT   Final Result   Stable bilateral airspace disease. XR CHEST PORT   Final Result   Decreased lung volumes with increased interstitial and airspace   opacities bilaterally      XR CHEST PORT   Final Result      Feeding tube terminates in the region of the distal esophagus and should be   advanced at least 10 to 15 cm          XR CHEST PORT   Final Result   Interstitial and airspace opacities with some improvement in the   interval.      XR CHEST PORT   Final Result   No significant change in interstitial and airspace opacities   possibly pulmonary edema      XR CHEST PORT   Final Result   No significant change      XR CHEST PORT   Final Result      Little interval change. XR CHEST PORT   Final Result   Slightly increased diffuse bilateral interstitial and alveolar   opacities, compatible with COVID pneumonia.          XR CHEST PORT   Final Result   No significant change XR CHEST SNGL V   Final Result   Stable bilateral pulmonary infiltrates. CT ABD PELV WO CONT    (Results Pending)          Assessment     Acute hypoxic respiratory failure on high flow 80 %  Respiratory alkalosis  COVID-19 Pneumonia  Sepsis  Hypertension  Hyperlipidemia  Stroke  Afib  MRSA nares  Elevated BNP  Hypokalemia    Plan   Continue meds:    Eliquis 2.5mg PO BID was held today and tomorrow      ASA 81 mg po daily was held today, will continue after procedure      Lipitor 20mg PO every day  Zithromax 500 IV daily  Olumiant 4mg PO QD  Decadron 4mg IV Q6h  Doxazosin 4mg PO QHS  Cymbalta 30mg POQD  Remeron 15mg PO every day  Vistaril 25mg Q8H daily  Mupirocin 2% ointment both nostrils BID  Zosyn 3.375g IV q8hrs  Ranolazine 500mg PO every day    Lasix 40 IV once as needed    Monitor blood counts, BNP level    Surgery has reassessed the patient, determined that J tube may be more ideal for the patient. They have discussed this option with the patient and his family. Plan for placement of J tube.               Current Facility-Administered Medications:     metoclopramide HCl (REGLAN) injection 5 mg, 5 mg, IntraVENous, Q8H, Deandre Oakes MD, 5 mg at 08/07/22 0616    pantoprazole (PROTONIX) tablet 40 mg, 40 mg, Oral, ACB, Marshall Todd MD    TPN ADULT-PERIPHERAL AA 4.25% D5% + ELECTROLYTES, , IntraVENous, CONTINUOUS, Marshall Todd MD, Last Rate: 63 mL/hr at 08/06/22 2152, New Bag at 08/06/22 2152    fat emulsion 20% (LIPOSYN, INTRAlipid) infusion 250 mL, 250 mL, IntraVENous, CONTINUOUS, Marshall Todd MD, Last Rate: 20.83 mL/hr at 08/06/22 2153, 250 mL at 08/06/22 2153    lidocaine (XYLOCAINE) 2 % jelly, , Mucous Membrane, PRN, Deandre Oakes MD    0.9% sodium chloride infusion 250 mL, 250 mL, IntraVENous, PRN, Garrett Browning MD    nystatin (MYCOSTATIN) 100,000 unit/mL oral suspension 500,000 Units, 500,000 Units, Oral, QID, Allauddin, Bharath, MD, 500,000 Units at 08/06/22 1304 hydrOXYzine (VISTARIL) injection 25 mg, 25 mg, IntraMUSCular, Q6H PRN, Mahogany Chapa MD, 25 mg at 08/07/22 0448    hydrOXYzine pamoate (VISTARIL) capsule 25 mg, 25 mg, Oral, Q8H, Clotilde Prince MD, 25 mg at 08/06/22 2146    piperacillin-tazobactam (ZOSYN) 3.375 g in 0.9% sodium chloride (MBP/ADV) 100 mL MBP, 3.375 g, IntraVENous, Q8H, Mahogany Chapa MD, Last Rate: 25 mL/hr at 08/07/22 0347, 3.375 g at 08/07/22 0347    [Held by provider] apixaban (ELIQUIS) tablet 2.5 mg, 2.5 mg, Oral, BID, Mahogany Chapa MD, 2.5 mg at 08/03/22 2010    aspirin delayed-release tablet 81 mg, 81 mg, Oral, DAILY, Mahogany Chapa MD, 81 mg at 08/06/22 0930    atorvastatin (LIPITOR) tablet 20 mg, 20 mg, Oral, DAILY, Mahogany Chapa MD, 20 mg at 08/06/22 0930    doxazosin (CARDURA) tablet 4 mg, 4 mg, Oral, QHS, Mahogany Chapa MD, 4 mg at 08/06/22 2146    DULoxetine (CYMBALTA) capsule 30 mg, 30 mg, Oral, DAILY, Mahogany Chapa MD, 30 mg at 08/06/22 0930    mirtazapine (REMERON) tablet 15 mg, 15 mg, Oral, QHS, Mahogany Chapa MD, 15 mg at 08/06/22 2036    ranolazine ER (RANEXA) tablet 500 mg, 500 mg, Oral, BID, Mahogany Chapa MD, 500 mg at 08/06/22 2036    acetaminophen (TYLENOL) tablet 650 mg, 650 mg, Oral, Q6H PRN, 650 mg at 07/30/22 2150 **OR** acetaminophen (TYLENOL) suppository 650 mg, 650 mg, Rectal, Q6H PRN, Mahogany Chapa MD    polyethylene glycol (MIRALAX) packet 17 g, 17 g, Oral, DAILY PRN, Mahogany Chapa MD    ondansetron (ZOFRAN ODT) tablet 4 mg, 4 mg, Oral, Q8H PRN **OR** ondansetron (ZOFRAN) injection 4 mg, 4 mg, IntraVENous, Q6H PRN, Mahogany Chapa MD    dexamethasone (DECADRON) 4 mg/mL injection 4 mg, 4 mg, IntraVENous, Q6H, Mahogany Chapa MD, 4 mg at 08/07/22 8663    Current Outpatient Medications   Medication Instructions    amLODIPine (NORVASC) 5 mg, Oral, DAILY    apixaban (ELIQUIS) 2.5 mg, Oral, 2 TIMES DAILY    aspirin delayed-release 81 mg, Oral, DAILY    atorvastatin (LIPITOR) 20 mg, Oral, DAILY    cholecalciferol (vitamin D3) 2,000 Units, Oral, EVERY BEDTIME    dexAMETHasone (DECADRON) 4 mg, Oral, 2 TIMES DAILY    doxazosin (CARDURA) 4 mg, Oral, DAILY    DULoxetine (CYMBALTA) 30 mg, Oral, DAILY    mirtazapine (REMERON) 15 mg, Oral, EVERY BEDTIME    ranolazine ER (RANEXA) 500 mg, Oral, 2 TIMES DAILY         Signed By: Jarrell Campos     August 7, 2022

## 2022-08-08 NOTE — PROGRESS NOTES
[facility] notified of the change in status, patient is intubated GCS of 3 and the family has decided to transition the patient's code status from Full Code to [address] Spoke with [employer], Shawn Reyes who advised, this patient would only be suitable for eye denotation and to call back within 60 minutes of death.

## 2022-08-08 NOTE — PROGRESS NOTES
Spiritual Care Assessment/Progress Note  [facility]      NAME: Lillian Zhang      MRN: [id number]  AGE: 80 y.o. SEX: male  Yazidism Affiliation: Church   Language: English     8/8/2022     Total Time (in minutes): 62     Spiritual Assessment begun in [facility] 2 CCU through conversation with:         []Patient        [x] Family    [] Friend(s)        Reason for Consult: End-of-life support     Spiritual beliefs: (Please include comment if needed)     [x] Identifies with a chuck tradition:    Jehovah's witness     [x] Supported by a chuck community:            [] Claims no spiritual orientation:           [] Seeking spiritual identity:                [] Adheres to an individual form of spirituality:           [] Not able to assess:                           Identified resources for coping:      [x] Prayer                               [] Music                  [] Guided Imagery     [x] Family/friends                 [] Pet visits     [] Devotional reading                         [] Unknown     [] Other:                                               Interventions offered during this visit: (See comments for more details)    Patient Interventions: Prayer (actual) (Ouside of room)     Family/Friend(s):  Affirmation of emotions/emotional suffering, Affirmation of chuck, Catharsis/review of pertinent events in supportive environment, Prayer (assurance of), Prayer (actual), Iconic (affirming the presence of God/Higher Power), End of life issues discussed     Plan of Care:     [] Support spiritual and/or cultural needs    [] Support AMD and/or advance care planning process      [] Support grieving process   [] Coordinate Rites and/or Rituals    [] Coordination with community clergy   [] No spiritual needs identified at this time   [] Detailed Plan of Care below (See Comments)  [] Make referral to Music Therapy  [] Make referral to Pet Therapy     [] Make referral to Addiction services  [] Make referral to [facility] Passages  [] Make referral to Spiritual Care Partner  [] No future visits requested        [x] Contact Spiritual Care for further referrals     Comments: Responded to request fro  to CVICU. Mr. Boogie Avina wife of 58 years and one of their sons were outside the room. They have three sons, two of which they have been unable to contact this early morning. Mr. Bobo Hooker is under contact restrictions. Present with nurse as she clarified what doctor had shared with Mrs. Bobo Hooker. They decided that they would move to comfort care. Mr. Bobo Hooker was/is a  and has [place] to [place]. At  his wife request provided prayer outside the room. They went back into the room to be by his bedside. Provided assurance of prayer. Presence on unit while withdraw of mechanical support. Place hand on doorway and provided silent prayer after death. Family remains in the room.     Hadley Rogers., MS., [facility]   can be reached by calling the  at [facility]  [phone]

## 2022-08-08 NOTE — FAMILY MEETING
Discussed events leading up to code blue and probable course of illness with patient's wife and son. Talked in depth regarding next steps. Dr. Marie Morgan at bedside speaking with family about chest tube placement r/t pneumothorax. Pt's family members declined this. Ultimately, decision made for DNR and comfort measures. Dr. Frantz Jackson paged for orders; patient's wife unable to understand him. Dr. Mario Jackson to call unit and get verbal confirmation to proceed with terminal extubation. Awaiting orders at this time.  called to support family at this time.

## 2022-08-08 NOTE — PROGRESS NOTES
0340 = extubated per family's wishes with Dr. Mahoney Found aware of the family decision. Pt noted to have no respiratory effort RR =0    HR = 0  BP = 0.  Pronounced per protocol

## 2022-08-08 NOTE — PROGRESS NOTES
Spiritual Care Assessment/Progress Note  [facility]      NAME: Selin Montgomery      MRN: [id number]  AGE: 80 y.o. SEX: male  Church Affiliation: Pentecostal   Language: English     8/8/2022     Total Time (in minutes): 29     Spiritual Assessment begun in [facility] 2 CCU through conversation with:         []Patient        [] Family    [] Friend(s)        Reason for Consult: Code Blue/Piyush     Spiritual beliefs: (Please include comment if needed)     [] Identifies with a chuck tradition:         [] Supported by a chuck community:            [] Claims no spiritual orientation:           [] Seeking spiritual identity:                [] Adheres to an individual form of spirituality:           [x] Not able to assess:                           Identified resources for coping:      [] Prayer                               [] Music                  [] Guided Imagery     [] Family/friends                 [] Pet visits     [] Devotional reading                         [x] Unknown     [] Other:                                              Interventions offered during this visit: (See comments for more details)    Patient Interventions: Initial visit (Attempted)           Plan of Care:     [] Support spiritual and/or cultural needs    [] Support AMD and/or advance care planning process      [] Support grieving process   [] Coordinate Rites and/or Rituals    [] Coordination with community clergy   [] No spiritual needs identified at this time   [] Detailed Plan of Care below (See Comments)  [] Make referral to Music Therapy  [] Make referral to Pet Therapy     [] Make referral to Addiction services  [] Make referral to [facility]  [] Make referral to Spiritual Care Partner  [] No future visits requested        [x] Contact Spiritual Care for further referrals     Comments: Responded to Code Blue in [facility]. Provided presence outside Mr. Darnell New room as staff worked with him. He is now intubated.   Unable to assess at this time. No family present. Consulted with nurse. Advised nurse to [facility] for any further referrals.    Ag Allen., MS., [facility] can be reached by calling the  at [facility]  [phone]

## 2022-08-08 NOTE — PROGRESS NOTES
PROGRESS NOTE    Patient: Justice Bowman MRN: [id number]  SSN: [id number]    YOB: 1929  Age: 80 y.o. Sex: male      Admit Date: 7/27/2022    LOS: 12 days       Subjective     Chief Complaint   Patient presents with    Shortness of Breath       HPI: Patient is a 80y.o. year old male with a significant PMH of Afib, hypercholesterolemia, HTN, neuropathy, and hx of stroke presented to the ED today due to hypoxia and worsening confusion. The patient was discharged from the hospital yesterday after being hospitalized d/t COVID-19. Per nursing facility his O2 was in the 80s and it was 89% upon admission. The patient was stable at that time of discharge yesterday and not using any oxygen. The patient is now on BiPAP with an O2 sat of 100%. Febrile at 101.5  WBC 25.1  Hypertensive on admission  Respiratory rate at 31  CXR: Stable bilateral pulmonary infiltrates. Patient was given:  10mg Decadron  2g Magnesium  Duoneb treatment  Terbutaline treatment     7/28  Pt sitting in bed, responds to voice  P02 53 on ABG this morning, pt placed on BiPap  SpO2 stable  CRP=16.1    XR CHEST PORT  Diffuse interstitial airspace opacities are not  significantly changed. Trace left effusion. Biapical pleural thickening. Heartsize is enlarged      7/29    Patient on BiPAP now on 70% O2  Patient pulled out BiPAP last night put on high flow    7/30    On high flow 90% O2      Left Ventricle: Normal left ventricular systolic function with a visually estimated EF of 55 - 60%. Left ventricle size is normal. Normal wall thickness. Normal wall motion. Grade I diastolic dysfunction with normal LAP. Aortic Valve: Moderate regurgitation. Mitral Valve: Mildly thickened leaflet. 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 Moderate to severe regurgitation with an eccentrically directed jet and may underestimate severity. Tricuspid Valve: Moderate regurgitation.       7/31    On high flow 80% O2    8/1    On high flow 70% O2  Poor appetite poor appetite    8/2  Patient still on high flow 80% O2 very poor appetite  Refuse NG tube    8/3  Patient alert awake on high flow 80% O2  Unable to get NG tube placed  Very poor appetite    Discussed with the patient wife yesterday  She wants PPN      8/4  Patient continues on high flow oxygen. 60 %    PEG tube placement was moved to tomorrow. Procedure was discussed with wife. Patient was seen by occupational therapist and PT yesterday and tolerated both session fairly well. Both noted weakness and increased need for oxygen when exerting himself. 8/8  Patient          Review of Systems   Unable to perform ROS: Acuity of condition       Objective     Visit Vitals  BP (!) 37/26   Pulse (!) 0   Temp 97.8 °F (36.6 °C)   Resp (!) 0   Ht 5' 5.98\" (1.676 m)   Wt 50.8 kg (111 lb 15.9 oz)   SpO2 100%   BMI 18.09 kg/m²    O2 Flow Rate (L/min): 60 l/min O2 Device: Ventilator    Physical Exam:   Physical Exam  Constitutional:       Appearance: He is normal weight. HENT:      Head: Normocephalic and atraumatic. Cardiovascular:      Rate and Rhythm: Normal rate and regular rhythm. Pulses: Normal pulses. Heart sounds: Normal heart sounds. Pulmonary:      Effort: Pulmonary effort is normal.      Breath sounds: Normal breath sounds. Musculoskeletal:         General: Normal range of motion. Cervical back: Normal range of motion and neck supple. Neurological:      General: No focal deficit present. Mental Status: He is alert. He is disoriented. Intake & Output:  Current Shift: No intake/output data recorded. Last three shifts: 08/06 1901 - 08/08 0700  In: -   Out: 620 [Urine:620]    Lab/Data Review: All lab results for the last 24 hours reviewed.        24 Hour Results:    Recent Results (from the past 24 hour(s))   C REACTIVE PROTEIN, QT    Collection Time: 08/08/22 12:35 AM   Result Value Ref Range    C-Reactive protein 18.60 (H) 0.00 - 0.60 mg/dL LD    Collection Time: 08/08/22 12:35 AM   Result Value Ref Range    LD 1,779 (H) 85 - 241 U/L   RENAL FUNCTION PANEL    Collection Time: 08/08/22 12:35 AM   Result Value Ref Range    Sodium 140 136 - 145 mmol/L    Potassium 4.7 3.5 - 5.1 mmol/L    Chloride 105 97 - 108 mmol/L    CO2 23 21 - 32 mmol/L    Anion gap 12 5 - 15 mmol/L    Glucose 138 (H) 65 - 100 mg/dL    BUN 42 (H) 6 - 20 mg/dL    Creatinine 0.98 0.70 - 1.30 mg/dL    BUN/Creatinine ratio 43 (H) 12 - 20      GFR est AA >60 >60 ml/min/1.73m2    GFR est non-AA >60 >60 ml/min/1.73m2    Calcium 7.2 (L) 8.5 - 10.1 mg/dL    Phosphorus 5.8 (H) 2.6 - 4.7 mg/dL    Albumin 1.8 (L) 3.5 - 5.0 g/dL   MAGNESIUM    Collection Time: 08/08/22 12:35 AM   Result Value Ref Range    Magnesium 2.6 (H) 1.6 - 2.4 mg/dL   CBC WITH AUTOMATED DIFF    Collection Time: 08/08/22 12:35 AM   Result Value Ref Range    WBC 12.3 (H) 4.1 - 11.1 K/uL    RBC 2.24 (L) 4.10 - 5.70 M/uL    HGB 7.1 (L) 12.1 - 17.0 g/dL    HCT 23.0 (L) 36.6 - 50.3 %    .7 (H) 80.0 - 99.0 FL    MCH 31.7 26.0 - 34.0 PG    MCHC 30.9 30.0 - 36.5 g/dL    RDW 17.1 (H) 11.5 - 14.5 %    PLATELET 66 (L) 637 - 400 K/uL    MPV 12.5 8.9 - 12.9 FL    NRBC 4.6 (H) 0.0  WBC    ABSOLUTE NRBC 0.57 (H) 0.00 - 0.01 K/uL    NEUTROPHILS 94 (H) 32 - 75 %    LYMPHOCYTES 3 (L) 12 - 49 %    MONOCYTES 1 (L) 5 - 13 %    EOSINOPHILS 0 0 - 7 %    BASOPHILS 0 0 - 1 %    IMMATURE GRANULOCYTES 2 (H) 0 - 0.5 %    ABS. NEUTROPHILS 11.4 (H) 1.8 - 8.0 K/UL    ABS. LYMPHOCYTES 0.4 (L) 0.8 - 3.5 K/UL    ABS. MONOCYTES 0.1 0.0 - 1.0 K/UL    ABS. EOSINOPHILS 0.0 0.0 - 0.4 K/UL    ABS. BASOPHILS 0.0 0.0 - 0.1 K/UL    ABS. IMM.  GRANS. 0.2 (H) 0.00 - 0.04 K/UL    DF AUTOMATED     PHOSPHORUS    Collection Time: 08/08/22 12:35 AM   Result Value Ref Range    Phosphorus 5.6 (H) 2.6 - 4.7 mg/dL   GLUCOSE, POC    Collection Time: 08/08/22 12:53 AM   Result Value Ref Range    Glucose (POC) 171 (H) 65 - 117 mg/dL    Performed by [facility]:    XR CHEST PORT   Final Result   Satisfactory endotracheal tube placement. Moderate to large right pneumothorax   with leftward mediastinal shift. Stable bilateral lung opacities. The findings were discussed with the patient's nurse on 8/8/2022 at 0145 hours   by Dr. Hillary Beauchamp. [phone]          XR CHEST PORT   Final Result      No change in mild bilateral airspace disease. Chronic interstitial changes. XR CHEST PORT   Final Result   Stable bilateral airspace disease. XR CHEST PORT   Final Result   Decreased lung volumes with increased interstitial and airspace   opacities bilaterally      XR CHEST PORT   Final Result      Feeding tube terminates in the region of the distal esophagus and should be   advanced at least 10 to 15 cm          XR CHEST PORT   Final Result   Interstitial and airspace opacities with some improvement in the   interval.      XR CHEST PORT   Final Result   No significant change in interstitial and airspace opacities   possibly pulmonary edema      XR CHEST PORT   Final Result   No significant change      XR CHEST PORT   Final Result      Little interval change. XR CHEST PORT   Final Result   Slightly increased diffuse bilateral interstitial and alveolar   opacities, compatible with COVID pneumonia. XR CHEST PORT   Final Result   No significant change       XR CHEST SNGL V   Final Result   Stable bilateral pulmonary infiltrates.          CT ABD PELV WO CONT    (Results Pending)          Assessment     Acute hypoxic respiratory failure on high flow 80 %  Respiratory alkalosis  COVID-19 Pneumonia  Sepsis  Hypertension  Hyperlipidemia  Stroke  Afib  MRSA nares  Elevated BNP  Hypokalemia    Plan   Continue meds:    Eliquis 2.5mg PO BID was held today and tomorrow      ASA 81 mg po daily was held today, will continue after procedure      Lipitor 20mg PO every day  Zithromax 500 IV daily  Olumiant 4mg PO QD  Decadron 4mg IV Q6h  Doxazosin 4mg PO QHS  Cymbalta 30mg POQD  Remeron 15mg PO every day  Vistaril 25mg Q8H daily  Mupirocin 2% ointment both nostrils BID  Zosyn 3.375g IV q8hrs  Ranolazine 500mg PO every day    Replace potassium as needed  Lasix 40 IV once as needed    Follow up with PEG placement   Monitor blood counts, BNP level    PEG tube placement today and start feeding this evening              Current Facility-Administered Medications:     0.9% sodium chloride infusion, 100 mL/hr, IntraVENous, CONTINUOUS, Jesse Hauser MD, Last Rate: 100 mL/hr at 08/08/22 0200, 100 mL/hr at 08/08/22 0200    NOREPINephrine (LEVOPHED) 8 mg in 0.9% NS 250ml infusion, 0.5-30 mcg/min, IntraVENous, TITRATE, Jesse Hauser MD, Last Rate: 15 mL/hr at 08/08/22 0122, 8 mcg/min at 08/08/22 0122    propofol (DIPRIVAN) 10 mg/mL infusion, 0-50 mcg/kg/min, IntraVENous, TITRATE, Earline Fabry, MD, Held at 08/08/22 0200    ondansetron (ZOFRAN) injection 4 mg, 4 mg, IntraVENous, Q4H PRN, Marshall Todd MD    LORazepam (INTENSOL) 2 mg/mL oral concentrate 1 mg, 1 mg, Oral, Q1H PRN, Marshall Cutler MD    morphine injection 2 mg, 2 mg, IntraVENous, Q15MIN PRN, Marshall Cutler MD    dexamethasone (DECADRON) 4 mg/mL injection 2 mg, 2 mg, IntraVENous, Q12H, Earline Fabry, MD, 2 mg at 08/07/22 2000    pantoprazole (PROTONIX) 40 mg in 0.9% sodium chloride 10 mL injection, 40 mg, IntraVENous, DAILY, Earline Fabry, MD    TPN ADULT-PERIPHERAL AA 4.25% D5% + ELECTROLYTES, , IntraVENous, CONTINUOUS, Mikey ALVAREZ MD, Last Rate: 63 mL/hr at 08/07/22 2151, New Bag at 08/07/22 2151    fat emulsion 20% (LIPOSYN, INTRAlipid) infusion 250 mL, 250 mL, IntraVENous, CONTINUOUS, Marshall Todd MD, Last Rate: 20.83 mL/hr at 08/07/22 2149, 250 mL at 08/07/22 2149    lidocaine (XYLOCAINE) 2 % jelly, , Mucous Membrane, PRN, Earline Fabry, MD    0.9% sodium chloride infusion 250 mL, 250 mL, IntraVENous, PRN, Garrett Browning MD    nystatin (MYCOSTATIN) 100,000 unit/mL oral suspension 500,000 Units, 500,000 Units, Oral, QID, Bharath Johnson MD, 500,000 Units at 08/06/22 2146    hydrOXYzine (VISTARIL) injection 25 mg, 25 mg, IntraMUSCular, Q6H PRN, Mahogany Chapa MD, 25 mg at 08/07/22 1802    piperacillin-tazobactam (ZOSYN) 3.375 g in 0.9% sodium chloride (MBP/ADV) 100 mL MBP, 3.375 g, IntraVENous, Q8H, Mahogany Chapa MD, Last Rate: 25 mL/hr at 08/07/22 1955, 3.375 g at 08/07/22 1955    [Held by provider] apixaban (ELIQUIS) tablet 2.5 mg, 2.5 mg, Oral, BID, Mahogany Chapa MD, 2.5 mg at 08/03/22 2010    aspirin delayed-release tablet 81 mg, 81 mg, Oral, DAILY, Mahogany Chapa MD, 81 mg at 08/06/22 0930    [Held by provider] atorvastatin (LIPITOR) tablet 20 mg, 20 mg, Oral, DAILY, Mahogany Chapa MD, 20 mg at 08/06/22 0930    doxazosin (CARDURA) tablet 4 mg, 4 mg, Oral, QHS, Mahogany Chapa MD, 4 mg at 08/06/22 2146    DULoxetine (CYMBALTA) capsule 30 mg, 30 mg, Oral, DAILY, Mahogany Chapa MD, 30 mg at 08/06/22 0930    mirtazapine (REMERON) tablet 15 mg, 15 mg, Oral, QHS, Mahogany Chapa MD, 15 mg at 08/06/22 2036    ranolazine ER (RANEXA) tablet 500 mg, 500 mg, Oral, BID, Catie Chapa MD, 500 mg at 08/06/22 2036    acetaminophen (TYLENOL) tablet 650 mg, 650 mg, Oral, Q6H PRN, 650 mg at 07/30/22 2150 **OR** acetaminophen (TYLENOL) suppository 650 mg, 650 mg, Rectal, Q6H PRN, Mahogany Chapa MD    polyethylene glycol (MIRALAX) packet 17 g, 17 g, Oral, DAILY PRN, Mahogany Chapa MD    Current Outpatient Medications   Medication Instructions    amLODIPine (NORVASC) 5 mg, Oral, DAILY    apixaban (ELIQUIS) 2.5 mg, Oral, 2 TIMES DAILY    aspirin delayed-release 81 mg, Oral, DAILY    atorvastatin (LIPITOR) 20 mg, Oral, DAILY    cholecalciferol (vitamin D3) 2,000 Units, Oral, EVERY BEDTIME    dexAMETHasone (DECADRON) 4 mg, Oral, 2 TIMES DAILY    doxazosin (CARDURA) 4 mg, Oral, DAILY    DULoxetine (CYMBALTA) 30 mg, Oral, DAILY    mirtazapine (REMERON) 15 mg, Oral, EVERY BEDTIME    ranolazine ER (RANEXA) 500 mg, Oral, 2 TIMES DAILY         Signed By: Silvio Arthur     August 8, 2022

## 2022-09-21 NOTE — DISCHARGE SUMMARY
Discharge Summary     Patient: Johnny Treviño MRN: [id number]  SSN: [id number]    YOB: 1929  Age: 80 y.o. Sex: male       Admit Date: 2022    Discharge Date: 2022      Admission Diagnoses: COVID [U07.1]  Sepsis ([facility] [address] [A41.9]    Discharge Diagnoses:   Problem List as of 2022 Never Reviewed            Codes Class Noted - Resolved    Severe protein-calorie malnutrition ([facility] [address] ICD-10-CM: E43  ICD-9-CM: 262  2022 - Present        Sepsis ([facility] [address] ICD-10-CM: A41.9  ICD-9-CM: 038.9, 995.91  2022 - Present        Hypoxia ICD-10-CM: R09.02  ICD-9-CM: 799.02  2022 - Present        COVID ICD-10-CM: U07.1  ICD-9-CM: 079.89  2022 - Present        Carotid stenosis ICD-10-CM: I65.29  ICD-9-CM: 433.10  2021 - Present            Discharge Condition:     Hospital Course: Was admitted for COVID-19 pneumonia, other acquired pneumonia, sepsis, hiatal hernia patient was admitted for acute hypoxic respiratory failure on ventilator none invasive with FiO2 of 70%, healthcare associated pneumonia, large hiatal hernia, oral thrush, anemia, metabolic abnormalities including hypokalemia, hypocalcemia, moderate aortic regurgitation, moderate to severe mitral regurgitation, diastolic left ventricular dysfunction, atrial fibrillation, recent COVID pneumonitis patient was placed on IV Zosyn and continued on TPN therapy patient was seen by critical care/pulmonary and also by infectious disease subsequently     Consults: Infectious Disease and Pulmonary/Critical Care    Significant Diagnostic Studies:       XR CHEST PORT   Final Result   Satisfactory endotracheal tube placement. Moderate to large right pneumothorax   with leftward mediastinal shift. Stable bilateral lung opacities. The findings were discussed with the patient's nurse on 2022 at 0145 hours   by Dr. Aníbal Maloney.       789          XR CHEST PORT   Final Result      No change in mild bilateral airspace disease. Chronic interstitial changes. XR CHEST PORT   Final Result   Stable bilateral airspace disease. XR CHEST PORT   Final Result   Decreased lung volumes with increased interstitial and airspace   opacities bilaterally      XR CHEST PORT   Final Result      Feeding tube terminates in the region of the distal esophagus and should be   advanced at least 10 to 15 cm          XR CHEST PORT   Final Result   Interstitial and airspace opacities with some improvement in the   interval.      XR CHEST PORT   Final Result   No significant change in interstitial and airspace opacities   possibly pulmonary edema      XR CHEST PORT   Final Result   No significant change      XR CHEST PORT   Final Result      Little interval change. XR CHEST PORT   Final Result   Slightly increased diffuse bilateral interstitial and alveolar   opacities, compatible with COVID pneumonia. XR CHEST PORT   Final Result   No significant change       XR CHEST SNGL V   Final Result   Stable bilateral pulmonary infiltrates. CT ABD PELV WO CONT    (Results Pending)       Disposition:     Discharge Medications:   Cannot display discharge medications since this patient is not currently admitted. Activity:   Diet:   Wound Care:     No orders of the defined types were placed in this encounter. 35 minutes discharge time  Signed By: Agnieszka Ott MD     2022       .

## 2025-02-10 NOTE — PROGRESS NOTES
Patient continues to refuse NGT. He states maybe in the morning. Wife is asking about nutrition through his IV. I explained that nutrition via the GI tract is optimal for healing and nourishment. I also explained risks associated with TPN. [Cough] : cough [Negative] : Heme/Lymph

## (undated) DEVICE — DRAPE SURG UTIL 26X15 IN W/ TAPE N INVASIVE MULT LAYR DISP

## (undated) DEVICE — C-ARM: Brand: UNBRANDED

## (undated) DEVICE — INTRODUCER SHTH 0.018 IN 21 GAX7 CM TRANSCAROTID ACCS KT

## (undated) DEVICE — VASCULAR-SFMC: Brand: MEDLINE INDUSTRIES, INC.

## (undated) DEVICE — SYR 3ML LL TIP 1/10ML GRAD --

## (undated) DEVICE — SUTURE MCRYL SZ 4-0 L27IN ABSRB UD L19MM PS-2 1/2 CIR PRIM Y426H

## (undated) DEVICE — DERMABOND SKIN ADH 0.7ML -- DERMABOND ADVANCED 12/BX

## (undated) DEVICE — TRAY PREP DRY W/ PREM GLV 2 APPL 6 SPNG 2 UNDPD 1 OVERWRAP

## (undated) DEVICE — SUTURE VCRL SZ 3-0 L27IN ABSRB UD L26MM SH 1/2 CIR J416H

## (undated) DEVICE — GUIDEWIRE ENDOSCP L150CM DIA0.035IN TIP L15CM BENT PTFE

## (undated) DEVICE — ANGLED-TIP ARTERIAL SHEATH CONFIGURATION: Brand: ENROUTE TRANSCAROTID NEUROPROTECTION SYSTEM

## (undated) DEVICE — ENDO KIT 1: Brand: MEDLINE INDUSTRIES, INC.

## (undated) DEVICE — GLOVE ORANGE PI 7 1/2   MSG9075

## (undated) DEVICE — GLOVE SURG SZ 75 CRM LTX FREE POLYISOPRENE POLYMER BEAD ANTI

## (undated) DEVICE — COVER,MAYO STAND,STERILE: Brand: MEDLINE

## (undated) DEVICE — INFLATION DEVICE: Brand: ENCORE™ 26

## (undated) DEVICE — GUIDEWIRE VASCULAR L95CM DIA0.014IN ENROUTE

## (undated) DEVICE — SYR 10ML LUER LOK 1/5ML GRAD --

## (undated) DEVICE — TOTAL TRAY, 16FR 10ML SIL FOLEY, URN: Brand: MEDLINE

## (undated) DEVICE — SUT SLK 2-0SH 30IN BLK --

## (undated) DEVICE — MOUTHPIECE ENDOSCP 20X27MM --

## (undated) DEVICE — PTA BALLOON DILATATION CATHETER: Brand: STERLING™

## (undated) DEVICE — SEALANT HEMOSTAT W/THROM 8ML -- SURGIFLO MATRIX

## (undated) DEVICE — COVER US PRB W15XL120CM W/ GEL RUBBERBAND TAPE STRP FLD GEN

## (undated) DEVICE — SUTURE PROL SZ 5-0 L24IN NONABSORBABLE BLU RB-2 L13IN 1/2 8554H

## (undated) DEVICE — DRAPE,LAPAROTOMY,PED,STERILE: Brand: MEDLINE

## (undated) DEVICE — NEEDLE HYPO 25GA L1.5IN BVL ORIENTED ECLIPSE

## (undated) DEVICE — MICROPUNCTURE INTRODUCER SET SILHOUETTE TRANSITIONLESS WITH STAINLESS STEEL WIRE GUIDE: Brand: MICROPUNCTURE

## (undated) DEVICE — BLANKET WRM W35.4XL86.6IN FULL UNDERBODY + FORC AIR

## (undated) DEVICE — Device

## (undated) DEVICE — BLADE ASSEMB CLP HAIR FINE --

## (undated) DEVICE — CANNULA NSL O2 AD 7 FT END-TIDAL CARBON DIOX VENTFLO